# Patient Record
Sex: FEMALE | Race: WHITE | NOT HISPANIC OR LATINO | Employment: OTHER | URBAN - METROPOLITAN AREA
[De-identification: names, ages, dates, MRNs, and addresses within clinical notes are randomized per-mention and may not be internally consistent; named-entity substitution may affect disease eponyms.]

---

## 2017-07-12 ENCOUNTER — TELEPHONE (OUTPATIENT)
Dept: ORTHOPEDICS | Facility: CLINIC | Age: 67
End: 2017-07-12

## 2017-07-12 ENCOUNTER — OFFICE VISIT (OUTPATIENT)
Dept: ORTHOPEDICS | Facility: CLINIC | Age: 67
End: 2017-07-12
Payer: COMMERCIAL

## 2017-07-12 VITALS
HEART RATE: 93 BPM | BODY MASS INDEX: 28.34 KG/M2 | WEIGHT: 187 LBS | HEIGHT: 68 IN | DIASTOLIC BLOOD PRESSURE: 72 MMHG | SYSTOLIC BLOOD PRESSURE: 142 MMHG

## 2017-07-12 DIAGNOSIS — M17.12 PRIMARY OSTEOARTHRITIS OF LEFT KNEE: ICD-10-CM

## 2017-07-12 DIAGNOSIS — M25.562 LEFT KNEE PAIN: ICD-10-CM

## 2017-07-12 PROCEDURE — 99203 OFFICE O/P NEW LOW 30 MIN: CPT | Mod: ,,, | Performed by: ORTHOPAEDIC SURGERY

## 2017-07-12 PROCEDURE — 73562 X-RAY EXAM OF KNEE 3: CPT | Mod: LT,,, | Performed by: ORTHOPAEDIC SURGERY

## 2017-07-12 PROCEDURE — 1159F MED LIST DOCD IN RCRD: CPT | Mod: ,,, | Performed by: ORTHOPAEDIC SURGERY

## 2017-07-12 RX ORDER — AMLODIPINE BESYLATE 5 MG/1
1 TABLET ORAL DAILY
Refills: 1 | COMMUNITY
Start: 2017-06-05 | End: 2021-04-19 | Stop reason: DRUGHIGH

## 2017-07-12 RX ORDER — BUDESONIDE AND FORMOTEROL FUMARATE DIHYDRATE 160; 4.5 UG/1; UG/1
2 AEROSOL RESPIRATORY (INHALATION) DAILY
Refills: 5 | COMMUNITY
Start: 2017-06-05 | End: 2018-12-03

## 2017-07-12 RX ORDER — INSULIN ASPART 100 [IU]/ML
20 INJECTION, SUSPENSION SUBCUTANEOUS
Refills: 5 | Status: ON HOLD | COMMUNITY
Start: 2017-06-05 | End: 2021-05-11 | Stop reason: HOSPADM

## 2017-07-12 RX ORDER — PANCRELIPASE 60000; 12000; 38000 [USP'U]/1; [USP'U]/1; [USP'U]/1
2 CAPSULE, DELAYED RELEASE PELLETS ORAL 4 TIMES DAILY
Refills: 5 | COMMUNITY
Start: 2017-05-26

## 2017-07-17 DIAGNOSIS — M17.12 OSTEOARTHRITIS OF LEFT KNEE: Primary | ICD-10-CM

## 2017-08-04 ENCOUNTER — HISTORICAL (OUTPATIENT)
Dept: ADMINISTRATIVE | Facility: HOSPITAL | Age: 67
End: 2017-08-04

## 2017-08-04 ENCOUNTER — OFFICE VISIT (OUTPATIENT)
Dept: ORTHOPEDICS | Facility: CLINIC | Age: 67
End: 2017-08-04
Payer: MEDICARE

## 2017-08-04 VITALS
WEIGHT: 187 LBS | SYSTOLIC BLOOD PRESSURE: 150 MMHG | DIASTOLIC BLOOD PRESSURE: 99 MMHG | HEIGHT: 68 IN | BODY MASS INDEX: 28.34 KG/M2 | HEART RATE: 88 BPM

## 2017-08-04 DIAGNOSIS — M17.12 PRIMARY OSTEOARTHRITIS OF LEFT KNEE: Primary | ICD-10-CM

## 2017-08-04 LAB
ALBUMIN SERPL-MCNC: 4.3 G/DL (ref 3.1–4.7)
ALP SERPL-CCNC: 84 IU/L (ref 40–104)
ALT (SGPT): 19 IU/L (ref 3–33)
AST SERPL-CCNC: 18 IU/L (ref 10–40)
BACTERIA SPEC CULT: NORMAL
BASOPHILS NFR BLD: 0.1 K/UL (ref 0–0.2)
BASOPHILS NFR BLD: 0.8 %
BILIRUB SERPL-MCNC: 0.7 MG/DL (ref 0.3–1)
BUN SERPL-MCNC: 25 MG/DL (ref 8–20)
CALCIUM SERPL-MCNC: 9.3 MG/DL (ref 7.7–10.4)
CHLORIDE: 103 MMOL/L (ref 98–110)
CO2 SERPL-SCNC: 29.1 MMOL/L (ref 22.8–31.6)
CREATININE: 0.79 MG/DL (ref 0.6–1.4)
EOSINOPHIL NFR BLD: 0.3 K/UL (ref 0–0.7)
EOSINOPHIL NFR BLD: 3.2 %
ERYTHROCYTE [DISTWIDTH] IN BLOOD BY AUTOMATED COUNT: 12.1 % (ref 11.7–14.9)
GLUCOSE: 113 MG/DL (ref 70–99)
GRAN #: 5.1 K/UL (ref 1.4–6.5)
GRAN%: 64.2 %
HCT VFR BLD AUTO: 42.2 % (ref 36–48)
HGB BLD-MCNC: 14.3 G/DL (ref 12–15)
IMMATURE GRANS (ABS): 0.1 K/UL (ref 0–1)
IMMATURE GRANULOCYTES: 0.6 %
LYMPH #: 1.9 K/UL (ref 1.2–3.4)
LYMPH%: 23.6 %
MCH RBC QN AUTO: 31.9 PG (ref 25–35)
MCHC RBC AUTO-ENTMCNC: 33.9 G/DL (ref 31–36)
MCV RBC AUTO: 94.2 FL (ref 79–98)
MONO #: 0.6 K/UL (ref 0.1–0.6)
MONO%: 7.6 %
MRSA SCREEN BY PCR: NORMAL
NUCLEATED RBCS: 0 %
PLATELET # BLD AUTO: 196 K/UL (ref 140–440)
PMV BLD AUTO: 10.9 FL (ref 8.8–12.7)
POTASSIUM SERPL-SCNC: 4.8 MMOL/L (ref 3.5–5)
PROT SERPL-MCNC: 7.6 G/DL (ref 6–8.2)
RBC # BLD AUTO: 4.48 M/UL (ref 3.5–5.5)
RBC # BLD AUTO: NORMAL /HPF
SODIUM: 138 MMOL/L (ref 134–144)
SQUAMOUS EPITHELIAL, UA: NORMAL
WBC # BLD AUTO: 7.9 K/UL (ref 5–10)
WBC # BLD: NORMAL /HPF

## 2017-08-04 PROCEDURE — 99499 UNLISTED E&M SERVICE: CPT | Mod: ,,, | Performed by: ORTHOPAEDIC SURGERY

## 2017-08-04 RX ORDER — HYDROCODONE BITARTRATE AND ACETAMINOPHEN 7.5; 325 MG/1; MG/1
1 TABLET ORAL EVERY 12 HOURS PRN
Qty: 30 TABLET | Refills: 0 | Status: SHIPPED | OUTPATIENT
Start: 2017-08-04 | End: 2017-08-18 | Stop reason: DRUGHIGH

## 2017-08-04 NOTE — PROGRESS NOTES
Subjective:       Chief Complaint    Chief Complaint   Patient presents with    Left Knee - Pre-op Exam     DOS: will be on 08/10/2017 left total knee arthroplasty.  The purpose is to relieve the pain of the osteoarthritis left knee.       CURT Otero is a 66 y.o.  female who presents Preop for left total knee arthroplasty 08/10/2017.  Using Aleve or ibuprofen for pain relief. Works in an assisted living environment or apartment complex. We will give her Norco 7.5 one every 12 hours when necessary pain preoperatively. Been advised to completely stop taking NSAIDs. Point review of systems is negative. Previous pack a day smoker, quit in April of this past year. Occasionally gets shortness of breath. Does not have a productive cough. She also has frequent nocturia due to prolapsed bladder. This has been going on for years. She denies previous chronic infections.      Past History  Past Medical History:   Diagnosis Date    Arthritis     Asthma     Diabetes mellitus     Diabetes mellitus, type 2     Diverticulosis     HNP (herniated nucleus pulposus)     LUMBAR    Hypertension     Pancreatic insufficiency     s/p whipple, non cancer    Presence of dental bridge     UPPER     Past Surgical History:   Procedure Laterality Date    APPENDECTOMY      BACK SURGERY  1994    lower back    BACK SURGERY  2012    lower back    CHOLECYSTECTOMY      COLONOSCOPY  2009    normal, recheck 2019    TONSILLECTOMY      WHIPPLE PROCEDURE W/ LAPAROSCOPY  10/09     Social History     Social History    Marital status:      Spouse name: N/A    Number of children: N/A    Years of education: N/A     Occupational History    Not on file.     Social History Main Topics    Smoking status: Former Smoker     Packs/day: 0.00     Years: 30.00     Quit date: 04/2017    Smokeless tobacco: Never Used    Alcohol use No    Drug use: No    Sexual activity: Not on file     Other Topics Concern    Not on file      Social History Narrative    No narrative on file         Medications  Current Outpatient Prescriptions   Medication Sig    albuterol (PROVENTIL/VENTOLIN) 90 mcg/actuation inhaler Inhale 2 puffs into the lungs every 6 (six) hours as needed for Wheezing.    amlodipine (NORVASC) 5 MG tablet Take 1 tablet by mouth once daily.    CREON CpDR Take 2 capsules by mouth 4 (four) times daily.    NOVOLOG MIX 70-30 FLEXPEN 100 unit/mL (70-30) InPn pen Inject 12 Units into the skin 3 (three) times daily with meals.    SYMBICORT 160-4.5 mcg/actuation HFAA Inhale 2 puffs into the lungs once daily.    hydrocodone-acetaminophen 7.5-325mg (NORCO) 7.5-325 mg per tablet Take 1 tablet by mouth every 12 (twelve) hours as needed for Pain.     No current facility-administered medications for this visit.        Allergies  Review of patient's allergies indicates:   Allergen Reactions    Sulfa (sulfonamide antibiotics) Hives    Penicillin v      Childhood reaction, swelled         Review of Systems     Constitutional: Negative    HENT: Negative  Eyes: Negative  Respiratory: Negative  Cardiovascular: Negative  Musculoskeletal: HPI  Skin: Negative  Neurological: Negative  Hematological: Negative  Endocrine: Negative      Physical Exam    Vitals:    08/04/17 0922   BP: (!) 150/99   Pulse: 88     Physical Examination:Left knee-valgus alignment, with diffuse tenderness. Patellofemoral crepitance. Joint lines are tender. Range of motion is -°. Knee is boggy.     Skin- clear  General appearance -  well appearing, and in no distress  Mental status - awake  Neck - supple  Chest -  symmetric air entry  Heart - normal rate   Abdomen - soft      Assessment/Plan   Primary osteoarthritis of left knee  -     HME - OTHER    Other orders  -     hydrocodone-acetaminophen 7.5-325mg (NORCO) 7.5-325 mg per tablet; Take 1 tablet by mouth every 12 (twelve) hours as needed for Pain.  Dispense: 30 tablet; Refill: 0    TKA scheduled 08/10/2017. All  questions answered.        This note was dictated using voice recognition software and may contain grammatical errors.

## 2017-08-07 LAB
BACTERIA SPEC CULT: NORMAL
RBC # BLD AUTO: NORMAL /HPF
SQUAMOUS EPITHELIAL, UA: NORMAL
WBC # BLD: NORMAL /HPF

## 2017-08-10 LAB — GLUCOSE SERPL-MCNC: 202 MG/DL (ref 70–99)

## 2017-08-11 LAB
BUN SERPL-MCNC: 16 MG/DL (ref 8–20)
CALCIUM SERPL-MCNC: 8.1 MG/DL (ref 7.7–10.4)
CHLORIDE: 99 MMOL/L (ref 98–110)
CO2 SERPL-SCNC: 27.6 MMOL/L (ref 22.8–31.6)
CREATININE: 0.93 MG/DL (ref 0.6–1.4)
GLUCOSE: 256 MG/DL (ref 70–99)
HCT VFR BLD AUTO: 34.5 % (ref 36–48)
HGB BLD-MCNC: 11.4 G/DL (ref 12–15)
MCH RBC QN AUTO: 31.7 PG (ref 25–35)
MCHC RBC AUTO-ENTMCNC: 33 G/DL (ref 31–36)
MCV RBC AUTO: 95.8 FL (ref 79–98)
NUCLEATED RBCS: 0 %
PLATELET # BLD AUTO: 163 K/UL (ref 140–440)
POTASSIUM SERPL-SCNC: 4.3 MMOL/L (ref 3.5–5)
RBC # BLD AUTO: 3.6 M/UL (ref 3.5–5.5)
SODIUM: 134 MMOL/L (ref 134–144)
WBC # BLD AUTO: 9 K/UL (ref 5–10)

## 2017-08-13 LAB
HCT VFR BLD AUTO: 29.2 % (ref 36–48)
HGB BLD-MCNC: 9.6 G/DL (ref 12–15)
MCH RBC QN AUTO: 31.6 PG (ref 25–35)
MCHC RBC AUTO-ENTMCNC: 32.9 G/DL (ref 31–36)
MCV RBC AUTO: 96.1 FL (ref 79–98)
NUCLEATED RBCS: 0 %
PLATELET # BLD AUTO: 141 K/UL (ref 140–440)
RBC # BLD AUTO: 3.04 M/UL (ref 3.5–5.5)
WBC # BLD AUTO: 8.1 K/UL (ref 5–10)

## 2017-08-18 ENCOUNTER — OFFICE VISIT (OUTPATIENT)
Dept: ORTHOPEDICS | Facility: CLINIC | Age: 67
End: 2017-08-18
Payer: MEDICARE

## 2017-08-18 VITALS
DIASTOLIC BLOOD PRESSURE: 58 MMHG | WEIGHT: 187 LBS | BODY MASS INDEX: 28.34 KG/M2 | SYSTOLIC BLOOD PRESSURE: 136 MMHG | HEIGHT: 68 IN | HEART RATE: 95 BPM

## 2017-08-18 DIAGNOSIS — Z98.890 POST-OPERATIVE STATE: ICD-10-CM

## 2017-08-18 DIAGNOSIS — M17.12 PRIMARY OSTEOARTHRITIS OF LEFT KNEE: Primary | ICD-10-CM

## 2017-08-18 PROCEDURE — 99024 POSTOP FOLLOW-UP VISIT: CPT | Mod: ,,, | Performed by: ORTHOPAEDIC SURGERY

## 2017-08-18 RX ORDER — ENOXAPARIN SODIUM 100 MG/ML
30 INJECTION SUBCUTANEOUS DAILY
Refills: 0 | COMMUNITY
Start: 2017-08-10 | End: 2017-08-20

## 2017-08-18 RX ORDER — HYDROCODONE BITARTRATE AND ACETAMINOPHEN 10; 325 MG/1; MG/1
TABLET ORAL EVERY 4 HOURS PRN
COMMUNITY
End: 2017-09-12

## 2017-08-18 NOTE — PROGRESS NOTES
Subjective:       Chief Complaint    Chief Complaint   Patient presents with    Post-op Evaluation     8 days, left knee.  DOS: 8/10/17, Left TKA (Depuy Attune).  Patient states she has some swelling that started about 2-3 days ago.  Her pain is tolerable.  Home P.T. 3 times a week and using CPM machine daily.  She is using a walker to ambulate.         CURT Otero is a 66 y.o.  female who presents 8 days post total knee replacement, left knee. She hs done well. Treated with Sultana dressing.      Past History  Past Medical History:   Diagnosis Date    Arthritis     Asthma     Diabetes mellitus     Diabetes mellitus, type 2     Diverticulosis     HNP (herniated nucleus pulposus)     LUMBAR    Hypertension     Pancreatic insufficiency     s/p whipple, non cancer    Presence of dental bridge     UPPER     Past Surgical History:   Procedure Laterality Date    APPENDECTOMY      BACK SURGERY  1994    lower back    BACK SURGERY  2012    lower back    CHOLECYSTECTOMY      COLONOSCOPY  2009    normal, recheck 2019    TONSILLECTOMY      TOTAL KNEE ARTHROPLASTY Left 08/10/2017    WHIPPLE PROCEDURE W/ LAPAROSCOPY  10/09     Social History     Social History    Marital status:      Spouse name: N/A    Number of children: N/A    Years of education: N/A     Occupational History    Not on file.     Social History Main Topics    Smoking status: Former Smoker     Packs/day: 0.00     Years: 30.00     Quit date: 04/2017    Smokeless tobacco: Never Used    Alcohol use No    Drug use: No    Sexual activity: Not on file     Other Topics Concern    Not on file     Social History Narrative    No narrative on file         Medications  Current Outpatient Prescriptions   Medication Sig    albuterol (PROVENTIL/VENTOLIN) 90 mcg/actuation inhaler Inhale 2 puffs into the lungs every 6 (six) hours as needed for Wheezing.    amlodipine (NORVASC) 5 MG tablet Take 1 tablet by mouth once daily.    CREON  CpDR Take 2 capsules by mouth 4 (four) times daily.    enoxaparin (LOVENOX) 30 mg/0.3 mL Syrg Inject 30 mg into the skin once daily.    hydrocodone-acetaminophen 10-325mg (NORCO)  mg Tab Take by mouth every 4 (four) hours as needed for Pain.    NOVOLOG MIX 70-30 FLEXPEN 100 unit/mL (70-30) InPn pen Inject 12 Units into the skin 3 (three) times daily with meals.    SYMBICORT 160-4.5 mcg/actuation HFAA Inhale 2 puffs into the lungs once daily.     No current facility-administered medications for this visit.        Allergies  Review of patient's allergies indicates:   Allergen Reactions    Sulfa (sulfonamide antibiotics) Hives    Penicillin v      Childhood reaction, swelled         Review of Systems     Constitutional: Negative    HENT: Negative  Eyes: Negative  Respiratory: Negative  Cardiovascular: Negative  Musculoskeletal: HPI  Skin: Negative  Neurological: Negative  Hematological: Negative  Endocrine: Negative      Physical Exam    Vitals:    08/18/17 0950   BP: (!) 136/58   Pulse: 95     Physical Examination:Left knee demonstrates moderate swelling. Diffuse tenderness as expected. Incision looks good. Straight leg raise. Motions of -10-70°. Staples not quite ready for removal. But healing well.     Skin-  General appearance -  well appearing, and in no distress  Mental status - awake  Neck - supple  Chest -  symmetric air entry  Heart - normal rate   Abdomen - soft      Assessment/Plan   Primary osteoarthritis of left knee    Post-operative state      Will return Tuesday for staple removal. Encouraged to use ice.      This note was dictated using voice recognition software and may contain grammatical errors.

## 2017-08-22 ENCOUNTER — OFFICE VISIT (OUTPATIENT)
Dept: ORTHOPEDICS | Facility: CLINIC | Age: 67
End: 2017-08-22
Payer: MEDICARE

## 2017-08-22 VITALS
SYSTOLIC BLOOD PRESSURE: 142 MMHG | HEART RATE: 106 BPM | BODY MASS INDEX: 28.34 KG/M2 | DIASTOLIC BLOOD PRESSURE: 80 MMHG | WEIGHT: 187 LBS | HEIGHT: 68 IN

## 2017-08-22 DIAGNOSIS — M17.12 PRIMARY OSTEOARTHRITIS OF LEFT KNEE: Primary | ICD-10-CM

## 2017-08-22 DIAGNOSIS — Z98.890 POST-OPERATIVE STATE: ICD-10-CM

## 2017-08-22 DIAGNOSIS — Z96.652 S/P TOTAL KNEE ARTHROPLASTY, LEFT: ICD-10-CM

## 2017-08-22 PROCEDURE — 99024 POSTOP FOLLOW-UP VISIT: CPT | Mod: ,,, | Performed by: ORTHOPAEDIC SURGERY

## 2017-08-22 RX ORDER — HYDROCODONE BITARTRATE AND ACETAMINOPHEN 10; 325 MG/1; MG/1
1 TABLET ORAL EVERY 4 HOURS PRN
Qty: 60 TABLET | Refills: 0 | Status: SHIPPED | OUTPATIENT
Start: 2017-08-22 | End: 2019-05-09

## 2017-08-22 NOTE — PROGRESS NOTES
Subjective:       Chief Complaint    Chief Complaint   Patient presents with    Post-op Evaluation     11 days, left knee.  DOS: 8/10/17, Left TKA (Depuy Attune).  Still has some stiffness.  Patient is here to possibly have the remaining stitches removed.       CURT Otero is a 66 y.o.  female who presentsFollow-up on total knee arthroplasty for removal of remaining staples. Using CPM for at least 6 hours a day. Lives in [place].       Past History  Past Medical History:   Diagnosis Date    Arthritis     Asthma     Diabetes mellitus     Diabetes mellitus, type 2     Diverticulosis     HNP (herniated nucleus pulposus)     LUMBAR    Hypertension     Pancreatic insufficiency     s/p whipple, non cancer    Presence of dental bridge     UPPER     Past Surgical History:   Procedure Laterality Date    APPENDECTOMY      BACK SURGERY  1994    lower back    BACK SURGERY  2012    lower back    CHOLECYSTECTOMY      COLONOSCOPY  2009    normal, recheck 2019    TONSILLECTOMY      TOTAL KNEE ARTHROPLASTY Left 08/10/2017    WHIPPLE PROCEDURE W/ LAPAROSCOPY  10/09     Social History     Social History    Marital status:      Spouse name: N/A    Number of children: N/A    Years of education: N/A     Occupational History    Not on file.     Social History Main Topics    Smoking status: Former Smoker     Packs/day: 0.00     Years: 30.00     Quit date: 04/2017    Smokeless tobacco: Never Used    Alcohol use No    Drug use: No    Sexual activity: Not on file     Other Topics Concern    Not on file     Social History Narrative    No narrative on file         Medications  Current Outpatient Prescriptions   Medication Sig    albuterol (PROVENTIL/VENTOLIN) 90 mcg/actuation inhaler Inhale 2 puffs into the lungs every 6 (six) hours as needed for Wheezing.    amlodipine (NORVASC) 5 MG tablet Take 1 tablet by mouth once daily.    CREON CpDR Take 2 capsules by mouth 4 (four) times daily.     hydrocodone-acetaminophen 10-325mg (NORCO)  mg Tab Take by mouth every 4 (four) hours as needed for Pain.    NOVOLOG MIX 70-30 FLEXPEN 100 unit/mL (70-30) InPn pen Inject 12 Units into the skin 3 (three) times daily with meals.    SYMBICORT 160-4.5 mcg/actuation HFAA Inhale 2 puffs into the lungs once daily.    hydrocodone-acetaminophen 10-325mg (NORCO)  mg Tab Take 1 tablet by mouth every 4 (four) hours as needed for Pain.     No current facility-administered medications for this visit.        Allergies  Review of patient's allergies indicates:   Allergen Reactions    Sulfa (sulfonamide antibiotics) Hives    Penicillin v      Childhood reaction, swelled         Review of Systems     Constitutional: Negative    HENT: Negative  Eyes: Negative  Respiratory: Negative  Cardiovascular: Negative  Musculoskeletal: HPI  Skin: Negative  Neurological: Negative  Hematological: Negative  Endocrine: Negative      Physical Exam    Vitals:    08/22/17 0918   BP: (!) 142/80   Pulse: 106     Physical Examination:Moderate swelling of the left knee. -25° to 90°.. Remaining staples removed..     Skin-clear  General appearance -  well appearing, and in no distress  Mental status - awake  Neck - supple  Chest -  symmetric air entry  Heart - normal rate   Abdomen - soft      Assessment/Plan   Primary osteoarthritis of left knee  -     hydrocodone-acetaminophen 10-325mg (NORCO)  mg Tab; Take 1 tablet by mouth every 4 (four) hours as needed for Pain.  Dispense: 60 tablet; Refill: 0    Post-operative state  -     hydrocodone-acetaminophen 10-325mg (NORCO)  mg Tab; Take 1 tablet by mouth every 4 (four) hours as needed for Pain.  Dispense: 60 tablet; Refill: 0    S/P total knee arthroplasty, left  -     hydrocodone-acetaminophen 10-325mg (NORCO)  mg Tab; Take 1 tablet by mouth every 4 (four) hours as needed for Pain.  Dispense: 60 tablet; Refill: 0      Needs to work on quad control and full  extension.      This note was dictated using voice recognition software and may contain grammatical errors.

## 2017-09-12 ENCOUNTER — OFFICE VISIT (OUTPATIENT)
Dept: ORTHOPEDICS | Facility: CLINIC | Age: 67
End: 2017-09-12
Payer: MEDICARE

## 2017-09-12 VITALS
HEIGHT: 68 IN | DIASTOLIC BLOOD PRESSURE: 80 MMHG | BODY MASS INDEX: 28.34 KG/M2 | SYSTOLIC BLOOD PRESSURE: 138 MMHG | HEART RATE: 104 BPM | WEIGHT: 187 LBS

## 2017-09-12 DIAGNOSIS — Z96.652 S/P TOTAL KNEE ARTHROPLASTY, LEFT: Primary | ICD-10-CM

## 2017-09-12 DIAGNOSIS — M17.12 PRIMARY OSTEOARTHRITIS OF LEFT KNEE: ICD-10-CM

## 2017-09-12 PROCEDURE — 99024 POSTOP FOLLOW-UP VISIT: CPT | Mod: ,,, | Performed by: ORTHOPAEDIC SURGERY

## 2017-09-12 NOTE — PROGRESS NOTES
Subjective:       Chief Complaint    Chief Complaint   Patient presents with    Post-op Evaluation     4 weeks & 5 days, left knee.  DOS: 8/10/17, Left TKA (Depuy Attune).  Patient reports she still has pain at times but doing well.  Patient is using a walker.  Home P.T. will end tomorrow.        HPI  Maddie Otero is a 66 y.o.  female who presents 5 weeks post total knee arthroplasty on the left. Doing extremely well. Using 1 pain pole daily. Has good quad control      Past History  Past Medical History:   Diagnosis Date    Arthritis     Asthma     Diabetes mellitus     Diabetes mellitus, type 2     Diverticulosis     HNP (herniated nucleus pulposus)     LUMBAR    Hypertension     Pancreatic insufficiency     s/p whipple, non cancer    Presence of dental bridge     UPPER     Past Surgical History:   Procedure Laterality Date    APPENDECTOMY      BACK SURGERY  1994    lower back    BACK SURGERY  2012    lower back    CHOLECYSTECTOMY      COLONOSCOPY  2009    normal, recheck 2019    TONSILLECTOMY      TOTAL KNEE ARTHROPLASTY Left 08/10/2017    WHIPPLE PROCEDURE W/ LAPAROSCOPY  10/09     Social History     Social History    Marital status:      Spouse name: N/A    Number of children: N/A    Years of education: N/A     Occupational History    Not on file.     Social History Main Topics    Smoking status: Former Smoker     Packs/day: 0.00     Years: 30.00     Quit date: 04/2017    Smokeless tobacco: Never Used    Alcohol use No    Drug use: No    Sexual activity: Not on file     Other Topics Concern    Not on file     Social History Narrative    No narrative on file         Medications  Current Outpatient Prescriptions   Medication Sig    albuterol (PROVENTIL/VENTOLIN) 90 mcg/actuation inhaler Inhale 2 puffs into the lungs every 6 (six) hours as needed for Wheezing.    amlodipine (NORVASC) 5 MG tablet Take 1 tablet by mouth once daily.    CREON CpDR Take 2 capsules by mouth 4  (four) times daily.    hydrocodone-acetaminophen 10-325mg (NORCO)  mg Tab Take 1 tablet by mouth every 4 (four) hours as needed for Pain. (Patient taking differently: Take 1 tablet by mouth every 12 (twelve) hours as needed for Pain. )    NOVOLOG MIX 70-30 FLEXPEN 100 unit/mL (70-30) InPn pen Inject 12 Units into the skin 3 (three) times daily with meals.    SYMBICORT 160-4.5 mcg/actuation HFAA Inhale 2 puffs into the lungs once daily.     No current facility-administered medications for this visit.        Allergies  Review of patient's allergies indicates:   Allergen Reactions    Sulfa (sulfonamide antibiotics) Hives    Penicillin v      Childhood reaction, swelled         Review of Systems     Constitutional: Negative    HENT: Negative  Eyes: Negative  Respiratory: Negative  Cardiovascular: Negative  Musculoskeletal: HPI  Skin: Negative  Neurological: Negative  Hematological: Negative  Endocrine: Negative      Physical Exam    Vitals:    09/12/17 1501   BP: 138/80   Pulse: 104     Physical Examination:Age motion. Left knees of -8° to 110°. Good alignment. Mild swelling. No redness.     Skin-  General appearance -  well appearing, and in no distress  Mental status - awake  Neck - supple  Chest -  symmetric air entry  Heart - normal rate   Abdomen - soft      Assessment/Plan   S/P total knee arthroplasty, left    Primary osteoarthritis of left knee      Requesting outpatient physical therapy at [facility].    This note was dictated using voice recognition software and may contain grammatical errors.

## 2017-09-19 ENCOUNTER — TELEPHONE (OUTPATIENT)
Dept: ORTHOPEDICS | Facility: CLINIC | Age: 67
End: 2017-09-19

## 2017-09-19 DIAGNOSIS — M17.12 PRIMARY OSTEOARTHRITIS OF LEFT KNEE: ICD-10-CM

## 2017-09-19 DIAGNOSIS — Z96.652 S/P TKR (TOTAL KNEE REPLACEMENT), LEFT: Primary | ICD-10-CM

## 2017-09-19 NOTE — TELEPHONE ENCOUNTER
----- Message from Tamara Condon sent at 9/19/2017  8:56 AM CDT -----  Contact: PATIENT  NEEDS PHYSICAL THERAPY  ORDER FAXED TO [facility].    PATIENT Ph# [phone]

## 2017-10-03 ENCOUNTER — OFFICE VISIT (OUTPATIENT)
Dept: ORTHOPEDICS | Facility: CLINIC | Age: 67
End: 2017-10-03
Payer: MEDICARE

## 2017-10-03 VITALS
HEIGHT: 68 IN | WEIGHT: 187 LBS | HEART RATE: 103 BPM | DIASTOLIC BLOOD PRESSURE: 83 MMHG | BODY MASS INDEX: 28.34 KG/M2 | SYSTOLIC BLOOD PRESSURE: 157 MMHG

## 2017-10-03 DIAGNOSIS — M17.12 PRIMARY OSTEOARTHRITIS OF LEFT KNEE: ICD-10-CM

## 2017-10-03 DIAGNOSIS — Z98.890 POST-OPERATIVE STATE: Primary | ICD-10-CM

## 2017-10-03 DIAGNOSIS — Z96.652 S/P TOTAL KNEE ARTHROPLASTY, LEFT: ICD-10-CM

## 2017-10-03 PROCEDURE — 99024 POSTOP FOLLOW-UP VISIT: CPT | Mod: ,,, | Performed by: ORTHOPAEDIC SURGERY

## 2017-10-03 NOTE — PROGRESS NOTES
Subjective:       Chief Complaint    Chief Complaint   Patient presents with    Post-op Evaluation      7 weeks & 5 days, left knee.  DOS: 8/10/17, Left TKA (Depuy Attune). Her left knee is doing well.  She is attending physical therapy 2-3 days a week.       CURT Otero is a 66 y.o.  female who presents State weeks postop left total knee arthroplasty. Doing extremely well.      Past History  Past Medical History:   Diagnosis Date    Arthritis     Asthma     Diabetes mellitus     Diabetes mellitus, type 2     Diverticulosis     HNP (herniated nucleus pulposus)     LUMBAR    Hypertension     Pancreatic insufficiency     s/p whipple, non cancer    Presence of dental bridge     UPPER     Past Surgical History:   Procedure Laterality Date    APPENDECTOMY      BACK SURGERY  1994    lower back    BACK SURGERY  2012    lower back    CHOLECYSTECTOMY      COLONOSCOPY  2009    normal, recheck 2019    TONSILLECTOMY      TOTAL KNEE ARTHROPLASTY Left 08/10/2017    WHIPPLE PROCEDURE W/ LAPAROSCOPY  10/09     Social History     Social History    Marital status:      Spouse name: N/A    Number of children: N/A    Years of education: N/A     Occupational History    Not on file.     Social History Main Topics    Smoking status: Former Smoker     Packs/day: 0.00     Years: 30.00     Quit date: 04/2017    Smokeless tobacco: Never Used    Alcohol use No    Drug use: No    Sexual activity: Not on file     Other Topics Concern    Not on file     Social History Narrative    No narrative on file         Medications  Current Outpatient Prescriptions   Medication Sig    albuterol (PROVENTIL/VENTOLIN) 90 mcg/actuation inhaler Inhale 2 puffs into the lungs every 6 (six) hours as needed for Wheezing.    amlodipine (NORVASC) 5 MG tablet Take 1 tablet by mouth once daily.    CREON CpDR Take 2 capsules by mouth 4 (four) times daily.    hydrocodone-acetaminophen 10-325mg (NORCO)  mg Tab Take 1  tablet by mouth every 4 (four) hours as needed for Pain. (Patient taking differently: Take 1 tablet by mouth every 12 (twelve) hours as needed for Pain. )    NOVOLOG MIX 70-30 FLEXPEN 100 unit/mL (70-30) InPn pen Inject 12 Units into the skin 3 (three) times daily with meals.    SYMBICORT 160-4.5 mcg/actuation HFAA Inhale 2 puffs into the lungs once daily.     No current facility-administered medications for this visit.        Allergies  Review of patient's allergies indicates:   Allergen Reactions    Sulfa (sulfonamide antibiotics) Hives    Penicillin v      Childhood reaction, swelled         Review of Systems     Constitutional: Negative    HENT: Negative  Eyes: Negative  Respiratory: Negative  Cardiovascular: Negative  Musculoskeletal: HPI  Skin: Negative  Neurological: Negative  Hematological: Negative  Endocrine: Negative      Physical Exam    Vitals:    10/03/17 0853   BP: (!) 157/83   Pulse: 103     Physical Examination:Left knee range of motion -5° to 110°. Good alignment. Minimal tenderness. Mild swelling.     Skin-  General appearance -  well appearing, and in no distress  Mental status - awake  Neck - supple  Chest -  symmetric air entry  Heart - normal rate   Abdomen - soft      Assessment/Plan   Post-operative state    S/P total knee arthroplasty, left    Primary osteoarthritis of left knee      She's had 4 physical therapy visits. Continue with the physical therapy. She is very pleased with her progress and her therapist.      This note was dictated using voice recognition software and may contain grammatical errors.

## 2017-11-03 ENCOUNTER — OFFICE VISIT (OUTPATIENT)
Dept: ORTHOPEDICS | Facility: CLINIC | Age: 67
End: 2017-11-03
Payer: MEDICARE

## 2017-11-03 VITALS
HEART RATE: 104 BPM | BODY MASS INDEX: 28.34 KG/M2 | SYSTOLIC BLOOD PRESSURE: 132 MMHG | DIASTOLIC BLOOD PRESSURE: 78 MMHG | HEIGHT: 68 IN | WEIGHT: 187 LBS

## 2017-11-03 DIAGNOSIS — Z96.652 S/P TKR (TOTAL KNEE REPLACEMENT), LEFT: Primary | ICD-10-CM

## 2017-11-03 PROCEDURE — 99212 OFFICE O/P EST SF 10 MIN: CPT | Mod: ,,, | Performed by: ORTHOPAEDIC SURGERY

## 2017-11-03 NOTE — PROGRESS NOTES
Subjective:       Chief Complaint    Chief Complaint   Patient presents with    Post-op Evaluation     12 weeks & 1 day, left knee.  DOS: 8/10/17, Left TKA (Depuy Attune).  Patient reports a twinge a pain off and on but overall doing well.  Completed P.T.        HPI  Maddie Otero is a 66 y.o.  female who presents Follow-up on left total knee replacement. She doing very well and is ready to return to work 11/06/2017.      Past History  Past Medical History:   Diagnosis Date    Arthritis     Asthma     Diabetes mellitus     Diabetes mellitus, type 2     Diverticulosis     HNP (herniated nucleus pulposus)     LUMBAR    Hypertension     Pancreatic insufficiency     s/p whipple, non cancer    Presence of dental bridge     UPPER     Past Surgical History:   Procedure Laterality Date    APPENDECTOMY      BACK SURGERY  1994    lower back    BACK SURGERY  2012    lower back    CHOLECYSTECTOMY      COLONOSCOPY  2009    normal, recheck 2019    TONSILLECTOMY      TOTAL KNEE ARTHROPLASTY Left 08/10/2017    WHIPPLE PROCEDURE W/ LAPAROSCOPY  10/09     Social History     Social History    Marital status:      Spouse name: N/A    Number of children: N/A    Years of education: N/A     Occupational History    Not on file.     Social History Main Topics    Smoking status: Former Smoker     Packs/day: 0.00     Years: 30.00     Quit date: 04/2017    Smokeless tobacco: Never Used    Alcohol use No    Drug use: No    Sexual activity: Not on file     Other Topics Concern    Not on file     Social History Narrative    No narrative on file         Medications  Current Outpatient Prescriptions   Medication Sig    albuterol (PROVENTIL/VENTOLIN) 90 mcg/actuation inhaler Inhale 2 puffs into the lungs every 6 (six) hours as needed for Wheezing.    amlodipine (NORVASC) 5 MG tablet Take 1 tablet by mouth once daily.    CREON CpDR Take 2 capsules by mouth 4 (four) times daily.    hydrocodone-acetaminophen  10-325mg (NORCO)  mg Tab Take 1 tablet by mouth every 4 (four) hours as needed for Pain. (Patient taking differently: Take 1 tablet by mouth every 12 (twelve) hours as needed for Pain. )    NOVOLOG MIX 70-30 FLEXPEN 100 unit/mL (70-30) InPn pen Inject 12 Units into the skin 3 (three) times daily with meals.    SYMBICORT 160-4.5 mcg/actuation HFAA Inhale 2 puffs into the lungs once daily.     No current facility-administered medications for this visit.        Allergies  Review of patient's allergies indicates:   Allergen Reactions    Sulfa (sulfonamide antibiotics) Hives    Penicillin v      Childhood reaction, swelled         Review of Systems     Constitutional: Negative    HENT: Negative  Eyes: Negative  Respiratory: Negative  Cardiovascular: Negative  Musculoskeletal: HPI  Skin: Negative  Neurological: Negative  Hematological: Negative  Endocrine: Negative      Physical Exam    Vitals:    11/03/17 0810   BP: 132/78   Pulse: 104     Physical Examination:Left knee range of motion 0-120°. Swelling, nontender. Excellent alignment. Stable.     Skin-  General appearance -  well appearing, and in no distress  Mental status - awake  Neck - supple  Chest -  symmetric air entry  Heart - normal rate   Abdomen - soft      Assessment/Plan   S/P TKR (total knee replacement), left      Fit for duty with no restrictions on 6 2017.      This note was dictated using voice recognition software and may contain grammatical errors.

## 2018-12-03 ENCOUNTER — DOCUMENTATION ONLY (OUTPATIENT)
Dept: PULMONOLOGY | Facility: CLINIC | Age: 68
End: 2018-12-03

## 2018-12-03 ENCOUNTER — OFFICE VISIT (OUTPATIENT)
Dept: PULMONOLOGY | Facility: CLINIC | Age: 68
End: 2018-12-03
Payer: MEDICARE

## 2018-12-03 VITALS
SYSTOLIC BLOOD PRESSURE: 140 MMHG | HEART RATE: 100 BPM | HEIGHT: 68 IN | OXYGEN SATURATION: 95 % | WEIGHT: 201 LBS | BODY MASS INDEX: 30.46 KG/M2 | DIASTOLIC BLOOD PRESSURE: 85 MMHG

## 2018-12-03 DIAGNOSIS — Z87.891 PERSONAL HISTORY OF TOBACCO USE, PRESENTING HAZARDS TO HEALTH: ICD-10-CM

## 2018-12-03 DIAGNOSIS — J44.9 CHRONIC OBSTRUCTIVE PULMONARY DISEASE, UNSPECIFIED COPD TYPE: ICD-10-CM

## 2018-12-03 DIAGNOSIS — R06.00 DYSPNEA, UNSPECIFIED TYPE: ICD-10-CM

## 2018-12-03 DIAGNOSIS — R09.02 HYPOXEMIA: Primary | ICD-10-CM

## 2018-12-03 PROCEDURE — 3077F SYST BP >= 140 MM HG: CPT | Mod: ,,, | Performed by: INTERNAL MEDICINE

## 2018-12-03 PROCEDURE — 99205 OFFICE O/P NEW HI 60 MIN: CPT | Mod: 25,,, | Performed by: INTERNAL MEDICINE

## 2018-12-03 PROCEDURE — 3079F DIAST BP 80-89 MM HG: CPT | Mod: ,,, | Performed by: INTERNAL MEDICINE

## 2018-12-03 PROCEDURE — 1101F PT FALLS ASSESS-DOCD LE1/YR: CPT | Mod: ,,, | Performed by: INTERNAL MEDICINE

## 2018-12-03 RX ORDER — IPRATROPIUM BROMIDE AND ALBUTEROL SULFATE 2.5; .5 MG/3ML; MG/3ML
3 SOLUTION RESPIRATORY (INHALATION) EVERY 4 HOURS PRN
COMMUNITY
End: 2021-05-05 | Stop reason: CLARIF

## 2018-12-03 RX ORDER — CETIRIZINE HYDROCHLORIDE 10 MG/1
10 TABLET ORAL DAILY
COMMUNITY

## 2018-12-03 RX ORDER — IPRATROPIUM BROMIDE 0.5 MG/2.5ML
500 SOLUTION RESPIRATORY (INHALATION) 4 TIMES DAILY
COMMUNITY
End: 2018-12-03

## 2018-12-03 NOTE — PROGRESS NOTES
Roseanne Mcneil M.D., F.C.C.P.  Pulmonary & Critical Care Medicine    [address], [address]  [place] [address]  [phone]      PFT's / Spirometry Results    Patient Name: Maddie Otero  YOB: 1950    Date of Study: 12/3/18    Spirometry: Severe obstruction    Lung Volume: No restriction    Diffusion Capacity: Severe diffusion defect.    Response to Bronchodilators: No change with Ventolin.     Comments:         This note was electronically signed by Roseanne Mcneil MD

## 2018-12-03 NOTE — PROGRESS NOTES
Roseanne Mcneil M.D., F.C.C.P.  Pulmonary & Critical Care Medicine    [address], [address]  [place] [address]  [phone]    Patient: Maddie Otero         YOB: 1950    Diagnosis: Dyspnea      Date of Study: 12/3/18                            Room Air                      O2:  2 liters                                          SpO2    Heart Rate  SpO2  Heart Rate  Rest 95 106 98 110   1 min 93 112 97 112   2 min 90 117 92 117   3 min 87 116 91 119   4 min   91 121   5 min   91 122   6 min   91 122   Distance    755 feet           Recovery       1 min   96 115   2 min   96 112     Comments:       Impression:    Hypoxemic walk, needs to be on 2 liters of oxygen.       Roseanne Mcneil MD        This note was electronically signed by Roseanne Mcneil MD

## 2018-12-03 NOTE — PROGRESS NOTES
SUBJECTIVE:    Patient ID: Maddie Otero is a 68 y.o. female.    Chief Complaint: Shortness of Breath    HPI  The patient is a new patient who is here with increasing shortness of breath since early this year.  She was hospitalized in June at [place]. She left not feeling much better and is progressively more dyspneic as time goes on.  No peak flow.  She coughs and wheezes, her sputum is green. Last on Cipro a few months ago.    Past Medical History:   Diagnosis Date    Arthritis     Asthma     Diabetes mellitus     Diabetes mellitus, type 2     Diverticulosis     HNP (herniated nucleus pulposus)     LUMBAR    Hypertension     Pancreatic insufficiency     s/p whipple, non cancer    Presence of dental bridge     UPPER     Past Surgical History:   Procedure Laterality Date    APPENDECTOMY      BACK SURGERY  1994    lower back    BACK SURGERY  2012    lower back    CHOLECYSTECTOMY      LAMINECTOMY, SPINE, LUMBAR, WITH DISCECTOMY Left 12/3/2012    [facility] OR    TONSILLECTOMY      TOTAL KNEE ARTHROPLASTY Left 08/10/2017    WHIPPLE PROCEDURE W/ LAPAROSCOPY  10/09     Family History   Problem Relation Age of Onset    Diabetes Mother     Cancer Father     Hypertension Neg Hx     Hyperlipidemia Neg Hx         Social History:   Marital Status:   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  Tobacco History:  reports that she quit smoking about 20 months ago. Her smoking use included cigarettes. She has a 30.00 pack-year smoking history. she has never used smokeless tobacco.  Drug History:  reports that she does not use drugs.    Review of patient's allergies indicates:   Allergen Reactions    Sulfa (sulfonamide antibiotics) Hives    Penicillin v      Childhood reaction, swelled       Current Outpatient Medications   Medication Sig Dispense Refill    albuterol (PROVENTIL/VENTOLIN) 90 mcg/actuation  "inhaler Inhale 2 puffs into the lungs every 6 (six) hours as needed for Wheezing. 1 each 0    albuterol-ipratropium (DUO-NEB) 2.5 mg-0.5 mg/3 mL nebulizer solution Take 3 mLs by nebulization every 4 (four) hours as needed for Wheezing. Rescue      amlodipine (NORVASC) 5 MG tablet Take 1 tablet by mouth once daily.  1    cetirizine (ZYRTEC) 10 MG tablet Take 10 mg by mouth once daily.      CREON CpDR Take 2 capsules by mouth 4 (four) times daily.  5    NOVOLOG MIX 70-30 FLEXPEN 100 unit/mL (70-30) InPn pen Inject 12 Units into the skin 3 (three) times daily with meals.  5    ranitidine (ZANTAC) 150 MG tablet Take 150 mg by mouth 2 (two) times daily.      fluticasone-umeclidin-vilanter (TRELEGY ELLIPTA) 100-62.5-25 mcg DsDv Inhale 1 puff into the lungs once daily. 1 each 11    hydrocodone-acetaminophen 10-325mg (NORCO)  mg Tab Take 1 tablet by mouth every 4 (four) hours as needed for Pain. (Patient taking differently: Take 1 tablet by mouth every 12 (twelve) hours as needed for Pain. ) 60 tablet 0     No current facility-administered medications for this visit.        Alpha-1 Antitrypsin:  Last PFT:   Last CT:    Review of Systems  General: Feeling poorly.  Eyes: Vision is good with glasses.  ENT:  "Keeps sinuses" mucus is clear  Heart:: No chest pain or palpitations.  Lungs: Coughing and wheezing and coughing up green sputum especially at night.  Coughs her head off at night.  GI: Nauseated now, abdomen hurts.  : Nocturia 5-6, drinks a lot of water.  Musculoskeletal: Muscle cramps.  Skin: No lesions or rashes.  Neuro: No headaches or neuropathy.  Lymph: No edema or adenopathy.  Psych: Anxiety and depression  Endo: Gained 20-25 pounds in last year.    OBJECTIVE:      BP (!) 140/85 (BP Location: Right leg, Patient Position: Sitting)   Pulse 100   Ht 5' 8" (1.727 m)   Wt 91.2 kg (201 lb)   SpO2 95%   BMI 30.56 kg/m²     Physical Exam  GENERAL: Older patient in no distress.  HEENT: Pupils equal and " reactive. Extraocular movements intact. Nose intact.      Pharynx moist.  NECK: Supple.   HEART: Regular rate and rhythm. No murmur or gallop auscultated.  LUNGS: Diffuse expiratory wheezing with prolonged expiratory phase.  Sputum is tan.  ABDOMEN: Bowel sounds present. Non-tender, no masses palpated.  EXTREMITIES: Normal muscle tone and joint movement, no cyanosis or clubbing.   LYMPHATICS: No adenopathy palpated, no edema.  SKIN: Dry, intact, no lesions.   NEURO: Cranial nerves II-XII intact. Motor strength 5/5 bilaterally, upper and lower extremities.  PSYCH: Appropriate affect.    Taught to use MDI and ellipta devices correctly.    Assessment:       1. Hypoxemia    2. Chronic obstructive pulmonary disease, unspecified COPD type    3. Dyspnea, unspecified type    4. Personal history of tobacco use, presenting hazards to health          Plan:       Hypoxemia  -     OXYGEN FOR HOME USE    Chronic obstructive pulmonary disease, unspecified COPD type  -     Culture, Respiratory with Gram Stain    Dyspnea, unspecified type    Personal history of tobacco use, presenting hazards to health  -     CT Chest Lung Screening Low Dose    Other orders  -     fluticasone-umeclidin-vilanter (TRELEGY ELLIPTA) 100-62.5-25 mcg DsDv; Inhale 1 puff into the lungs once daily.  Dispense: 1 each; Refill: 11         Follow-up in about 2 months (around 2/3/2019).    Trelegy daily, rinse mouth after  Stop Symbicort  Continue Ventolin or nebulizer as needed, not more than 4-6 times a day  6 minute walk-- done shows patient desaturates while walking and needs 2 ltiers oxygen at all times.  Therefore, will order 2 liters pulsed with days and continuous with sleep.  Sputum culture  Screening CT

## 2018-12-06 ENCOUNTER — TELEPHONE (OUTPATIENT)
Dept: PULMONOLOGY | Facility: CLINIC | Age: 68
End: 2018-12-06

## 2018-12-06 NOTE — TELEPHONE ENCOUNTER
----- Message from Uli Manrique MA sent at 12/6/2018 10:23 AM CST -----  Spoke to her and told her I refaxed o2 orders to [facility] and if she doesn't hear from anyone by Tuesday to give me a call  ----- Message -----  From: Yesika Higgins  Sent: 12/5/2018   2:11 PM  To: Uli Manrique MA    Want to know what you found out about her oxygen. Please call

## 2018-12-07 LAB
BETA LACTAMASE: NORMAL

## 2018-12-10 ENCOUNTER — TELEPHONE (OUTPATIENT)
Dept: PULMONOLOGY | Facility: CLINIC | Age: 68
End: 2018-12-10

## 2018-12-10 DIAGNOSIS — J40 BRONCHITIS: Primary | ICD-10-CM

## 2018-12-10 RX ORDER — LEVOFLOXACIN 500 MG/1
500 TABLET, FILM COATED ORAL DAILY
Qty: 7 TABLET | Refills: 0 | Status: SHIPPED | OUTPATIENT
Start: 2018-12-10 | End: 2019-05-09

## 2018-12-10 NOTE — TELEPHONE ENCOUNTER
Sputum growing a heavy growth of beta lactamase positive [place].  Will call out a week of Levaquin.  No answer, left message.    Spoke with daughter.

## 2018-12-13 ENCOUNTER — TELEPHONE (OUTPATIENT)
Dept: PULMONOLOGY | Facility: CLINIC | Age: 68
End: 2018-12-13

## 2018-12-13 NOTE — TELEPHONE ENCOUNTER
CT shows basilar tree in bud and bronchial wall thickening.  She also has emphysema.   Will repeat film after Levaquin and see how she is feeling.  Will need to add emphysema to problem list next visit.  Spoke with the daughter who feels she is still coughing up a lot of mucus.  She will call back Monday if this continues.

## 2018-12-17 ENCOUNTER — TELEPHONE (OUTPATIENT)
Dept: PULMONOLOGY | Facility: CLINIC | Age: 68
End: 2018-12-17

## 2018-12-17 DIAGNOSIS — J40 BRONCHITIS DUE TO MORAXELLA CATARRHALIS: Primary | ICD-10-CM

## 2018-12-17 DIAGNOSIS — B96.89 BRONCHITIS DUE TO MORAXELLA CATARRHALIS: Primary | ICD-10-CM

## 2018-12-17 RX ORDER — LEVOFLOXACIN 500 MG/1
500 TABLET, FILM COATED ORAL DAILY
Qty: 7 TABLET | Refills: 0 | Status: SHIPPED | OUTPATIENT
Start: 2018-12-17 | End: 2019-08-22

## 2019-01-02 ENCOUNTER — TELEPHONE (OUTPATIENT)
Dept: PULMONOLOGY | Facility: CLINIC | Age: 69
End: 2019-01-02

## 2019-01-02 DIAGNOSIS — R05.9 COUGH: Primary | ICD-10-CM

## 2019-01-02 NOTE — TELEPHONE ENCOUNTER
"----- Message from Pablitobeck Morgan sent at 1/2/2019 11:42 AM CST -----  Contact: Self  This is DAYANNA    The patient called stating she is finished her 2nd round of antibiotics, she is feeling a little better, however she still is coughing up "stuff", and wanted to know if she needs to continue to be on the oxygen all the time.  She is requesting that you call her back at your earliest convenience.  Thanks in advance.    DAYANNA,   "

## 2019-01-02 NOTE — TELEPHONE ENCOUNTER
I spoke with her she needs a cbc with diff, cmp, IgG with subclasses, IgE, 3 first morning sputum cultures for afb and fungus.  I sent the order.

## 2019-01-03 LAB
ALBUMIN SERPL-MCNC: 3.6 G/DL (ref 3.1–4.7)
ALP SERPL-CCNC: 69 IU/L (ref 40–104)
ALT (SGPT): 16 IU/L (ref 3–33)
AST SERPL-CCNC: 24 IU/L (ref 10–40)
BASOPHILS NFR BLD: 0 K/UL (ref 0–0.2)
BASOPHILS NFR BLD: 0.6 %
BILIRUB SERPL-MCNC: 1.1 MG/DL (ref 0.3–1)
BUN SERPL-MCNC: 18 MG/DL (ref 8–20)
CALCIUM SERPL-MCNC: 9 MG/DL (ref 7.7–10.4)
CHLORIDE: 104 MMOL/L (ref 98–110)
CO2 SERPL-SCNC: 25.4 MMOL/L (ref 22.8–31.6)
CREATININE: 0.79 MG/DL (ref 0.6–1.4)
EOSINOPHIL NFR BLD: 0.2 K/UL (ref 0–0.7)
EOSINOPHIL NFR BLD: 3.2 %
ERYTHROCYTE [DISTWIDTH] IN BLOOD BY AUTOMATED COUNT: 13.2 % (ref 11.7–14.9)
GLUCOSE: 187 MG/DL (ref 70–99)
GRAN #: 4.6 K/UL (ref 1.4–6.5)
GRAN%: 64.6 %
HCT VFR BLD AUTO: 42.9 % (ref 36–48)
HGB BLD-MCNC: 13.9 G/DL (ref 12–15)
IMMATURE GRANS (ABS): 0 K/UL (ref 0–1)
IMMATURE GRANULOCYTES: 0.6 %
LYMPH #: 1.7 K/UL (ref 1.2–3.4)
LYMPH%: 23.5 %
MCH RBC QN AUTO: 30.6 PG (ref 25–35)
MCHC RBC AUTO-ENTMCNC: 32.4 G/DL (ref 31–36)
MCV RBC AUTO: 94.5 FL (ref 79–98)
MONO #: 0.5 K/UL (ref 0.1–0.6)
MONO%: 7.5 %
NUCLEATED RBCS: 0 %
PLATELET # BLD AUTO: 210 K/UL (ref 140–440)
PMV BLD AUTO: 11.6 FL (ref 8.8–12.7)
POTASSIUM SERPL-SCNC: 4.3 MMOL/L (ref 3.5–5)
PROT SERPL-MCNC: 7.4 G/DL (ref 6–8.2)
RBC # BLD AUTO: 4.54 M/UL (ref 3.5–5.5)
SODIUM: 137 MMOL/L (ref 134–144)
WBC # BLD AUTO: 7.1 K/UL (ref 5–10)

## 2019-01-06 LAB — IGE: 194 IU/ML (ref 0–100)

## 2019-01-08 LAB
BETA LACTAMASE: NORMAL

## 2019-01-10 ENCOUNTER — TELEPHONE (OUTPATIENT)
Dept: PULMONOLOGY | Facility: CLINIC | Age: 69
End: 2019-01-10

## 2019-01-10 NOTE — TELEPHONE ENCOUNTER
Patients sputum with sensitive pseudomonas aeruginosa and Haemophilus influenza.  CBC fine, IgG with no deficiency.  IgE elevated.  She needs an appointment next week to evaluate her.

## 2019-01-14 ENCOUNTER — OFFICE VISIT (OUTPATIENT)
Dept: PULMONOLOGY | Facility: CLINIC | Age: 69
End: 2019-01-14
Payer: MEDICARE

## 2019-01-14 VITALS
WEIGHT: 203 LBS | SYSTOLIC BLOOD PRESSURE: 120 MMHG | HEIGHT: 68 IN | HEART RATE: 103 BPM | BODY MASS INDEX: 30.77 KG/M2 | DIASTOLIC BLOOD PRESSURE: 90 MMHG | OXYGEN SATURATION: 97 %

## 2019-01-14 DIAGNOSIS — J44.9 CHRONIC OBSTRUCTIVE PULMONARY DISEASE, UNSPECIFIED COPD TYPE: Primary | ICD-10-CM

## 2019-01-14 DIAGNOSIS — J43.2 CENTRILOBULAR EMPHYSEMA: ICD-10-CM

## 2019-01-14 DIAGNOSIS — J44.89 CHRONIC OBSTRUCTIVE BRONCHITIS: ICD-10-CM

## 2019-01-14 PROCEDURE — 90662 FLU VACCINE - HIGH DOSE (65+) PRESERVATIVE FREE IM: ICD-10-PCS | Mod: ,,, | Performed by: INTERNAL MEDICINE

## 2019-01-14 PROCEDURE — 1101F PR PT FALLS ASSESS DOC 0-1 FALLS W/OUT INJ PAST YR: ICD-10-PCS | Mod: ,,, | Performed by: INTERNAL MEDICINE

## 2019-01-14 PROCEDURE — G0008 FLU VACCINE - HIGH DOSE (65+) PRESERVATIVE FREE IM: ICD-10-PCS | Mod: ,,, | Performed by: INTERNAL MEDICINE

## 2019-01-14 PROCEDURE — G0008 ADMIN INFLUENZA VIRUS VAC: HCPCS | Mod: ,,, | Performed by: INTERNAL MEDICINE

## 2019-01-14 PROCEDURE — 3080F PR MOST RECENT DIASTOLIC BLOOD PRESSURE >= 90 MM HG: ICD-10-PCS | Mod: ,,, | Performed by: INTERNAL MEDICINE

## 2019-01-14 PROCEDURE — 99214 PR OFFICE/OUTPT VISIT, EST, LEVL IV, 30-39 MIN: ICD-10-PCS | Mod: 25,,, | Performed by: INTERNAL MEDICINE

## 2019-01-14 PROCEDURE — 3074F PR MOST RECENT SYSTOLIC BLOOD PRESSURE < 130 MM HG: ICD-10-PCS | Mod: ,,, | Performed by: INTERNAL MEDICINE

## 2019-01-14 PROCEDURE — 1101F PT FALLS ASSESS-DOCD LE1/YR: CPT | Mod: ,,, | Performed by: INTERNAL MEDICINE

## 2019-01-14 PROCEDURE — 99214 OFFICE O/P EST MOD 30 MIN: CPT | Mod: 25,,, | Performed by: INTERNAL MEDICINE

## 2019-01-14 PROCEDURE — 3074F SYST BP LT 130 MM HG: CPT | Mod: ,,, | Performed by: INTERNAL MEDICINE

## 2019-01-14 PROCEDURE — 90662 IIV NO PRSV INCREASED AG IM: CPT | Mod: ,,, | Performed by: INTERNAL MEDICINE

## 2019-01-14 PROCEDURE — 3080F DIAST BP >= 90 MM HG: CPT | Mod: ,,, | Performed by: INTERNAL MEDICINE

## 2019-01-14 NOTE — PROGRESS NOTES
SUBJECTIVE:    Patient ID: Maddie Otero is a 68 y.o. female.    Chief Complaint: Results (per lp )    HPI The patient is here with her breathing doing better.  She is coughing up green about 6 times a day.  Small amounts, slimy.  She has had 2 rounds of Levaquin.  Her CT showed some tree in bud and emphysema.  She perceives she is breathing better with the Trelegy.    Past Medical History:   Diagnosis Date    Arthritis     Asthma     Diabetes mellitus     Diabetes mellitus, type 2     Diverticulosis     HNP (herniated nucleus pulposus)     LUMBAR    Hypertension     Pancreatic insufficiency     s/p whipple, non cancer    Presence of dental bridge     UPPER     Past Surgical History:   Procedure Laterality Date    APPENDECTOMY      BACK SURGERY  1994    lower back    BACK SURGERY  2012    lower back    CHOLECYSTECTOMY      LAMINECTOMY, SPINE, LUMBAR, WITH DISCECTOMY Left 12/3/2012    [facility] OR    TONSILLECTOMY      TOTAL KNEE ARTHROPLASTY Left 08/10/2017    WHIPPLE PROCEDURE W/ LAPAROSCOPY  10/09     Family History   Problem Relation Age of Onset    Diabetes Mother     Cancer Father     Hypertension Neg Hx     Hyperlipidemia Neg Hx         Social History:   Marital Status:   Occupation: retired from caring for mentally disabled people  Alcohol History:  reports that she does not drink alcohol.  Tobacco History:  reports that she quit smoking about 21 months ago. Her smoking use included cigarettes. She has a 30.00 pack-year smoking history. she has never used smokeless tobacco.  Drug History:  reports that she does not use drugs.    Review of patient's allergies indicates:   Allergen Reactions    Sulfa (sulfonamide antibiotics) Hives    Penicillin v      Childhood reaction, swelled       Current Outpatient Medications   Medication Sig Dispense Refill    albuterol (PROVENTIL/VENTOLIN) 90 mcg/actuation inhaler Inhale 2 puffs into the lungs every  "6 (six) hours as needed for Wheezing. 1 each 0    albuterol-ipratropium (DUO-NEB) 2.5 mg-0.5 mg/3 mL nebulizer solution Take 3 mLs by nebulization every 4 (four) hours as needed for Wheezing. Rescue      amlodipine (NORVASC) 5 MG tablet Take 1 tablet by mouth once daily.  1    cetirizine (ZYRTEC) 10 MG tablet Take 10 mg by mouth once daily.      CREON CpDR Take 2 capsules by mouth 4 (four) times daily.  5    fluticasone-umeclidin-vilanter (TRELEGY ELLIPTA) 100-62.5-25 mcg DsDv Inhale 1 puff into the lungs once daily. 1 each 11    hydrocodone-acetaminophen 10-325mg (NORCO)  mg Tab Take 1 tablet by mouth every 4 (four) hours as needed for Pain. (Patient taking differently: Take 1 tablet by mouth every 12 (twelve) hours as needed for Pain. ) 60 tablet 0    levoFLOXacin (LEVAQUIN) 500 MG tablet Take 1 tablet (500 mg total) by mouth once daily. 7 tablet 0    levoFLOXacin (LEVAQUIN) 500 MG tablet Take 1 tablet (500 mg total) by mouth once daily. 7 tablet 0    NOVOLOG MIX 70-30 FLEXPEN 100 unit/mL (70-30) InPn pen Inject 12 Units into the skin 3 (three) times daily with meals.  5    ranitidine (ZANTAC) 150 MG tablet Take 150 mg by mouth 2 (two) times daily.       No current facility-administered medications for this visit.        Alpha-1 Antitrypsin:  Last PFT:   Last CT:    Review of Systems  General: Feeling Well.  Eyes: Vision is good.  ENT:  No sinusitis or pharyngitis.   Heart:: No chest pain or palpitations.  Lungs: breathing is much better but she still has green mucus multiple times a day  GI: No Nausea, vomiting, constipation, diarrhea, or reflux.  : No dysuria, hesitancy, or nocturia.  Musculoskeletal: No joint pain or myalgias.  Skin: No lesions or rashes.  Neuro: No headaches or neuropathy.  Lymph: No edema or adenopathy.  Psych: No anxiety or depression.  Endo: No weight change.    OBJECTIVE:      BP (!) 120/90 (BP Location: Right arm, Patient Position: Sitting)   Pulse 103   Ht 5' 8" " (1.727 m)   Wt 92.1 kg (203 lb)   SpO2 97% Comment: 2.0 oxygen level  BMI 30.87 kg/m²     Physical Exam  GENERAL: Older patient in no distress.  HEENT: Pupils equal and reactive. Extraocular movements intact. Nose intact.      Pharynx moist.  NECK: Supple.   HEART: Regular rate and rhythm. No murmur or gallop auscultated.  LUNGS: Clear to auscultation and percussion. Lung excursion symmetrical. No change in fremitus. No adventitial noises.  ABDOMEN: Bowel sounds present. Non-tender, no masses palpated.  EXTREMITIES: Normal muscle tone and joint movement, no cyanosis or clubbing.   LYMPHATICS: No adenopathy palpated, no edema.  SKIN: Dry, intact, no lesions.   NEURO: Cranial nerves II-XII intact. Motor strength 5/5 bilaterally, upper and lower extremities.  PSYCH: Appropriate affect.    Assessment:       1. Chronic obstructive pulmonary disease, unspecified COPD type    2. Centrilobular emphysema    3. Chronic obstructive bronchitis          Plan:       Chronic obstructive pulmonary disease, unspecified COPD type  -     Influenza - High Dose (65+) (PF) (IM)    Centrilobular emphysema  -     CT Chest Without Contrast; Future    Chronic obstructive bronchitis  -     CT Chest Without Contrast; Future    CT chest in 6 months     Follow-up in about 4 months (around 5/14/2019).\  Use Trelegy daily  Cotninue oxygen  Prevnar next visit  Buy a pulse ox and keep sats > or equal to 90%

## 2019-01-14 NOTE — PATIENT INSTRUCTIONS
Chronic Lung Disease: Preventing Lung Infections  Chronic lung diseases include chronic obstructive pulmonary disease (COPD), which includes chronic bronchitis and emphysema. Other chronic lung diseases include pulmonary fibrosis, sarcoidosis, and other conditions. When you have chronic lung diseases, it's very important to protect yourself from respiratory infections, like colds, the flu, and lung infections. Infections may cause your lung condition to worsen. Although you can't completely avoid them, there are things you can do to lessen the chance of infections.    Take precautions  Taking the following precautions can help you avoid illness:  · Remember to keep your hands away from your nose and mouth. Germs on your hands get into your respiratory system this way.  · Wash your hands often. When you wash them:  ¨ Use soap and warm water.  ¨ Rub your hands together well for at least 20 seconds.  ¨ Make sure to rinse them well.  ¨ Dry your hands on clean towels or air-dry them.  · Use hand  containing alcohol, if you are unable to wash your hands. Use the  after touching doorknobs, handles, and supermarket carts, for example, since lots of people touch them. Then wash your hands as soon as you can.  · To help prevent the flu, get a flu vaccination every year. This may be given at your healthcare provider's office, a drugstore, or pharmacy, or at work. Get your flu shot as soon as the vaccines are available in your area. This is usually around September each year.  · To help prevent pneumococcal pneumonia, get pneumonia vaccinations. Talk with your healthcare provider about which pneumococcal vaccinations you need.  · Try to stay away from people with respiratory infections, such as colds or the flu. Stay away from crowded places, like shopping centers or movie theatres during cold and flu season.  · If you smoke, think about quitting. In addition to causing or worsening many lung conditions, the  lung damage from smoking increases your risk of infections. Stay away from others who smoke, too. This is also harmful and increases your chance of infections.  Date Last Reviewed: 4/14/2016  © 3167-9874 [employer]. [address] [place] [address], [place] [address]. All rights reserved. This information is not intended as a substitute for professional medical care. Always follow your healthcare professional's instructions.

## 2019-04-30 ENCOUNTER — TELEPHONE (OUTPATIENT)
Dept: PULMONOLOGY | Facility: CLINIC | Age: 69
End: 2019-04-30

## 2019-05-09 ENCOUNTER — OFFICE VISIT (OUTPATIENT)
Dept: PODIATRY | Facility: CLINIC | Age: 69
End: 2019-05-09
Payer: MEDICARE

## 2019-05-09 VITALS
HEIGHT: 68 IN | SYSTOLIC BLOOD PRESSURE: 138 MMHG | BODY MASS INDEX: 30.77 KG/M2 | DIASTOLIC BLOOD PRESSURE: 91 MMHG | HEART RATE: 88 BPM | WEIGHT: 203 LBS

## 2019-05-09 DIAGNOSIS — E11.9 DIABETES MELLITUS WITHOUT COMPLICATION: ICD-10-CM

## 2019-05-09 DIAGNOSIS — L84 CORN OR CALLUS: Primary | ICD-10-CM

## 2019-05-09 PROCEDURE — 1101F PT FALLS ASSESS-DOCD LE1/YR: CPT | Mod: ,,, | Performed by: PODIATRIST

## 2019-05-09 PROCEDURE — 3080F DIAST BP >= 90 MM HG: CPT | Mod: ,,, | Performed by: PODIATRIST

## 2019-05-09 PROCEDURE — 17110 DESTRUCTION B9 LES UP TO 14: CPT | Mod: ,,, | Performed by: PODIATRIST

## 2019-05-09 PROCEDURE — 3080F PR MOST RECENT DIASTOLIC BLOOD PRESSURE >= 90 MM HG: ICD-10-PCS | Mod: ,,, | Performed by: PODIATRIST

## 2019-05-09 PROCEDURE — 1101F PR PT FALLS ASSESS DOC 0-1 FALLS W/OUT INJ PAST YR: ICD-10-PCS | Mod: ,,, | Performed by: PODIATRIST

## 2019-05-09 PROCEDURE — 3075F SYST BP GE 130 - 139MM HG: CPT | Mod: ,,, | Performed by: PODIATRIST

## 2019-05-09 PROCEDURE — 17110 PR DESTRUCTION BENIGN LESIONS UP TO 14: ICD-10-PCS | Mod: ,,, | Performed by: PODIATRIST

## 2019-05-09 PROCEDURE — 99203 OFFICE O/P NEW LOW 30 MIN: CPT | Mod: 25,,, | Performed by: PODIATRIST

## 2019-05-09 PROCEDURE — 99070 PR SPECIAL SUPPLIES: ICD-10-PCS | Mod: ,,, | Performed by: PODIATRIST

## 2019-05-09 PROCEDURE — 99070 SPECIAL SUPPLIES PHYS/QHP: CPT | Mod: ,,, | Performed by: PODIATRIST

## 2019-05-09 PROCEDURE — 99203 PR OFFICE/OUTPT VISIT, NEW, LEVL III, 30-44 MIN: ICD-10-PCS | Mod: 25,,, | Performed by: PODIATRIST

## 2019-05-09 PROCEDURE — 3075F PR MOST RECENT SYSTOLIC BLOOD PRESS GE 130-139MM HG: ICD-10-PCS | Mod: ,,, | Performed by: PODIATRIST

## 2019-05-09 RX ORDER — LORATADINE 10 MG/1
TABLET ORAL
COMMUNITY
End: 2019-08-22

## 2019-05-09 RX ORDER — FLUTICASONE PROPIONATE 50 MCG
SPRAY, SUSPENSION (ML) NASAL
COMMUNITY
End: 2021-04-19

## 2019-05-09 RX ORDER — IPRATROPIUM BROMIDE AND ALBUTEROL SULFATE 2.5; .5 MG/3ML; MG/3ML
SOLUTION RESPIRATORY (INHALATION)
COMMUNITY
End: 2021-05-05 | Stop reason: CLARIF

## 2019-05-09 RX ORDER — BUDESONIDE AND FORMOTEROL FUMARATE DIHYDRATE 160; 4.5 UG/1; UG/1
AEROSOL RESPIRATORY (INHALATION)
COMMUNITY
End: 2019-05-09

## 2019-05-09 RX ORDER — ALBUTEROL SULFATE 90 UG/1
AEROSOL, METERED RESPIRATORY (INHALATION)
Refills: 4 | COMMUNITY
Start: 2019-04-19 | End: 2019-05-09 | Stop reason: SDUPTHER

## 2019-05-09 RX ORDER — OMEPRAZOLE 20 MG/1
CAPSULE, DELAYED RELEASE ORAL
COMMUNITY
End: 2019-05-09

## 2019-05-09 RX ORDER — CLINDAMYCIN HYDROCHLORIDE 300 MG/1
CAPSULE ORAL
COMMUNITY
End: 2019-05-09

## 2019-05-09 RX ORDER — CYCLOBENZAPRINE HCL 5 MG
TABLET ORAL
COMMUNITY
End: 2019-09-04

## 2019-05-09 RX ORDER — INSULIN ASPART 100 [IU]/ML
INJECTION, SUSPENSION SUBCUTANEOUS
COMMUNITY
End: 2019-08-22 | Stop reason: SDUPTHER

## 2019-05-09 RX ORDER — CALCIUM CITRATE/VITAMIN D3 200MG-6.25
TABLET ORAL
Refills: 4 | COMMUNITY
Start: 2019-04-19 | End: 2019-08-22

## 2019-05-09 RX ORDER — DEXAMETHASONE SODIUM PHOSPHATE 4 MG/ML
8 INJECTION, SOLUTION INTRA-ARTICULAR; INTRALESIONAL; INTRAMUSCULAR; INTRAVENOUS; SOFT TISSUE
COMMUNITY
End: 2019-08-22

## 2019-05-09 NOTE — PROGRESS NOTES
[address] [facility] [address]  [place] [address]  Phone: [phone]   [phone]    Patient's PCP:Primary Doctor No  Referring Provider: No ref. provider found    Subjective:      Chief Complaint:: Foot Pain (possible wart per Dr. Blunt / cracked skin)    CURT Otero is a 68 y.o. female who presents with a complaint of possible wart (per Dr. Blunt) or cracked skin lasting for a couple of years. Current symptoms include just like a cracked skin feeling, no pain. Treatment to date have included none. Patients rates pain 0/10 on pain scale.    Systemic Doctor: Dr. Blunt  Date Last Seen: 04/2019  Blood Sugar: 135   Hemoglobin A1c: 7.0s    Vitals:    05/09/19 0926   BP: (!) 138/91   Pulse: 88     Shoe Size: 7.5 / 8    Past Surgical History:   Procedure Laterality Date    APPENDECTOMY      BACK SURGERY  1994    lower back    BACK SURGERY  2012    lower back    CHOLECYSTECTOMY      LAMINECTOMY, SPINE, LUMBAR, WITH DISCECTOMY Left 12/3/2012    [facility] OR    TONSILLECTOMY      TOTAL KNEE ARTHROPLASTY Left 08/10/2017    WHIPPLE PROCEDURE W/ LAPAROSCOPY  10/09     Past Medical History:   Diagnosis Date    Arthritis     Asthma     Diabetes mellitus     Diabetes mellitus, type 2     Diverticulosis     HNP (herniated nucleus pulposus)     LUMBAR    Hypertension     Pancreatic insufficiency     s/p whipple, non cancer    Presence of dental bridge     UPPER     Family History   Problem Relation Age of Onset    Diabetes Mother     Cancer Father     Hypertension Neg Hx     Hyperlipidemia Neg Hx         Social History:   Marital Status:   Alcohol History:  reports that she does not drink alcohol.  Tobacco History:  reports that she quit smoking about 2 years ago. Her smoking use included cigarettes. She has a 30.00 pack-year smoking history. She has never used smokeless tobacco.  Drug History:  reports that she does not use drugs.    Review of  "patient's allergies indicates:   Allergen Reactions    Heparin Itching    Penicillins Anaphylaxis, Hives and Itching     Other reaction(s): Unknown  Childhood reaction, swelled      Sulfa (sulfonamide antibiotics) Hives    Doxycycline Nausea And Vomiting     Other reaction(s): Abdominal Pain    Clindamycin      "Felt like I was on fire down through throat and felt like I was having spasms in my throat"    Penicillin v      Childhood reaction, swelled       Current Outpatient Medications   Medication Sig Dispense Refill    albuterol (PROVENTIL/VENTOLIN) 90 mcg/actuation inhaler Inhale 2 puffs into the lungs every 6 (six) hours as needed for Wheezing. 1 each 0    amlodipine (NORVASC) 5 MG tablet Take 1 tablet by mouth once daily.  1    blood sugar diagnostic (TRUE METRIX GLUCOSE TEST STRIP MISC) True Metrix Glucose Test Strip   Take 1 strip 3 times a day by miscell. route as needed.      blood sugar diagnostic (TRUE METRIX GLUCOSE TEST STRIP MISC) True Metrix Glucose Test Strip      cetirizine (ZYRTEC) 10 MG tablet Take 10 mg by mouth once daily.      CREON CpDR Take 2 capsules by mouth 4 (four) times daily.  5    cyclobenzaprine (FLEXERIL) 5 MG tablet cyclobenzaprine 5 mg tablet      fluticasone-umeclidin-vilanter (TRELEGY ELLIPTA) 100-62.5-25 mcg DsDv Inhale 1 puff into the lungs once daily. 1 each 11    insulin aspart protamine-insulin aspart (NOVOLOG MIX 70-30FLEXPEN U-100) 100 unit/mL (70-30) InPn pen Novolog Mix 70-30 FlexPen U-100 Insulin 100 unit/mL subcutaneous pen      insulin NPH-insulin regular, 70/30, (NOVOLIN 70/30 U-100 INSULIN) 100 unit/mL (70-30) injection Novolin 70/30 U-100 Insulin 100 unit/mL subcutaneous suspension      NOVOLOG MIX 70-30 FLEXPEN 100 unit/mL (70-30) InPn pen Inject 12 Units into the skin 3 (three) times daily with meals.  5    ranitidine (ZANTAC) 150 MG tablet Take 150 mg by mouth 2 (two) times daily.      TRUE METRIX GLUCOSE TEST STRIP Strp   4    " albuterol-ipratropium (DUO-NEB) 2.5 mg-0.5 mg/3 mL nebulizer solution Take 3 mLs by nebulization every 4 (four) hours as needed for Wheezing. Rescue      albuterol-ipratropium (DUO-NEB) 2.5 mg-0.5 mg/3 mL nebulizer solution ipratropium-albuterol 0.5 mg-3 mg(2.5 mg base)/3 mL nebulization soln      dexamethasone (DECADRON) 4 mg/mL injection 8 mg.      fluticasone propionate (FLONASE) 50 mcg/actuation nasal spray fluticasone propionate 50 mcg/actuation nasal spray,suspension      levoFLOXacin (LEVAQUIN) 500 MG tablet Take 1 tablet (500 mg total) by mouth once daily. 7 tablet 0    loratadine (CLARITIN) 10 mg tablet loratadine 10 mg tablet   Take 1 tablet every day by oral route.       No current facility-administered medications for this visit.        Review of Systems      Objective:        Physical Exam:   Foot Exam    General  General Appearance: appears stated age and healthy   Orientation: alert and oriented to person, place, and time   Affect: appropriate   Gait: unimpaired       Right Foot/Ankle     Inspection and Palpation  Ecchymosis: none  Tenderness: none   Swelling: none   Arch: normal  Hammertoes: absent  Claw Toes: absent  Hallux valgus: no  Hallux limitus: no  Skin Exam: callus; (Subsecond metatarsal)    Neurovascular  Dorsalis pedis: 2+  Posterior tibial: 2+  Saphenous nerve sensation: normal  Tibial nerve sensation: normal  Superficial peroneal nerve sensation: normal  Deep peroneal nerve sensation: normal  Sural nerve sensation: normal      Left Foot/Ankle      Inspection and Palpation  Ecchymosis: none  Tenderness: none   Swelling: none   Arch: normal  Hammertoes: absent  Claw toes: absent  Hallux valgus: no  Hallux limitus: no  Skin Exam: skin intact;     Neurovascular  Dorsalis pedis: 2+  Posterior tibial: 2+  Saphenous nerve sensation: normal  Tibial nerve sensation: normal  Superficial peroneal nerve sensation: normal  Deep peroneal nerve sensation: normal  Sural nerve sensation:  normal          Physical Exam   Cardiovascular:   Pulses:       Dorsalis pedis pulses are 2+ on the right side, and 2+ on the left side.        Posterior tibial pulses are 2+ on the right side, and 2+ on the left side.   Feet:   Right Foot:   Skin Integrity: Positive for callus.       Imaging:            Assessment:       1. Corn or callus - Right Foot    2. Diabetes mellitus without complication      Plan:   Corn or callus - Right Foot    Diabetes mellitus without complication    Cantharone treatment of benign skin lesions - plantar second MPJ right foot. Informed consent was obtained, hyperkeratotic skin was debrided from each lesion utilizing a scapel, Cantharone acid was applied, and was covered with a Band-Aid. Written and verbal structures are given to the patient. Patient tolerated the procedure well.     Instructions After Cantharone Acid Treatment    1. Keep dry for 3 days  2. If a blister forms, pop it  3. After 3rd day, begin wart stick twice daily for two week  4. Follow-up appointment 2 weeks      Wart Treatment: Twice Daily    1. Bathe area at night  2. File with pumice stone or nail file  3. Apply wart stick to affected area  4. Cover with a bad aid    Follow up if symptoms worsen or fail to improve.    Procedures - None    Counseling:     I provided patient education verbally regarding:   Patient diagnosis, treatment options, as well as alternatives, risks, and benefits.     This note was created using Dragon voice recognition software that occasionally misinterpreted phrases or words.

## 2019-05-13 ENCOUNTER — OFFICE VISIT (OUTPATIENT)
Dept: PULMONOLOGY | Facility: CLINIC | Age: 69
End: 2019-05-13
Payer: MEDICARE

## 2019-05-13 VITALS
HEART RATE: 100 BPM | DIASTOLIC BLOOD PRESSURE: 80 MMHG | BODY MASS INDEX: 32.28 KG/M2 | HEIGHT: 68 IN | WEIGHT: 213 LBS | OXYGEN SATURATION: 97 % | SYSTOLIC BLOOD PRESSURE: 150 MMHG

## 2019-05-13 DIAGNOSIS — J44.9 CHRONIC OBSTRUCTIVE PULMONARY DISEASE, UNSPECIFIED COPD TYPE: Primary | ICD-10-CM

## 2019-05-13 PROCEDURE — 99214 OFFICE O/P EST MOD 30 MIN: CPT | Mod: ,,, | Performed by: INTERNAL MEDICINE

## 2019-05-13 PROCEDURE — 99214 PR OFFICE/OUTPT VISIT, EST, LEVL IV, 30-39 MIN: ICD-10-PCS | Mod: ,,, | Performed by: INTERNAL MEDICINE

## 2019-05-13 NOTE — PROGRESS NOTES
SUBJECTIVE:    Patient ID: Maddie Otero is a 68 y.o. female.    Chief Complaint: COPD    HPI   Patient here today feeling well. Her Ct scan showed resolution of tree and bud. She is using her Trelegy daily. She does not use her rescue inhaler regularly.  She has oxygen that she uses occasionally she checks her sats and she is above 90%.  She does occasionally sleep with it but not all the time.   Past Medical History:   Diagnosis Date    Arthritis     Asthma     Diabetes mellitus     Diabetes mellitus, type 2     Diverticulosis     HNP (herniated nucleus pulposus)     LUMBAR    Hypertension     Pancreatic insufficiency     s/p whipple, non cancer    Presence of dental bridge     UPPER     Past Surgical History:   Procedure Laterality Date    APPENDECTOMY      BACK SURGERY  1994    lower back    BACK SURGERY  2012    lower back    CHOLECYSTECTOMY      LAMINECTOMY, SPINE, LUMBAR, WITH DISCECTOMY Left 12/3/2012    Performed by Kwabena Bui Jr[facility]    TONSILLECTOMY      TOTAL KNEE ARTHROPLASTY Left 08/10/2017    WHIPPLE PROCEDURE W/ LAPAROSCOPY  10/09     Family History   Problem Relation Age of Onset    Diabetes Mother     Cancer Father     Hypertension Neg Hx     Hyperlipidemia Neg Hx         Social History:   Marital Status:   Occupation: retired from caring for mentally disabled people  Alcohol History:  reports that she does not drink alcohol.  Tobacco History:  reports that she quit smoking about 2 years ago. Her smoking use included cigarettes. She has a 30.00 pack-year smoking history. She has never used smokeless tobacco.  Drug History:  reports that she does not use drugs.    Review of patient's allergies indicates:   Allergen Reactions    Sulfa (sulfonamide antibiotics) Hives    Penicillin v      Childhood reaction, swelled       Current Outpatient Medications   Medication Sig Dispense Refill    albuterol (PROVENTIL/VENTOLIN) 90 mcg/actuation inhaler Inhale 2  puffs into the lungs every 6 (six) hours as needed for Wheezing. 1 each 0    amlodipine (NORVASC) 5 MG tablet Take 1 tablet by mouth once daily.  1    blood sugar diagnostic (TRUE METRIX GLUCOSE TEST STRIP MISC) True Metrix Glucose Test Strip   Take 1 strip 3 times a day by miscell. route as needed.      blood sugar diagnostic (TRUE METRIX GLUCOSE TEST STRIP MISC) True Metrix Glucose Test Strip      cetirizine (ZYRTEC) 10 MG tablet Take 10 mg by mouth once daily.      CREON CpDR Take 2 capsules by mouth 4 (four) times daily.  5    cyclobenzaprine (FLEXERIL) 5 MG tablet cyclobenzaprine 5 mg tablet      fluticasone propionate (FLONASE) 50 mcg/actuation nasal spray fluticasone propionate 50 mcg/actuation nasal spray,suspension      fluticasone-umeclidin-vilanter (TRELEGY ELLIPTA) 100-62.5-25 mcg DsDv Inhale 1 puff into the lungs once daily. 1 each 11    insulin aspart protamine-insulin aspart (NOVOLOG MIX 70-30FLEXPEN U-100) 100 unit/mL (70-30) InPn pen Novolog Mix 70-30 FlexPen U-100 Insulin 100 unit/mL subcutaneous pen      insulin NPH-insulin regular, 70/30, (NOVOLIN 70/30 U-100 INSULIN) 100 unit/mL (70-30) injection Novolin 70/30 U-100 Insulin 100 unit/mL subcutaneous suspension      ranitidine (ZANTAC) 150 MG tablet Take 150 mg by mouth 2 (two) times daily.      albuterol-ipratropium (DUO-NEB) 2.5 mg-0.5 mg/3 mL nebulizer solution Take 3 mLs by nebulization every 4 (four) hours as needed for Wheezing. Rescue      albuterol-ipratropium (DUO-NEB) 2.5 mg-0.5 mg/3 mL nebulizer solution ipratropium-albuterol 0.5 mg-3 mg(2.5 mg base)/3 mL nebulization soln      dexamethasone (DECADRON) 4 mg/mL injection 8 mg.      levoFLOXacin (LEVAQUIN) 500 MG tablet Take 1 tablet (500 mg total) by mouth once daily. 7 tablet 0    loratadine (CLARITIN) 10 mg tablet loratadine 10 mg tablet   Take 1 tablet every day by oral route.      NOVOLOG MIX 70-30 FLEXPEN 100 unit/mL (70-30) InPn pen Inject 12 Units into the skin 3  "(three) times daily with meals.  5    TRUE METRIX GLUCOSE TEST STRIP Strp   4     No current facility-administered medications for this visit.          Last PFT: 01/2019  Last CT:04/30/19    Review of Systems  General: Feeling Well.  Eyes: Vision is good.  ENT:  Gets sinus infections at least 3 times a year   Heart:: No chest pain or palpitations.  Lungs:breathing is better, occasionally brings up mucous.   GI: No Nausea, vomiting, constipation, diarrhea, or reflux.  : No dysuria, hesitancy, or nocturia.  Musculoskeletal: No joint pain or myalgias.  Skin: No lesions or rashes.  Neuro: No headaches or neuropathy.  Lymph: No edema or adenopathy.  Psych: No anxiety or depression.  Endo: weight gain     OBJECTIVE:      BP (!) 150/80 (BP Location: Left arm, Patient Position: Sitting, BP Method: Medium (Manual))   Pulse 100   Ht 5' 8" (1.727 m)   Wt 96.6 kg (213 lb)   SpO2 97%   BMI 32.39 kg/m²     Physical Exam  GENERAL: Older patient in no distress.  HEENT: Pupils equal and reactive. Extraocular movements intact. Nose intact.      Pharynx moist.  NECK: Supple.   HEART: Regular rate and rhythm. No murmur or gallop auscultated.  LUNGS: Clear to auscultation and percussion. Lung excursion symmetrical. No change in fremitus. No adventitial noises.  ABDOMEN: Bowel sounds present. Non-tender, no masses palpated.  EXTREMITIES: Normal muscle tone and joint movement, no cyanosis or clubbing.   LYMPHATICS: No adenopathy palpated, no edema.  SKIN: Dry, intact, no lesions.   NEURO: Cranial nerves II-XII intact. Motor strength 5/5 bilaterally, upper and lower extremities.  PSYCH: Appropriate affect.    Kerry Alvarez NP served in the capacity as a "scribe" for this patient encounter.  A "face to face" encounter occurred with Dr. Mcneil on this date  The treatment plan and medical decision making is outlined below:         Assessment:       1. Chronic obstructive pulmonary disease, unspecified COPD type          Plan:     "   Chronic obstructive pulmonary disease, unspecified COPD type  -     PULSE OXIMETRY OVERNIGHT; Future; Expected date: 05/13/2019         Follow up in about 6 months (around 11/13/2019).   Overnight pulse ox to see if should be sleeping on oxygen   Keep using your Trelegy    You should see an ENT regarding the frequent sinus infections

## 2019-05-13 NOTE — PATIENT INSTRUCTIONS
Treatment for COPD    Your healthcare provider will prescribe the best treatments for your COPD.  Treatment  Recommendations include the following:  · Medicines. Some medicines help relieve symptoms when you have them. Others are taken daily to control inflammation in the lungs. Always take your medicines as prescribed. Learn the names of your medicines, as well as how and when to use them.  · Oxygen therapy. Oxygen may be prescribed if tests show that your blood contains too little oxygen.  · Smoking. If you smoke, quit. Smoking is the main cause of COPD. Quitting will help you be able to better manage your COPD. Ask your healthcare provider about ways to help you quit smoking.  · Avoiding infections. Infections, like a cold or the flu, can cause your symptoms to worsen. Try to stay away from people who are sick. Wash your hands often. And, ask your healthcare provider about vaccines for the flu and pneumonia.  Coping with shortness of breath  Coping tips include the following:  · Exercise. Try to be as active as possible. This will improve energy levels and strengthen your muscles, so you can do more.  · Breathing techniques. Ask your healthcare provider or nurse to show you how to do pursed-lip breathing.  · Balance rest and activity. Each day, try to balance rest periods with activity. For example, you might start the day with getting dressed and eating breakfast, then relax and read the paper. After that, take a brief walk. And then sit with your feet up for a while.  · Pulmonary rehabilitation. Ask your provider, or call your local hospital to find out about pulmonary rehab programs. The programs help with managing your disease, breathing techniques, exercise, support and counseling.  · Healthy eating. Eating a healthy, balanced diet and making an effort to maintain your ideal weight are important to staying as healthy as possible. Make sure you have a lot of fruit and vegetables every day, as well as  balanced portions of whole grains, lean meats and fish, and low-fat dairy products.  Date Last Reviewed: 5/1/2016  © 2534-7635 [employer]. [address] [place] [address], [place] [address]. All rights reserved. This information is not intended as a substitute for professional medical care. Always follow your healthcare professional's instructions.    Overnight pulse ox to see if should be sleeping on oxygen   Keep using your Trelegy    You should see an ENT regarding the frequent sinus infections

## 2019-05-14 ENCOUNTER — DOCUMENTATION ONLY (OUTPATIENT)
Dept: PULMONOLOGY | Facility: CLINIC | Age: 69
End: 2019-05-14

## 2019-05-30 ENCOUNTER — TELEPHONE (OUTPATIENT)
Dept: PULMONOLOGY | Facility: CLINIC | Age: 69
End: 2019-05-30

## 2019-05-30 NOTE — TELEPHONE ENCOUNTER
----- Message from Marcella Lee MA sent at 5/29/2019 10:18 AM CDT -----  Procedure report virtuox 5/29/19

## 2019-05-30 NOTE — TELEPHONE ENCOUNTER
Overnight pulse ox showed sats less then 88% is 3.7 minutes, less then 89% for 25 minutes on room air.  Her lowest sat was 85%.  She should still be sleeping on her oxygen

## 2019-08-22 ENCOUNTER — TELEPHONE (OUTPATIENT)
Dept: PULMONOLOGY | Facility: CLINIC | Age: 69
End: 2019-08-22

## 2019-08-22 ENCOUNTER — OFFICE VISIT (OUTPATIENT)
Dept: SURGERY | Facility: CLINIC | Age: 69
End: 2019-08-22
Payer: MEDICARE

## 2019-08-22 VITALS
DIASTOLIC BLOOD PRESSURE: 89 MMHG | WEIGHT: 219 LBS | HEIGHT: 68 IN | BODY MASS INDEX: 33.19 KG/M2 | HEART RATE: 87 BPM | TEMPERATURE: 98 F | SYSTOLIC BLOOD PRESSURE: 145 MMHG

## 2019-08-22 DIAGNOSIS — K43.2 INCISIONAL HERNIA, WITHOUT OBSTRUCTION OR GANGRENE: Primary | ICD-10-CM

## 2019-08-22 PROCEDURE — 99203 OFFICE O/P NEW LOW 30 MIN: CPT | Mod: S$GLB,,, | Performed by: SURGERY

## 2019-08-22 PROCEDURE — 1101F PT FALLS ASSESS-DOCD LE1/YR: CPT | Mod: S$GLB,,, | Performed by: SURGERY

## 2019-08-22 PROCEDURE — 3079F PR MOST RECENT DIASTOLIC BLOOD PRESSURE 80-89 MM HG: ICD-10-PCS | Mod: S$GLB,,, | Performed by: SURGERY

## 2019-08-22 PROCEDURE — 99999 PR PBB SHADOW E&M-EST. PATIENT-LVL IV: CPT | Mod: PBBFAC,,, | Performed by: SURGERY

## 2019-08-22 PROCEDURE — 3077F SYST BP >= 140 MM HG: CPT | Mod: S$GLB,,, | Performed by: SURGERY

## 2019-08-22 PROCEDURE — 3079F DIAST BP 80-89 MM HG: CPT | Mod: S$GLB,,, | Performed by: SURGERY

## 2019-08-22 PROCEDURE — 99999 PR PBB SHADOW E&M-EST. PATIENT-LVL IV: ICD-10-PCS | Mod: PBBFAC,,, | Performed by: SURGERY

## 2019-08-22 PROCEDURE — 99203 PR OFFICE/OUTPT VISIT, NEW, LEVL III, 30-44 MIN: ICD-10-PCS | Mod: S$GLB,,, | Performed by: SURGERY

## 2019-08-22 PROCEDURE — 1101F PR PT FALLS ASSESS DOC 0-1 FALLS W/OUT INJ PAST YR: ICD-10-PCS | Mod: S$GLB,,, | Performed by: SURGERY

## 2019-08-22 PROCEDURE — 3077F PR MOST RECENT SYSTOLIC BLOOD PRESSURE >= 140 MM HG: ICD-10-PCS | Mod: S$GLB,,, | Performed by: SURGERY

## 2019-08-22 RX ORDER — LIDOCAINE HYDROCHLORIDE 10 MG/ML
1 INJECTION, SOLUTION EPIDURAL; INFILTRATION; INTRACAUDAL; PERINEURAL ONCE
Status: DISCONTINUED | OUTPATIENT
Start: 2019-08-22 | End: 2019-10-22 | Stop reason: CLARIF

## 2019-08-22 RX ORDER — SODIUM CHLORIDE 9 MG/ML
INJECTION, SOLUTION INTRAVENOUS CONTINUOUS
Status: CANCELLED | OUTPATIENT
Start: 2019-08-22

## 2019-08-22 NOTE — PROGRESS NOTES
"Subjective:       Patient ID: Maddie Otero is a 68 y.o. female.    Chief Complaint: Consult (Eval possible hernia )      HPI:  Patient is 68-year-old female referred to the office for evaluation of an incisional hernia. Patient had Whipple procedure for benign disease in 2009.  Over the past several months she has noticed bulging near the superior aspect of her incision. She states it is becoming more uncomfortable with lifting and Valsalva.  She has no obstructive symptoms.  She has no history of previous hernia repair.  She has past medical history of significant COPD.  She is on home oxygen occasionally at night with sleeping.  She is on Trelegy daily.  She reports no current shortness of breath or productive cough.      Past Medical History:   Diagnosis Date    Arthritis     Asthma     Diabetes mellitus, type 2     Diverticulosis     HNP (herniated nucleus pulposus)     LUMBAR    Hypertension     Pancreatic insufficiency     s/p whipple, non cancer    Presence of dental bridge     UPPER     Past Surgical History:   Procedure Laterality Date    APPENDECTOMY      BACK SURGERY  1994    lower back    BACK SURGERY  2012    lower back    CHOLECYSTECTOMY      LAMINECTOMY, SPINE, LUMBAR, WITH DISCECTOMY Left 12/3/2012    [facility]    TONSILLECTOMY      TOTAL KNEE ARTHROPLASTY Left 08/10/2017    WHIPPLE PROCEDURE W/ LAPAROSCOPY  10/09     Review of patient's allergies indicates:   Allergen Reactions    Heparin Itching    Penicillins Anaphylaxis, Hives and Itching     Other reaction(s): Unknown  Childhood reaction, swelled      Sulfa (sulfonamide antibiotics) Hives    Doxycycline Nausea And Vomiting     Other reaction(s): Abdominal Pain    Clindamycin      "Felt like I was on fire down through throat and felt like I was having spasms in my throat"    Penicillin v      Childhood reaction, swelled     Medication List with Changes/Refills   Current Medications    " ALBUTEROL (PROVENTIL/VENTOLIN) 90 MCG/ACTUATION INHALER    Inhale 2 puffs into the lungs every 6 (six) hours as needed for Wheezing.    ALBUTEROL-IPRATROPIUM (DUO-NEB) 2.5 MG-0.5 MG/3 ML NEBULIZER SOLUTION    Take 3 mLs by nebulization every 4 (four) hours as needed for Wheezing. Rescue    ALBUTEROL-IPRATROPIUM (DUO-NEB) 2.5 MG-0.5 MG/3 ML NEBULIZER SOLUTION    ipratropium-albuterol 0.5 mg-3 mg(2.5 mg base)/3 mL nebulization soln    AMLODIPINE (NORVASC) 5 MG TABLET    Take 1 tablet by mouth once daily.    BLOOD SUGAR DIAGNOSTIC (TRUE METRIX GLUCOSE TEST STRIP MISC)    True Metrix Glucose Test Strip   Take 1 strip 3 times a day by miscell. route as needed.    BLOOD SUGAR DIAGNOSTIC (TRUE METRIX GLUCOSE TEST STRIP MISC)    True Metrix Glucose Test Strip    CETIRIZINE (ZYRTEC) 10 MG TABLET    Take 10 mg by mouth once daily.    CREON CPDR    Take 2 capsules by mouth 4 (four) times daily.    CYCLOBENZAPRINE (FLEXERIL) 5 MG TABLET    cyclobenzaprine 5 mg tablet    FLUTICASONE PROPIONATE (FLONASE) 50 MCG/ACTUATION NASAL SPRAY    fluticasone propionate 50 mcg/actuation nasal spray,suspension    FLUTICASONE-UMECLIDIN-VILANTER (TRELEGY ELLIPTA) 100-62.5-25 MCG DSDV    Inhale 1 puff into the lungs once daily.    NOVOLOG MIX 70-30 FLEXPEN 100 UNIT/ML (70-30) INPN PEN    Inject 12 Units into the skin 3 (three) times daily with meals.    RANITIDINE (ZANTAC) 150 MG TABLET    Take 150 mg by mouth 2 (two) times daily.   Discontinued Medications    DEXAMETHASONE (DECADRON) 4 MG/ML INJECTION    8 mg.    INSULIN ASPART PROTAMINE-INSULIN ASPART (NOVOLOG MIX 70-30FLEXPEN U-100) 100 UNIT/ML (70-30) INPN PEN    Novolog Mix 70-30 FlexPen U-100 Insulin 100 unit/mL subcutaneous pen    INSULIN NPH-INSULIN REGULAR, 70/30, (NOVOLIN 70/30 U-100 INSULIN) 100 UNIT/ML (70-30) INJECTION    Novolin 70/30 U-100 Insulin 100 unit/mL subcutaneous suspension    LEVOFLOXACIN (LEVAQUIN) 500 MG TABLET    Take 1 tablet (500 mg total) by mouth once daily.     LORATADINE (CLARITIN) 10 MG TABLET    loratadine 10 mg tablet   Take 1 tablet every day by oral route.    TRUE METRIX GLUCOSE TEST STRIP STRP         Family History   Problem Relation Age of Onset    Diabetes Mother     Cancer Father     Hypertension Neg Hx     Hyperlipidemia Neg Hx      Social History     Socioeconomic History    Marital status:      Spouse name: Not on file    Number of children: Not on file    Years of education: Not on file    Highest education level: Not on file   Occupational History    Not on file   Social Needs    Financial resource strain: Not on file    Food insecurity:     Worry: Not on file     Inability: Not on file    Transportation needs:     Medical: Not on file     Non-medical: Not on file   Tobacco Use    Smoking status: Former Smoker     Packs/day: 1.00     Years: 30.00     Pack years: 30.00     Types: Cigarettes     Last attempt to quit: 2017     Years since quittin.3    Smokeless tobacco: Never Used   Substance and Sexual Activity    Alcohol use: No    Drug use: No    Sexual activity: Not on file   Lifestyle    Physical activity:     Days per week: Not on file     Minutes per session: Not on file    Stress: Not on file   Relationships    Social connections:     Talks on phone: Not on file     Gets together: Not on file     Attends Hinduism service: Not on file     Active member of club or organization: Not on file     Attends meetings of clubs or organizations: Not on file     Relationship status: Not on file   Other Topics Concern    Not on file   Social History Narrative    Not on file         Review of Systems   Constitutional: Negative for appetite change, chills, fever and unexpected weight change.   HENT: Negative for hearing loss, rhinorrhea, sore throat and voice change.    Eyes: Negative for photophobia and visual disturbance.   Respiratory: Negative for cough, choking and shortness of breath.    Cardiovascular: Negative for chest  pain, palpitations and leg swelling.   Gastrointestinal: Negative for abdominal pain, blood in stool, constipation, diarrhea, nausea and vomiting.   Endocrine: Negative for cold intolerance, heat intolerance, polydipsia and polyuria.   Musculoskeletal: Negative for arthralgias, back pain, joint swelling and neck stiffness.   Skin: Negative for color change, pallor and rash.   Neurological: Negative for dizziness, seizures, syncope and headaches.   Hematological: Negative for adenopathy. Does not bruise/bleed easily.   Psychiatric/Behavioral: Negative for agitation, behavioral problems and confusion.       Objective:      Physical Exam   Constitutional: She is oriented to person, place, and time. She appears well-developed and well-nourished.  Non-toxic appearance. No distress.   HENT:   Head: Normocephalic and atraumatic. Head is without abrasion and without laceration.   Right Ear: External ear normal.   Left Ear: External ear normal.   Nose: Nose normal.   Mouth/Throat: Oropharynx is clear and moist.   Eyes: Pupils are equal, round, and reactive to light. EOM are normal.   Neck: Trachea normal. Neck supple. No tracheal deviation and normal range of motion present.   Cardiovascular: Normal rate and regular rhythm.   Pulmonary/Chest: Effort normal. No accessory muscle usage. No tachypnea. No respiratory distress. She has wheezes.   Breathing comfortably.  Does have mild wheezing bilaterally   Abdominal: Soft. Normal appearance and bowel sounds are normal. She exhibits no distension and no mass. There is no tenderness. There is no rigidity, no rebound and no guarding. A hernia is present. Hernia confirmed positive in the ventral area.       Patient has a well-healed midline incision.  There is bulging at the superior aspect and I would do appreciate hernia defect with the bulging.  Size of the defect feels to be several cm in diameter.   Lymphadenopathy:        Right: No inguinal adenopathy present.        Left: No  inguinal adenopathy present.   Neurological: She is alert and oriented to person, place, and time. Coordination and gait normal.   Skin: Skin is warm and intact.   Psychiatric: She has a normal mood and affect. Her speech is normal and behavior is normal.       Assessment/Plan:   Maddie was seen today for consult.    Diagnoses and all orders for this visit:    Incisional hernia, without obstruction or gangrene  -     Vital signs; Standing  -     Insert peripheral IV; Standing  -     Diet NPO; Standing  -     Pulse Oximetry Q4H; Standing  -     Case Request Operating Room: REPAIR, HERNIA, INCISIONAL, LAPAROSCOPIC  -     Full code; Standing  -     Place in Outpatient; Standing  -     Place sequential compression device; Standing  -     Basic metabolic panel; Future  -     CBC auto differential; Future  -     EKG 12-lead; Future  -     X-Ray Chest 1 View; Future  -     Insert peripheral IV  -     Full code    Other orders  -     lidocaine (PF) 10 mg/ml (1%) injection 10 mg  -     0.9%  NaCl infusion  -     IP VTE LOW RISK PATIENT; Standing  -     vancomycin (VANCOCIN) 1,500 mg in dextrose 5 % 500 mL IVPB      Patient has moderate size incisional hernia. We will plan on repair tentatively set for September 16th.  Patient has history significant for COPD.  Would like to send her to back to her pulmonologist for pulmonary clearance and any further recommendations on perioperative pulmonary therapy.     Planned procedure:  Laparoscopic repair of incisional hernia    NPO past midnight    Alex cloth scrub per protocol    SCDs Bilateral Lower Extremities    I discussed the proposed procedures the the patient including risks, benefits, indications, alternatives and special concerns.  The patient appears to understand and agrees to go ahead with surgery.  I have made no promises, warranties or verbal agreements beyond what was discussed above.

## 2019-08-22 NOTE — TELEPHONE ENCOUNTER
Pt call [facility] wanting them to come  oxygen from her home she no longer needs it bc she has her own concentrator that she uses to sleep on.     Faxed order to [facility] to  oxygen equipt per verbal order by Kerry Alvarez NP.

## 2019-09-04 ENCOUNTER — TELEPHONE (OUTPATIENT)
Dept: SURGERY | Facility: CLINIC | Age: 69
End: 2019-09-04

## 2019-09-04 ENCOUNTER — LAB VISIT (OUTPATIENT)
Dept: LAB | Facility: HOSPITAL | Age: 69
End: 2019-09-04
Attending: INTERNAL MEDICINE
Payer: MEDICARE

## 2019-09-04 ENCOUNTER — TELEPHONE (OUTPATIENT)
Dept: PULMONOLOGY | Facility: CLINIC | Age: 69
End: 2019-09-04

## 2019-09-04 ENCOUNTER — OFFICE VISIT (OUTPATIENT)
Dept: PULMONOLOGY | Facility: CLINIC | Age: 69
End: 2019-09-04
Payer: MEDICARE

## 2019-09-04 ENCOUNTER — HOSPITAL ENCOUNTER (OUTPATIENT)
Dept: RADIOLOGY | Facility: HOSPITAL | Age: 69
Discharge: HOME | End: 2019-09-04
Attending: NURSE PRACTITIONER | Admitting: SURGERY
Payer: MEDICARE

## 2019-09-04 VITALS
HEART RATE: 85 BPM | WEIGHT: 210 LBS | DIASTOLIC BLOOD PRESSURE: 95 MMHG | OXYGEN SATURATION: 94 % | HEIGHT: 68 IN | BODY MASS INDEX: 31.83 KG/M2 | SYSTOLIC BLOOD PRESSURE: 145 MMHG

## 2019-09-04 DIAGNOSIS — J44.1 COPD WITH EXACERBATION: ICD-10-CM

## 2019-09-04 DIAGNOSIS — R05.9 COUGH: Primary | ICD-10-CM

## 2019-09-04 DIAGNOSIS — R05.9 COUGH: ICD-10-CM

## 2019-09-04 PROCEDURE — 87077 CULTURE AEROBIC IDENTIFY: CPT

## 2019-09-04 PROCEDURE — 87205 SMEAR GRAM STAIN: CPT

## 2019-09-04 PROCEDURE — 3077F SYST BP >= 140 MM HG: CPT | Mod: S$GLB,,, | Performed by: NURSE PRACTITIONER

## 2019-09-04 PROCEDURE — 71046 X-RAY EXAM CHEST 2 VIEWS: CPT | Mod: TC

## 2019-09-04 PROCEDURE — 3080F DIAST BP >= 90 MM HG: CPT | Mod: S$GLB,,, | Performed by: NURSE PRACTITIONER

## 2019-09-04 PROCEDURE — 87070 CULTURE OTHR SPECIMN AEROBIC: CPT

## 2019-09-04 PROCEDURE — 1101F PT FALLS ASSESS-DOCD LE1/YR: CPT | Mod: S$GLB,,, | Performed by: NURSE PRACTITIONER

## 2019-09-04 PROCEDURE — 3077F PR MOST RECENT SYSTOLIC BLOOD PRESSURE >= 140 MM HG: ICD-10-PCS | Mod: S$GLB,,, | Performed by: NURSE PRACTITIONER

## 2019-09-04 PROCEDURE — 3080F PR MOST RECENT DIASTOLIC BLOOD PRESSURE >= 90 MM HG: ICD-10-PCS | Mod: S$GLB,,, | Performed by: NURSE PRACTITIONER

## 2019-09-04 PROCEDURE — 99214 OFFICE O/P EST MOD 30 MIN: CPT | Mod: S$GLB,,, | Performed by: NURSE PRACTITIONER

## 2019-09-04 PROCEDURE — 1101F PR PT FALLS ASSESS DOC 0-1 FALLS W/OUT INJ PAST YR: ICD-10-PCS | Mod: S$GLB,,, | Performed by: NURSE PRACTITIONER

## 2019-09-04 PROCEDURE — 99214 PR OFFICE/OUTPT VISIT, EST, LEVL IV, 30-39 MIN: ICD-10-PCS | Mod: S$GLB,,, | Performed by: NURSE PRACTITIONER

## 2019-09-04 RX ORDER — LEVOFLOXACIN 500 MG/1
500 TABLET, FILM COATED ORAL DAILY
Qty: 7 TABLET | Refills: 0 | Status: ON HOLD | OUTPATIENT
Start: 2019-09-04 | End: 2019-10-22 | Stop reason: ALTCHOICE

## 2019-09-04 RX ORDER — HUMAN INSULIN 100 [USP'U]/ML
INJECTION, SUSPENSION SUBCUTANEOUS
Refills: 22 | COMMUNITY
Start: 2019-08-26 | End: 2021-04-19 | Stop reason: SDUPTHER

## 2019-09-04 RX ORDER — PREDNISONE 10 MG/1
TABLET ORAL
Qty: 20 TABLET | Refills: 0 | Status: SHIPPED | OUTPATIENT
Start: 2019-09-04 | End: 2021-04-19

## 2019-09-04 NOTE — PATIENT INSTRUCTIONS
COPD Flare    You have had a flare-up of your COPD.  COPD, or chronic obstructive pulmonary disease, is a common lung disease. It causes your airways to become irritated and narrower. This makes it harder for you to breathe. Emphysema and chronic bronchitis are both types of COPD. This is a chronic condition, which means you always have it. Sometimes it gets worse. When this happens, it is called a flare-up.  Symptoms of COPD  People with COPD may have symptoms most of the time. In a flare-up, your symptoms get worse. These symptoms may mean you are having a flare-up:  · Shortness of breath, shallow or rapid breathing, or wheezing that gets worse  · Lung infection  · Cough that gets worse  · More mucus, thicker mucus or mucus of a different color  · Tiredness, decreased energy, or trouble doing your usual activities  · Fever  · Chest tightness  · Your symptoms dont get better even when you use your usual medicines, inhalers, and nebulizer  · Trouble talking  · You feel confused  Causes of flare-ups  Unfortunately, a flare-up can happen even though you did everything right, and you followed your doctors instructions. Some causes of flare-ups are:  · Smoking or secondhand smoke  · Colds, the flu, or respiratory infections  · Air pollution  · Sudden change in the weather  · Dust, irritating chemicals, or strong fumes  · Not taking your medicines as prescribed  Home care  Here are some things you can do at home to treat a flare-up:  · Try not to panic. This makes it harder to breathe, and keeps you from doing the right things.  · Dont smoke or be around others who are smoking.  · Try to drink more fluids than usual during a flare-up, unless your doctor has told you not to because of heart and kidney problems. More fluids can help loosen the mucus.  · Use your inhalers and nebulizer, if you have one, as you have been told to.  · If you were given antibiotics, take them until they are used up or your doctor tells you  to stop. Its important to finish the antibiotics, even though you feel better. This will make sure the infection has cleared.  · If you were given prednisone or another steroid, finish it even if you feel better.  Preventing a flare-up  Even though flare-ups happen, the best way to treat one is to prevent it before it starts. Here are some pointers:  · Dont smoke or be around others who are smoking.  · Take your medicines as you have been told.  · Talk with your doctor about getting a flu shot every year. Also find out if you need a pneumonia shot.  · If there is a weather advisory warning to stay indoors, try to stay inside when possible.  · Try to eat healthy and get plenty of sleep.  · Try to avoid things that usually set you off, like dust, chemical fumes, hairsprays, or strong perfumes.  Follow-up care  Follow up with your healthcare provider, or as advised.  If a culture was done, you will be told if your treatment needs to be changed. You can call as directed for the results.  If X-rays were done, you will be notified of any new findings that may affect your care.  Call 911  Call 911 if any of these occur:  · You have trouble breathing  · You feel confused or its difficult to wake you up  · You faint or lose consciousness  · You have a rapid heart rate  · You have new pain in your chest, arm, shoulder, neck or upper back  When to seek medical advice  Call your healthcare provider right away if any of these occur:  · Wheezing or shortness of breath gets worse  · You need to use your inhalers more often than usual without relief  · Fever of 100.4°F (38ºC) or higher, or as directed by your healthcare provider  · Coughing up lots of dark-colored or bloody mucus (sputum)  · Chest pain with each breath  · You do not start to get better within 24 hours  · Swelling of your ankles gets worse  · Dizziness or weakness  Date Last Reviewed: 9/1/2016  © 7218-9349 [employer]. [address] [facility],  [place] [address]. All rights reserved. This information is not intended as a substitute for professional medical care. Always follow your healthcare professional's instructions.      Levaquin 500mg by mouth daily   Chest xray today   Prednisone short taper   Sputum culture   Wear your oxygen at night  Keep your sats 90% or above   Follow up next week with Dr. Mcneil   Follow up in about 1 week (around 9/11/2019).

## 2019-09-04 NOTE — TELEPHONE ENCOUNTER
Pt came into office requesting to cancel surgery that is scheduled for 9/16/19 due to pending pulmonary clearance from Dr. Mcneil. Pt will call office back once cleared to reschedule.

## 2019-09-04 NOTE — TELEPHONE ENCOUNTER
Chest xray The lungs are normally and symmetrically expanded, with no consolidation, pleural effusion or evidence of pulmonary edema.  Upper lobe lucencies reflecting emphysema are unchanged.  No confluent infiltrates or pneumothorax. There are no significant osseous abnormalities

## 2019-09-04 NOTE — PROGRESS NOTES
SUBJECTIVE:    Patient ID: Maddie Otero is a 68 y.o. female.    Chief Complaint: COPD (surgical clearance)    COPD        Patient here today to get surgical clearance for a laproscopic hernia repair.  Her PFT's in December showed an FEV1 of 0.8, patient states she was sick at that time. Today she is here sick again.  She did not see an ENT because she states she has had no issues since the first time she came here. She is using the Trelegy daily. She is coughing up green mucous, feeling tight and more short of breath.  She has multiple drug allergies.  Past Medical History:   Diagnosis Date    Arthritis     Asthma     Diabetes mellitus, type 2     Diverticulosis     HNP (herniated nucleus pulposus)     LUMBAR    Hypertension     Pancreatic insufficiency     s/p whipple, non cancer    Presence of dental bridge     UPPER     Past Surgical History:   Procedure Laterality Date    APPENDECTOMY      BACK SURGERY  1994    lower back    BACK SURGERY  2012    lower back    CHOLECYSTECTOMY      LAMINECTOMY, SPINE, LUMBAR, WITH DISCECTOMY Left 12/3/2012    Performed by Kwabena Bui Jr., MD at [facility] OR    TONSILLECTOMY      TOTAL KNEE ARTHROPLASTY Left 08/10/2017    WHIPPLE PROCEDURE W/ LAPAROSCOPY  10/09     Family History   Problem Relation Age of Onset    Diabetes Mother     Cancer Father     Hypertension Neg Hx     Hyperlipidemia Neg Hx         Social History:   Marital Status:   Occupation: retired from caring for mentally disabled people  Alcohol History:  reports that she does not drink alcohol.  Tobacco History:  reports that she quit smoking about 2 years ago. Her smoking use included cigarettes. She has a 30.00 pack-year smoking history. She has never used smokeless tobacco.  Drug History:  reports that she does not use drugs.    Review of patient's allergies indicates:   Allergen Reactions    Sulfa (sulfonamide antibiotics) Hives    Penicillin v      Childhood reaction, swelled        Current Outpatient Medications   Medication Sig Dispense Refill    albuterol (PROVENTIL/VENTOLIN) 90 mcg/actuation inhaler Inhale 2 puffs into the lungs every 6 (six) hours as needed for Wheezing. 1 each 0    amlodipine (NORVASC) 5 MG tablet Take 1 tablet by mouth once daily.  1    blood sugar diagnostic (TRUE METRIX GLUCOSE TEST STRIP MISC) True Metrix Glucose Test Strip   Take 1 strip 3 times a day by miscell. route as needed.      blood sugar diagnostic (TRUE METRIX GLUCOSE TEST STRIP MISC) True Metrix Glucose Test Strip      cetirizine (ZYRTEC) 10 MG tablet Take 10 mg by mouth once daily.      CREON CpDR Take 2 capsules by mouth 4 (four) times daily.  5    fluticasone-umeclidin-vilanter (TRELEGY ELLIPTA) 100-62.5-25 mcg DsDv Inhale 1 puff into the lungs once daily. 1 each 11    NOVOLIN 70/30 U-100 INSULIN 100 unit/mL (70-30) injection   22    NOVOLOG MIX 70-30 FLEXPEN 100 unit/mL (70-30) InPn pen Inject 12 Units into the skin 3 (three) times daily with meals.  5    ranitidine (ZANTAC) 150 MG tablet Take 150 mg by mouth 2 (two) times daily.      albuterol-ipratropium (DUO-NEB) 2.5 mg-0.5 mg/3 mL nebulizer solution Take 3 mLs by nebulization every 4 (four) hours as needed for Wheezing. Rescue      albuterol-ipratropium (DUO-NEB) 2.5 mg-0.5 mg/3 mL nebulizer solution ipratropium-albuterol 0.5 mg-3 mg(2.5 mg base)/3 mL nebulization soln      fluticasone propionate (FLONASE) 50 mcg/actuation nasal spray fluticasone propionate 50 mcg/actuation nasal spray,suspension      levoFLOXacin (LEVAQUIN) 500 MG tablet Take 1 tablet (500 mg total) by mouth once daily. 7 tablet 0    predniSONE (DELTASONE) 10 MG tablet Take 4 tabs x 2 days, then take 3 tabs x 2 days, then take 2 tabs x 2 days, then take 1 tab x 2 days. 20 tablet 0     Current Facility-Administered Medications   Medication Dose Route Frequency Provider Last Rate Last Dose    lidocaine (PF) 10 mg/ml (1%) injection 10 mg  1 mL Intradermal Once  "Pantera Almanza III, MD             Last PFT: 01/2019  Last CT:04/30/19    Review of Systems  General: not feeling well   Eyes: Vision is good.  ENT:  Ear feels full, unable to breathe through her nose   Heart:: No chest pain or palpitations.  Lungs:coughing up green mucous, increased dyspnea    GI: abdominal hernia that hurts  : No dysuria, hesitancy, or nocturia.  Musculoskeletal: No joint pain or myalgias.  Skin: No lesions or rashes.  Neuro: No headaches or neuropathy.  Lymph: no swelling  Psych: No anxiety or depression.  Endo: weight gain     OBJECTIVE:      BP (!) (P) 140/90   Pulse 85   Ht 5' 8" (1.727 m)   Wt 95.3 kg (210 lb)   SpO2 (!) 94%   BMI 31.93 kg/m²     Physical Exam  GENERAL: Older patient in no distress.  HEENT: Pupils equal and reactive. Extraocular movements intact. Nose intact.      Pharynx moist.  NECK: Supple.   HEART: Regular rate and rhythm. No murmur or gallop auscultated.  LUNGS: decreased throughout, harsh obstructed sounding cough   ABDOMEN: Bowel sounds present. Non-tender, no masses palpated.  EXTREMITIES: Normal muscle tone and joint movement, no cyanosis or clubbing.   LYMPHATICS: No adenopathy palpated, +1 pitting to legs  SKIN: Dry, intact, no lesions.   NEURO: Cranial nerves II-XII intact. Motor strength 5/5 bilaterally, upper and lower extremities.  PSYCH: Appropriate affect.           Assessment:       1. Cough    2. COPD with exacerbation          Plan:       Cough  -     X-Ray Chest PA And Lateral; Future  -     Culture, Respiratory with Gram Stain    COPD with exacerbation    Other orders  -     levoFLOXacin (LEVAQUIN) 500 MG tablet; Take 1 tablet (500 mg total) by mouth once daily.  Dispense: 7 tablet; Refill: 0  -     predniSONE (DELTASONE) 10 MG tablet; Take 4 tabs x 2 days, then take 3 tabs x 2 days, then take 2 tabs x 2 days, then take 1 tab x 2 days.  Dispense: 20 tablet; Refill: 0       Levaquin 500mg by mouth daily   Chest xray today   Prednisone short " taper   Sputum culture   Wear your oxygen at night  Keep your sats 90% or above   Follow up next week with Dr. Mcneil   Follow up in about 1 week (around 9/11/2019).

## 2019-09-07 LAB
BACTERIA SPEC AEROBE CULT: ABNORMAL
GRAM STN SPEC: ABNORMAL

## 2019-09-12 ENCOUNTER — OFFICE VISIT (OUTPATIENT)
Dept: PULMONOLOGY | Facility: CLINIC | Age: 69
End: 2019-09-12
Payer: MEDICARE

## 2019-09-12 VITALS
HEART RATE: 95 BPM | DIASTOLIC BLOOD PRESSURE: 70 MMHG | HEIGHT: 68 IN | OXYGEN SATURATION: 96 % | WEIGHT: 220 LBS | BODY MASS INDEX: 33.34 KG/M2 | SYSTOLIC BLOOD PRESSURE: 120 MMHG

## 2019-09-12 DIAGNOSIS — J44.9 CHRONIC OBSTRUCTIVE PULMONARY DISEASE, UNSPECIFIED COPD TYPE: Primary | ICD-10-CM

## 2019-09-12 DIAGNOSIS — G47.34 NOCTURNAL HYPOXEMIA: ICD-10-CM

## 2019-09-12 PROCEDURE — 1101F PT FALLS ASSESS-DOCD LE1/YR: CPT | Mod: S$GLB,,, | Performed by: INTERNAL MEDICINE

## 2019-09-12 PROCEDURE — 99214 PR OFFICE/OUTPT VISIT, EST, LEVL IV, 30-39 MIN: ICD-10-PCS | Mod: S$GLB,,, | Performed by: INTERNAL MEDICINE

## 2019-09-12 PROCEDURE — 3078F DIAST BP <80 MM HG: CPT | Mod: S$GLB,,, | Performed by: INTERNAL MEDICINE

## 2019-09-12 PROCEDURE — 3078F PR MOST RECENT DIASTOLIC BLOOD PRESSURE < 80 MM HG: ICD-10-PCS | Mod: S$GLB,,, | Performed by: INTERNAL MEDICINE

## 2019-09-12 PROCEDURE — 3074F PR MOST RECENT SYSTOLIC BLOOD PRESSURE < 130 MM HG: ICD-10-PCS | Mod: S$GLB,,, | Performed by: INTERNAL MEDICINE

## 2019-09-12 PROCEDURE — 1101F PR PT FALLS ASSESS DOC 0-1 FALLS W/OUT INJ PAST YR: ICD-10-PCS | Mod: S$GLB,,, | Performed by: INTERNAL MEDICINE

## 2019-09-12 PROCEDURE — 3074F SYST BP LT 130 MM HG: CPT | Mod: S$GLB,,, | Performed by: INTERNAL MEDICINE

## 2019-09-12 PROCEDURE — 99214 OFFICE O/P EST MOD 30 MIN: CPT | Mod: S$GLB,,, | Performed by: INTERNAL MEDICINE

## 2019-09-12 NOTE — PROGRESS NOTES
SUBJECTIVE:    Patient ID: Maddie Otero is a 68 y.o. female.    Chief Complaint: COPD (1 week follow up clerance for surgery hernia )    COPD      Patient here today feeling better.  She finished the antibiotic and steroids. Her sputum culture grew haemophilus and Moraxella.  She is not expectorating clear mucous occasionally, no fever. She is using Trelegy daily and sleeping on her oxygen. She needs clearance for her hernia surgery that was supposed to be last week but I pushed back secondary to COPD exacerbation and patients FEV1 is 0.8.   .  Past Medical History:   Diagnosis Date    Arthritis     Asthma     COPD (chronic obstructive pulmonary disease)     Diabetes mellitus, type 2     Diverticulosis     HNP (herniated nucleus pulposus)     LUMBAR    Hypertension     Lung disease     Pancreatic insufficiency     s/p whipple, non cancer    Presence of dental bridge     UPPER     Past Surgical History:   Procedure Laterality Date    APPENDECTOMY      BACK SURGERY  1994    lower back    BACK SURGERY  2012    lower back    CHOLECYSTECTOMY      LAMINECTOMY, SPINE, LUMBAR, WITH DISCECTOMY Left 12/3/2012    [facility]    TONSILLECTOMY      TOTAL KNEE ARTHROPLASTY Left 08/10/2017    WHIPPLE PROCEDURE W/ LAPAROSCOPY  10/09     Family History   Problem Relation Age of Onset    Diabetes Mother     Cancer Father         Social History:   Marital Status:   Occupation: retired from caring for mentally disabled people  Alcohol History:  reports that she does not drink alcohol.  Tobacco History:  reports that she quit smoking about 2 years ago. Her smoking use included cigarettes. She has a 30.00 pack-year smoking history. She has never used smokeless tobacco.  Drug History:  reports that she does not use drugs.    Review of patient's allergies indicates:   Allergen Reactions    Sulfa (sulfonamide antibiotics) Hives    Penicillin v      Childhood reaction,  swelled       Current Outpatient Medications   Medication Sig Dispense Refill    albuterol (PROVENTIL/VENTOLIN) 90 mcg/actuation inhaler Inhale 2 puffs into the lungs every 6 (six) hours as needed for Wheezing. 1 each 0    amlodipine (NORVASC) 5 MG tablet Take 1 tablet by mouth once daily.  1    blood sugar diagnostic (TRUE METRIX GLUCOSE TEST STRIP MISC) True Metrix Glucose Test Strip   Take 1 strip 3 times a day by miscell. route as needed.      blood sugar diagnostic (TRUE METRIX GLUCOSE TEST STRIP MISC) True Metrix Glucose Test Strip      cetirizine (ZYRTEC) 10 MG tablet Take 10 mg by mouth once daily.      CREON CpDR Take 2 capsules by mouth 4 (four) times daily.  5    fluticasone-umeclidin-vilanter (TRELEGY ELLIPTA) 100-62.5-25 mcg DsDv Inhale 1 puff into the lungs once daily. 1 each 11    NOVOLIN 70/30 U-100 INSULIN 100 unit/mL (70-30) injection   22    NOVOLOG MIX 70-30 FLEXPEN 100 unit/mL (70-30) InPn pen Inject 12 Units into the skin 3 (three) times daily with meals.  5    ranitidine (ZANTAC) 150 MG tablet Take 150 mg by mouth 2 (two) times daily.      albuterol-ipratropium (DUO-NEB) 2.5 mg-0.5 mg/3 mL nebulizer solution Take 3 mLs by nebulization every 4 (four) hours as needed for Wheezing. Rescue      albuterol-ipratropium (DUO-NEB) 2.5 mg-0.5 mg/3 mL nebulizer solution ipratropium-albuterol 0.5 mg-3 mg(2.5 mg base)/3 mL nebulization soln      fluticasone propionate (FLONASE) 50 mcg/actuation nasal spray fluticasone propionate 50 mcg/actuation nasal spray,suspension      levoFLOXacin (LEVAQUIN) 500 MG tablet Take 1 tablet (500 mg total) by mouth once daily. 7 tablet 0    predniSONE (DELTASONE) 10 MG tablet Take 4 tabs x 2 days, then take 3 tabs x 2 days, then take 2 tabs x 2 days, then take 1 tab x 2 days. 20 tablet 0     Current Facility-Administered Medications   Medication Dose Route Frequency Provider Last Rate Last Dose    lidocaine (PF) 10 mg/ml (1%) injection 10 mg  1 mL  "Intradermal Once Pantera Almanza III, MD             Last PFT: 01/2019    Last CT:04/30/19  IMPRESSION: Mild emphysematous changes with resolution of the scattered  bronchial wall thickening and tree-in-bud opacities    No acute pulmonary process    Review of Systems  General: not feeling well   Eyes: Vision is good.  ENT:  Ear feels full, unable to breathe through her nose   Heart:: No chest pain or palpitations.  Lungs:dyspnea is better, now with clear mucous     GI: abdominal hernia that hurts, GERD   : No dysuria, hesitancy, or nocturia.  Musculoskeletal: No joint pain or myalgias.  Skin: No lesions or rashes.  Neuro: No headaches or neuropathy.  Lymph: no swelling  Psych: No anxiety or depression.  Endo: weight gain     Kerry Alvarez NP served in the capacity as a "scribe" for this patient encounter.  A "face to face" encounter occurred with Dr. Mcneil on this date  The treatment plan and medical decision making is outlined below:         OBJECTIVE:      /70 (BP Location: Left arm, Patient Position: Sitting)   Pulse 95   Ht 5' 8" (1.727 m)   Wt 99.8 kg (220 lb)   SpO2 96%   BMI 33.45 kg/m²     Physical Exam  GENERAL: Older patient in no distress.  HEENT: Pupils equal and reactive. Extraocular movements intact. Nose intact.      Pharynx moist.  NECK: Supple.   HEART: Regular rate and rhythm. No murmur or gallop auscultated.  LUNGS: decreased throughout  ABDOMEN: Bowel sounds present. Non-tender, no masses palpated.  EXTREMITIES: Normal muscle tone and joint movement, no cyanosis or clubbing.   LYMPHATICS: No adenopathy palpated, trace edema   SKIN: Dry, intact, no lesions.   NEURO: Cranial nerves II-XII intact. Motor strength 5/5 bilaterally, upper and lower extremities.  PSYCH: Appropriate affect.    Kerry Alvarez NP served in the capacity as a "scribe" for this patient encounter.  A "face to face" encounter occurred with Dr. Mcneil on this date  The treatment plan and medical decision " making is outlined below:                Assessment:       1. Chronic obstructive pulmonary disease, unspecified COPD type    2. Nocturnal hypoxemia          Plan:       Chronic obstructive pulmonary disease, unspecified COPD type  -     Spirometry with/without bronchodilator; Future    Nocturnal hypoxemia      Continue Trelegy daily  Wear your oxygen at night   Needs clearance to Dr. Almanza for abdominal hernia repair, FEV1 in December 2018.     Follow up in about 6 months (around 3/12/2020).     Will clear for surgery In 1 week if FEV1 is better  If not better, will clear for 2 weeks with reservations  Need flu shot in October

## 2019-09-12 NOTE — PATIENT INSTRUCTIONS
Treatment for COPD    Your healthcare provider will prescribe the best treatments for your COPD.  Treatment  Recommendations include the following:  · Medicines. Some medicines help relieve symptoms when you have them. Others are taken daily to control inflammation in the lungs. Always take your medicines as prescribed. Learn the names of your medicines, as well as how and when to use them.  · Oxygen therapy. Oxygen may be prescribed if tests show that your blood contains too little oxygen.  · Smoking. If you smoke, quit. Smoking is the main cause of COPD. Quitting will help you be able to better manage your COPD. Ask your healthcare provider about ways to help you quit smoking.  · Avoiding infections. Infections, like a cold or the flu, can cause your symptoms to worsen. Try to stay away from people who are sick. Wash your hands often. And, ask your healthcare provider about vaccines for the flu and pneumonia.  Coping with shortness of breath  Coping tips include the following:  · Exercise. Try to be as active as possible. This will improve energy levels and strengthen your muscles, so you can do more.  · Breathing techniques. Ask your healthcare provider or nurse to show you how to do pursed-lip breathing.  · Balance rest and activity. Each day, try to balance rest periods with activity. For example, you might start the day with getting dressed and eating breakfast, then relax and read the paper. After that, take a brief walk. And then sit with your feet up for a while.  · Pulmonary rehabilitation. Ask your provider, or call your local hospital to find out about pulmonary rehab programs. The programs help with managing your disease, breathing techniques, exercise, support and counseling.  · Healthy eating. Eating a healthy, balanced diet and making an effort to maintain your ideal weight are important to staying as healthy as possible. Make sure you have a lot of fruit and vegetables every day, as well as  balanced portions of whole grains, lean meats and fish, and low-fat dairy products.  Date Last Reviewed: 5/1/2016  © 9878-7275 [employer]. [address] [place] [address], [place] [address]. All rights reserved. This information is not intended as a substitute for professional medical care. Always follow your healthcare professional's instructions.      Continue Trelegy daily  Wear your oxygen at night   Needs clearance to Dr. Almanza for abdominal hernia repair, FEV1 in December 2018.

## 2019-09-26 ENCOUNTER — HOSPITAL ENCOUNTER (OUTPATIENT)
Dept: PULMONOLOGY | Facility: HOSPITAL | Age: 69
Discharge: HOME | End: 2019-09-26
Attending: INTERNAL MEDICINE
Payer: MEDICARE

## 2019-09-26 DIAGNOSIS — J44.9 CHRONIC OBSTRUCTIVE PULMONARY DISEASE, UNSPECIFIED COPD TYPE: ICD-10-CM

## 2019-09-26 PROCEDURE — 94060 EVALUATION OF WHEEZING: CPT

## 2019-09-26 PROCEDURE — 94010 BREATHING CAPACITY TEST: CPT

## 2019-09-27 ENCOUNTER — TELEPHONE (OUTPATIENT)
Dept: PULMONOLOGY | Facility: CLINIC | Age: 69
End: 2019-09-27

## 2019-09-27 NOTE — TELEPHONE ENCOUNTER
Repeat Spirometry shows severe obstruction, but FEV1 is now 1.06 pre, it was 0.8 in December. Will clear for her abdominal surgery with recommendations.  I typed the letter to send to Dr. Gutierrez.

## 2019-09-27 NOTE — TELEPHONE ENCOUNTER
----- Message from Yesika Higgins sent at 9/26/2019  3:51 PM CDT -----  Dr Mcneil told her to call after she took the PFT. She did it this morning.

## 2019-09-30 ENCOUNTER — TELEPHONE (OUTPATIENT)
Dept: PULMONOLOGY | Facility: CLINIC | Age: 69
End: 2019-09-30

## 2019-09-30 NOTE — TELEPHONE ENCOUNTER
I called pt to let her know we sent her surg clearance and would like Kerry to call her back with the details of her PFT results.

## 2019-09-30 NOTE — TELEPHONE ENCOUNTER
----- Message from Jo Ann Espino sent at 9/30/2019  1:41 PM CDT -----  Contact: Maddie  Patient called last week inquiring about test results.  She has not gotten a return call.  Can you please call patient??      Thank You        Jo Ann

## 2019-10-02 ENCOUNTER — TELEPHONE (OUTPATIENT)
Dept: SURGERY | Facility: CLINIC | Age: 69
End: 2019-10-02

## 2019-10-02 NOTE — TELEPHONE ENCOUNTER
Pt called.. States  has cleared her for surgery.. She is ready to R/S... Apt sched to see  10/9/19.. We did not discuss a surgery date

## 2019-10-09 ENCOUNTER — OFFICE VISIT (OUTPATIENT)
Dept: SURGERY | Facility: CLINIC | Age: 69
End: 2019-10-09
Payer: MEDICARE

## 2019-10-09 ENCOUNTER — HOSPITAL ENCOUNTER (OUTPATIENT)
Dept: PREADMISSION TESTING | Facility: HOSPITAL | Age: 69
Discharge: HOME | End: 2019-10-09
Attending: SURGERY
Payer: MEDICARE

## 2019-10-09 VITALS
BODY MASS INDEX: 33.38 KG/M2 | HEIGHT: 68 IN | HEART RATE: 80 BPM | RESPIRATION RATE: 20 BRPM | OXYGEN SATURATION: 94 % | WEIGHT: 220.25 LBS | TEMPERATURE: 98 F

## 2019-10-09 VITALS
HEIGHT: 68 IN | HEART RATE: 89 BPM | TEMPERATURE: 98 F | BODY MASS INDEX: 33.34 KG/M2 | DIASTOLIC BLOOD PRESSURE: 93 MMHG | SYSTOLIC BLOOD PRESSURE: 146 MMHG | WEIGHT: 220 LBS

## 2019-10-09 DIAGNOSIS — K43.2 INCISIONAL HERNIA, WITHOUT OBSTRUCTION OR GANGRENE: Primary | ICD-10-CM

## 2019-10-09 DIAGNOSIS — K43.2 INCISIONAL HERNIA, WITHOUT OBSTRUCTION OR GANGRENE: ICD-10-CM

## 2019-10-09 PROCEDURE — 99213 OFFICE O/P EST LOW 20 MIN: CPT | Mod: S$GLB,,, | Performed by: SURGERY

## 2019-10-09 PROCEDURE — 3080F PR MOST RECENT DIASTOLIC BLOOD PRESSURE >= 90 MM HG: ICD-10-PCS | Mod: S$GLB,,, | Performed by: SURGERY

## 2019-10-09 PROCEDURE — 3077F PR MOST RECENT SYSTOLIC BLOOD PRESSURE >= 140 MM HG: ICD-10-PCS | Mod: S$GLB,,, | Performed by: SURGERY

## 2019-10-09 PROCEDURE — 3080F DIAST BP >= 90 MM HG: CPT | Mod: S$GLB,,, | Performed by: SURGERY

## 2019-10-09 PROCEDURE — 3077F SYST BP >= 140 MM HG: CPT | Mod: S$GLB,,, | Performed by: SURGERY

## 2019-10-09 PROCEDURE — 99213 PR OFFICE/OUTPT VISIT, EST, LEVL III, 20-29 MIN: ICD-10-PCS | Mod: S$GLB,,, | Performed by: SURGERY

## 2019-10-09 PROCEDURE — 93005 ELECTROCARDIOGRAM TRACING: CPT

## 2019-10-09 RX ORDER — SODIUM CHLORIDE 9 MG/ML
INJECTION, SOLUTION INTRAVENOUS CONTINUOUS
Status: CANCELLED | OUTPATIENT
Start: 2019-10-09

## 2019-10-09 RX ORDER — LIDOCAINE HYDROCHLORIDE 10 MG/ML
1 INJECTION, SOLUTION EPIDURAL; INFILTRATION; INTRACAUDAL; PERINEURAL ONCE
Status: DISCONTINUED | OUTPATIENT
Start: 2019-10-09 | End: 2019-10-22 | Stop reason: CLARIF

## 2019-10-10 NOTE — PROGRESS NOTES
Subjective:       Patient ID: Maddie Oetro is a 68 y.o. female.    Chief Complaint: Other (ReEval Incisional Hernia)      HPI:  Patient is 68-year-old female returns to the office to schedule incisional hernia repair.  She was originally referred to the office for evaluation of an incisional hernia 1 month ago. Patient had Whipple procedure for benign disease in 2009.  Over the past several months she has noticed bulging near the superior aspect of her incision. She states it is becoming more uncomfortable with lifting and Valsalva.  She has no obstructive symptoms.  She has no history of previous hernia repair.  She has past medical history of significant COPD.  She is on home oxygen occasionally at night with sleeping.  She is on Trelegy daily.  She reports no current shortness of breath or productive cough today.  She states she recently had a pulmonary infection and has completed Levaquin today. She was sent for pulmonary clearance.  She received pulmonary clearance but still considered high risk for perioperative pulmonary issues.      Past Medical History:   Diagnosis Date    Arthritis     Asthma     COPD (chronic obstructive pulmonary disease)     COPD (chronic obstructive pulmonary disease) 2019    Diabetes mellitus, type 2     Diverticulosis     History of left knee replacement 2017    DR CRENSHAW    HNP (herniated nucleus pulposus)     LUMBAR    Hypertension     Lung disease     Pancreatic insufficiency     s/p whipple, non cancer    Presence of dental bridge     UPPER     Past Surgical History:   Procedure Laterality Date    APPENDECTOMY      BACK SURGERY  1994    lower back    BACK SURGERY  2012    lower back    CHOLECYSTECTOMY      HERNIA REPAIR      pancrease  2009    stents in pancrease     TONSILLECTOMY      TOTAL KNEE ARTHROPLASTY Left 08/10/2017    WHIPPLE PROCEDURE W/ LAPAROSCOPY  10/09     Review of patient's allergies indicates:   Allergen Reactions    Heparin Itching      "Pt states she has no allergy      NOT AN ALLERGY     Penicillins Anaphylaxis, Hives and Itching     Other reaction(s): Unknown  Childhood reaction, swelled      Sulfa (sulfonamide antibiotics) Hives    Doxycycline Nausea And Vomiting     Other reaction(s): Abdominal Pain    Clindamycin      "Felt like I was on fire down through throat and felt like I was having spasms in my throat"    Penicillin v      Childhood reaction, swelled     Medication List with Changes/Refills   Current Medications    ALBUTEROL (PROVENTIL/VENTOLIN) 90 MCG/ACTUATION INHALER    Inhale 2 puffs into the lungs every 6 (six) hours as needed for Wheezing.    ALBUTEROL-IPRATROPIUM (DUO-NEB) 2.5 MG-0.5 MG/3 ML NEBULIZER SOLUTION    Take 3 mLs by nebulization every 4 (four) hours as needed for Wheezing. Rescue    ALBUTEROL-IPRATROPIUM (DUO-NEB) 2.5 MG-0.5 MG/3 ML NEBULIZER SOLUTION    ipratropium-albuterol 0.5 mg-3 mg(2.5 mg base)/3 mL nebulization soln    AMLODIPINE (NORVASC) 5 MG TABLET    Take 1 tablet by mouth once daily.    BLOOD SUGAR DIAGNOSTIC (TRUE METRIX GLUCOSE TEST STRIP MISC)    True Metrix Glucose Test Strip   Take 1 strip 3 times a day by miscell. route as needed.    BLOOD SUGAR DIAGNOSTIC (TRUE METRIX GLUCOSE TEST STRIP MISC)    True Metrix Glucose Test Strip    CETIRIZINE (ZYRTEC) 10 MG TABLET    Take 10 mg by mouth once daily.    CREON CPDR    Take 2 capsules by mouth 4 (four) times daily.    FLUTICASONE PROPIONATE (FLONASE) 50 MCG/ACTUATION NASAL SPRAY    fluticasone propionate 50 mcg/actuation nasal spray,suspension    FLUTICASONE-UMECLIDIN-VILANTER (TRELEGY ELLIPTA) 100-62.5-25 MCG DSDV    Inhale 1 puff into the lungs once daily.    LEVOFLOXACIN (LEVAQUIN) 500 MG TABLET    Take 1 tablet (500 mg total) by mouth once daily.    NOVOLIN 70/30 U-100 INSULIN 100 UNIT/ML (70-30) INJECTION        NOVOLOG MIX 70-30 FLEXPEN 100 UNIT/ML (70-30) INPN PEN    Inject 12 Units into the skin 3 (three) times daily with meals.    PREDNISONE " (DELTASONE) 10 MG TABLET    Take 4 tabs x 2 days, then take 3 tabs x 2 days, then take 2 tabs x 2 days, then take 1 tab x 2 days.    RANITIDINE (ZANTAC) 150 MG TABLET    Take 150 mg by mouth 2 (two) times daily.     Family History   Problem Relation Age of Onset    Diabetes Mother     Cancer Father      Social History     Socioeconomic History    Marital status:      Spouse name: Not on file    Number of children: Not on file    Years of education: Not on file    Highest education level: Not on file   Occupational History    Not on file   Social Needs    Financial resource strain: Not on file    Food insecurity:     Worry: Not on file     Inability: Not on file    Transportation needs:     Medical: Not on file     Non-medical: Not on file   Tobacco Use    Smoking status: Former Smoker     Packs/day: 1.00     Years: 30.00     Pack years: 30.00     Types: Cigarettes     Last attempt to quit: 2017     Years since quittin.5    Smokeless tobacco: Never Used   Substance and Sexual Activity    Alcohol use: No    Drug use: No    Sexual activity: Not on file   Lifestyle    Physical activity:     Days per week: Not on file     Minutes per session: Not on file    Stress: Not on file   Relationships    Social connections:     Talks on phone: Not on file     Gets together: Not on file     Attends Anabaptist service: Not on file     Active member of club or organization: Not on file     Attends meetings of clubs or organizations: Not on file     Relationship status: Not on file   Other Topics Concern    Not on file   Social History Narrative    Not on file         Review of Systems   Constitutional: Negative for appetite change, chills, fever and unexpected weight change.   HENT: Negative for hearing loss, rhinorrhea, sore throat and voice change.    Eyes: Negative for photophobia and visual disturbance.   Respiratory: Negative for cough, choking and shortness of breath.    Cardiovascular:  Negative for chest pain, palpitations and leg swelling.   Gastrointestinal: Negative for abdominal pain, blood in stool, constipation, diarrhea, nausea and vomiting.   Endocrine: Negative for cold intolerance, heat intolerance, polydipsia and polyuria.   Musculoskeletal: Negative for arthralgias, back pain, joint swelling and neck stiffness.   Skin: Negative for color change, pallor and rash.   Neurological: Negative for dizziness, seizures, syncope and headaches.   Hematological: Negative for adenopathy. Does not bruise/bleed easily.   Psychiatric/Behavioral: Negative for agitation, behavioral problems and confusion.       Objective:      Physical Exam   Constitutional: She is oriented to person, place, and time. She appears well-developed and well-nourished.  Non-toxic appearance. No distress.   HENT:   Head: Normocephalic and atraumatic. Head is without abrasion and without laceration.   Right Ear: External ear normal.   Left Ear: External ear normal.   Nose: Nose normal.   Mouth/Throat: Oropharynx is clear and moist.   Eyes: Pupils are equal, round, and reactive to light. EOM are normal.   Neck: Trachea normal. Neck supple. No tracheal deviation and normal range of motion present.   Cardiovascular: Normal rate and regular rhythm.   Pulmonary/Chest: Effort normal. No accessory muscle usage. No tachypnea. No respiratory distress. She has wheezes.   Breathing comfortably.  Does have mild wheezing bilaterally   Abdominal: Soft. Normal appearance and bowel sounds are normal. She exhibits no distension and no mass. There is no tenderness. There is no rigidity, no rebound and no guarding. A hernia is present. Hernia confirmed positive in the ventral area.       Patient has a well-healed midline incision.  There is bulging at the superior aspect and I would do appreciate hernia defect with the bulging.  Size of the defect feels to be several cm in diameter.   Lymphadenopathy: No inguinal adenopathy noted on the right or  left side.   Neurological: She is alert and oriented to person, place, and time. Coordination and gait normal.   Skin: Skin is warm and intact.   Psychiatric: She has a normal mood and affect. Her speech is normal and behavior is normal.       Assessment/Plan:   Incisional hernia, without obstruction or gangrene  -     Insert peripheral IV; Standing  -     Case Request Operating Room: REPAIR, HERNIA, INCISIONAL, LAPAROSCOPIC  -     Full code; Standing  -     Basic metabolic panel; Future; Expected date: 10/09/2019  -     CBC auto differential; Future; Expected date: 10/09/2019  -     EKG 12-lead; Future  -     X-Ray Chest 1 View; Future; Expected date: 10/09/2019    Other orders  -     lidocaine (PF) 10 mg/ml (1%) injection 10 mg     Patient will be scheduled for laparoscopic incisional hernia repair.  All risks and benefits of the surgery were discussed with the patient. She has been cleared with Pulmonary to proceed with surgery.      I discussed the proposed procedures the the patient including risks, benefits, indications, alternatives and special concerns.  The patient appears to understand and agrees to go ahead with surgery.  I have made no promises, warranties or verbal agreements beyond what was discussed above.

## 2019-10-10 NOTE — H&P (VIEW-ONLY)
Subjective:       Patient ID: Maddie Oteor is a 68 y.o. female.    Chief Complaint: Other (ReEval Incisional Hernia)      HPI:  Patient is 68-year-old female returns to the office to schedule incisional hernia repair.  She was originally referred to the office for evaluation of an incisional hernia 1 month ago. Patient had Whipple procedure for benign disease in 2009.  Over the past several months she has noticed bulging near the superior aspect of her incision. She states it is becoming more uncomfortable with lifting and Valsalva.  She has no obstructive symptoms.  She has no history of previous hernia repair.  She has past medical history of significant COPD.  She is on home oxygen occasionally at night with sleeping.  She is on Trelegy daily.  She reports no current shortness of breath or productive cough today.  She states she recently had a pulmonary infection and has completed Levaquin today. She was sent for pulmonary clearance.  She received pulmonary clearance but still considered high risk for perioperative pulmonary issues.      Past Medical History:   Diagnosis Date    Arthritis     Asthma     COPD (chronic obstructive pulmonary disease)     COPD (chronic obstructive pulmonary disease) 2019    Diabetes mellitus, type 2     Diverticulosis     History of left knee replacement 2017    DR CRENSHAW    HNP (herniated nucleus pulposus)     LUMBAR    Hypertension     Lung disease     Pancreatic insufficiency     s/p whipple, non cancer    Presence of dental bridge     UPPER     Past Surgical History:   Procedure Laterality Date    APPENDECTOMY      BACK SURGERY  1994    lower back    BACK SURGERY  2012    lower back    CHOLECYSTECTOMY      HERNIA REPAIR      pancrease  2009    stents in pancrease     TONSILLECTOMY      TOTAL KNEE ARTHROPLASTY Left 08/10/2017    WHIPPLE PROCEDURE W/ LAPAROSCOPY  10/09     Review of patient's allergies indicates:   Allergen Reactions    Heparin Itching      "Pt states she has no allergy      NOT AN ALLERGY     Penicillins Anaphylaxis, Hives and Itching     Other reaction(s): Unknown  Childhood reaction, swelled      Sulfa (sulfonamide antibiotics) Hives    Doxycycline Nausea And Vomiting     Other reaction(s): Abdominal Pain    Clindamycin      "Felt like I was on fire down through throat and felt like I was having spasms in my throat"    Penicillin v      Childhood reaction, swelled     Medication List with Changes/Refills   Current Medications    ALBUTEROL (PROVENTIL/VENTOLIN) 90 MCG/ACTUATION INHALER    Inhale 2 puffs into the lungs every 6 (six) hours as needed for Wheezing.    ALBUTEROL-IPRATROPIUM (DUO-NEB) 2.5 MG-0.5 MG/3 ML NEBULIZER SOLUTION    Take 3 mLs by nebulization every 4 (four) hours as needed for Wheezing. Rescue    ALBUTEROL-IPRATROPIUM (DUO-NEB) 2.5 MG-0.5 MG/3 ML NEBULIZER SOLUTION    ipratropium-albuterol 0.5 mg-3 mg(2.5 mg base)/3 mL nebulization soln    AMLODIPINE (NORVASC) 5 MG TABLET    Take 1 tablet by mouth once daily.    BLOOD SUGAR DIAGNOSTIC (TRUE METRIX GLUCOSE TEST STRIP MISC)    True Metrix Glucose Test Strip   Take 1 strip 3 times a day by miscell. route as needed.    BLOOD SUGAR DIAGNOSTIC (TRUE METRIX GLUCOSE TEST STRIP MISC)    True Metrix Glucose Test Strip    CETIRIZINE (ZYRTEC) 10 MG TABLET    Take 10 mg by mouth once daily.    CREON CPDR    Take 2 capsules by mouth 4 (four) times daily.    FLUTICASONE PROPIONATE (FLONASE) 50 MCG/ACTUATION NASAL SPRAY    fluticasone propionate 50 mcg/actuation nasal spray,suspension    FLUTICASONE-UMECLIDIN-VILANTER (TRELEGY ELLIPTA) 100-62.5-25 MCG DSDV    Inhale 1 puff into the lungs once daily.    LEVOFLOXACIN (LEVAQUIN) 500 MG TABLET    Take 1 tablet (500 mg total) by mouth once daily.    NOVOLIN 70/30 U-100 INSULIN 100 UNIT/ML (70-30) INJECTION        NOVOLOG MIX 70-30 FLEXPEN 100 UNIT/ML (70-30) INPN PEN    Inject 12 Units into the skin 3 (three) times daily with meals.    PREDNISONE " (DELTASONE) 10 MG TABLET    Take 4 tabs x 2 days, then take 3 tabs x 2 days, then take 2 tabs x 2 days, then take 1 tab x 2 days.    RANITIDINE (ZANTAC) 150 MG TABLET    Take 150 mg by mouth 2 (two) times daily.     Family History   Problem Relation Age of Onset    Diabetes Mother     Cancer Father      Social History     Socioeconomic History    Marital status:      Spouse name: Not on file    Number of children: Not on file    Years of education: Not on file    Highest education level: Not on file   Occupational History    Not on file   Social Needs    Financial resource strain: Not on file    Food insecurity:     Worry: Not on file     Inability: Not on file    Transportation needs:     Medical: Not on file     Non-medical: Not on file   Tobacco Use    Smoking status: Former Smoker     Packs/day: 1.00     Years: 30.00     Pack years: 30.00     Types: Cigarettes     Last attempt to quit: 2017     Years since quittin.5    Smokeless tobacco: Never Used   Substance and Sexual Activity    Alcohol use: No    Drug use: No    Sexual activity: Not on file   Lifestyle    Physical activity:     Days per week: Not on file     Minutes per session: Not on file    Stress: Not on file   Relationships    Social connections:     Talks on phone: Not on file     Gets together: Not on file     Attends Quaker service: Not on file     Active member of club or organization: Not on file     Attends meetings of clubs or organizations: Not on file     Relationship status: Not on file   Other Topics Concern    Not on file   Social History Narrative    Not on file         Review of Systems   Constitutional: Negative for appetite change, chills, fever and unexpected weight change.   HENT: Negative for hearing loss, rhinorrhea, sore throat and voice change.    Eyes: Negative for photophobia and visual disturbance.   Respiratory: Negative for cough, choking and shortness of breath.    Cardiovascular:  Negative for chest pain, palpitations and leg swelling.   Gastrointestinal: Negative for abdominal pain, blood in stool, constipation, diarrhea, nausea and vomiting.   Endocrine: Negative for cold intolerance, heat intolerance, polydipsia and polyuria.   Musculoskeletal: Negative for arthralgias, back pain, joint swelling and neck stiffness.   Skin: Negative for color change, pallor and rash.   Neurological: Negative for dizziness, seizures, syncope and headaches.   Hematological: Negative for adenopathy. Does not bruise/bleed easily.   Psychiatric/Behavioral: Negative for agitation, behavioral problems and confusion.       Objective:      Physical Exam   Constitutional: She is oriented to person, place, and time. She appears well-developed and well-nourished.  Non-toxic appearance. No distress.   HENT:   Head: Normocephalic and atraumatic. Head is without abrasion and without laceration.   Right Ear: External ear normal.   Left Ear: External ear normal.   Nose: Nose normal.   Mouth/Throat: Oropharynx is clear and moist.   Eyes: Pupils are equal, round, and reactive to light. EOM are normal.   Neck: Trachea normal. Neck supple. No tracheal deviation and normal range of motion present.   Cardiovascular: Normal rate and regular rhythm.   Pulmonary/Chest: Effort normal. No accessory muscle usage. No tachypnea. No respiratory distress. She has wheezes.   Breathing comfortably.  Does have mild wheezing bilaterally   Abdominal: Soft. Normal appearance and bowel sounds are normal. She exhibits no distension and no mass. There is no tenderness. There is no rigidity, no rebound and no guarding. A hernia is present. Hernia confirmed positive in the ventral area.       Patient has a well-healed midline incision.  There is bulging at the superior aspect and I would do appreciate hernia defect with the bulging.  Size of the defect feels to be several cm in diameter.   Lymphadenopathy: No inguinal adenopathy noted on the right or  left side.   Neurological: She is alert and oriented to person, place, and time. Coordination and gait normal.   Skin: Skin is warm and intact.   Psychiatric: She has a normal mood and affect. Her speech is normal and behavior is normal.       Assessment/Plan:   Incisional hernia, without obstruction or gangrene  -     Insert peripheral IV; Standing  -     Case Request Operating Room: REPAIR, HERNIA, INCISIONAL, LAPAROSCOPIC  -     Full code; Standing  -     Basic metabolic panel; Future; Expected date: 10/09/2019  -     CBC auto differential; Future; Expected date: 10/09/2019  -     EKG 12-lead; Future  -     X-Ray Chest 1 View; Future; Expected date: 10/09/2019    Other orders  -     lidocaine (PF) 10 mg/ml (1%) injection 10 mg     Patient will be scheduled for laparoscopic incisional hernia repair.  All risks and benefits of the surgery were discussed with the patient. She has been cleared with Pulmonary to proceed with surgery.      I discussed the proposed procedures the the patient including risks, benefits, indications, alternatives and special concerns.  The patient appears to understand and agrees to go ahead with surgery.  I have made no promises, warranties or verbal agreements beyond what was discussed above.

## 2019-10-22 ENCOUNTER — ANESTHESIA (OUTPATIENT)
Dept: SURGERY | Facility: HOSPITAL | Age: 69
End: 2019-10-22
Payer: MEDICARE

## 2019-10-22 ENCOUNTER — HOSPITAL ENCOUNTER (OUTPATIENT)
Facility: HOSPITAL | Age: 69
Discharge: HOME | End: 2019-10-23
Attending: SURGERY | Admitting: SURGERY
Payer: MEDICARE

## 2019-10-22 ENCOUNTER — ANESTHESIA EVENT (OUTPATIENT)
Dept: SURGERY | Facility: HOSPITAL | Age: 69
End: 2019-10-22
Payer: MEDICARE

## 2019-10-22 DIAGNOSIS — K43.2 INCISIONAL HERNIA, WITHOUT OBSTRUCTION OR GANGRENE: Primary | ICD-10-CM

## 2019-10-22 PROBLEM — R09.02 HYPOXIA: Status: ACTIVE | Noted: 2019-10-22

## 2019-10-22 LAB
GLUCOSE SERPL-MCNC: 161 MG/DL (ref 70–110)
GLUCOSE SERPL-MCNC: 250 MG/DL (ref 70–110)
GLUCOSE SERPL-MCNC: 271 MG/DL (ref 70–110)

## 2019-10-22 PROCEDURE — 71000016 HC POSTOP RECOV ADDL HR: Performed by: SURGERY

## 2019-10-22 PROCEDURE — 36000709 HC OR TIME LEV III EA ADD 15 MIN: Performed by: SURGERY

## 2019-10-22 PROCEDURE — 49654 PR LAP, INCISIONAL HERNIA REPAIR,REDUCIBLE: CPT | Mod: ,,, | Performed by: SURGERY

## 2019-10-22 PROCEDURE — 25000003 PHARM REV CODE 250: Performed by: ANESTHESIOLOGY

## 2019-10-22 PROCEDURE — 27000221 HC OXYGEN, UP TO 24 HOURS

## 2019-10-22 PROCEDURE — 27000654 HC CATH IV JELCO: Performed by: ANESTHESIOLOGY

## 2019-10-22 PROCEDURE — S0028 INJECTION, FAMOTIDINE, 20 MG: HCPCS | Performed by: NURSE ANESTHETIST, CERTIFIED REGISTERED

## 2019-10-22 PROCEDURE — 63600175 PHARM REV CODE 636 W HCPCS: Performed by: ANESTHESIOLOGY

## 2019-10-22 PROCEDURE — 71000015 HC POSTOP RECOV 1ST HR: Performed by: SURGERY

## 2019-10-22 PROCEDURE — 71000039 HC RECOVERY, EACH ADD'L HOUR: Performed by: SURGERY

## 2019-10-22 PROCEDURE — 27201423 OPTIME MED/SURG SUP & DEVICES STERILE SUPPLY: Performed by: SURGERY

## 2019-10-22 PROCEDURE — 63600175 PHARM REV CODE 636 W HCPCS: Performed by: SURGERY

## 2019-10-22 PROCEDURE — C1781 MESH (IMPLANTABLE): HCPCS | Performed by: SURGERY

## 2019-10-22 PROCEDURE — 49654 PR LAP, INCISIONAL HERNIA REPAIR,REDUCIBLE: ICD-10-PCS | Mod: ,,, | Performed by: SURGERY

## 2019-10-22 PROCEDURE — 96372 THER/PROPH/DIAG INJ SC/IM: CPT | Mod: 59

## 2019-10-22 PROCEDURE — 36000708 HC OR TIME LEV III 1ST 15 MIN: Performed by: SURGERY

## 2019-10-22 PROCEDURE — 27000673 HC TUBING BLOOD Y: Performed by: ANESTHESIOLOGY

## 2019-10-22 PROCEDURE — 63600175 PHARM REV CODE 636 W HCPCS: Performed by: NURSE ANESTHETIST, CERTIFIED REGISTERED

## 2019-10-22 PROCEDURE — 37000008 HC ANESTHESIA 1ST 15 MINUTES: Performed by: SURGERY

## 2019-10-22 PROCEDURE — 63600175 PHARM REV CODE 636 W HCPCS: Mod: GZ | Performed by: SURGERY

## 2019-10-22 PROCEDURE — 37000009 HC ANESTHESIA EA ADD 15 MINS: Performed by: SURGERY

## 2019-10-22 PROCEDURE — 71000033 HC RECOVERY, INTIAL HOUR: Performed by: SURGERY

## 2019-10-22 PROCEDURE — 27000675 HC TUBING MICRODRIP: Performed by: ANESTHESIOLOGY

## 2019-10-22 PROCEDURE — G0378 HOSPITAL OBSERVATION PER HR: HCPCS

## 2019-10-22 PROCEDURE — 27000651 HC AIRWAY WILLIAMS: Performed by: ANESTHESIOLOGY

## 2019-10-22 PROCEDURE — 25000003 PHARM REV CODE 250: Performed by: NURSE ANESTHETIST, CERTIFIED REGISTERED

## 2019-10-22 PROCEDURE — C9290 INJ, BUPIVACAINE LIPOSOME: HCPCS | Performed by: SURGERY

## 2019-10-22 PROCEDURE — 94761 N-INVAS EAR/PLS OXIMETRY MLT: CPT

## 2019-10-22 PROCEDURE — 25000003 PHARM REV CODE 250: Performed by: SURGERY

## 2019-10-22 DEVICE — IMPLANTABLE DEVICE: Type: IMPLANTABLE DEVICE | Site: ABDOMEN | Status: FUNCTIONAL

## 2019-10-22 RX ORDER — ACETAMINOPHEN 500 MG
1000 TABLET ORAL ONCE
Status: COMPLETED | OUTPATIENT
Start: 2019-10-22 | End: 2019-10-22

## 2019-10-22 RX ORDER — HYDROMORPHONE HYDROCHLORIDE 1 MG/ML
0.2 INJECTION, SOLUTION INTRAMUSCULAR; INTRAVENOUS; SUBCUTANEOUS
Status: DISCONTINUED | OUTPATIENT
Start: 2019-10-22 | End: 2019-10-22 | Stop reason: HOSPADM

## 2019-10-22 RX ORDER — HYDROCODONE BITARTRATE AND ACETAMINOPHEN 5; 325 MG/1; MG/1
1 TABLET ORAL EVERY 6 HOURS PRN
Qty: 30 TABLET | Refills: 0
Start: 2019-10-22 | End: 2021-04-19

## 2019-10-22 RX ORDER — LIDOCAINE HYDROCHLORIDE 20 MG/ML
INJECTION, SOLUTION EPIDURAL; INFILTRATION; INTRACAUDAL; PERINEURAL
Status: DISCONTINUED | OUTPATIENT
Start: 2019-10-22 | End: 2019-10-22

## 2019-10-22 RX ORDER — FAMOTIDINE 10 MG/ML
INJECTION INTRAVENOUS
Status: DISCONTINUED | OUTPATIENT
Start: 2019-10-22 | End: 2019-10-22

## 2019-10-22 RX ORDER — SUCCINYLCHOLINE CHLORIDE 20 MG/ML
INJECTION INTRAMUSCULAR; INTRAVENOUS
Status: DISCONTINUED | OUTPATIENT
Start: 2019-10-22 | End: 2019-10-22

## 2019-10-22 RX ORDER — CETIRIZINE HYDROCHLORIDE 10 MG/1
10 TABLET ORAL DAILY
Status: DISCONTINUED | OUTPATIENT
Start: 2019-10-23 | End: 2019-10-23 | Stop reason: HOSPADM

## 2019-10-22 RX ORDER — OXYCODONE HYDROCHLORIDE 5 MG/1
5 TABLET ORAL
Status: DISCONTINUED | OUTPATIENT
Start: 2019-10-22 | End: 2019-10-22 | Stop reason: HOSPADM

## 2019-10-22 RX ORDER — ALBUTEROL SULFATE 90 UG/1
1 AEROSOL, METERED RESPIRATORY (INHALATION) EVERY 4 HOURS PRN
Status: DISCONTINUED | OUTPATIENT
Start: 2019-10-22 | End: 2019-10-23 | Stop reason: HOSPADM

## 2019-10-22 RX ORDER — HYDROCODONE BITARTRATE AND ACETAMINOPHEN 10; 325 MG/1; MG/1
1 TABLET ORAL EVERY 4 HOURS PRN
Status: DISCONTINUED | OUTPATIENT
Start: 2019-10-22 | End: 2019-10-23 | Stop reason: HOSPADM

## 2019-10-22 RX ORDER — ACETAMINOPHEN 325 MG/1
650 TABLET ORAL EVERY 8 HOURS PRN
Status: DISCONTINUED | OUTPATIENT
Start: 2019-10-22 | End: 2019-10-23 | Stop reason: HOSPADM

## 2019-10-22 RX ORDER — DEXAMETHASONE SODIUM PHOSPHATE 4 MG/ML
INJECTION, SOLUTION INTRA-ARTICULAR; INTRALESIONAL; INTRAMUSCULAR; INTRAVENOUS; SOFT TISSUE
Status: DISCONTINUED | OUTPATIENT
Start: 2019-10-22 | End: 2019-10-22

## 2019-10-22 RX ORDER — SODIUM CHLORIDE 0.9 % (FLUSH) 0.9 %
10 SYRINGE (ML) INJECTION
Status: DISCONTINUED | OUTPATIENT
Start: 2019-10-22 | End: 2019-10-23 | Stop reason: HOSPADM

## 2019-10-22 RX ORDER — HYDROCODONE BITARTRATE AND ACETAMINOPHEN 5; 325 MG/1; MG/1
1 TABLET ORAL EVERY 4 HOURS PRN
Status: DISCONTINUED | OUTPATIENT
Start: 2019-10-22 | End: 2019-10-23 | Stop reason: HOSPADM

## 2019-10-22 RX ORDER — PROPOFOL 10 MG/ML
VIAL (ML) INTRAVENOUS
Status: DISCONTINUED | OUTPATIENT
Start: 2019-10-22 | End: 2019-10-22

## 2019-10-22 RX ORDER — SODIUM CHLORIDE 9 MG/ML
INJECTION, SOLUTION INTRAVENOUS CONTINUOUS
Status: DISCONTINUED | OUTPATIENT
Start: 2019-10-22 | End: 2019-10-22

## 2019-10-22 RX ORDER — BUPIVACAINE HYDROCHLORIDE AND EPINEPHRINE 5; 5 MG/ML; UG/ML
INJECTION, SOLUTION EPIDURAL; INTRACAUDAL; PERINEURAL
Status: DISCONTINUED | OUTPATIENT
Start: 2019-10-22 | End: 2019-10-22 | Stop reason: HOSPADM

## 2019-10-22 RX ORDER — RAMELTEON 8 MG/1
8 TABLET ORAL NIGHTLY PRN
Status: DISCONTINUED | OUTPATIENT
Start: 2019-10-22 | End: 2019-10-23 | Stop reason: HOSPADM

## 2019-10-22 RX ORDER — LIDOCAINE HYDROCHLORIDE 10 MG/ML
1 INJECTION, SOLUTION EPIDURAL; INFILTRATION; INTRACAUDAL; PERINEURAL ONCE
Status: DISCONTINUED | OUTPATIENT
Start: 2019-10-22 | End: 2019-10-23 | Stop reason: HOSPADM

## 2019-10-22 RX ORDER — GLYCOPYRROLATE 0.2 MG/ML
INJECTION INTRAMUSCULAR; INTRAVENOUS
Status: DISCONTINUED | OUTPATIENT
Start: 2019-10-22 | End: 2019-10-22

## 2019-10-22 RX ORDER — INSULIN ASPART 100 [IU]/ML
20 INJECTION, SUSPENSION SUBCUTANEOUS 3 TIMES DAILY
Status: DISCONTINUED | OUTPATIENT
Start: 2019-10-22 | End: 2019-10-23 | Stop reason: HOSPADM

## 2019-10-22 RX ORDER — SODIUM CHLORIDE 0.9 % (FLUSH) 0.9 %
10 SYRINGE (ML) INJECTION
Status: DISCONTINUED | OUTPATIENT
Start: 2019-10-22 | End: 2019-10-22 | Stop reason: HOSPADM

## 2019-10-22 RX ORDER — IBUPROFEN 200 MG
24 TABLET ORAL
Status: DISCONTINUED | OUTPATIENT
Start: 2019-10-22 | End: 2019-10-23 | Stop reason: HOSPADM

## 2019-10-22 RX ORDER — FENTANYL CITRATE 50 UG/ML
INJECTION, SOLUTION INTRAMUSCULAR; INTRAVENOUS
Status: DISCONTINUED | OUTPATIENT
Start: 2019-10-22 | End: 2019-10-22

## 2019-10-22 RX ORDER — ONDANSETRON HYDROCHLORIDE 2 MG/ML
INJECTION, SOLUTION INTRAMUSCULAR; INTRAVENOUS
Status: DISCONTINUED | OUTPATIENT
Start: 2019-10-22 | End: 2019-10-22

## 2019-10-22 RX ORDER — PHENYLEPHRINE HYDROCHLORIDE 10 MG/ML
INJECTION INTRAVENOUS
Status: DISCONTINUED | OUTPATIENT
Start: 2019-10-22 | End: 2019-10-22

## 2019-10-22 RX ORDER — GABAPENTIN 100 MG/1
200 CAPSULE ORAL ONCE
Status: COMPLETED | OUTPATIENT
Start: 2019-10-22 | End: 2019-10-22

## 2019-10-22 RX ORDER — GLUCAGON 1 MG
1 KIT INJECTION
Status: DISCONTINUED | OUTPATIENT
Start: 2019-10-22 | End: 2019-10-23 | Stop reason: HOSPADM

## 2019-10-22 RX ORDER — BUPIVACAINE HYDROCHLORIDE AND EPINEPHRINE 2.5; 5 MG/ML; UG/ML
INJECTION, SOLUTION EPIDURAL; INFILTRATION; INTRACAUDAL; PERINEURAL
Status: DISCONTINUED | OUTPATIENT
Start: 2019-10-22 | End: 2019-10-22 | Stop reason: HOSPADM

## 2019-10-22 RX ORDER — IPRATROPIUM BROMIDE AND ALBUTEROL SULFATE 2.5; .5 MG/3ML; MG/3ML
3 SOLUTION RESPIRATORY (INHALATION) EVERY 4 HOURS PRN
Status: DISCONTINUED | OUTPATIENT
Start: 2019-10-22 | End: 2019-10-23 | Stop reason: HOSPADM

## 2019-10-22 RX ORDER — ROCURONIUM BROMIDE 10 MG/ML
INJECTION, SOLUTION INTRAVENOUS
Status: DISCONTINUED | OUTPATIENT
Start: 2019-10-22 | End: 2019-10-22

## 2019-10-22 RX ORDER — ONDANSETRON 4 MG/1
8 TABLET, ORALLY DISINTEGRATING ORAL EVERY 8 HOURS PRN
Status: DISCONTINUED | OUTPATIENT
Start: 2019-10-22 | End: 2019-10-23 | Stop reason: HOSPADM

## 2019-10-22 RX ORDER — SODIUM CHLORIDE, SODIUM LACTATE, POTASSIUM CHLORIDE, CALCIUM CHLORIDE 600; 310; 30; 20 MG/100ML; MG/100ML; MG/100ML; MG/100ML
INJECTION, SOLUTION INTRAVENOUS CONTINUOUS PRN
Status: DISCONTINUED | OUTPATIENT
Start: 2019-10-22 | End: 2019-10-22

## 2019-10-22 RX ORDER — AMLODIPINE BESYLATE 5 MG/1
5 TABLET ORAL DAILY
Status: DISCONTINUED | OUTPATIENT
Start: 2019-10-23 | End: 2019-10-23 | Stop reason: HOSPADM

## 2019-10-22 RX ORDER — ONDANSETRON 2 MG/ML
4 INJECTION INTRAMUSCULAR; INTRAVENOUS DAILY PRN
Status: DISCONTINUED | OUTPATIENT
Start: 2019-10-22 | End: 2019-10-22 | Stop reason: HOSPADM

## 2019-10-22 RX ORDER — IBUPROFEN 200 MG
16 TABLET ORAL
Status: DISCONTINUED | OUTPATIENT
Start: 2019-10-22 | End: 2019-10-23 | Stop reason: HOSPADM

## 2019-10-22 RX ORDER — MEPERIDINE HYDROCHLORIDE 50 MG/ML
12.5 INJECTION INTRAMUSCULAR; INTRAVENOUS; SUBCUTANEOUS EVERY 10 MIN PRN
Status: DISCONTINUED | OUTPATIENT
Start: 2019-10-22 | End: 2019-10-22 | Stop reason: HOSPADM

## 2019-10-22 RX ORDER — INSULIN ASPART 100 [IU]/ML
0-5 INJECTION, SOLUTION INTRAVENOUS; SUBCUTANEOUS
Status: DISCONTINUED | OUTPATIENT
Start: 2019-10-22 | End: 2019-10-23 | Stop reason: HOSPADM

## 2019-10-22 RX ORDER — CELECOXIB 100 MG/1
200 CAPSULE ORAL ONCE
Status: COMPLETED | OUTPATIENT
Start: 2019-10-22 | End: 2019-10-22

## 2019-10-22 RX ADMIN — HYDROMORPHONE HYDROCHLORIDE 0.2 MG: 1 INJECTION, SOLUTION INTRAMUSCULAR; INTRAVENOUS; SUBCUTANEOUS at 02:10

## 2019-10-22 RX ADMIN — DEXAMETHASONE SODIUM PHOSPHATE 4 MG: 4 INJECTION, SOLUTION INTRAMUSCULAR; INTRAVENOUS at 09:10

## 2019-10-22 RX ADMIN — FAMOTIDINE 20 MG: 10 INJECTION, SOLUTION INTRAVENOUS at 09:10

## 2019-10-22 RX ADMIN — HYDROMORPHONE HYDROCHLORIDE 0.2 MG: 1 INJECTION, SOLUTION INTRAMUSCULAR; INTRAVENOUS; SUBCUTANEOUS at 01:10

## 2019-10-22 RX ADMIN — PROPOFOL 130 MG: 10 INJECTION, EMULSION INTRAVENOUS at 09:10

## 2019-10-22 RX ADMIN — VANCOMYCIN HYDROCHLORIDE 1500 MG: 1 INJECTION, POWDER, LYOPHILIZED, FOR SOLUTION INTRAVENOUS at 09:10

## 2019-10-22 RX ADMIN — FENTANYL CITRATE 50 MCG: 50 INJECTION, SOLUTION INTRAMUSCULAR; INTRAVENOUS at 10:10

## 2019-10-22 RX ADMIN — ONDANSETRON 4 MG: 2 INJECTION, SOLUTION INTRAMUSCULAR; INTRAVENOUS at 01:10

## 2019-10-22 RX ADMIN — SODIUM CHLORIDE, SODIUM LACTATE, POTASSIUM CHLORIDE, AND CALCIUM CHLORIDE: .6; .31; .03; .02 INJECTION, SOLUTION INTRAVENOUS at 10:10

## 2019-10-22 RX ADMIN — GLYCOPYRROLATE 0.2 MG: 0.2 INJECTION INTRAMUSCULAR; INTRAVENOUS at 10:10

## 2019-10-22 RX ADMIN — PHENYLEPHRINE HYDROCHLORIDE 150 MCG: 10 INJECTION INTRAVENOUS at 09:10

## 2019-10-22 RX ADMIN — LIDOCAINE HYDROCHLORIDE 100 MG: 20 INJECTION, SOLUTION EPIDURAL; INFILTRATION; INTRACAUDAL; PERINEURAL at 09:10

## 2019-10-22 RX ADMIN — HYDROCODONE BITARTRATE AND ACETAMINOPHEN 1 TABLET: 5; 325 TABLET ORAL at 06:10

## 2019-10-22 RX ADMIN — CELECOXIB 200 MG: 100 CAPSULE ORAL at 09:10

## 2019-10-22 RX ADMIN — SUCCINYLCHOLINE CHLORIDE 160 MG: 20 INJECTION, SOLUTION INTRAMUSCULAR; INTRAVENOUS at 09:10

## 2019-10-22 RX ADMIN — SODIUM CHLORIDE, SODIUM LACTATE, POTASSIUM CHLORIDE, AND CALCIUM CHLORIDE: .6; .31; .03; .02 INJECTION, SOLUTION INTRAVENOUS at 09:10

## 2019-10-22 RX ADMIN — HYDROCODONE BITARTRATE AND ACETAMINOPHEN 1 TABLET: 5; 325 TABLET ORAL at 09:10

## 2019-10-22 RX ADMIN — PHENYLEPHRINE HYDROCHLORIDE 150 MCG: 10 INJECTION INTRAVENOUS at 10:10

## 2019-10-22 RX ADMIN — SUGAMMADEX 200 MG: 100 INJECTION, SOLUTION INTRAVENOUS at 01:10

## 2019-10-22 RX ADMIN — GABAPENTIN 200 MG: 100 CAPSULE ORAL at 09:10

## 2019-10-22 RX ADMIN — ROCURONIUM BROMIDE 10 MG: 10 INJECTION, SOLUTION INTRAVENOUS at 11:10

## 2019-10-22 RX ADMIN — ROCURONIUM BROMIDE 10 MG: 10 INJECTION, SOLUTION INTRAVENOUS at 10:10

## 2019-10-22 RX ADMIN — FENTANYL CITRATE 150 MCG: 50 INJECTION, SOLUTION INTRAMUSCULAR; INTRAVENOUS at 09:10

## 2019-10-22 RX ADMIN — PANCRELIPASE 1 CAPSULE: 30000; 6000; 19000 CAPSULE, DELAYED RELEASE PELLETS ORAL at 09:10

## 2019-10-22 RX ADMIN — ACETAMINOPHEN 1000 MG: 500 TABLET, FILM COATED ORAL at 09:10

## 2019-10-22 RX ADMIN — INSULIN ASPART 20 UNITS: 100 INJECTION, SUSPENSION SUBCUTANEOUS at 09:10

## 2019-10-22 RX ADMIN — FENTANYL CITRATE 50 MCG: 50 INJECTION, SOLUTION INTRAMUSCULAR; INTRAVENOUS at 11:10

## 2019-10-22 RX ADMIN — SODIUM CHLORIDE, SODIUM LACTATE, POTASSIUM CHLORIDE, AND CALCIUM CHLORIDE: .6; .31; .03; .02 INJECTION, SOLUTION INTRAVENOUS at 12:10

## 2019-10-22 RX ADMIN — ROCURONIUM BROMIDE 30 MG: 10 INJECTION, SOLUTION INTRAVENOUS at 09:10

## 2019-10-22 RX ADMIN — ONDANSETRON 4 MG: 2 INJECTION, SOLUTION INTRAMUSCULAR; INTRAVENOUS at 09:10

## 2019-10-22 NOTE — ANESTHESIA PREPROCEDURE EVALUATION
10/22/2019  Maddie Otero is a 68 y.o., female.  Patient Active Problem List   Diagnosis    Pancreatic insufficiency    Hypertension    Diverticulosis    Abnormal mammogram    Back pain    Diabetes mellitus type II    Sciatica    Left lumbar radiculopathy    S/P lumbar discectomy, laminectomy 12/03/2012    Hyperlipidemia LDL goal < 100    Muscle cramps    Diabetes mellitus without complication    Primary osteoarthritis of left knee    Post-operative state    S/P TKR (total knee replacement), left    Corn or callus - Right Foot    COPD with exacerbation    COPD (chronic obstructive pulmonary disease)    Nocturnal hypoxemia    Incisional hernia, without obstruction or gangrene       Past Surgical History:   Procedure Laterality Date    APPENDECTOMY      BACK SURGERY  1994    lower back    BACK SURGERY  2012    lower back    CHOLECYSTECTOMY      HERNIA REPAIR      pancrease  2009    stents in pancrease     TONSILLECTOMY      TOTAL KNEE ARTHROPLASTY Left 08/10/2017    WHIPPLE PROCEDURE W/ LAPAROSCOPY  10/09        Tobacco Use:  The patient  reports that she quit smoking about 2 years ago. Her smoking use included cigarettes. She has a 30.00 pack-year smoking history. She has never used smokeless tobacco.     Results for orders placed or performed during the hospital encounter of 10/09/19   EKG 12-lead    Collection Time: 10/09/19  4:09 PM    Narrative    Test Reason : K43.2,    Vent. Rate : 073 BPM     Atrial Rate : 073 BPM     P-R Int : 142 ms          QRS Dur : 090 ms      QT Int : 412 ms       P-R-T Axes : 048 -13 063 degrees     QTc Int : 453 ms    Normal sinus rhythm  Normal ECG  When compared with ECG of 27-NOV-2012 08:53,  Questionable change in The axis  Confirmed by Mateo Umana MD ([id number]) on 10/11/2019 10:26:35 AM    Referred By: ASIA GUZMAN           Confirmed  By:Mateo Umana MD             Lab Results   Component Value Date    WBC 8.77 10/09/2019    HGB 13.9 10/09/2019    HCT 43.3 10/09/2019    MCV 96 10/09/2019     10/09/2019     BMP  Lab Results   Component Value Date     10/09/2019    K 4.4 10/09/2019     10/09/2019    CO2 29 10/09/2019    BUN 20 10/09/2019    CREATININE 0.9 10/09/2019    CALCIUM 8.6 (L) 10/09/2019    ANIONGAP 7 (L) 10/09/2019    ESTGFRAFRICA >60.0 10/09/2019    EGFRNONAA >60.0 10/09/2019           Pre-op Assessment    I have reviewed the Patient Summary Reports.    I have reviewed the Nursing Notes.   I have reviewed the Medications.     Review of Systems  Anesthesia Hx:  No problems with previous Anesthesia  Denies Family Hx of Anesthesia complications.   Denies Personal Hx of Anesthesia complications.   Social:  Non-Smoker    Hematology/Oncology:  Hematology Normal        EENT/Dental:EENT/Dental Normal   Cardiovascular:   Hypertension, well controlled    Pulmonary:  Pulmonary Normal  Asthma:  Chronic Obstructive Pulmonary Disease (COPD): (oxygen 2 l/min at night sats at low 90's as baseline)  is secondary to smoking.    Renal/:  Renal/ Normal     Hepatic/GI:  Hepatic/GI Normal    Musculoskeletal:  Musculoskeletal Normal  Musculoskeletal General/Symptoms: (generalized arthritis)    Neurological:  Neurology Normal  Neuromuscular Disease (no back problems since her last back surgery)   Endocrine:  Endocrine Normal  : runs as high as 200's.    Psych:  Psychiatric Normal           Physical Exam  General:  Well nourished    Airway/Jaw/Neck:  Airway Findings: Mouth Opening: Normal Tongue: Normal  General Airway Assessment: Adult  Mallampati: II  TM Distance: Normal, at least 6 cm  Jaw/Neck Findings:  Neck ROM: Normal ROM     Eyes/Ears/Nose:  Eyes/Ears/Nose Findings:    Dental:  Dental Findings: (upper partials at home) Upper partial dentures   Chest/Lungs:  Chest/Lungs Findings: Clear to auscultation, Normal Respiratory Rate      Heart/Vascular:  Heart Findings: Rate: Normal  Rhythm: Regular Rhythm  Sounds: Normal     Abdomen:  Abdomen Findings:     Musculoskeletal:  Musculoskeletal Findings:    Skin:  Skin Findings:     Mental Status:  Mental Status Findings:  Cooperative, Alert and Oriented         Anesthesia Plan  Type of Anesthesia, risks & benefits discussed:  Anesthesia Type:  general  Patient's Preference:   Intra-op Monitoring Plan: standard ASA monitors  Intra-op Monitoring Plan Comments:   Post Op Pain Control Plan: multimodal analgesia  Post Op Pain Control Plan Comments:   Induction:   IV  Beta Blocker:         Informed Consent: Patient understands risks and agrees with Anesthesia plan.  Questions answered. Anesthesia consent signed with patient.  ASA Score: 3     Day of Surgery Review of History & Physical:    H&P update referred to the surgeon.     Anesthesia Plan Notes: Multimodal with celebrex,tylenol,neurontin.  PONV prophylaxis with zofran 4mg and pepcid 20mg.

## 2019-10-22 NOTE — BRIEF OP NOTE
[facility]  Brief Operative Note    SUMMARY     Surgery Date: 10/22/2019     Surgeon(s) and Role:     * Pantera Almanza III, MD - Primary    Assisting Surgeon: None    Pre-op Diagnosis:  Incisional hernia, without obstruction or gangrene [K43.2]    Post-op Diagnosis:  Post-Op Diagnosis Codes:     * Incisional hernia, without obstruction or gangrene [K43.2]    Procedure(s) (LRB):  REPAIR, HERNIA, INCISIONAL, LAPAROSCOPIC (N/A) and extensive lysis of adhesions - small bowel and colon to the anterior abdominal wall.     Anesthesia: General    Description of Procedure:The patient was brought to the operating room and transferred to the operating room table in the supine position.  Patient was given general anesthesia and intubated.  Sharpe catheter and OGT tube were placed.  The abdomen was prepped and draped.  A small left upper quadrant incision was made.  Dissection was carried down through the skin and subcutaneous tissue.  The fascia was grasped and Veres needle was placed through the abdominal wall and used to insufflate the abdomen.  The 5mm visiport was used to gain entrance to the abdomen through the abdominal wall at this incision site.  Two 5mm ports were placed in the left lower quadrant. She had previous Whipple procedure and had extensive adhesions to the anterior abdominal wall. Small bowel and colon were densely adhered across nearly the entire abdominal wall. Adhesions were most dense directly underneath the incision. About 2 hr of time was taken to carefully dissect the intestine off the anterior abdominal wall.  This was done with scissors as well as ligature.  Once the adhesions were taken down, the entire anterior abdominal wall was exposed.  This exposed the incisional hernia located in the mid abdomen.  It measured 6 x 6 cm.  I could then also see that she had multiple smaller incisions inferiorly along the incision that had a Swiss cheese appearance.  Because of the smaller  hernias I chose a 22 cm x 15 cm hernia mesh to cover all the defects.  Another two 5mm ports were placed in the right abdomen.  The mesh was prepared for the repair.  [place]-Aubrey sutures were placed at the 12, 3, 6, and 9 o'clock positions on the mesh.  The mesh was placed into the abdomen.  The needle grasper was used to come through the abdominal wall to bring the gore-aubrey sutures through the abdominal wall and suture the mesh to the wall.  There was good overlap of the edge of the mesh to the hernia edges.  Absorbable tacs were used to better secure the mesh to the wall in between the sutures.  After this, the instruments and ports were removed.  The abdomen was deflated.  The skin was closed with Monocryl.  The patient was then extubated and brought to the recovery room in stable condition.             Description of the findings of the procedure:  6 cm x 6 cm incisional hernia as well as multiple smaller incisional hernias along her entire incision.    Estimated Blood Loss: 10 mL    Complications none     Instrument counts correct          Specimens:   Specimen (12h ago, onward)    None

## 2019-10-22 NOTE — OP NOTE
Surgery Date: 10/22/2019     Surgeon(s) and Role:     * Pantera Almanza III, MD - Primary    Assisting Surgeon: None    Pre-op Diagnosis:  Incisional hernia, without obstruction or gangrene [K43.2]    Post-op Diagnosis:  Post-Op Diagnosis Codes:     * Incisional hernia, without obstruction or gangrene [K43.2]    Procedure(s) (LRB):  REPAIR, HERNIA, INCISIONAL, LAPAROSCOPIC (N/A) and extensive lysis of adhesions - small bowel and colon to the anterior abdominal wall.     Anesthesia: General    Description of Procedure:The patient was brought to the operating room and transferred to the operating room table in the supine position.  Patient was given general anesthesia and intubated.  Sharpe catheter and OGT tube were placed.  The abdomen was prepped and draped.  A small left upper quadrant incision was made.  Dissection was carried down through the skin and subcutaneous tissue.  The fascia was grasped and Veres needle was placed through the abdominal wall and used to insufflate the abdomen.  The 5mm visiport was used to gain entrance to the abdomen through the abdominal wall at this incision site.  Two 5mm ports were placed in the left lower quadrant. She had previous Whipple procedure and had extensive adhesions to the anterior abdominal wall. Small bowel and colon were densely adhered across nearly the entire abdominal wall. Adhesions were most dense directly underneath the incision. About 2 hr of time was taken to carefully dissect the intestine off the anterior abdominal wall.  This was done with scissors as well as ligature.  Once the adhesions were taken down, the entire anterior abdominal wall was exposed.  This exposed the incisional hernia located in the mid abdomen.  It measured 6 x 6 cm.  I could then also see that she had multiple smaller incisions inferiorly along the incision that had a Swiss cheese appearance.  Because of the smaller hernias I chose a 22 cm x 15 cm hernia mesh to cover all the  defects.  Another two 5mm ports were placed in the right abdomen.  The mesh was prepared for the repair.  [place]-Aubrey sutures were placed at the 12, 3, 6, and 9 o'clock positions on the mesh.  The mesh was placed into the abdomen.  The needle grasper was used to come through the abdominal wall to bring the gore-aubrey sutures through the abdominal wall and suture the mesh to the wall.  There was good overlap of the edge of the mesh to the hernia edges.  Absorbable tacs were used to better secure the mesh to the wall in between the sutures.  After this, the instruments and ports were removed.  The abdomen was deflated.  The skin was closed with Monocryl.  The patient was then extubated and brought to the recovery room in stable condition.             Description of the findings of the procedure:  6 cm x 6 cm incisional hernia as well as multiple smaller incisional hernias along her entire incision.    Estimated Blood Loss: 10 mL    Complications none     Instrument counts correct          Specimens:   Specimen (12h ago, onward)    None

## 2019-10-22 NOTE — PLAN OF CARE
Pt sleepy, opens eyes to verbal stimuli, o2 sats 89% on room air, placed on o2 at 3l per n/c, o2 sats increased to 93-94%, Dr Almanza on unit and notified

## 2019-10-22 NOTE — TRANSFER OF CARE
"Anesthesia Transfer of Care Note    Patient: Maddie Otero    Procedure(s) Performed: Procedure(s) (LRB):  REPAIR, HERNIA, INCISIONAL, LAPAROSCOPIC (N/A)    Patient location: PACU    Anesthesia Type: general    Transport from OR: Transported from OR on room air with adequate spontaneous ventilation    Post pain: adequate analgesia    Post assessment: no apparent anesthetic complications    Post vital signs: stable    Level of consciousness: awake, alert and oriented    Nausea/Vomiting: no nausea/vomiting    Complications: none    Transfer of care protocol was followedComments: Patient extubated awake and following commands. To PACU. Spontaneously breathing. Vital signs stable. Report given to RN.       Last vitals:   Visit Vitals  BP (!) 162/92 (BP Location: Right arm, Patient Position: Lying)   Pulse 83   Temp 36.6 °C (97.9 °F) (Temporal)   Resp 16   Ht 5' 8" (1.727 m)   Wt 99.8 kg (220 lb)   SpO2 98%   Breastfeeding? No   BMI 33.45 kg/m²     "

## 2019-10-22 NOTE — DISCHARGE INSTRUCTIONS
Hernia (Adult)    A hernia can happen when there is a weakness or defect in the wall of the abdomen or groin. Intestines or nearby tissues may move from their usual location and push through the weakness in the wall. This can cause a hernia (bulge) you may see or feel.  Causes and Risk Factors   A hernia may be present at birth. Or it may be caused by the wear and tear of daily living. Certain factors can make a hernia more likely. These can include:  · Heavy lifting  · Straining, whether from lifting, movement, or constipation  · Chronic cough  · Injury to the abdominal wall  · Excess weight  · Pregnancy  · Prior surgery  · Older age  · Family history of hernia  Symptoms  Symptoms of a hernia may come on suddenly. Or they may appear slowly over time. Some common symptoms include:  · Bulge in the groin area, around the navel, or in the scrotum (the bulge may get bigger when you stand and go away when you lie down)  · Pain or pressure around the bulge  · Pain during activities such as lifting, coughing, or sneezing  · A feeling of weakness or pressure in the groin  · Pain or swelling in the scrotum  Types of hernias  There are different types of hernia. The type you have depends on its location:  · Inguinal: This type is in the groin or scrotum. It is more common in men.  · Femoral: This type is in the groin, upper thigh (where the leg bends), or labia. It is more common in women.  · Ventral: This type is in the abdominal wall.  · Umbilical: This type occurs around the navel (belly button).  · Incisional: This type occurs at the site of a previous surgery.  The condition of the hernia can help determine how urgently it needs to be treated.  · Reducible: It goes back in by itself, or it can be pushed back in.  · Irreducible: It cant be pushed back in.  · Incarcerated/Strangulated: The intestine is trapped (incarcerated). If this happens, you wont be able to push the bulge back in. If the incarcerated hernia isnt  treated, it may become strangulated. This means the area loses blood supply and the tissue may die. This requires emergency surgery! Treatment is needed right away!  In most cases, a hernia will not heal on its own. Surgery is usually needed to repair the defect in the abdominal wall or groin. Youll be told more about surgery, if needed.  If your symptoms are not severe, treatment may sometimes be delayed. In such cases, regular follow-up visits with the provider will be needed. Youll be asked to keep track of your symptoms and to watch for signs of more serious problems. You may also be given guidelines similar to the home care instructions below.  Home Care  To help keep a hernia from getting worse, you may be advised to:  · Avoid heavy lifting and straining as directed.  · Take steps to prevent constipation, such as eating more fiber and drinking more water. This may help reduce straining that can occur when having a bowel movement. Reducing straining may help keep your symptoms from getting worse.  · Maintain a healthy weight or lose excess weight. This can help reduce strain on abdominal muscles and tissues.  · Stop smoking. This can help prevent coughing that may also strain abdominal muscles and tissues.  Follow-up care  Follow up with your healthcare provider, or as directed. If imaging tests were done, they will be reviewed a doctor. You will be told the results and any new findings that may affect your care.  When to seek medical advice  Call your healthcare provider right away if any of these occur:  · Hernia hardens, swells, or grows larger  · Hernia can no longer be pushed back in  · Pain moves to the lower right abdomen (just below the waistline), or spreads to the back  Call 911  Call 911 right away if any of these occur:  · Nausea and vomiting  · Severe pain, redness, or tenderness in the area near the hernia  · Pain worsens quickly and doesnt get better  · Inability to have a bowel movement or  pass gas  · Fever of 100.4°F (38°C) or higher  · Trouble breathing  · Fainting  · Rapid heart rate  · Vomiting blood  · Large amounts of blood in stool  Date Last Reviewed: 6/9/2015  © 9972-6283 [employer]. [address] [place] [address], [place] [address]. All rights reserved. This information is not intended as a substitute for professional medical care. Always follow your healthcare professional's instructions.        Discharge Instructions: After Your Surgery  Youve just had surgery. During surgery, you were given medicine called anesthesia to keep you relaxed and free of pain. After surgery, you may have some pain or nausea. This is common. Here are some tips for feeling better and getting well after surgery.     Stay on schedule with your medicine.   Going home  Your healthcare provider will show you how to take care of yourself when you go home. He or she will also answer your questions. Have an adult family member or friend drive you home. For the first 24 hours after your surgery:  · Do not drive or use heavy equipment.  · Do not make important decisions or sign legal papers.  · Do not drink alcohol.  · Have someone stay with you, if needed. He or she can watch for problems and help keep you safe.  Be sure to go to all follow-up visits with your healthcare provider. And rest after your surgery for as long as your healthcare provider tells you to.  Coping with pain  If you have pain after surgery, pain medicine will help you feel better. Take it as told, before pain becomes severe. Also, ask your healthcare provider or pharmacist about other ways to control pain. This might be with heat, ice, or relaxation. And follow any other instructions your surgeon or nurse gives you.  Tips for taking pain medicine  To get the best relief possible, remember these points:  · Pain medicines can upset your stomach. Taking them with a little food may help.  · Most pain relievers taken by mouth need at least 20 to 30  minutes to start to work.  · Taking medicine on a schedule can help you remember to take it. Try to time your medicine so that you can take it before starting an activity. This might be before you get dressed, go for a walk, or sit down for dinner.  · Constipation is a common side effect of pain medicines. Call your healthcare provider before taking any medicines such as laxatives or stool softeners to help ease constipation. Also ask if you should skip any foods. Drinking lots of fluids and eating foods such as fruits and vegetables that are high in fiber can also help. Remember, do not take laxatives unless your surgeon has prescribed them.  · Drinking alcohol and taking pain medicine can cause dizziness and slow your breathing. It can even be deadly. Do not drink alcohol while taking pain medicine.  · Pain medicine can make you react more slowly to things. Do not drive or run machinery while taking pain medicine.  Your healthcare provider may tell you to take acetaminophen to help ease your pain. Ask him or her how much you are supposed to take each day. Acetaminophen or other pain relievers may interact with your prescription medicines or other over-the-counter (OTC) medicines. Some prescription medicines have acetaminophen and other ingredients. Using both prescription and OTC acetaminophen for pain can cause you to overdose. Read the labels on your OTC medicines with care. This will help you to clearly know the list of ingredients, how much to take, and any warnings. It may also help you not take too much acetaminophen. If you have questions or do not understand the information, ask your pharmacist or healthcare provider to explain it to you before you take the OTC medicine.  Managing nausea  Some people have an upset stomach after surgery. This is often because of anesthesia, pain, or pain medicine, or the stress of surgery. These tips will help you handle nausea and eat healthy foods as you get better. If  you were on a special food plan before surgery, ask your healthcare provider if you should follow it while you get better. These tips may help:  · Do not push yourself to eat. Your body will tell you when to eat and how much.  · Start off with clear liquids and soup. They are easier to digest.  · Next try semi-solid foods, such as mashed potatoes, applesauce, and gelatin, as you feel ready.  · Slowly move to solid foods. Dont eat fatty, rich, or spicy foods at first.  · Do not force yourself to have 3 large meals a day. Instead eat smaller amounts more often.  · Take pain medicines with a small amount of solid food, such as crackers or toast, to avoid nausea.     Call your surgeon if  · You still have pain an hour after taking medicine. The medicine may not be strong enough.  · You feel too sleepy, dizzy, or groggy. The medicine may be too strong.  · You have side effects like nausea, vomiting, or skin changes, such as rash, itching, or hives.       If you have obstructive sleep apnea  You were given anesthesia medicine during surgery to keep you comfortable and free of pain. After surgery, you may have more apnea spells because of this medicine and other medicines you were given. The spells may last longer than usual.   At home:  · Keep using the continuous positive airway pressure (CPAP) device when you sleep. Unless your healthcare provider tells you not to, use it when you sleep, day or night. CPAP is a common device used to treat obstructive sleep apnea.  · Talk with your provider before taking any pain medicine, muscle relaxants, or sedatives. Your provider will tell you about the possible dangers of taking these medicines.  Date Last Reviewed: 12/1/2016  © 0317-6242 [employer]. [address] [place] [address], [place] [address]. All rights reserved. This information is not intended as a substitute for professional medical care. Always follow your healthcare professional's instructions.

## 2019-10-22 NOTE — NURSING TRANSFER
Nursing Transfer Note      10/22/2019     Transfer To: 1224    Transfer via stretcher    Transfer with  to O2    Transported by [facility]    Medicines sent: no    Chart send with patient: Yes    Notified: daughter    Patient reassessed at: 10/22/2019 1635    Upon arrival to floor: patient oriented to room, call bell in reach and bed in lowest position

## 2019-10-22 NOTE — ANESTHESIA POSTPROCEDURE EVALUATION
Anesthesia Post Evaluation    Patient: Maddie Otero    Procedure(s) Performed: Procedure(s) (LRB):  REPAIR, HERNIA, INCISIONAL, LAPAROSCOPIC (N/A)    Final Anesthesia Type: general  Patient location during evaluation: PACU  Patient participation: Yes- Able to Participate  Level of consciousness: awake and alert  Post-procedure vital signs: reviewed and stable  Pain management: adequate  Airway patency: patent  PONV status at discharge: No PONV  Anesthetic complications: no      Cardiovascular status: blood pressure returned to baseline and stable  Respiratory status: unassisted and room air  Hydration status: euvolemic  Follow-up not needed.          Vitals Value Taken Time   /71 10/22/2019  2:15 PM   Temp 36.7 °C (98 °F) 10/22/2019  2:00 PM   Pulse 80 10/22/2019  2:26 PM   Resp 14 10/22/2019  2:26 PM   SpO2 95 % 10/22/2019  2:26 PM   Vitals shown include unvalidated device data.      No case tracking events are documented in the log.      Pain/Gemini Score: Pain Rating Prior to Med Admin: 6 (10/22/2019  2:08 PM)  Gemini Score: 9 (10/22/2019  2:00 PM)

## 2019-10-23 VITALS
RESPIRATION RATE: 16 BRPM | WEIGHT: 219.81 LBS | HEART RATE: 82 BPM | OXYGEN SATURATION: 93 % | BODY MASS INDEX: 33.31 KG/M2 | SYSTOLIC BLOOD PRESSURE: 122 MMHG | DIASTOLIC BLOOD PRESSURE: 86 MMHG | HEIGHT: 68 IN | TEMPERATURE: 98 F

## 2019-10-23 LAB
ESTIMATED AVG GLUCOSE: 180 MG/DL (ref 68–131)
GLUCOSE SERPL-MCNC: 237 MG/DL (ref 70–110)
HBA1C MFR BLD HPLC: 7.9 % (ref 4.5–6.2)

## 2019-10-23 PROCEDURE — 25000003 PHARM REV CODE 250: Performed by: SURGERY

## 2019-10-23 PROCEDURE — G0378 HOSPITAL OBSERVATION PER HR: HCPCS

## 2019-10-23 PROCEDURE — 36415 COLL VENOUS BLD VENIPUNCTURE: CPT

## 2019-10-23 PROCEDURE — 83036 HEMOGLOBIN GLYCOSYLATED A1C: CPT

## 2019-10-23 RX ADMIN — HYDROCODONE BITARTRATE AND ACETAMINOPHEN 1 TABLET: 10; 325 TABLET ORAL at 09:10

## 2019-10-23 RX ADMIN — CETIRIZINE HYDROCHLORIDE 10 MG: 10 TABLET, FILM COATED ORAL at 08:10

## 2019-10-23 RX ADMIN — PANCRELIPASE 1 CAPSULE: 30000; 6000; 19000 CAPSULE, DELAYED RELEASE PELLETS ORAL at 08:10

## 2019-10-23 RX ADMIN — HYDROCODONE BITARTRATE AND ACETAMINOPHEN 1 TABLET: 10; 325 TABLET ORAL at 04:10

## 2019-10-23 NOTE — CARE UPDATE
10/22/19 2124   Patient Assessment/Suction   Level of Consciousness (AVPU) alert   All Lung Fields Breath Sounds clear   PRE-TX-O2   O2 Device (Oxygen Therapy) nasal cannula   $ Is the patient on Low Flow Oxygen? Yes   Flow (L/min) 2   SpO2 96 %   Pulse Oximetry Type Continuous   $ Pulse Oximetry - Multiple Charge Pulse Oximetry - Multiple   Pulse 70   Resp 16

## 2019-11-07 ENCOUNTER — OFFICE VISIT (OUTPATIENT)
Dept: SURGERY | Facility: CLINIC | Age: 69
End: 2019-11-07
Payer: MEDICARE

## 2019-11-07 VITALS
DIASTOLIC BLOOD PRESSURE: 89 MMHG | HEART RATE: 96 BPM | HEIGHT: 68 IN | WEIGHT: 219 LBS | SYSTOLIC BLOOD PRESSURE: 150 MMHG | BODY MASS INDEX: 33.19 KG/M2 | TEMPERATURE: 98 F

## 2019-11-07 DIAGNOSIS — K43.2 INCISIONAL HERNIA, WITHOUT OBSTRUCTION OR GANGRENE: Primary | ICD-10-CM

## 2019-11-07 PROCEDURE — 99024 POSTOP FOLLOW-UP VISIT: CPT | Mod: S$GLB,,, | Performed by: SURGERY

## 2019-11-07 PROCEDURE — 99024 PR POST-OP FOLLOW-UP VISIT: ICD-10-PCS | Mod: S$GLB,,, | Performed by: SURGERY

## 2019-11-12 NOTE — PROGRESS NOTES
Subjective:       Patient ID: Maddie Otero is a 68 y.o. female.    Chief Complaint: Post-op Evaluation (FU DOS 10/22/19 L/S Incisional hernia repair and extensive lysis of adhesions - small bowel and colon to the anterior abdominal wall. )      HPI:  The patient is status post incisional hernia repair.  She has no new complaints today.  Pain is improving.  She has been afebrile.    Review of Systems    Objective:      Physical Exam   Constitutional: She is oriented to person, place, and time. She is cooperative. No distress.   Pulmonary/Chest: Effort normal. No respiratory distress.   Abdominal: Soft. She exhibits no distension. There is no tenderness. There is no rebound and no guarding. No hernia.   Neurological: She is alert and oriented to person, place, and time.   Skin:   Incisions are clean, dry and intact  There is no evidence of infection, hematoma or seroma        Assessment/Plan:   Incisional hernia, without obstruction or gangrene      S/p repair. Doing well  Follow up if symptoms worsen or fail to improve.

## 2019-12-23 DIAGNOSIS — J32.4 CHRONIC PANSINUSITIS: Primary | ICD-10-CM

## 2020-06-17 ENCOUNTER — OFFICE VISIT (OUTPATIENT)
Dept: PULMONOLOGY | Facility: CLINIC | Age: 70
End: 2020-06-17
Payer: MEDICARE

## 2020-06-17 VITALS
TEMPERATURE: 97 F | BODY MASS INDEX: 32.74 KG/M2 | HEART RATE: 93 BPM | DIASTOLIC BLOOD PRESSURE: 90 MMHG | WEIGHT: 216 LBS | OXYGEN SATURATION: 95 % | SYSTOLIC BLOOD PRESSURE: 145 MMHG | HEIGHT: 68 IN

## 2020-06-17 DIAGNOSIS — J44.9 CHRONIC OBSTRUCTIVE PULMONARY DISEASE, UNSPECIFIED COPD TYPE: Primary | ICD-10-CM

## 2020-06-17 DIAGNOSIS — G47.34 NOCTURNAL HYPOXEMIA: ICD-10-CM

## 2020-06-17 PROBLEM — J44.1 COPD WITH EXACERBATION: Status: RESOLVED | Noted: 2019-09-04 | Resolved: 2020-06-17

## 2020-06-17 PROCEDURE — 1159F PR MEDICATION LIST DOCUMENTED IN MEDICAL RECORD: ICD-10-PCS | Mod: S$GLB,,, | Performed by: INTERNAL MEDICINE

## 2020-06-17 PROCEDURE — 1125F AMNT PAIN NOTED PAIN PRSNT: CPT | Mod: S$GLB,,, | Performed by: INTERNAL MEDICINE

## 2020-06-17 PROCEDURE — 1101F PR PT FALLS ASSESS DOC 0-1 FALLS W/OUT INJ PAST YR: ICD-10-PCS | Mod: S$GLB,,, | Performed by: INTERNAL MEDICINE

## 2020-06-17 PROCEDURE — 99214 PR OFFICE/OUTPT VISIT, EST, LEVL IV, 30-39 MIN: ICD-10-PCS | Mod: S$GLB,,, | Performed by: INTERNAL MEDICINE

## 2020-06-17 PROCEDURE — 3077F SYST BP >= 140 MM HG: CPT | Mod: S$GLB,,, | Performed by: INTERNAL MEDICINE

## 2020-06-17 PROCEDURE — 1159F MED LIST DOCD IN RCRD: CPT | Mod: S$GLB,,, | Performed by: INTERNAL MEDICINE

## 2020-06-17 PROCEDURE — 3077F PR MOST RECENT SYSTOLIC BLOOD PRESSURE >= 140 MM HG: ICD-10-PCS | Mod: S$GLB,,, | Performed by: INTERNAL MEDICINE

## 2020-06-17 PROCEDURE — 1125F PR PAIN SEVERITY QUANTIFIED, PAIN PRESENT: ICD-10-PCS | Mod: S$GLB,,, | Performed by: INTERNAL MEDICINE

## 2020-06-17 PROCEDURE — 1101F PT FALLS ASSESS-DOCD LE1/YR: CPT | Mod: S$GLB,,, | Performed by: INTERNAL MEDICINE

## 2020-06-17 PROCEDURE — 99214 OFFICE O/P EST MOD 30 MIN: CPT | Mod: S$GLB,,, | Performed by: INTERNAL MEDICINE

## 2020-06-17 PROCEDURE — 3080F PR MOST RECENT DIASTOLIC BLOOD PRESSURE >= 90 MM HG: ICD-10-PCS | Mod: S$GLB,,, | Performed by: INTERNAL MEDICINE

## 2020-06-17 PROCEDURE — 3080F DIAST BP >= 90 MM HG: CPT | Mod: S$GLB,,, | Performed by: INTERNAL MEDICINE

## 2020-06-17 NOTE — PROGRESS NOTES
SUBJECTIVE:    Patient ID: Maddie Otero is a 69 y.o. female.    Chief Complaint: COPD    COPD    The patient is here after multiple months.  She survived her hernia repair.  She had a lot of adhesions.  She is using her Trelegy daily.  She is swimming weekly. She does ok if she goes slowly.  .  Past Medical History:   Diagnosis Date    Arthritis     Asthma     COPD (chronic obstructive pulmonary disease)     COPD (chronic obstructive pulmonary disease) 2019    Diabetes mellitus, type 2     Diverticulosis     History of left knee replacement 2017    DR CRENSHAW    HNP (herniated nucleus pulposus)     LUMBAR    Hypertension     Lung disease     Pancreatic insufficiency     s/p whipple, non cancer    Presence of dental bridge     UPPER     Past Surgical History:   Procedure Laterality Date    APPENDECTOMY      BACK SURGERY  1994    lower back    BACK SURGERY  2012    lower back    CHOLECYSTECTOMY      HERNIA REPAIR      LAPAROSCOPIC REPAIR OF INCISIONAL HERNIA N/A 10/22/2019    Procedure: REPAIR, HERNIA, INCISIONAL, LAPAROSCOPIC;  Surgeon: Pantera Almanza III, MD;  Location: [facility];  Service: General;  Laterality: N/A;    pancrease  2009    stents in pancrease     TONSILLECTOMY      TOTAL KNEE ARTHROPLASTY Left 08/10/2017    WHIPPLE PROCEDURE W/ LAPAROSCOPY  10/09     Family History   Problem Relation Age of Onset    Diabetes Mother     Cancer Father         Social History:   Marital Status:   Occupation: retired from caring for mentally disabled people  Alcohol History:  reports no history of alcohol use.  Tobacco History:  reports that she quit smoking about 3 years ago. Her smoking use included cigarettes. She has a 30.00 pack-year smoking history. She has never used smokeless tobacco.  Drug History:  reports no history of drug use.    Review of patient's allergies indicates:   Allergen Reactions    Sulfa (sulfonamide antibiotics) Hives    Penicillin v      Childhood  reaction, swelled       Current Outpatient Medications   Medication Sig Dispense Refill    albuterol (PROVENTIL/VENTOLIN) 90 mcg/actuation inhaler Inhale 2 puffs into the lungs every 6 (six) hours as needed for Wheezing. 1 each 0    albuterol-ipratropium (DUO-NEB) 2.5 mg-0.5 mg/3 mL nebulizer solution Take 3 mLs by nebulization every 4 (four) hours as needed for Wheezing. Rescue      albuterol-ipratropium (DUO-NEB) 2.5 mg-0.5 mg/3 mL nebulizer solution ipratropium-albuterol 0.5 mg-3 mg(2.5 mg base)/3 mL nebulization soln      amlodipine (NORVASC) 5 MG tablet Take 1 tablet by mouth once daily.  1    cetirizine (ZYRTEC) 10 MG tablet Take 10 mg by mouth once daily.      CREON CpDR Take 2 capsules by mouth 4 (four) times daily.  5    fluticasone-umeclidin-vilanter (TRELEGY ELLIPTA) 100-62.5-25 mcg DsDv Inhale 1 puff into the lungs once daily. 1 each 11    NOVOLIN 70/30 U-100 INSULIN 100 unit/mL (70-30) injection   22    NOVOLOG MIX 70-30 FLEXPEN 100 unit/mL (70-30) InPn pen Inject 20 Units into the skin 3 (three) times daily with meals.   5    blood sugar diagnostic (TRUE METRIX GLUCOSE TEST STRIP MISC) True Metrix Glucose Test Strip   Take 1 strip 3 times a day by miscell. route as needed.      blood sugar diagnostic (TRUE METRIX GLUCOSE TEST STRIP MISC) True Metrix Glucose Test Strip      fluticasone propionate (FLONASE) 50 mcg/actuation nasal spray fluticasone propionate 50 mcg/actuation nasal spray,suspension      HYDROcodone-acetaminophen (NORCO) 5-325 mg per tablet Take 1 tablet by mouth every 6 (six) hours as needed for Pain. (Patient not taking: Reported on 6/17/2020) 30 tablet 0    predniSONE (DELTASONE) 10 MG tablet Take 4 tabs x 2 days, then take 3 tabs x 2 days, then take 2 tabs x 2 days, then take 1 tab x 2 days. (Patient not taking: Reported on 6/17/2020) 20 tablet 0    ranitidine (ZANTAC) 150 MG tablet Take 150 mg by mouth 2 (two) times daily.       No current facility-administered  "medications for this visit.          Last PFT: 10/8/19  Spirometry shows severe obstruction, FEV1 1.06  Last CT:04/30/19  IMPRESSION: Mild emphysematous changes with resolution of the scattered  bronchial wall thickening and tree-in-bud opacities    No acute pulmonary process    Review of Systems  General: Feels good  Eyes: Vision needs improvement  ENT:  Still has allergies  Heart:: No chest pain or palpitations.  Lungs:coughs up clear mucus daily, has good and bad days  GI: GERD with red gravy  : No dysuria, hesitancy, or nocturia.  Musculoskeletal: pain in her knee and shoulder  Skin: No lesions or rashes.  Neuro: No headaches or neuropathy.  Lymph: no swelling  Psych: No anxiety or depression.  Endo: weight loss on purpose        OBJECTIVE:      BP (!) 145/90 (BP Location: Left arm, Patient Position: Sitting, BP Method: Medium (Manual))   Pulse 93   Temp 97.2 °F (36.2 °C)   Ht 5' 8" (1.727 m)   Wt 98 kg (216 lb)   LMP  (LMP Unknown)   SpO2 95%   BMI 32.84 kg/m²     Physical Exam  GENERAL: Older patient in no distress.  HEENT: Pupils equal and reactive. Extraocular movements intact. Nose intact.      Pharynx moist.  NECK: Supple.   HEART: Regular rate and rhythm. No murmur or gallop auscultated.  LUNGS: decreased throughout  ABDOMEN: Bowel sounds present. Non-tender, no masses palpated.  EXTREMITIES: Normal muscle tone and joint movement, no cyanosis or clubbing.   LYMPHATICS: No adenopathy palpated, trace edema   SKIN: Dry, intact, no lesions.   NEURO: Cranial nerves II-XII intact. Motor strength 5/5 bilaterally, upper and lower extremities.  PSYCH: Appropriate affect.      Assessment:       1. Chronic obstructive pulmonary disease, unspecified COPD type    2. Nocturnal hypoxemia          Plan:       Chronic obstructive pulmonary disease, unspecified COPD type    Nocturnal hypoxemia      Continue Trelegy daily  Wear your oxygen at night       Follow up in about 6 months (around 12/17/2020).         "

## 2020-11-03 ENCOUNTER — TELEPHONE (OUTPATIENT)
Dept: PULMONOLOGY | Facility: CLINIC | Age: 70
End: 2020-11-03

## 2020-11-03 DIAGNOSIS — J44.9 CHRONIC OBSTRUCTIVE PULMONARY DISEASE, UNSPECIFIED COPD TYPE: Primary | ICD-10-CM

## 2020-11-03 NOTE — TELEPHONE ENCOUNTER
----- Message from Jo Ann Espino sent at 11/3/2020  8:39 AM CST -----  Mrs. Otero needs a medical clearance faxed to [phone] for knee replacement surgery being done 11/20/20 in [place]. Dr. Carroll is the orthopedic surgeon. If you have any problems please call patient if she needs to come prior to having surgery.        Thank You      Jo Ann

## 2020-11-09 ENCOUNTER — TELEPHONE (OUTPATIENT)
Dept: PULMONOLOGY | Facility: CLINIC | Age: 70
End: 2020-11-09

## 2020-11-09 ENCOUNTER — HOSPITAL ENCOUNTER (OUTPATIENT)
Dept: PULMONOLOGY | Facility: HOSPITAL | Age: 70
Discharge: HOME | End: 2020-11-09
Attending: INTERNAL MEDICINE
Payer: MEDICARE

## 2020-11-09 ENCOUNTER — HOSPITAL ENCOUNTER (OUTPATIENT)
Dept: RADIOLOGY | Facility: HOSPITAL | Age: 70
Discharge: HOME | End: 2020-11-09
Attending: NURSE PRACTITIONER
Payer: MEDICARE

## 2020-11-09 DIAGNOSIS — J44.9 CHRONIC OBSTRUCTIVE PULMONARY DISEASE, UNSPECIFIED COPD TYPE: ICD-10-CM

## 2020-11-09 PROCEDURE — 94727 GAS DIL/WSHOT DETER LNG VOL: CPT

## 2020-11-09 PROCEDURE — 94060 EVALUATION OF WHEEZING: CPT

## 2020-11-09 PROCEDURE — 94729 DIFFUSING CAPACITY: CPT

## 2020-11-09 PROCEDURE — 94010 BREATHING CAPACITY TEST: CPT | Mod: XB

## 2020-11-09 PROCEDURE — 71046 X-RAY EXAM CHEST 2 VIEWS: CPT | Mod: TC

## 2020-11-09 NOTE — TELEPHONE ENCOUNTER
PFT shows severe obstruction with no response to bronchodilator, no restriction, severe diffusion defect.  FEV1 is 1.16

## 2020-11-17 ENCOUNTER — OFFICE VISIT (OUTPATIENT)
Dept: PULMONOLOGY | Facility: CLINIC | Age: 70
End: 2020-11-17
Payer: MEDICARE

## 2020-11-17 VITALS
HEART RATE: 89 BPM | TEMPERATURE: 97 F | WEIGHT: 216 LBS | DIASTOLIC BLOOD PRESSURE: 92 MMHG | OXYGEN SATURATION: 93 % | SYSTOLIC BLOOD PRESSURE: 132 MMHG | HEIGHT: 68 IN | BODY MASS INDEX: 32.74 KG/M2

## 2020-11-17 DIAGNOSIS — G47.34 NOCTURNAL HYPOXEMIA: ICD-10-CM

## 2020-11-17 DIAGNOSIS — R06.00 DYSPNEA, UNSPECIFIED TYPE: Primary | ICD-10-CM

## 2020-11-17 DIAGNOSIS — J44.9 CHRONIC OBSTRUCTIVE PULMONARY DISEASE, UNSPECIFIED COPD TYPE: ICD-10-CM

## 2020-11-17 DIAGNOSIS — J43.2 CENTRILOBULAR EMPHYSEMA: ICD-10-CM

## 2020-11-17 PROCEDURE — 3075F PR MOST RECENT SYSTOLIC BLOOD PRESS GE 130-139MM HG: ICD-10-PCS | Mod: S$GLB,,, | Performed by: INTERNAL MEDICINE

## 2020-11-17 PROCEDURE — 3080F PR MOST RECENT DIASTOLIC BLOOD PRESSURE >= 90 MM HG: ICD-10-PCS | Mod: S$GLB,,, | Performed by: INTERNAL MEDICINE

## 2020-11-17 PROCEDURE — 3080F DIAST BP >= 90 MM HG: CPT | Mod: S$GLB,,, | Performed by: INTERNAL MEDICINE

## 2020-11-17 PROCEDURE — 1125F AMNT PAIN NOTED PAIN PRSNT: CPT | Mod: S$GLB,,, | Performed by: INTERNAL MEDICINE

## 2020-11-17 PROCEDURE — 3075F SYST BP GE 130 - 139MM HG: CPT | Mod: S$GLB,,, | Performed by: INTERNAL MEDICINE

## 2020-11-17 PROCEDURE — 1125F PR PAIN SEVERITY QUANTIFIED, PAIN PRESENT: ICD-10-PCS | Mod: S$GLB,,, | Performed by: INTERNAL MEDICINE

## 2020-11-17 PROCEDURE — 99214 PR OFFICE/OUTPT VISIT, EST, LEVL IV, 30-39 MIN: ICD-10-PCS | Mod: S$GLB,,, | Performed by: INTERNAL MEDICINE

## 2020-11-17 PROCEDURE — 1159F PR MEDICATION LIST DOCUMENTED IN MEDICAL RECORD: ICD-10-PCS | Mod: S$GLB,,, | Performed by: INTERNAL MEDICINE

## 2020-11-17 PROCEDURE — 3008F PR BODY MASS INDEX (BMI) DOCUMENTED: ICD-10-PCS | Mod: S$GLB,,, | Performed by: INTERNAL MEDICINE

## 2020-11-17 PROCEDURE — 1159F MED LIST DOCD IN RCRD: CPT | Mod: S$GLB,,, | Performed by: INTERNAL MEDICINE

## 2020-11-17 PROCEDURE — 3008F BODY MASS INDEX DOCD: CPT | Mod: S$GLB,,, | Performed by: INTERNAL MEDICINE

## 2020-11-17 PROCEDURE — 99214 OFFICE O/P EST MOD 30 MIN: CPT | Mod: S$GLB,,, | Performed by: INTERNAL MEDICINE

## 2020-11-17 RX ORDER — OXYCODONE AND ACETAMINOPHEN 5; 325 MG/1; MG/1
TABLET ORAL
COMMUNITY
Start: 2020-10-19 | End: 2021-04-01

## 2020-11-17 RX ORDER — POTASSIUM CHLORIDE 20 MEQ/1
20 TABLET, EXTENDED RELEASE ORAL
Status: ON HOLD | COMMUNITY
End: 2021-05-11 | Stop reason: HOSPADM

## 2020-11-17 RX ORDER — A/SINGAPORE/GP1908/2015 IVR-180 (AN A/MICHIGAN/45/2015 (H1N1)PDM09-LIKE VIRUS, A/HONG KONG/4801/2014, NYMC X-263B (H3N2) (AN A/HONG KONG/4801/2014-LIKE VIRUS), AND B/BRISBANE/60/2008, WILD TYPE (A B/BRISBANE/60/2008-LIKE VIRUS) 15; 15; 15 UG/.5ML; UG/.5ML; UG/.5ML
INJECTION, SUSPENSION INTRAMUSCULAR
COMMUNITY
Start: 2020-10-01 | End: 2021-04-19

## 2020-11-17 RX ORDER — FAMOTIDINE 20 MG/1
20 TABLET, FILM COATED ORAL 2 TIMES DAILY
COMMUNITY

## 2020-11-17 NOTE — LETTER
Date: 11/17/2020        Patient: Maddie Otero  YOB: 1950      Dear Dr. Carroll,    Thank you very much for requesting my opinion on Maddie Otero.    Diagnosis: severe COPD and nocturnal hypoxemia    Comments and Recommendations: The patient has a FEV1 of 1.06 liters and a severe diffusion defect.  She should use her Trelegy that morning. She may need perioperative bronchodilators.  She sleeps on 2 liters of oxygen and likely with need perioperative oxygen supplementation.    Again, thank you for the consult. If I can be of further assistance, please don't hesitate to contact me.       Sincerely,       Roseanne Mcneil MD

## 2020-11-17 NOTE — PROGRESS NOTES
SUBJECTIVE:    Patient ID: Maddie Otero is a 69 y.o. female.    Chief Complaint: Follow-up    COPD    Follow-up    The patient is here for clearance for R knee arthroplasty.  She has severe COPD and a severe diffusion defect.  She is using Trelegy daily.    .  Past Medical History:   Diagnosis Date    Arthritis     COPD (chronic obstructive pulmonary disease)     COPD (chronic obstructive pulmonary disease) 2019    Diabetes mellitus, type 2     Diverticulosis     History of left knee replacement 2017    DR CRENSHAW    HNP (herniated nucleus pulposus)     LUMBAR    Hypertension     Lung disease     Pancreatic insufficiency     s/p whipple, non cancer    Presence of dental bridge     UPPER     Past Surgical History:   Procedure Laterality Date    APPENDECTOMY      BACK SURGERY  1994    lower back    BACK SURGERY  2012    lower back    CHOLECYSTECTOMY      HERNIA REPAIR      LAPAROSCOPIC REPAIR OF INCISIONAL HERNIA N/A 10/22/2019    Procedure: REPAIR, HERNIA, INCISIONAL, LAPAROSCOPIC;  Surgeon: Pantera Almanza III, MD;  Location: [facility];  Service: General;  Laterality: N/A;    pancrease  2009    stents in pancrease     TONSILLECTOMY      TOTAL KNEE ARTHROPLASTY Left 08/10/2017    WHIPPLE PROCEDURE W/ LAPAROSCOPY  10/09     Family History   Problem Relation Age of Onset    Diabetes Mother     Cancer Father         Social History:   Marital Status:   Occupation: retired from caring for mentally disabled people  Alcohol History:  reports no history of alcohol use.  Tobacco History:  reports that she quit smoking about 3 years ago. Her smoking use included cigarettes. She has a 30.00 pack-year smoking history. She has never used smokeless tobacco.  Drug History:  reports no history of drug use.    Review of patient's allergies indicates:   Allergen Reactions    Sulfa (sulfonamide antibiotics) Hives    Penicillin v      Childhood reaction, swelled       Current Outpatient Medications    Medication Sig Dispense Refill    albuterol (PROVENTIL/VENTOLIN) 90 mcg/actuation inhaler Inhale 2 puffs into the lungs every 6 (six) hours as needed for Wheezing. 1 each 0    amlodipine (NORVASC) 5 MG tablet Take 1 tablet by mouth once daily.  1    blood sugar diagnostic (TRUE METRIX GLUCOSE TEST STRIP MISC) True Metrix Glucose Test Strip   Take 1 strip 3 times a day by miscell. route as needed.      blood sugar diagnostic (TRUE METRIX GLUCOSE TEST STRIP MISC) True Metrix Glucose Test Strip      cetirizine (ZYRTEC) 10 MG tablet Take 10 mg by mouth once daily.      CREON CpDR Take 2 capsules by mouth 4 (four) times daily.  5    famotidine (PEPCID) 20 MG tablet famotidine 20 mg tablet      FLUAD QUAD 2020-21,65Y UP,,PF, 60 mcg (15 mcg x 4)/0.5 mL Syrg ADM 0.5ML IM UTD      fluticasone-umeclidin-vilanter (TRELEGY ELLIPTA) 100-62.5-25 mcg DsDv Inhale 1 puff into the lungs once daily. 1 each 11    NOVOLIN 70/30 U-100 INSULIN 100 unit/mL (70-30) injection   22    NOVOLOG MIX 70-30 FLEXPEN 100 unit/mL (70-30) InPn pen Inject 20 Units into the skin 3 (three) times daily with meals.   5    oxyCODONE-acetaminophen (PERCOCET) 5-325 mg per tablet       potassium chloride SA (K-DUR,KLOR-CON) 20 MEQ tablet potassium chloride ER 20 mEq tablet,extended release(part/cryst)      albuterol-ipratropium (DUO-NEB) 2.5 mg-0.5 mg/3 mL nebulizer solution Take 3 mLs by nebulization every 4 (four) hours as needed for Wheezing. Rescue      albuterol-ipratropium (DUO-NEB) 2.5 mg-0.5 mg/3 mL nebulizer solution ipratropium-albuterol 0.5 mg-3 mg(2.5 mg base)/3 mL nebulization soln      fluticasone propionate (FLONASE) 50 mcg/actuation nasal spray fluticasone propionate 50 mcg/actuation nasal spray,suspension      HYDROcodone-acetaminophen (NORCO) 5-325 mg per tablet Take 1 tablet by mouth every 6 (six) hours as needed for Pain. (Patient not taking: Reported on 6/17/2020) 30 tablet 0    predniSONE (DELTASONE) 10 MG tablet Take  "4 tabs x 2 days, then take 3 tabs x 2 days, then take 2 tabs x 2 days, then take 1 tab x 2 days. (Patient not taking: Reported on 6/17/2020) 20 tablet 0     No current facility-administered medications for this visit.          Last PFT: 10/8/19  Spirometry shows severe obstruction, FEV1 1.06  Last CT:04/30/19  IMPRESSION: Mild emphysematous changes with resolution of the scattered  bronchial wall thickening and tree-in-bud opacities    No acute pulmonary process    Review of Systems  General: Feels good  Eyes: Vision is good  ENT:  No rhinitis or sinusitis  Heart:: No chest pain or palpitations.  Lungs:coughs up clear mucus daily, still has dyspnea with exertion, sleeping with 2  GI: GERD sometimes  : No dysuria, hesitancy, or nocturia.  Musculoskeletal: pain in her knee and shoulder  Skin: No lesions or rashes.  Neuro: No headaches or neuropathy.  Lymph: no swelling  Psych: No anxiety or depression.  Endo: weight ok        OBJECTIVE:      BP (!) 132/92 (BP Location: Left arm, Patient Position: Sitting, BP Method: Large (Automatic))   Pulse 89   Temp 97.3 °F (36.3 °C)   Ht 5' 8" (1.727 m)   Wt 98 kg (216 lb)   LMP  (LMP Unknown)   SpO2 (!) 93%   BMI 32.84 kg/m²     Physical Exam  GENERAL: Older patient in no distress.  HEENT: Pupils equal and reactive. Extraocular movements intact. Nose intact.      Pharynx moist.  NECK: Supple.   HEART: Regular rate and rhythm. No murmur or gallop auscultated.  LUNGS: decreased throughout  ABDOMEN: Bowel sounds present. Non-tender, no masses palpated.  EXTREMITIES: Normal muscle tone and joint movement, no cyanosis or clubbing.   LYMPHATICS: No adenopathy palpated, trace edema   SKIN: Dry, intact, no lesions.   NEURO: Cranial nerves II-XII intact. Motor strength 5/5 bilaterally, upper and lower extremities.  PSYCH: Appropriate affect.      Assessment:       1. Dyspnea, unspecified type    2. Chronic obstructive pulmonary disease, unspecified COPD type    3. Centrilobular " emphysema    4. Nocturnal hypoxemia          Plan:       Dyspnea, unspecified type    Chronic obstructive pulmonary disease, unspecified COPD type    Centrilobular emphysema    Nocturnal hypoxemia      Continue Trelegy daily  Wear your oxygen at night   Clear for surgery, clearance to Dr. Carroll, [facility], fax to [phone]  Had her flu shot and another pneumonia      Follow up in about 3 months (around 2/17/2021).

## 2020-12-02 ENCOUNTER — TELEPHONE (OUTPATIENT)
Dept: PULMONOLOGY | Facility: CLINIC | Age: 70
End: 2020-12-02

## 2020-12-02 NOTE — TELEPHONE ENCOUNTER
The patient's chest x-ray shows bilateral infiltrates consistent with COVID pneumonia.  The patient states she had a negative COVID test as part of her preop workup.  The patient states she had diarrhea over the weekend and has been feeling better in the last few days.  However, her breathing is not doing well.  I explained we cannot clear her for surgery and she needs to be seen with her probable pneumonia.  She will come in tomorrow to see Alyse at 9:00 a.m..

## 2020-12-03 ENCOUNTER — TELEPHONE (OUTPATIENT)
Dept: PULMONOLOGY | Facility: CLINIC | Age: 70
End: 2020-12-03

## 2020-12-03 ENCOUNTER — OFFICE VISIT (OUTPATIENT)
Dept: PULMONOLOGY | Facility: CLINIC | Age: 70
End: 2020-12-03
Payer: MEDICARE

## 2020-12-03 ENCOUNTER — HOSPITAL ENCOUNTER (OUTPATIENT)
Dept: RADIOLOGY | Facility: HOSPITAL | Age: 70
Discharge: HOME | End: 2020-12-03
Attending: NURSE PRACTITIONER
Payer: MEDICARE

## 2020-12-03 VITALS
HEART RATE: 89 BPM | WEIGHT: 208 LBS | DIASTOLIC BLOOD PRESSURE: 85 MMHG | BODY MASS INDEX: 31.52 KG/M2 | OXYGEN SATURATION: 92 % | TEMPERATURE: 98 F | SYSTOLIC BLOOD PRESSURE: 140 MMHG | HEIGHT: 68 IN

## 2020-12-03 DIAGNOSIS — J18.9 PNEUMONIA DUE TO INFECTIOUS ORGANISM, UNSPECIFIED LATERALITY, UNSPECIFIED PART OF LUNG: Primary | ICD-10-CM

## 2020-12-03 DIAGNOSIS — J18.9 PNEUMONIA DUE TO INFECTIOUS ORGANISM, UNSPECIFIED LATERALITY, UNSPECIFIED PART OF LUNG: ICD-10-CM

## 2020-12-03 PROCEDURE — 99214 PR OFFICE/OUTPT VISIT, EST, LEVL IV, 30-39 MIN: ICD-10-PCS | Mod: S$GLB,,, | Performed by: NURSE PRACTITIONER

## 2020-12-03 PROCEDURE — 3008F PR BODY MASS INDEX (BMI) DOCUMENTED: ICD-10-PCS | Mod: S$GLB,,, | Performed by: NURSE PRACTITIONER

## 2020-12-03 PROCEDURE — 3008F BODY MASS INDEX DOCD: CPT | Mod: S$GLB,,, | Performed by: NURSE PRACTITIONER

## 2020-12-03 PROCEDURE — 3079F PR MOST RECENT DIASTOLIC BLOOD PRESSURE 80-89 MM HG: ICD-10-PCS | Mod: S$GLB,,, | Performed by: NURSE PRACTITIONER

## 2020-12-03 PROCEDURE — 1126F AMNT PAIN NOTED NONE PRSNT: CPT | Mod: S$GLB,,, | Performed by: NURSE PRACTITIONER

## 2020-12-03 PROCEDURE — 3079F DIAST BP 80-89 MM HG: CPT | Mod: S$GLB,,, | Performed by: NURSE PRACTITIONER

## 2020-12-03 PROCEDURE — 99214 OFFICE O/P EST MOD 30 MIN: CPT | Mod: S$GLB,,, | Performed by: NURSE PRACTITIONER

## 2020-12-03 PROCEDURE — 3077F PR MOST RECENT SYSTOLIC BLOOD PRESSURE >= 140 MM HG: ICD-10-PCS | Mod: S$GLB,,, | Performed by: NURSE PRACTITIONER

## 2020-12-03 PROCEDURE — 1126F PR PAIN SEVERITY QUANTIFIED, NO PAIN PRESENT: ICD-10-PCS | Mod: S$GLB,,, | Performed by: NURSE PRACTITIONER

## 2020-12-03 PROCEDURE — 1159F PR MEDICATION LIST DOCUMENTED IN MEDICAL RECORD: ICD-10-PCS | Mod: S$GLB,,, | Performed by: NURSE PRACTITIONER

## 2020-12-03 PROCEDURE — 1159F MED LIST DOCD IN RCRD: CPT | Mod: S$GLB,,, | Performed by: NURSE PRACTITIONER

## 2020-12-03 PROCEDURE — 71046 X-RAY EXAM CHEST 2 VIEWS: CPT | Mod: TC

## 2020-12-03 PROCEDURE — 3077F SYST BP >= 140 MM HG: CPT | Mod: S$GLB,,, | Performed by: NURSE PRACTITIONER

## 2020-12-03 RX ORDER — ALBUTEROL SULFATE 1.25 MG/3ML
1.25 SOLUTION RESPIRATORY (INHALATION) EVERY 6 HOURS PRN
Qty: 120 VIAL | Refills: 6 | Status: SHIPPED | OUTPATIENT
Start: 2020-12-03 | End: 2021-05-05 | Stop reason: CLARIF

## 2020-12-03 RX ORDER — LEVOFLOXACIN 500 MG/1
500 TABLET, FILM COATED ORAL DAILY
Qty: 7 TABLET | Refills: 0 | Status: SHIPPED | OUTPATIENT
Start: 2020-12-03 | End: 2021-04-19

## 2020-12-03 RX ORDER — NITROFURANTOIN 25; 75 MG/1; MG/1
100 CAPSULE ORAL
COMMUNITY
Start: 2020-11-30 | End: 2020-12-07

## 2020-12-03 NOTE — PATIENT INSTRUCTIONS
Continue Trelegy daily  Wear your oxygen at night   CBC,  CMP, Procalcitonin,  CRP,  Chest xray downstairs.

## 2020-12-03 NOTE — TELEPHONE ENCOUNTER
Chest xray with multifocal pneumonia.  Reviewed labs. Called the patient sending Levaquin for a week. She was instructed to eat before taking. If she gets worse she was instructed to go to the ER considering the severity of her COPD.  She did have a negative Covid test on 11/30 after she had a chest xray in [place] that showed pneumonia.

## 2020-12-03 NOTE — PROGRESS NOTES
SUBJECTIVE:    Patient ID: Maddie Otero is a 70 y.o. female.    Chief Complaint: Cough and Shortness of Breath    Patient here today feeling ill since Thanksgiving. She was supposed to have Knee surgery but not cleared because chest xray showed pneumonia on Monday.  She was Covid tested on Monday and was negative.  She has a productive cough with clear to green mucous all day. However she coughs all the time regardless. She has felt feverish in the evenings, had diarrhea over the weekend.  She is able to taste and smell, appetite is good, no body aches.  She is sleeping on her oxygen..     Past Medical History:   Diagnosis Date    Arthritis     COPD (chronic obstructive pulmonary disease)     COPD (chronic obstructive pulmonary disease) 2019    Diabetes mellitus, type 2     Diverticulosis     History of left knee replacement 2017    DR CRENSHAW    HNP (herniated nucleus pulposus)     LUMBAR    Hypertension     Lung disease     Pancreatic insufficiency     s/p whipple, non cancer    Presence of dental bridge     UPPER     Past Surgical History:   Procedure Laterality Date    APPENDECTOMY      BACK SURGERY  1994    lower back    BACK SURGERY  2012    lower back    CHOLECYSTECTOMY      HERNIA REPAIR      LAPAROSCOPIC REPAIR OF INCISIONAL HERNIA N/A 10/22/2019    Procedure: REPAIR, HERNIA, INCISIONAL, LAPAROSCOPIC;  Surgeon: Pantera Almanza III, MD;  Location: [facility];  Service: General;  Laterality: N/A;    pancrease  2009    stents in pancrease     TONSILLECTOMY      TOTAL KNEE ARTHROPLASTY Left 08/10/2017    WHIPPLE PROCEDURE W/ LAPAROSCOPY  10/09     Family History   Problem Relation Age of Onset    Diabetes Mother     Cancer Father         Social History:   Marital Status:   Occupation: retired from caring for mentally disabled people  Alcohol History:  reports no history of alcohol use.  Tobacco History:  reports that she quit smoking about 3 years ago. Her smoking use  included cigarettes. She has a 30.00 pack-year smoking history. She has never used smokeless tobacco.  Drug History:  reports no history of drug use.    Review of patient's allergies indicates:   Allergen Reactions    Sulfa (sulfonamide antibiotics) Hives    Penicillin v      Childhood reaction, swelled       Current Outpatient Medications   Medication Sig Dispense Refill    albuterol (PROVENTIL/VENTOLIN) 90 mcg/actuation inhaler Inhale 2 puffs into the lungs every 6 (six) hours as needed for Wheezing. 1 each 0    amlodipine (NORVASC) 5 MG tablet Take 1 tablet by mouth once daily.  1    blood sugar diagnostic (TRUE METRIX GLUCOSE TEST STRIP MISC) True Metrix Glucose Test Strip   Take 1 strip 3 times a day by miscell. route as needed.      blood sugar diagnostic (TRUE METRIX GLUCOSE TEST STRIP MISC) True Metrix Glucose Test Strip      cetirizine (ZYRTEC) 10 MG tablet Take 10 mg by mouth once daily.      CREON CpDR Take 2 capsules by mouth 4 (four) times daily.  5    famotidine (PEPCID) 20 MG tablet famotidine 20 mg tablet      FLUAD QUAD 2020-21,65Y UP,,PF, 60 mcg (15 mcg x 4)/0.5 mL Syrg ADM 0.5ML IM UTD      fluticasone propionate (FLONASE) 50 mcg/actuation nasal spray fluticasone propionate 50 mcg/actuation nasal spray,suspension      fluticasone-umeclidin-vilanter (TRELEGY ELLIPTA) 100-62.5-25 mcg DsDv Inhale 1 puff into the lungs once daily. 1 each 11    nitrofurantoin, macrocrystal-monohydrate, (MACROBID) 100 MG capsule Take 100 mg by mouth.      NOVOLIN 70/30 U-100 INSULIN 100 unit/mL (70-30) injection   22    NOVOLOG MIX 70-30 FLEXPEN 100 unit/mL (70-30) InPn pen Inject 20 Units into the skin 3 (three) times daily with meals.   5    oxyCODONE-acetaminophen (PERCOCET) 5-325 mg per tablet       potassium chloride SA (K-DUR,KLOR-CON) 20 MEQ tablet potassium chloride ER 20 mEq tablet,extended release(part/cryst)      albuterol (ACCUNEB) 1.25 mg/3 mL Nebu Take 3 mLs (1.25 mg total) by  "nebulization every 6 (six) hours as needed. Rescue 120 vial 6    albuterol-ipratropium (DUO-NEB) 2.5 mg-0.5 mg/3 mL nebulizer solution Take 3 mLs by nebulization every 4 (four) hours as needed for Wheezing. Rescue      albuterol-ipratropium (DUO-NEB) 2.5 mg-0.5 mg/3 mL nebulizer solution ipratropium-albuterol 0.5 mg-3 mg(2.5 mg base)/3 mL nebulization soln      HYDROcodone-acetaminophen (NORCO) 5-325 mg per tablet Take 1 tablet by mouth every 6 (six) hours as needed for Pain. (Patient not taking: Reported on 6/17/2020) 30 tablet 0    levoFLOXacin (LEVAQUIN) 500 MG tablet Take 1 tablet (500 mg total) by mouth once daily. 7 tablet 0    predniSONE (DELTASONE) 10 MG tablet Take 4 tabs x 2 days, then take 3 tabs x 2 days, then take 2 tabs x 2 days, then take 1 tab x 2 days. (Patient not taking: Reported on 6/17/2020) 20 tablet 0     No current facility-administered medications for this visit.                General: night sweats   Eyes: Vision is good  ENT:  No rhinitis or sinusitis  Heart:: No chest pain or palpitations.  Lungs:increased dyspnea over the last several days, productive cough with clear to green mucous    GI: diarrhea over the weekend, appetite good   : No dysuria, hesitancy, or nocturia.  Musculoskeletal: pain in her knee and shoulder  Skin: No lesions or rashes.  Neuro: No headaches or neuropathy.  Lymph: no swelling  Psych: No anxiety or depression.  Endo: weight stable         OBJECTIVE:      BP (!) 140/85 (BP Location: Left arm, Patient Position: Sitting, BP Method: Medium (Manual))   Pulse 89   Temp 97.9 °F (36.6 °C)   Ht 5' 8" (1.727 m)   Wt 94.3 kg (208 lb)   LMP  (LMP Unknown)   SpO2 (!) 92%   BMI 31.63 kg/m²     Physical Exam  GENERAL: Older patient in no distress.  HEENT: Pupils equal and reactive. Extraocular movements intact. Nose intact.      Pharynx moist.  NECK: Supple.   HEART: Regular rate and rhythm. No murmur or gallop auscultated.  LUNGS: decreased throughout but " clear  ABDOMEN: Bowel sounds present. Non-tender, no masses palpated.  EXTREMITIES: Normal muscle tone and joint movement, no cyanosis or clubbing.   LYMPHATICS: No adenopathy palpated, trace edema   SKIN: Dry, intact, no lesions.   NEURO: Cranial nerves II-XII intact. Motor strength 5/5 bilaterally, upper and lower extremities.  PSYCH: Appropriate affect.      Assessment:       1. Pneumonia due to infectious organism, unspecified laterality, unspecified part of lung          Plan:       Pneumonia due to infectious organism, unspecified laterality, unspecified part of lung  -     CBC Auto Differential; Future; Expected date: 12/03/2020  -     Comprehensive Metabolic Panel; Future; Expected date: 12/03/2020  -     X-Ray Chest PA And Lateral; Future  -     Procalcitonin; Future; Expected date: 12/03/2020  -     C-Reactive Protein; Future; Expected date: 12/03/2020    Other orders  -     albuterol (ACCUNEB) 1.25 mg/3 mL Nebu; Take 3 mLs (1.25 mg total) by nebulization every 6 (six) hours as needed. Rescue  Dispense: 120 vial; Refill: 6  -     levoFLOXacin (LEVAQUIN) 500 MG tablet; Take 1 tablet (500 mg total) by mouth once daily.  Dispense: 7 tablet; Refill: 0      Continue Trelegy daily  Wear your oxygen at night   CBC,  CMP, Procalcitonin,  CRP,  Chest xray downstairs.         No follow-ups on file.

## 2020-12-15 ENCOUNTER — HOSPITAL ENCOUNTER (OUTPATIENT)
Dept: RADIOLOGY | Facility: HOSPITAL | Age: 70
Discharge: HOME | End: 2020-12-15
Attending: INTERNAL MEDICINE
Payer: MEDICARE

## 2020-12-15 ENCOUNTER — TELEPHONE (OUTPATIENT)
Dept: PULMONOLOGY | Facility: CLINIC | Age: 70
End: 2020-12-15

## 2020-12-15 DIAGNOSIS — J18.9 UNRESOLVED PNEUMONIA: Primary | ICD-10-CM

## 2020-12-15 DIAGNOSIS — J18.9 UNRESOLVED PNEUMONIA: ICD-10-CM

## 2020-12-15 PROCEDURE — 71046 X-RAY EXAM CHEST 2 VIEWS: CPT | Mod: TC

## 2020-12-15 NOTE — TELEPHONE ENCOUNTER
Called pt and LVM stating her CXR was better but not completely clear and we want to repeat the CXR in 4 weeks to make sure its completely clear and for her to call me back to let me know how shes feeling.     @1305 pt called back and I told her all of the above and she said she is feeling better than she was but still not feels sob and not her normal. Wanted to know if she should take another round of antibiotics.

## 2020-12-15 NOTE — TELEPHONE ENCOUNTER
IMPRESSION:  Stable lateral opacity in the right midlung with improvement of the  interstitial opacities in the lung bases.     Continued follow-up in 4-6 weeks is recommended. X     Hyperexpanded lungs compatible with COPD    Will repeat chest xray in a 4 weeks

## 2021-01-07 ENCOUNTER — TELEPHONE (OUTPATIENT)
Dept: PULMONOLOGY | Facility: CLINIC | Age: 71
End: 2021-01-07

## 2021-01-07 DIAGNOSIS — J18.9 PNEUMONIA DUE TO INFECTIOUS ORGANISM, UNSPECIFIED LATERALITY, UNSPECIFIED PART OF LUNG: Primary | ICD-10-CM

## 2021-01-14 ENCOUNTER — TELEPHONE (OUTPATIENT)
Dept: PULMONOLOGY | Facility: CLINIC | Age: 71
End: 2021-01-14

## 2021-01-14 ENCOUNTER — OFFICE VISIT (OUTPATIENT)
Dept: PULMONOLOGY | Facility: CLINIC | Age: 71
End: 2021-01-14
Payer: MEDICARE

## 2021-01-14 ENCOUNTER — HOSPITAL ENCOUNTER (OUTPATIENT)
Dept: RADIOLOGY | Facility: HOSPITAL | Age: 71
Discharge: HOME | End: 2021-01-14
Attending: NURSE PRACTITIONER
Payer: MEDICARE

## 2021-01-14 VITALS
HEART RATE: 86 BPM | HEIGHT: 68 IN | OXYGEN SATURATION: 93 % | SYSTOLIC BLOOD PRESSURE: 142 MMHG | DIASTOLIC BLOOD PRESSURE: 70 MMHG | WEIGHT: 215 LBS | BODY MASS INDEX: 32.58 KG/M2

## 2021-01-14 DIAGNOSIS — J44.9 CHRONIC OBSTRUCTIVE PULMONARY DISEASE, UNSPECIFIED COPD TYPE: Primary | ICD-10-CM

## 2021-01-14 DIAGNOSIS — J18.9 PNEUMONIA DUE TO INFECTIOUS ORGANISM, UNSPECIFIED LATERALITY, UNSPECIFIED PART OF LUNG: ICD-10-CM

## 2021-01-14 DIAGNOSIS — G47.34 NOCTURNAL HYPOXEMIA: ICD-10-CM

## 2021-01-14 PROCEDURE — 99214 OFFICE O/P EST MOD 30 MIN: CPT | Mod: S$GLB,,, | Performed by: NURSE PRACTITIONER

## 2021-01-14 PROCEDURE — 3077F PR MOST RECENT SYSTOLIC BLOOD PRESSURE >= 140 MM HG: ICD-10-PCS | Mod: S$GLB,,, | Performed by: NURSE PRACTITIONER

## 2021-01-14 PROCEDURE — 3078F PR MOST RECENT DIASTOLIC BLOOD PRESSURE < 80 MM HG: ICD-10-PCS | Mod: S$GLB,,, | Performed by: NURSE PRACTITIONER

## 2021-01-14 PROCEDURE — 3008F PR BODY MASS INDEX (BMI) DOCUMENTED: ICD-10-PCS | Mod: S$GLB,,, | Performed by: NURSE PRACTITIONER

## 2021-01-14 PROCEDURE — 3078F DIAST BP <80 MM HG: CPT | Mod: S$GLB,,, | Performed by: NURSE PRACTITIONER

## 2021-01-14 PROCEDURE — 71046 X-RAY EXAM CHEST 2 VIEWS: CPT | Mod: TC

## 2021-01-14 PROCEDURE — 1159F MED LIST DOCD IN RCRD: CPT | Mod: S$GLB,,, | Performed by: NURSE PRACTITIONER

## 2021-01-14 PROCEDURE — 1159F PR MEDICATION LIST DOCUMENTED IN MEDICAL RECORD: ICD-10-PCS | Mod: S$GLB,,, | Performed by: NURSE PRACTITIONER

## 2021-01-14 PROCEDURE — 3008F BODY MASS INDEX DOCD: CPT | Mod: S$GLB,,, | Performed by: NURSE PRACTITIONER

## 2021-01-14 PROCEDURE — 3077F SYST BP >= 140 MM HG: CPT | Mod: S$GLB,,, | Performed by: NURSE PRACTITIONER

## 2021-01-14 PROCEDURE — 99214 PR OFFICE/OUTPT VISIT, EST, LEVL IV, 30-39 MIN: ICD-10-PCS | Mod: S$GLB,,, | Performed by: NURSE PRACTITIONER

## 2021-01-14 RX ORDER — DILTIAZEM HYDROCHLORIDE 30 MG/1
30 TABLET, FILM COATED ORAL 3 TIMES DAILY
COMMUNITY
End: 2021-04-19

## 2021-01-14 RX ORDER — UMECLIDINIUM BROMIDE AND VILANTEROL TRIFENATATE 62.5; 25 UG/1; UG/1
1 POWDER RESPIRATORY (INHALATION) DAILY
Qty: 1 EACH | Refills: 5 | Status: SHIPPED | OUTPATIENT
Start: 2021-01-14 | End: 2021-02-08

## 2021-01-14 RX ORDER — NYSTATIN 100000 [USP'U]/ML
5 SUSPENSION ORAL 4 TIMES DAILY
COMMUNITY
End: 2021-04-19

## 2021-01-14 RX ORDER — PANTOPRAZOLE SODIUM 40 MG/1
40 TABLET, DELAYED RELEASE ORAL DAILY
Qty: 30 TABLET | Refills: 6 | Status: SHIPPED | OUTPATIENT
Start: 2021-01-14 | End: 2021-04-19

## 2021-01-22 ENCOUNTER — PATIENT MESSAGE (OUTPATIENT)
Dept: ADMINISTRATIVE | Facility: OTHER | Age: 71
End: 2021-01-22

## 2021-01-27 ENCOUNTER — TELEPHONE (OUTPATIENT)
Dept: PULMONOLOGY | Facility: CLINIC | Age: 71
End: 2021-01-27

## 2021-01-27 DIAGNOSIS — R05.9 COUGH: Primary | ICD-10-CM

## 2021-02-08 RX ORDER — TIOTROPIUM BROMIDE AND OLODATEROL 3.124; 2.736 UG/1; UG/1
2 SPRAY, METERED RESPIRATORY (INHALATION) DAILY
Qty: 1 G | Refills: 5 | Status: SHIPPED | OUTPATIENT
Start: 2021-02-08 | End: 2021-05-05 | Stop reason: CLARIF

## 2021-02-08 RX ORDER — TIOTROPIUM BROMIDE AND OLODATEROL 3.124; 2.736 UG/1; UG/1
2 SPRAY, METERED RESPIRATORY (INHALATION) DAILY
Qty: 12 G | Refills: 5 | Status: SHIPPED | OUTPATIENT
Start: 2021-02-08 | End: 2021-05-05 | Stop reason: CLARIF

## 2021-02-09 ENCOUNTER — IMMUNIZATION (OUTPATIENT)
Dept: PRIMARY CARE CLINIC | Facility: CLINIC | Age: 71
End: 2021-02-09
Payer: MEDICARE

## 2021-02-09 DIAGNOSIS — Z23 NEED FOR VACCINATION: Primary | ICD-10-CM

## 2021-02-09 PROCEDURE — 0001A COVID-19, MRNA, LNP-S, PF, 30 MCG/0.3 ML DOSE VACCINE: ICD-10-PCS | Mod: S$GLB,,, | Performed by: FAMILY MEDICINE

## 2021-02-09 PROCEDURE — 91300 COVID-19, MRNA, LNP-S, PF, 30 MCG/0.3 ML DOSE VACCINE: CPT | Mod: S$GLB,,, | Performed by: FAMILY MEDICINE

## 2021-02-09 PROCEDURE — 91300 COVID-19, MRNA, LNP-S, PF, 30 MCG/0.3 ML DOSE VACCINE: ICD-10-PCS | Mod: S$GLB,,, | Performed by: FAMILY MEDICINE

## 2021-02-09 PROCEDURE — 0001A COVID-19, MRNA, LNP-S, PF, 30 MCG/0.3 ML DOSE VACCINE: CPT | Mod: S$GLB,,, | Performed by: FAMILY MEDICINE

## 2021-02-11 ENCOUNTER — LAB VISIT (OUTPATIENT)
Dept: LAB | Facility: HOSPITAL | Age: 71
End: 2021-02-11
Attending: NURSE PRACTITIONER
Payer: MEDICARE

## 2021-02-11 DIAGNOSIS — R05.9 COUGH: ICD-10-CM

## 2021-02-11 PROCEDURE — 87070 CULTURE OTHR SPECIMN AEROBIC: CPT

## 2021-02-11 PROCEDURE — 87205 SMEAR GRAM STAIN: CPT

## 2021-02-13 LAB
BACTERIA SPEC AEROBE CULT: NORMAL
GRAM STN SPEC: NORMAL

## 2021-03-03 ENCOUNTER — IMMUNIZATION (OUTPATIENT)
Dept: PRIMARY CARE CLINIC | Facility: CLINIC | Age: 71
End: 2021-03-03
Payer: MEDICARE

## 2021-03-03 DIAGNOSIS — Z23 NEED FOR VACCINATION: Primary | ICD-10-CM

## 2021-03-03 PROCEDURE — 0002A COVID-19, MRNA, LNP-S, PF, 30 MCG/0.3 ML DOSE VACCINE: CPT | Mod: CV19,S$GLB,, | Performed by: FAMILY MEDICINE

## 2021-03-03 PROCEDURE — 91300 COVID-19, MRNA, LNP-S, PF, 30 MCG/0.3 ML DOSE VACCINE: ICD-10-PCS | Mod: S$GLB,,, | Performed by: FAMILY MEDICINE

## 2021-03-03 PROCEDURE — 0002A COVID-19, MRNA, LNP-S, PF, 30 MCG/0.3 ML DOSE VACCINE: ICD-10-PCS | Mod: CV19,S$GLB,, | Performed by: FAMILY MEDICINE

## 2021-03-03 PROCEDURE — 91300 COVID-19, MRNA, LNP-S, PF, 30 MCG/0.3 ML DOSE VACCINE: CPT | Mod: S$GLB,,, | Performed by: FAMILY MEDICINE

## 2021-03-08 ENCOUNTER — TELEPHONE (OUTPATIENT)
Dept: PULMONOLOGY | Facility: CLINIC | Age: 71
End: 2021-03-08

## 2021-03-08 DIAGNOSIS — J44.9 CHRONIC OBSTRUCTIVE PULMONARY DISEASE, UNSPECIFIED COPD TYPE: Primary | ICD-10-CM

## 2021-03-11 ENCOUNTER — HOSPITAL ENCOUNTER (OUTPATIENT)
Dept: RADIOLOGY | Facility: HOSPITAL | Age: 71
Discharge: HOME | End: 2021-03-11
Attending: NURSE PRACTITIONER
Payer: MEDICARE

## 2021-03-11 DIAGNOSIS — J44.9 CHRONIC OBSTRUCTIVE PULMONARY DISEASE, UNSPECIFIED COPD TYPE: ICD-10-CM

## 2021-03-11 PROCEDURE — 71046 X-RAY EXAM CHEST 2 VIEWS: CPT | Mod: TC

## 2021-03-15 ENCOUNTER — TELEPHONE (OUTPATIENT)
Dept: PULMONOLOGY | Facility: CLINIC | Age: 71
End: 2021-03-15

## 2021-03-16 ENCOUNTER — OFFICE VISIT (OUTPATIENT)
Dept: PULMONOLOGY | Facility: CLINIC | Age: 71
End: 2021-03-16
Payer: MEDICARE

## 2021-03-16 VITALS
DIASTOLIC BLOOD PRESSURE: 85 MMHG | SYSTOLIC BLOOD PRESSURE: 160 MMHG | BODY MASS INDEX: 32.74 KG/M2 | WEIGHT: 216 LBS | HEIGHT: 68 IN | TEMPERATURE: 98 F | OXYGEN SATURATION: 93 % | HEART RATE: 84 BPM

## 2021-03-16 DIAGNOSIS — J44.9 CHRONIC OBSTRUCTIVE PULMONARY DISEASE, UNSPECIFIED COPD TYPE: Primary | ICD-10-CM

## 2021-03-16 DIAGNOSIS — G47.34 NOCTURNAL HYPOXEMIA: ICD-10-CM

## 2021-03-16 PROCEDURE — 99214 PR OFFICE/OUTPT VISIT, EST, LEVL IV, 30-39 MIN: ICD-10-PCS | Mod: S$GLB,,, | Performed by: NURSE PRACTITIONER

## 2021-03-16 PROCEDURE — 3079F DIAST BP 80-89 MM HG: CPT | Mod: S$GLB,,, | Performed by: NURSE PRACTITIONER

## 2021-03-16 PROCEDURE — 3008F BODY MASS INDEX DOCD: CPT | Mod: S$GLB,,, | Performed by: NURSE PRACTITIONER

## 2021-03-16 PROCEDURE — 3079F PR MOST RECENT DIASTOLIC BLOOD PRESSURE 80-89 MM HG: ICD-10-PCS | Mod: S$GLB,,, | Performed by: NURSE PRACTITIONER

## 2021-03-16 PROCEDURE — 3008F PR BODY MASS INDEX (BMI) DOCUMENTED: ICD-10-PCS | Mod: S$GLB,,, | Performed by: NURSE PRACTITIONER

## 2021-03-16 PROCEDURE — 1125F AMNT PAIN NOTED PAIN PRSNT: CPT | Mod: S$GLB,,, | Performed by: NURSE PRACTITIONER

## 2021-03-16 PROCEDURE — 1159F MED LIST DOCD IN RCRD: CPT | Mod: S$GLB,,, | Performed by: NURSE PRACTITIONER

## 2021-03-16 PROCEDURE — 1159F PR MEDICATION LIST DOCUMENTED IN MEDICAL RECORD: ICD-10-PCS | Mod: S$GLB,,, | Performed by: NURSE PRACTITIONER

## 2021-03-16 PROCEDURE — 3077F SYST BP >= 140 MM HG: CPT | Mod: S$GLB,,, | Performed by: NURSE PRACTITIONER

## 2021-03-16 PROCEDURE — 1125F PR PAIN SEVERITY QUANTIFIED, PAIN PRESENT: ICD-10-PCS | Mod: S$GLB,,, | Performed by: NURSE PRACTITIONER

## 2021-03-16 PROCEDURE — 3077F PR MOST RECENT SYSTOLIC BLOOD PRESSURE >= 140 MM HG: ICD-10-PCS | Mod: S$GLB,,, | Performed by: NURSE PRACTITIONER

## 2021-03-16 PROCEDURE — 99214 OFFICE O/P EST MOD 30 MIN: CPT | Mod: S$GLB,,, | Performed by: NURSE PRACTITIONER

## 2021-03-16 RX ORDER — TEMAZEPAM 15 MG/1
CAPSULE ORAL
COMMUNITY
Start: 2021-02-15 | End: 2021-04-19

## 2021-04-01 ENCOUNTER — OFFICE VISIT (OUTPATIENT)
Dept: ORTHOPEDICS | Facility: CLINIC | Age: 71
End: 2021-04-01
Payer: MEDICARE

## 2021-04-01 VITALS
DIASTOLIC BLOOD PRESSURE: 96 MMHG | HEART RATE: 77 BPM | BODY MASS INDEX: 32.74 KG/M2 | WEIGHT: 216 LBS | SYSTOLIC BLOOD PRESSURE: 140 MMHG | HEIGHT: 68 IN

## 2021-04-01 DIAGNOSIS — M17.11 PRIMARY OSTEOARTHRITIS OF RIGHT KNEE: Primary | ICD-10-CM

## 2021-04-01 DIAGNOSIS — Z03.818 ENCOUNTER FOR OBSERVATION FOR SUSPECTED EXPOSURE TO OTHER BIOLOGICAL AGENTS RULED OUT: ICD-10-CM

## 2021-04-01 DIAGNOSIS — Z01.818 PRE-OPERATIVE EXAM: ICD-10-CM

## 2021-04-01 PROCEDURE — 1125F AMNT PAIN NOTED PAIN PRSNT: CPT | Mod: S$GLB,,, | Performed by: ORTHOPAEDIC SURGERY

## 2021-04-01 PROCEDURE — 1101F PT FALLS ASSESS-DOCD LE1/YR: CPT | Mod: S$GLB,,, | Performed by: ORTHOPAEDIC SURGERY

## 2021-04-01 PROCEDURE — 1125F PR PAIN SEVERITY QUANTIFIED, PAIN PRESENT: ICD-10-PCS | Mod: S$GLB,,, | Performed by: ORTHOPAEDIC SURGERY

## 2021-04-01 PROCEDURE — 3080F PR MOST RECENT DIASTOLIC BLOOD PRESSURE >= 90 MM HG: ICD-10-PCS | Mod: S$GLB,,, | Performed by: ORTHOPAEDIC SURGERY

## 2021-04-01 PROCEDURE — 3008F PR BODY MASS INDEX (BMI) DOCUMENTED: ICD-10-PCS | Mod: S$GLB,,, | Performed by: ORTHOPAEDIC SURGERY

## 2021-04-01 PROCEDURE — 1101F PR PT FALLS ASSESS DOC 0-1 FALLS W/OUT INJ PAST YR: ICD-10-PCS | Mod: S$GLB,,, | Performed by: ORTHOPAEDIC SURGERY

## 2021-04-01 PROCEDURE — 3008F BODY MASS INDEX DOCD: CPT | Mod: S$GLB,,, | Performed by: ORTHOPAEDIC SURGERY

## 2021-04-01 PROCEDURE — 3080F DIAST BP >= 90 MM HG: CPT | Mod: S$GLB,,, | Performed by: ORTHOPAEDIC SURGERY

## 2021-04-01 PROCEDURE — 99213 PR OFFICE/OUTPT VISIT, EST, LEVL III, 20-29 MIN: ICD-10-PCS | Mod: S$GLB,,, | Performed by: ORTHOPAEDIC SURGERY

## 2021-04-01 PROCEDURE — 1159F PR MEDICATION LIST DOCUMENTED IN MEDICAL RECORD: ICD-10-PCS | Mod: S$GLB,,, | Performed by: ORTHOPAEDIC SURGERY

## 2021-04-01 PROCEDURE — 3288F FALL RISK ASSESSMENT DOCD: CPT | Mod: S$GLB,,, | Performed by: ORTHOPAEDIC SURGERY

## 2021-04-01 PROCEDURE — 99213 OFFICE O/P EST LOW 20 MIN: CPT | Mod: S$GLB,,, | Performed by: ORTHOPAEDIC SURGERY

## 2021-04-01 PROCEDURE — 1159F MED LIST DOCD IN RCRD: CPT | Mod: S$GLB,,, | Performed by: ORTHOPAEDIC SURGERY

## 2021-04-01 PROCEDURE — 3077F PR MOST RECENT SYSTOLIC BLOOD PRESSURE >= 140 MM HG: ICD-10-PCS | Mod: S$GLB,,, | Performed by: ORTHOPAEDIC SURGERY

## 2021-04-01 PROCEDURE — 3288F PR FALLS RISK ASSESSMENT DOCUMENTED: ICD-10-PCS | Mod: S$GLB,,, | Performed by: ORTHOPAEDIC SURGERY

## 2021-04-01 PROCEDURE — 3077F SYST BP >= 140 MM HG: CPT | Mod: S$GLB,,, | Performed by: ORTHOPAEDIC SURGERY

## 2021-04-01 RX ORDER — MIRTAZAPINE 15 MG/1
TABLET, FILM COATED ORAL
COMMUNITY
Start: 2021-03-18 | End: 2021-04-19

## 2021-04-01 RX ORDER — CALCIUM CITRATE/VITAMIN D3 200MG-6.25
TABLET ORAL
COMMUNITY
Start: 2021-03-21 | End: 2021-05-05 | Stop reason: CLARIF

## 2021-04-01 RX ORDER — OXYCODONE AND ACETAMINOPHEN 7.5; 325 MG/1; MG/1
TABLET ORAL
Status: ON HOLD | COMMUNITY
Start: 2021-03-18 | End: 2021-05-03 | Stop reason: HOSPADM

## 2021-04-06 ENCOUNTER — PATIENT MESSAGE (OUTPATIENT)
Dept: ORTHOPEDICS | Facility: CLINIC | Age: 71
End: 2021-04-06

## 2021-04-19 ENCOUNTER — HOSPITAL ENCOUNTER (OUTPATIENT)
Dept: PREADMISSION TESTING | Facility: HOSPITAL | Age: 71
Discharge: HOME | End: 2021-04-19
Attending: ORTHOPAEDIC SURGERY
Payer: MEDICARE

## 2021-04-19 ENCOUNTER — HOSPITAL ENCOUNTER (OUTPATIENT)
Dept: RADIOLOGY | Facility: HOSPITAL | Age: 71
Discharge: HOME | End: 2021-04-19
Attending: ORTHOPAEDIC SURGERY
Payer: MEDICARE

## 2021-04-19 VITALS — BODY MASS INDEX: 32.06 KG/M2 | HEIGHT: 68 IN | WEIGHT: 211.56 LBS

## 2021-04-19 DIAGNOSIS — M17.11 PRIMARY OSTEOARTHRITIS OF RIGHT KNEE: ICD-10-CM

## 2021-04-19 DIAGNOSIS — Z01.818 PRE-OPERATIVE EXAM: ICD-10-CM

## 2021-04-19 DIAGNOSIS — Z01.818 PREOP TESTING: Primary | ICD-10-CM

## 2021-04-19 LAB
ABO + RH BLD: NORMAL
ALBUMIN SERPL BCP-MCNC: 3.7 G/DL (ref 3.5–5.2)
ALP SERPL-CCNC: 91 U/L (ref 55–135)
ALT SERPL W/O P-5'-P-CCNC: 16 U/L (ref 10–44)
ANION GAP SERPL CALC-SCNC: 6 MMOL/L (ref 8–16)
AST SERPL-CCNC: 25 U/L (ref 10–40)
BASOPHILS # BLD AUTO: 0.06 K/UL (ref 0–0.2)
BASOPHILS NFR BLD: 0.6 % (ref 0–1.9)
BILIRUB SERPL-MCNC: 0.6 MG/DL (ref 0.1–1)
BLD GP AB SCN CELLS X3 SERPL QL: NORMAL
BUN SERPL-MCNC: 15 MG/DL (ref 8–23)
CALCIUM SERPL-MCNC: 9.3 MG/DL (ref 8.7–10.5)
CHLORIDE SERPL-SCNC: 104 MMOL/L (ref 95–110)
CO2 SERPL-SCNC: 29 MMOL/L (ref 23–29)
CREAT SERPL-MCNC: 1 MG/DL (ref 0.5–1.4)
DIFFERENTIAL METHOD: ABNORMAL
EOSINOPHIL # BLD AUTO: 0.3 K/UL (ref 0–0.5)
EOSINOPHIL NFR BLD: 3.6 % (ref 0–8)
ERYTHROCYTE [DISTWIDTH] IN BLOOD BY AUTOMATED COUNT: 13.2 % (ref 11.5–14.5)
EST. GFR  (AFRICAN AMERICAN): >60 ML/MIN/1.73 M^2
EST. GFR  (NON AFRICAN AMERICAN): 56 ML/MIN/1.73 M^2
GLUCOSE SERPL-MCNC: 155 MG/DL (ref 70–110)
HCT VFR BLD AUTO: 41.1 % (ref 37–48.5)
HGB BLD-MCNC: 13 G/DL (ref 12–16)
IMM GRANULOCYTES # BLD AUTO: 0.03 K/UL (ref 0–0.04)
IMM GRANULOCYTES NFR BLD AUTO: 0.3 % (ref 0–0.5)
LYMPHOCYTES # BLD AUTO: 2 K/UL (ref 1–4.8)
LYMPHOCYTES NFR BLD: 21 % (ref 18–48)
MCH RBC QN AUTO: 29.6 PG (ref 27–31)
MCHC RBC AUTO-ENTMCNC: 31.6 G/DL (ref 32–36)
MCV RBC AUTO: 94 FL (ref 82–98)
MONOCYTES # BLD AUTO: 0.7 K/UL (ref 0.3–1)
MONOCYTES NFR BLD: 7.8 % (ref 4–15)
NEUTROPHILS # BLD AUTO: 6.2 K/UL (ref 1.8–7.7)
NEUTROPHILS NFR BLD: 66.7 % (ref 38–73)
NRBC BLD-RTO: 0 /100 WBC
PLATELET # BLD AUTO: 246 K/UL (ref 150–450)
PMV BLD AUTO: 10.8 FL (ref 9.2–12.9)
POTASSIUM SERPL-SCNC: 4.1 MMOL/L (ref 3.5–5.1)
PROT SERPL-MCNC: 7.5 G/DL (ref 6–8.4)
RBC # BLD AUTO: 4.39 M/UL (ref 4–5.4)
SODIUM SERPL-SCNC: 139 MMOL/L (ref 136–145)
WBC # BLD AUTO: 9.32 K/UL (ref 3.9–12.7)

## 2021-04-19 PROCEDURE — 85025 COMPLETE CBC W/AUTO DIFF WBC: CPT | Performed by: ORTHOPAEDIC SURGERY

## 2021-04-19 PROCEDURE — 80053 COMPREHEN METABOLIC PANEL: CPT | Performed by: ORTHOPAEDIC SURGERY

## 2021-04-19 PROCEDURE — 86900 BLOOD TYPING SEROLOGIC ABO: CPT | Performed by: ORTHOPAEDIC SURGERY

## 2021-04-19 PROCEDURE — 36415 COLL VENOUS BLD VENIPUNCTURE: CPT | Performed by: ORTHOPAEDIC SURGERY

## 2021-04-19 PROCEDURE — 87081 CULTURE SCREEN ONLY: CPT | Performed by: ORTHOPAEDIC SURGERY

## 2021-04-19 PROCEDURE — 99900103 DSU ONLY-NO CHARGE-INITIAL HR (STAT)

## 2021-04-19 PROCEDURE — 99900104 DSU ONLY-NO CHARGE-EA ADD'L HR (STAT)

## 2021-04-19 RX ORDER — AMLODIPINE BESYLATE 10 MG/1
10 TABLET ORAL DAILY
Status: ON HOLD | COMMUNITY
End: 2021-05-11 | Stop reason: HOSPADM

## 2021-04-19 RX ORDER — FUROSEMIDE 40 MG/1
40 TABLET ORAL
COMMUNITY

## 2021-04-21 LAB — MRSA SPEC QL CULT: NORMAL

## 2021-04-27 DIAGNOSIS — Z96.651 STATUS POST TOTAL KNEE REPLACEMENT, RIGHT: Primary | ICD-10-CM

## 2021-04-30 ENCOUNTER — LAB VISIT (OUTPATIENT)
Dept: PRIMARY CARE CLINIC | Facility: CLINIC | Age: 71
End: 2021-04-30
Payer: MEDICARE

## 2021-04-30 ENCOUNTER — ANESTHESIA EVENT (OUTPATIENT)
Dept: SURGERY | Facility: HOSPITAL | Age: 71
DRG: 981 | End: 2021-04-30
Payer: MEDICARE

## 2021-04-30 DIAGNOSIS — Z03.818 ENCOUNTER FOR OBSERVATION FOR SUSPECTED EXPOSURE TO OTHER BIOLOGICAL AGENTS RULED OUT: ICD-10-CM

## 2021-04-30 PROCEDURE — U0003 INFECTIOUS AGENT DETECTION BY NUCLEIC ACID (DNA OR RNA); SEVERE ACUTE RESPIRATORY SYNDROME CORONAVIRUS 2 (SARS-COV-2) (CORONAVIRUS DISEASE [COVID-19]), AMPLIFIED PROBE TECHNIQUE, MAKING USE OF HIGH THROUGHPUT TECHNOLOGIES AS DESCRIBED BY CMS-2020-01-R: HCPCS | Performed by: ORTHOPAEDIC SURGERY

## 2021-04-30 PROCEDURE — U0005 INFEC AGEN DETEC AMPLI PROBE: HCPCS | Performed by: ORTHOPAEDIC SURGERY

## 2021-05-01 LAB — SARS-COV-2 RNA RESP QL NAA+PROBE: NOT DETECTED

## 2021-05-03 ENCOUNTER — ANESTHESIA (OUTPATIENT)
Dept: SURGERY | Facility: HOSPITAL | Age: 71
DRG: 981 | End: 2021-05-03
Payer: MEDICARE

## 2021-05-03 PROBLEM — Z01.818 PREOP TESTING: Status: ACTIVE | Noted: 2021-05-03

## 2021-05-03 PROBLEM — Z01.818 PREOP TESTING: Status: RESOLVED | Noted: 2021-05-03 | Resolved: 2021-05-03

## 2021-05-03 PROCEDURE — 64447 NJX AA&/STRD FEMORAL NRV IMG: CPT | Performed by: ANESTHESIOLOGY

## 2021-05-03 PROCEDURE — 76942 ECHO GUIDE FOR BIOPSY: CPT | Mod: 26,,, | Performed by: ANESTHESIOLOGY

## 2021-05-03 PROCEDURE — 25000003 PHARM REV CODE 250: Performed by: NURSE ANESTHETIST, CERTIFIED REGISTERED

## 2021-05-03 PROCEDURE — 63600175 PHARM REV CODE 636 W HCPCS

## 2021-05-03 PROCEDURE — 63600175 PHARM REV CODE 636 W HCPCS: Performed by: NURSE ANESTHETIST, CERTIFIED REGISTERED

## 2021-05-03 PROCEDURE — 76942 PR U/S GUIDANCE FOR NEEDLE GUIDANCE: ICD-10-PCS | Mod: 26,,, | Performed by: ANESTHESIOLOGY

## 2021-05-03 PROCEDURE — 27200688 HC TRAY, SPINAL-HYPER/ ISOBARIC: Performed by: ANESTHESIOLOGY

## 2021-05-03 PROCEDURE — D9220A PRA ANESTHESIA: Mod: CRNA,,, | Performed by: NURSE ANESTHETIST, CERTIFIED REGISTERED

## 2021-05-03 PROCEDURE — 27200750 HC INSULATED NEEDLE/ STIMUPLEX: Performed by: ANESTHESIOLOGY

## 2021-05-03 PROCEDURE — D9220A PRA ANESTHESIA: ICD-10-PCS | Mod: CRNA,,, | Performed by: NURSE ANESTHETIST, CERTIFIED REGISTERED

## 2021-05-03 PROCEDURE — S0020 INJECTION, BUPIVICAINE HYDRO: HCPCS | Performed by: ANESTHESIOLOGY

## 2021-05-03 PROCEDURE — 63600175 PHARM REV CODE 636 W HCPCS: Performed by: ANESTHESIOLOGY

## 2021-05-03 PROCEDURE — C9290 INJ, BUPIVACAINE LIPOSOME: HCPCS | Performed by: ANESTHESIOLOGY

## 2021-05-03 PROCEDURE — D9220A PRA ANESTHESIA: ICD-10-PCS | Mod: ANES,,, | Performed by: ANESTHESIOLOGY

## 2021-05-03 PROCEDURE — 64447 PR NERVE BLOCK INJ, ANES/STEROID, FEMORAL, INCL IMAG GUIDANCE: ICD-10-PCS | Mod: 59,RT,, | Performed by: ANESTHESIOLOGY

## 2021-05-03 PROCEDURE — D9220A PRA ANESTHESIA: Mod: ANES,,, | Performed by: ANESTHESIOLOGY

## 2021-05-03 PROCEDURE — 25000003 PHARM REV CODE 250: Performed by: ANESTHESIOLOGY

## 2021-05-03 PROCEDURE — 76942 ECHO GUIDE FOR BIOPSY: CPT | Performed by: ANESTHESIOLOGY

## 2021-05-03 PROCEDURE — 64447 NJX AA&/STRD FEMORAL NRV IMG: CPT | Mod: 59,RT,, | Performed by: ANESTHESIOLOGY

## 2021-05-03 RX ORDER — PROPOFOL 10 MG/ML
VIAL (ML) INTRAVENOUS CONTINUOUS PRN
Status: DISCONTINUED | OUTPATIENT
Start: 2021-05-03 | End: 2021-05-03

## 2021-05-03 RX ORDER — FENTANYL CITRATE 50 UG/ML
INJECTION, SOLUTION INTRAMUSCULAR; INTRAVENOUS
Status: DISCONTINUED | OUTPATIENT
Start: 2021-05-03 | End: 2021-05-03

## 2021-05-03 RX ORDER — PHENYLEPHRINE HYDROCHLORIDE 10 MG/ML
INJECTION INTRAVENOUS
Status: DISCONTINUED | OUTPATIENT
Start: 2021-05-03 | End: 2021-05-03

## 2021-05-03 RX ORDER — BUPIVACAINE HYDROCHLORIDE 5 MG/ML
INJECTION, SOLUTION EPIDURAL; INTRACAUDAL
Status: DISCONTINUED | OUTPATIENT
Start: 2021-05-03 | End: 2021-05-03

## 2021-05-03 RX ORDER — ONDANSETRON 2 MG/ML
INJECTION INTRAMUSCULAR; INTRAVENOUS
Status: DISCONTINUED | OUTPATIENT
Start: 2021-05-03 | End: 2021-05-03

## 2021-05-03 RX ORDER — ACETAMINOPHEN 10 MG/ML
INJECTION, SOLUTION INTRAVENOUS
Status: DISCONTINUED | OUTPATIENT
Start: 2021-05-03 | End: 2021-05-03

## 2021-05-03 RX ORDER — TRANEXAMIC ACID 100 MG/ML
INJECTION, SOLUTION INTRAVENOUS
Status: DISCONTINUED | OUTPATIENT
Start: 2021-05-03 | End: 2021-05-03

## 2021-05-03 RX ORDER — BUPIVACAINE HYDROCHLORIDE 7.5 MG/ML
INJECTION, SOLUTION EPIDURAL; RETROBULBAR
Status: DISCONTINUED | OUTPATIENT
Start: 2021-05-03 | End: 2021-05-03

## 2021-05-03 RX ORDER — MIDAZOLAM HYDROCHLORIDE 1 MG/ML
INJECTION, SOLUTION INTRAMUSCULAR; INTRAVENOUS
Status: DISCONTINUED | OUTPATIENT
Start: 2021-05-03 | End: 2021-05-03

## 2021-05-03 RX ORDER — DEXAMETHASONE SODIUM PHOSPHATE 4 MG/ML
INJECTION, SOLUTION INTRA-ARTICULAR; INTRALESIONAL; INTRAMUSCULAR; INTRAVENOUS; SOFT TISSUE
Status: DISCONTINUED | OUTPATIENT
Start: 2021-05-03 | End: 2021-05-03

## 2021-05-03 RX ORDER — LIDOCAINE HYDROCHLORIDE 20 MG/ML
INJECTION INTRAVENOUS
Status: DISCONTINUED | OUTPATIENT
Start: 2021-05-03 | End: 2021-05-03

## 2021-05-03 RX ADMIN — DEXAMETHASONE SODIUM PHOSPHATE 4 MG: 4 INJECTION, SOLUTION INTRA-ARTICULAR; INTRALESIONAL; INTRAMUSCULAR; INTRAVENOUS; SOFT TISSUE at 10:05

## 2021-05-03 RX ADMIN — ACETAMINOPHEN 1000 MG: 10 INJECTION, SOLUTION INTRAVENOUS at 10:05

## 2021-05-03 RX ADMIN — SODIUM CHLORIDE, SODIUM GLUCONATE, SODIUM ACETATE, POTASSIUM CHLORIDE, MAGNESIUM CHLORIDE, SODIUM PHOSPHATE, DIBASIC, AND POTASSIUM PHOSPHATE: .53; .5; .37; .037; .03; .012; .00082 INJECTION, SOLUTION INTRAVENOUS at 10:05

## 2021-05-03 RX ADMIN — FENTANYL CITRATE 50 MCG: 50 INJECTION, SOLUTION INTRAMUSCULAR; INTRAVENOUS at 09:05

## 2021-05-03 RX ADMIN — FENTANYL CITRATE 50 MCG: 50 INJECTION, SOLUTION INTRAMUSCULAR; INTRAVENOUS at 08:05

## 2021-05-03 RX ADMIN — GLYCOPYRROLATE 0.2 MG: 0.2 INJECTION, SOLUTION INTRAMUSCULAR; INTRAVITREAL at 09:05

## 2021-05-03 RX ADMIN — PROPOFOL 50 MCG/KG/MIN: 10 INJECTION, EMULSION INTRAVENOUS at 10:05

## 2021-05-03 RX ADMIN — BUPIVACAINE HYDROCHLORIDE 2 ML: 7.5 INJECTION, SOLUTION EPIDURAL; RETROBULBAR at 10:05

## 2021-05-03 RX ADMIN — BUPIVACAINE HYDROCHLORIDE 10 ML: 5 INJECTION, SOLUTION EPIDURAL; INTRACAUDAL; PERINEURAL at 08:05

## 2021-05-03 RX ADMIN — BUPIVACAINE 20 ML: 13.3 INJECTION, SUSPENSION, LIPOSOMAL INFILTRATION at 08:05

## 2021-05-03 RX ADMIN — LIDOCAINE HYDROCHLORIDE 50 MG: 20 INJECTION, SOLUTION INTRAVENOUS at 10:05

## 2021-05-03 RX ADMIN — ONDANSETRON 4 MG: 2 INJECTION, SOLUTION INTRAMUSCULAR; INTRAVENOUS at 10:05

## 2021-05-03 RX ADMIN — TRANEXAMIC ACID 1000 MG: 100 INJECTION, SOLUTION INTRAVENOUS at 10:05

## 2021-05-03 RX ADMIN — MIDAZOLAM 2 MG: 1 INJECTION INTRAMUSCULAR; INTRAVENOUS at 08:05

## 2021-05-03 RX ADMIN — PHENYLEPHRINE HYDROCHLORIDE 100 MCG: 10 INJECTION INTRAVENOUS at 10:05

## 2021-05-05 ENCOUNTER — TELEPHONE (OUTPATIENT)
Dept: ORTHOPEDICS | Facility: CLINIC | Age: 71
End: 2021-05-05

## 2021-05-05 ENCOUNTER — HOSPITAL ENCOUNTER (INPATIENT)
Facility: HOSPITAL | Age: 71
LOS: 8 days | Discharge: NURSING, REHAB OR LONG-TERM CARE | DRG: 981 | End: 2021-05-13
Attending: EMERGENCY MEDICINE | Admitting: HOSPITALIST
Payer: MEDICARE

## 2021-05-05 DIAGNOSIS — I49.9 ARRHYTHMIA: ICD-10-CM

## 2021-05-05 DIAGNOSIS — R06.02 SHORTNESS OF BREATH: ICD-10-CM

## 2021-05-05 DIAGNOSIS — R11.10 VOMITING: ICD-10-CM

## 2021-05-05 DIAGNOSIS — J18.9 HCAP (HEALTHCARE-ASSOCIATED PNEUMONIA): ICD-10-CM

## 2021-05-05 DIAGNOSIS — I48.0 PAF (PAROXYSMAL ATRIAL FIBRILLATION): ICD-10-CM

## 2021-05-05 DIAGNOSIS — R00.0 TACHYCARDIA: ICD-10-CM

## 2021-05-05 DIAGNOSIS — R06.02 SOB (SHORTNESS OF BREATH): ICD-10-CM

## 2021-05-05 DIAGNOSIS — Z96.652 S/P TKR (TOTAL KNEE REPLACEMENT), LEFT: ICD-10-CM

## 2021-05-05 DIAGNOSIS — R07.9 CHEST PAIN: ICD-10-CM

## 2021-05-05 DIAGNOSIS — J18.9 PNEUMONIA: ICD-10-CM

## 2021-05-05 DIAGNOSIS — J96.21 ACUTE ON CHRONIC RESPIRATORY FAILURE WITH HYPOXIA: Primary | ICD-10-CM

## 2021-05-05 PROBLEM — R74.8 ELEVATED LIVER ENZYMES: Status: ACTIVE | Noted: 2021-05-05

## 2021-05-05 PROBLEM — N17.9 AKI (ACUTE KIDNEY INJURY): Status: ACTIVE | Noted: 2021-05-05

## 2021-05-05 LAB
ALBUMIN SERPL BCP-MCNC: 2.9 G/DL (ref 3.5–5.2)
ALP SERPL-CCNC: 98 U/L (ref 55–135)
ALT SERPL W/O P-5'-P-CCNC: 174 U/L (ref 10–44)
ANION GAP SERPL CALC-SCNC: 14 MMOL/L (ref 8–16)
AST SERPL-CCNC: 251 U/L (ref 10–40)
BASOPHILS NFR BLD: 0 % (ref 0–1.9)
BILIRUB SERPL-MCNC: 0.8 MG/DL (ref 0.1–1)
BILIRUB UR QL STRIP: ABNORMAL
BUN SERPL-MCNC: 37 MG/DL (ref 8–23)
CALCIUM SERPL-MCNC: 8.8 MG/DL (ref 8.7–10.5)
CHLORIDE SERPL-SCNC: 95 MMOL/L (ref 95–110)
CLARITY UR: CLEAR
CO2 SERPL-SCNC: 25 MMOL/L (ref 23–29)
COLOR UR: YELLOW
CREAT SERPL-MCNC: 2 MG/DL (ref 0.5–1.4)
DIFFERENTIAL METHOD: ABNORMAL
EOSINOPHIL NFR BLD: 0 % (ref 0–8)
ERYTHROCYTE [DISTWIDTH] IN BLOOD BY AUTOMATED COUNT: 13.6 % (ref 11.5–14.5)
EST. GFR  (AFRICAN AMERICAN): 28 ML/MIN/1.73 M^2
EST. GFR  (NON AFRICAN AMERICAN): 24 ML/MIN/1.73 M^2
GLUCOSE SERPL-MCNC: 451 MG/DL (ref 70–110)
GLUCOSE SERPL-MCNC: 469 MG/DL (ref 70–110)
GLUCOSE UR QL STRIP: ABNORMAL
HCT VFR BLD AUTO: 34.2 % (ref 37–48.5)
HGB BLD-MCNC: 10.7 G/DL (ref 12–16)
HGB UR QL STRIP: NEGATIVE
IMM GRANULOCYTES # BLD AUTO: ABNORMAL K/UL
IMM GRANULOCYTES NFR BLD AUTO: ABNORMAL %
KETONES UR QL STRIP: ABNORMAL
LACTATE SERPL-SCNC: 2.1 MMOL/L (ref 0.5–2.2)
LACTATE SERPL-SCNC: 2.3 MMOL/L (ref 0.5–2.2)
LACTATE SERPL-SCNC: 2.6 MMOL/L (ref 0.5–2.2)
LEUKOCYTE ESTERASE UR QL STRIP: NEGATIVE
LIPASE SERPL-CCNC: 4 U/L (ref 4–60)
LIPASE SERPL-CCNC: 4 U/L (ref 4–60)
LYMPHOCYTES NFR BLD: 12 % (ref 18–48)
MCH RBC QN AUTO: 29.7 PG (ref 27–31)
MCHC RBC AUTO-ENTMCNC: 31.3 G/DL (ref 32–36)
MCV RBC AUTO: 95 FL (ref 82–98)
MONOCYTES NFR BLD: 5 % (ref 4–15)
NEUTROPHILS NFR BLD: 75 % (ref 38–73)
NEUTS BAND NFR BLD MANUAL: 8 %
NITRITE UR QL STRIP: NEGATIVE
NRBC BLD-RTO: 0 /100 WBC
PH UR STRIP: 6 [PH] (ref 5–8)
PLATELET # BLD AUTO: 172 K/UL (ref 150–450)
PMV BLD AUTO: 11.6 FL (ref 9.2–12.9)
POCT GLUCOSE: 386 MG/DL (ref 70–110)
POCT GLUCOSE: 437 MG/DL (ref 70–110)
POCT GLUCOSE: 459 MG/DL (ref 70–110)
POTASSIUM SERPL-SCNC: 5.1 MMOL/L (ref 3.5–5.1)
PROCALCITONIN SERPL IA-MCNC: 1.62 NG/ML
PROT SERPL-MCNC: 6.3 G/DL (ref 6–8.4)
PROT UR QL STRIP: NEGATIVE
RBC # BLD AUTO: 3.6 M/UL (ref 4–5.4)
SARS-COV-2 RDRP RESP QL NAA+PROBE: NEGATIVE
SODIUM SERPL-SCNC: 134 MMOL/L (ref 136–145)
SP GR UR STRIP: >=1.03 (ref 1–1.03)
URN SPEC COLLECT METH UR: ABNORMAL
UROBILINOGEN UR STRIP-ACNC: NEGATIVE EU/DL
VANCOMYCIN SERPL-MCNC: 4 UG/ML
WBC # BLD AUTO: 14.52 K/UL (ref 3.9–12.7)

## 2021-05-05 PROCEDURE — 25000003 PHARM REV CODE 250: Performed by: PHYSICIAN ASSISTANT

## 2021-05-05 PROCEDURE — 82947 ASSAY GLUCOSE BLOOD QUANT: CPT | Performed by: HOSPITALIST

## 2021-05-05 PROCEDURE — 83690 ASSAY OF LIPASE: CPT | Performed by: PHYSICIAN ASSISTANT

## 2021-05-05 PROCEDURE — 83690 ASSAY OF LIPASE: CPT | Mod: 91 | Performed by: EMERGENCY MEDICINE

## 2021-05-05 PROCEDURE — 63600175 PHARM REV CODE 636 W HCPCS: Performed by: PHYSICIAN ASSISTANT

## 2021-05-05 PROCEDURE — 94761 N-INVAS EAR/PLS OXIMETRY MLT: CPT

## 2021-05-05 PROCEDURE — 87040 BLOOD CULTURE FOR BACTERIA: CPT | Performed by: EMERGENCY MEDICINE

## 2021-05-05 PROCEDURE — 25000242 PHARM REV CODE 250 ALT 637 W/ HCPCS: Performed by: PHYSICIAN ASSISTANT

## 2021-05-05 PROCEDURE — 27000221 HC OXYGEN, UP TO 24 HOURS

## 2021-05-05 PROCEDURE — 12000002 HC ACUTE/MED SURGE SEMI-PRIVATE ROOM

## 2021-05-05 PROCEDURE — 94640 AIRWAY INHALATION TREATMENT: CPT

## 2021-05-05 PROCEDURE — S0073 INJECTION, AZTREONAM, 500 MG: HCPCS | Performed by: PHYSICIAN ASSISTANT

## 2021-05-05 PROCEDURE — 83605 ASSAY OF LACTIC ACID: CPT | Mod: 91 | Performed by: NURSE PRACTITIONER

## 2021-05-05 PROCEDURE — U0002 COVID-19 LAB TEST NON-CDC: HCPCS | Performed by: PHYSICIAN ASSISTANT

## 2021-05-05 PROCEDURE — 85027 COMPLETE CBC AUTOMATED: CPT | Performed by: PHYSICIAN ASSISTANT

## 2021-05-05 PROCEDURE — 81003 URINALYSIS AUTO W/O SCOPE: CPT | Performed by: PHYSICIAN ASSISTANT

## 2021-05-05 PROCEDURE — 63600175 PHARM REV CODE 636 W HCPCS: Performed by: NURSE PRACTITIONER

## 2021-05-05 PROCEDURE — 85007 BL SMEAR W/DIFF WBC COUNT: CPT | Performed by: PHYSICIAN ASSISTANT

## 2021-05-05 PROCEDURE — 96374 THER/PROPH/DIAG INJ IV PUSH: CPT

## 2021-05-05 PROCEDURE — 83605 ASSAY OF LACTIC ACID: CPT | Performed by: EMERGENCY MEDICINE

## 2021-05-05 PROCEDURE — 36415 COLL VENOUS BLD VENIPUNCTURE: CPT | Performed by: PHYSICIAN ASSISTANT

## 2021-05-05 PROCEDURE — 99285 EMERGENCY DEPT VISIT HI MDM: CPT | Mod: 25

## 2021-05-05 PROCEDURE — 25000242 PHARM REV CODE 250 ALT 637 W/ HCPCS: Performed by: NURSE PRACTITIONER

## 2021-05-05 PROCEDURE — 36415 COLL VENOUS BLD VENIPUNCTURE: CPT | Performed by: HOSPITALIST

## 2021-05-05 PROCEDURE — 84145 PROCALCITONIN (PCT): CPT | Performed by: HOSPITALIST

## 2021-05-05 PROCEDURE — 25000003 PHARM REV CODE 250: Performed by: NURSE PRACTITIONER

## 2021-05-05 PROCEDURE — 80202 ASSAY OF VANCOMYCIN: CPT | Performed by: HOSPITALIST

## 2021-05-05 PROCEDURE — 36415 COLL VENOUS BLD VENIPUNCTURE: CPT | Performed by: NURSE PRACTITIONER

## 2021-05-05 PROCEDURE — 96361 HYDRATE IV INFUSION ADD-ON: CPT

## 2021-05-05 PROCEDURE — 80053 COMPREHEN METABOLIC PANEL: CPT | Performed by: PHYSICIAN ASSISTANT

## 2021-05-05 RX ORDER — GLUCAGON 1 MG
1 KIT INJECTION
Status: DISCONTINUED | OUTPATIENT
Start: 2021-05-05 | End: 2021-05-13 | Stop reason: HOSPADM

## 2021-05-05 RX ORDER — INSULIN ASPART 100 [IU]/ML
0-5 INJECTION, SOLUTION INTRAVENOUS; SUBCUTANEOUS
Status: DISCONTINUED | OUTPATIENT
Start: 2021-05-05 | End: 2021-05-05

## 2021-05-05 RX ORDER — SODIUM CHLORIDE 0.9 % (FLUSH) 0.9 %
10 SYRINGE (ML) INJECTION
Status: DISCONTINUED | OUTPATIENT
Start: 2021-05-05 | End: 2021-05-13 | Stop reason: HOSPADM

## 2021-05-05 RX ORDER — ONDANSETRON 2 MG/ML
8 INJECTION INTRAMUSCULAR; INTRAVENOUS
Status: COMPLETED | OUTPATIENT
Start: 2021-05-05 | End: 2021-05-05

## 2021-05-05 RX ORDER — SODIUM,POTASSIUM PHOSPHATES 280-250MG
2 POWDER IN PACKET (EA) ORAL
Status: DISCONTINUED | OUTPATIENT
Start: 2021-05-05 | End: 2021-05-13 | Stop reason: HOSPADM

## 2021-05-05 RX ORDER — IBUPROFEN 200 MG
24 TABLET ORAL
Status: DISCONTINUED | OUTPATIENT
Start: 2021-05-05 | End: 2021-05-13 | Stop reason: HOSPADM

## 2021-05-05 RX ORDER — ACETAMINOPHEN 500 MG
1000 TABLET ORAL
Status: COMPLETED | OUTPATIENT
Start: 2021-05-05 | End: 2021-05-05

## 2021-05-05 RX ORDER — ALBUTEROL SULFATE 2.5 MG/.5ML
2.5 SOLUTION RESPIRATORY (INHALATION)
Status: DISPENSED | OUTPATIENT
Start: 2021-05-05 | End: 2021-05-05

## 2021-05-05 RX ORDER — OXYCODONE HYDROCHLORIDE 5 MG/1
5 TABLET ORAL EVERY 4 HOURS PRN
Status: DISCONTINUED | OUTPATIENT
Start: 2021-05-05 | End: 2021-05-13 | Stop reason: HOSPADM

## 2021-05-05 RX ORDER — INSULIN ASPART 100 [IU]/ML
8 INJECTION, SOLUTION INTRAVENOUS; SUBCUTANEOUS ONCE
Status: COMPLETED | OUTPATIENT
Start: 2021-05-05 | End: 2021-05-05

## 2021-05-05 RX ORDER — SODIUM CHLORIDE 9 MG/ML
INJECTION, SOLUTION INTRAVENOUS CONTINUOUS
Status: DISCONTINUED | OUTPATIENT
Start: 2021-05-05 | End: 2021-05-08

## 2021-05-05 RX ORDER — ONDANSETRON 2 MG/ML
4 INJECTION INTRAMUSCULAR; INTRAVENOUS EVERY 6 HOURS PRN
Status: DISCONTINUED | OUTPATIENT
Start: 2021-05-05 | End: 2021-05-13 | Stop reason: HOSPADM

## 2021-05-05 RX ORDER — LANOLIN ALCOHOL/MO/W.PET/CERES
800 CREAM (GRAM) TOPICAL
Status: DISCONTINUED | OUTPATIENT
Start: 2021-05-05 | End: 2021-05-13 | Stop reason: HOSPADM

## 2021-05-05 RX ORDER — IBUPROFEN 200 MG
16 TABLET ORAL
Status: DISCONTINUED | OUTPATIENT
Start: 2021-05-05 | End: 2021-05-13 | Stop reason: HOSPADM

## 2021-05-05 RX ORDER — MORPHINE SULFATE 2 MG/ML
2 INJECTION, SOLUTION INTRAMUSCULAR; INTRAVENOUS EVERY 4 HOURS PRN
Status: DISCONTINUED | OUTPATIENT
Start: 2021-05-05 | End: 2021-05-11

## 2021-05-05 RX ORDER — INSULIN ASPART 100 [IU]/ML
1-10 INJECTION, SOLUTION INTRAVENOUS; SUBCUTANEOUS
Status: DISCONTINUED | OUTPATIENT
Start: 2021-05-05 | End: 2021-05-13 | Stop reason: HOSPADM

## 2021-05-05 RX ORDER — IPRATROPIUM BROMIDE AND ALBUTEROL SULFATE 2.5; .5 MG/3ML; MG/3ML
3 SOLUTION RESPIRATORY (INHALATION)
Status: DISCONTINUED | OUTPATIENT
Start: 2021-05-05 | End: 2021-05-13 | Stop reason: HOSPADM

## 2021-05-05 RX ORDER — AMOXICILLIN 250 MG
1 CAPSULE ORAL 2 TIMES DAILY
Status: DISCONTINUED | OUTPATIENT
Start: 2021-05-05 | End: 2021-05-13 | Stop reason: HOSPADM

## 2021-05-05 RX ORDER — TIOTROPIUM BROMIDE AND OLODATEROL 3.124; 2.736 UG/1; UG/1
1 SPRAY, METERED RESPIRATORY (INHALATION) 2 TIMES DAILY
COMMUNITY
End: 2024-01-05

## 2021-05-05 RX ADMIN — INSULIN ASPART 8 UNITS: 100 INJECTION, SOLUTION INTRAVENOUS; SUBCUTANEOUS at 10:05

## 2021-05-05 RX ADMIN — TIOTROPIUM BROMIDE AND OLODATEROL 1 PUFF: 3.124; 2.736 SPRAY, METERED RESPIRATORY (INHALATION) at 08:05

## 2021-05-05 RX ADMIN — INSULIN ASPART 5 UNITS: 100 INJECTION, SOLUTION INTRAVENOUS; SUBCUTANEOUS at 08:05

## 2021-05-05 RX ADMIN — ALBUTEROL SULFATE 2.5 MG: 2.5 SOLUTION RESPIRATORY (INHALATION) at 01:05

## 2021-05-05 RX ADMIN — ONDANSETRON 8 MG: 2 INJECTION INTRAMUSCULAR; INTRAVENOUS at 01:05

## 2021-05-05 RX ADMIN — AZTREONAM 2000 MG: 2 INJECTION, POWDER, LYOPHILIZED, FOR SOLUTION INTRAMUSCULAR; INTRAVENOUS at 04:05

## 2021-05-05 RX ADMIN — DOCUSATE SODIUM 50 MG AND SENNOSIDES 8.6 MG 1 TABLET: 8.6; 5 TABLET, FILM COATED ORAL at 08:05

## 2021-05-05 RX ADMIN — PANCRELIPASE 1 CAPSULE: 24000; 76000; 120000 CAPSULE, DELAYED RELEASE PELLETS ORAL at 08:05

## 2021-05-05 RX ADMIN — APIXABAN 5 MG: 2.5 TABLET, FILM COATED ORAL at 08:05

## 2021-05-05 RX ADMIN — ACETAMINOPHEN 1000 MG: 500 TABLET ORAL at 01:05

## 2021-05-05 RX ADMIN — OXYCODONE HYDROCHLORIDE 5 MG: 5 TABLET ORAL at 08:05

## 2021-05-05 RX ADMIN — IPRATROPIUM BROMIDE AND ALBUTEROL SULFATE 3 ML: .5; 3 SOLUTION RESPIRATORY (INHALATION) at 08:05

## 2021-05-05 RX ADMIN — SODIUM CHLORIDE: 0.9 INJECTION, SOLUTION INTRAVENOUS at 06:05

## 2021-05-05 RX ADMIN — SODIUM CHLORIDE 1000 ML: 0.9 INJECTION, SOLUTION INTRAVENOUS at 03:05

## 2021-05-06 LAB
ALBUMIN SERPL BCP-MCNC: 2.6 G/DL (ref 3.5–5.2)
ALLENS TEST: ABNORMAL
ALP SERPL-CCNC: 89 U/L (ref 55–135)
ALT SERPL W/O P-5'-P-CCNC: 194 U/L (ref 10–44)
ANION GAP SERPL CALC-SCNC: 7 MMOL/L (ref 8–16)
ANISOCYTOSIS BLD QL SMEAR: SLIGHT
AST SERPL-CCNC: 130 U/L (ref 10–40)
BASOPHILS NFR BLD: 0 % (ref 0–1.9)
BILIRUB SERPL-MCNC: 0.5 MG/DL (ref 0.1–1)
BUN SERPL-MCNC: 41 MG/DL (ref 8–23)
CALCIUM SERPL-MCNC: 8.3 MG/DL (ref 8.7–10.5)
CHLORIDE SERPL-SCNC: 100 MMOL/L (ref 95–110)
CO2 SERPL-SCNC: 26 MMOL/L (ref 23–29)
CREAT SERPL-MCNC: 1.6 MG/DL (ref 0.5–1.4)
DELSYS: ABNORMAL
DIFFERENTIAL METHOD: ABNORMAL
EOSINOPHIL NFR BLD: 0 % (ref 0–8)
ERYTHROCYTE [DISTWIDTH] IN BLOOD BY AUTOMATED COUNT: 13.3 % (ref 11.5–14.5)
EST. GFR  (AFRICAN AMERICAN): 37 ML/MIN/1.73 M^2
EST. GFR  (NON AFRICAN AMERICAN): 32 ML/MIN/1.73 M^2
FLOW: 4
GLUCOSE SERPL-MCNC: 296 MG/DL (ref 70–110)
HCO3 UR-SCNC: 28.6 MMOL/L (ref 24–28)
HCT VFR BLD AUTO: 30.5 % (ref 37–48.5)
HGB BLD-MCNC: 9.8 G/DL (ref 12–16)
HYPOCHROMIA BLD QL SMEAR: ABNORMAL
IMM GRANULOCYTES # BLD AUTO: ABNORMAL K/UL (ref 0–0.04)
IMM GRANULOCYTES NFR BLD AUTO: ABNORMAL % (ref 0–0.5)
LACTATE SERPL-SCNC: 1.7 MMOL/L (ref 0.5–2.2)
LYMPHOCYTES NFR BLD: 12 % (ref 18–48)
MAGNESIUM SERPL-MCNC: 2.5 MG/DL (ref 1.6–2.6)
MCH RBC QN AUTO: 30.1 PG (ref 27–31)
MCHC RBC AUTO-ENTMCNC: 32.1 G/DL (ref 32–36)
MCV RBC AUTO: 94 FL (ref 82–98)
MODE: ABNORMAL
MONOCYTES NFR BLD: 3 % (ref 4–15)
NEUTROPHILS NFR BLD: 75 % (ref 38–73)
NEUTS BAND NFR BLD MANUAL: 10 %
NRBC BLD-RTO: 0 /100 WBC
PCO2 BLDA: 44.9 MMHG (ref 35–45)
PH SMN: 7.41 [PH] (ref 7.35–7.45)
PLATELET # BLD AUTO: 163 K/UL (ref 150–450)
PLATELET BLD QL SMEAR: ABNORMAL
PMV BLD AUTO: 11.4 FL (ref 9.2–12.9)
PO2 BLDA: 45 MMHG (ref 80–100)
POC BE: 4 MMOL/L
POC SATURATED O2: 81 % (ref 95–100)
POC TCO2: 30 MMOL/L (ref 23–27)
POCT GLUCOSE: 219 MG/DL (ref 70–110)
POCT GLUCOSE: 279 MG/DL (ref 70–110)
POCT GLUCOSE: 300 MG/DL (ref 70–110)
POTASSIUM SERPL-SCNC: 4.3 MMOL/L (ref 3.5–5.1)
PROT SERPL-MCNC: 5.9 G/DL (ref 6–8.4)
RBC # BLD AUTO: 3.26 M/UL (ref 4–5.4)
SAMPLE: ABNORMAL
SITE: ABNORMAL
SODIUM SERPL-SCNC: 133 MMOL/L (ref 136–145)
WBC # BLD AUTO: 13.3 K/UL (ref 3.9–12.7)

## 2021-05-06 PROCEDURE — 94761 N-INVAS EAR/PLS OXIMETRY MLT: CPT

## 2021-05-06 PROCEDURE — 99900035 HC TECH TIME PER 15 MIN (STAT)

## 2021-05-06 PROCEDURE — 25000003 PHARM REV CODE 250: Performed by: NURSE PRACTITIONER

## 2021-05-06 PROCEDURE — C9113 INJ PANTOPRAZOLE SODIUM, VIA: HCPCS | Performed by: NURSE PRACTITIONER

## 2021-05-06 PROCEDURE — 12000002 HC ACUTE/MED SURGE SEMI-PRIVATE ROOM

## 2021-05-06 PROCEDURE — 25000242 PHARM REV CODE 250 ALT 637 W/ HCPCS: Performed by: NURSE PRACTITIONER

## 2021-05-06 PROCEDURE — 85027 COMPLETE CBC AUTOMATED: CPT | Performed by: NURSE PRACTITIONER

## 2021-05-06 PROCEDURE — 97116 GAIT TRAINING THERAPY: CPT

## 2021-05-06 PROCEDURE — S0073 INJECTION, AZTREONAM, 500 MG: HCPCS | Performed by: NURSE PRACTITIONER

## 2021-05-06 PROCEDURE — 36415 COLL VENOUS BLD VENIPUNCTURE: CPT | Performed by: NURSE PRACTITIONER

## 2021-05-06 PROCEDURE — 83605 ASSAY OF LACTIC ACID: CPT | Performed by: NURSE PRACTITIONER

## 2021-05-06 PROCEDURE — 63600175 PHARM REV CODE 636 W HCPCS: Performed by: NURSE PRACTITIONER

## 2021-05-06 PROCEDURE — 36600 WITHDRAWAL OF ARTERIAL BLOOD: CPT

## 2021-05-06 PROCEDURE — 83735 ASSAY OF MAGNESIUM: CPT | Performed by: NURSE PRACTITIONER

## 2021-05-06 PROCEDURE — 82803 BLOOD GASES ANY COMBINATION: CPT

## 2021-05-06 PROCEDURE — 85007 BL SMEAR W/DIFF WBC COUNT: CPT | Performed by: NURSE PRACTITIONER

## 2021-05-06 PROCEDURE — 94640 AIRWAY INHALATION TREATMENT: CPT

## 2021-05-06 PROCEDURE — 80053 COMPREHEN METABOLIC PANEL: CPT | Performed by: NURSE PRACTITIONER

## 2021-05-06 PROCEDURE — 97162 PT EVAL MOD COMPLEX 30 MIN: CPT

## 2021-05-06 PROCEDURE — 63600175 PHARM REV CODE 636 W HCPCS: Performed by: HOSPITALIST

## 2021-05-06 PROCEDURE — 92610 EVALUATE SWALLOWING FUNCTION: CPT

## 2021-05-06 PROCEDURE — 25000003 PHARM REV CODE 250: Performed by: HOSPITALIST

## 2021-05-06 PROCEDURE — 27000221 HC OXYGEN, UP TO 24 HOURS

## 2021-05-06 PROCEDURE — 94799 UNLISTED PULMONARY SVC/PX: CPT

## 2021-05-06 RX ORDER — ACETAMINOPHEN 325 MG/1
650 TABLET ORAL EVERY 6 HOURS PRN
Status: DISCONTINUED | OUTPATIENT
Start: 2021-05-07 | End: 2021-05-13 | Stop reason: HOSPADM

## 2021-05-06 RX ORDER — INSULIN ASPART 100 [IU]/ML
20 INJECTION, SUSPENSION SUBCUTANEOUS
Status: DISCONTINUED | OUTPATIENT
Start: 2021-05-06 | End: 2021-05-13 | Stop reason: HOSPADM

## 2021-05-06 RX ORDER — AMLODIPINE BESYLATE 5 MG/1
10 TABLET ORAL DAILY
Status: DISCONTINUED | OUTPATIENT
Start: 2021-05-06 | End: 2021-05-11

## 2021-05-06 RX ORDER — PANTOPRAZOLE SODIUM 40 MG/10ML
40 INJECTION, POWDER, LYOPHILIZED, FOR SOLUTION INTRAVENOUS 2 TIMES DAILY
Status: DISCONTINUED | OUTPATIENT
Start: 2021-05-06 | End: 2021-05-13 | Stop reason: HOSPADM

## 2021-05-06 RX ADMIN — INSULIN ASPART 6 UNITS: 100 INJECTION, SOLUTION INTRAVENOUS; SUBCUTANEOUS at 06:05

## 2021-05-06 RX ADMIN — DOCUSATE SODIUM 50 MG AND SENNOSIDES 8.6 MG 1 TABLET: 8.6; 5 TABLET, FILM COATED ORAL at 09:05

## 2021-05-06 RX ADMIN — INSULIN ASPART 6 UNITS: 100 INJECTION, SOLUTION INTRAVENOUS; SUBCUTANEOUS at 12:05

## 2021-05-06 RX ADMIN — APIXABAN 5 MG: 2.5 TABLET, FILM COATED ORAL at 09:05

## 2021-05-06 RX ADMIN — AMLODIPINE BESYLATE 10 MG: 5 TABLET ORAL at 12:05

## 2021-05-06 RX ADMIN — SODIUM CHLORIDE: 0.9 INJECTION, SOLUTION INTRAVENOUS at 06:05

## 2021-05-06 RX ADMIN — PANCRELIPASE 1 CAPSULE: 24000; 76000; 120000 CAPSULE, DELAYED RELEASE PELLETS ORAL at 12:05

## 2021-05-06 RX ADMIN — SODIUM CHLORIDE: 0.9 INJECTION, SOLUTION INTRAVENOUS at 07:05

## 2021-05-06 RX ADMIN — IPRATROPIUM BROMIDE AND ALBUTEROL SULFATE 3 ML: .5; 3 SOLUTION RESPIRATORY (INHALATION) at 11:05

## 2021-05-06 RX ADMIN — IPRATROPIUM BROMIDE AND ALBUTEROL SULFATE 3 ML: .5; 3 SOLUTION RESPIRATORY (INHALATION) at 07:05

## 2021-05-06 RX ADMIN — AZTREONAM 1000 MG: 1 INJECTION, POWDER, LYOPHILIZED, FOR SOLUTION INTRAMUSCULAR; INTRAVENOUS at 09:05

## 2021-05-06 RX ADMIN — IPRATROPIUM BROMIDE AND ALBUTEROL SULFATE 3 ML: .5; 3 SOLUTION RESPIRATORY (INHALATION) at 03:05

## 2021-05-06 RX ADMIN — AZTREONAM 1000 MG: 1 INJECTION, POWDER, LYOPHILIZED, FOR SOLUTION INTRAMUSCULAR; INTRAVENOUS at 01:05

## 2021-05-06 RX ADMIN — PANCRELIPASE 1 CAPSULE: 24000; 76000; 120000 CAPSULE, DELAYED RELEASE PELLETS ORAL at 09:05

## 2021-05-06 RX ADMIN — OXYCODONE HYDROCHLORIDE 5 MG: 5 TABLET ORAL at 12:05

## 2021-05-06 RX ADMIN — OXYCODONE HYDROCHLORIDE 5 MG: 5 TABLET ORAL at 07:05

## 2021-05-06 RX ADMIN — AZTREONAM 1000 MG: 1 INJECTION, POWDER, LYOPHILIZED, FOR SOLUTION INTRAMUSCULAR; INTRAVENOUS at 05:05

## 2021-05-06 RX ADMIN — APIXABAN 5 MG: 2.5 TABLET, FILM COATED ORAL at 10:05

## 2021-05-06 RX ADMIN — DOCUSATE SODIUM 50 MG AND SENNOSIDES 8.6 MG 1 TABLET: 8.6; 5 TABLET, FILM COATED ORAL at 10:05

## 2021-05-06 RX ADMIN — INSULIN ASPART 20 UNITS: 100 INJECTION, SUSPENSION SUBCUTANEOUS at 01:05

## 2021-05-06 RX ADMIN — OXYCODONE HYDROCHLORIDE 5 MG: 5 TABLET ORAL at 06:05

## 2021-05-06 RX ADMIN — PANTOPRAZOLE SODIUM 40 MG: 40 INJECTION, POWDER, FOR SOLUTION INTRAVENOUS at 10:05

## 2021-05-06 RX ADMIN — PANTOPRAZOLE SODIUM 40 MG: 40 INJECTION, POWDER, FOR SOLUTION INTRAVENOUS at 12:05

## 2021-05-06 RX ADMIN — ACETAMINOPHEN 650 MG: 325 TABLET ORAL at 11:05

## 2021-05-06 RX ADMIN — PANCRELIPASE 1 CAPSULE: 24000; 76000; 120000 CAPSULE, DELAYED RELEASE PELLETS ORAL at 10:05

## 2021-05-06 RX ADMIN — INSULIN ASPART 4 UNITS: 100 INJECTION, SOLUTION INTRAVENOUS; SUBCUTANEOUS at 05:05

## 2021-05-06 RX ADMIN — PANCRELIPASE 1 CAPSULE: 24000; 76000; 120000 CAPSULE, DELAYED RELEASE PELLETS ORAL at 05:05

## 2021-05-06 RX ADMIN — IPRATROPIUM BROMIDE AND ALBUTEROL SULFATE 3 ML: .5; 3 SOLUTION RESPIRATORY (INHALATION) at 06:05

## 2021-05-06 RX ADMIN — INSULIN ASPART 20 UNITS: 100 INJECTION, SUSPENSION SUBCUTANEOUS at 05:05

## 2021-05-06 RX ADMIN — VANCOMYCIN HYDROCHLORIDE 1500 MG: 1.5 INJECTION, POWDER, LYOPHILIZED, FOR SOLUTION INTRAVENOUS at 02:05

## 2021-05-07 ENCOUNTER — OUTSIDE PLACE OF SERVICE (OUTPATIENT)
Dept: PULMONOLOGY | Facility: CLINIC | Age: 71
End: 2021-05-07
Payer: MEDICARE

## 2021-05-07 DIAGNOSIS — J18.9 PNEUMONIA: ICD-10-CM

## 2021-05-07 PROBLEM — J43.8 OTHER EMPHYSEMA: Status: ACTIVE | Noted: 2019-09-12

## 2021-05-07 PROBLEM — J96.21 ACUTE ON CHRONIC RESPIRATORY FAILURE WITH HYPOXIA: Status: ACTIVE | Noted: 2021-05-07

## 2021-05-07 LAB
ALBUMIN SERPL BCP-MCNC: 2.3 G/DL (ref 3.5–5.2)
ALP SERPL-CCNC: 89 U/L (ref 55–135)
ALT SERPL W/O P-5'-P-CCNC: 270 U/L (ref 10–44)
ANION GAP SERPL CALC-SCNC: 7 MMOL/L (ref 8–16)
AST SERPL-CCNC: 173 U/L (ref 10–40)
BASOPHILS # BLD AUTO: 0.08 K/UL (ref 0–0.2)
BASOPHILS NFR BLD: 0.6 % (ref 0–1.9)
BILIRUB SERPL-MCNC: 0.7 MG/DL (ref 0.1–1)
BUN SERPL-MCNC: 24 MG/DL (ref 8–23)
CALCIUM SERPL-MCNC: 8 MG/DL (ref 8.7–10.5)
CHLORIDE SERPL-SCNC: 104 MMOL/L (ref 95–110)
CO2 SERPL-SCNC: 27 MMOL/L (ref 23–29)
CREAT SERPL-MCNC: 0.9 MG/DL (ref 0.5–1.4)
DIFFERENTIAL METHOD: ABNORMAL
EOSINOPHIL # BLD AUTO: 0.1 K/UL (ref 0–0.5)
EOSINOPHIL NFR BLD: 0.6 % (ref 0–8)
ERYTHROCYTE [DISTWIDTH] IN BLOOD BY AUTOMATED COUNT: 13.5 % (ref 11.5–14.5)
EST. GFR  (AFRICAN AMERICAN): >60 ML/MIN/1.73 M^2
EST. GFR  (NON AFRICAN AMERICAN): >60 ML/MIN/1.73 M^2
GLUCOSE SERPL-MCNC: 147 MG/DL (ref 70–110)
HCT VFR BLD AUTO: 28.9 % (ref 37–48.5)
HGB BLD-MCNC: 9 G/DL (ref 12–16)
IMM GRANULOCYTES # BLD AUTO: 0.22 K/UL (ref 0–0.04)
IMM GRANULOCYTES NFR BLD AUTO: 1.7 % (ref 0–0.5)
LYMPHOCYTES # BLD AUTO: 1.3 K/UL (ref 1–4.8)
LYMPHOCYTES NFR BLD: 10 % (ref 18–48)
MAGNESIUM SERPL-MCNC: 2.2 MG/DL (ref 1.6–2.6)
MCH RBC QN AUTO: 29.3 PG (ref 27–31)
MCHC RBC AUTO-ENTMCNC: 31.1 G/DL (ref 32–36)
MCV RBC AUTO: 94 FL (ref 82–98)
MONOCYTES # BLD AUTO: 0.8 K/UL (ref 0.3–1)
MONOCYTES NFR BLD: 6.4 % (ref 4–15)
NEUTROPHILS # BLD AUTO: 10.4 K/UL (ref 1.8–7.7)
NEUTROPHILS NFR BLD: 80.7 % (ref 38–73)
NRBC BLD-RTO: 0 /100 WBC
PLATELET # BLD AUTO: 159 K/UL (ref 150–450)
PMV BLD AUTO: 11.1 FL (ref 9.2–12.9)
POCT GLUCOSE: 117 MG/DL (ref 70–110)
POCT GLUCOSE: 122 MG/DL (ref 70–110)
POCT GLUCOSE: 142 MG/DL (ref 70–110)
POCT GLUCOSE: 236 MG/DL (ref 70–110)
POTASSIUM SERPL-SCNC: 3.9 MMOL/L (ref 3.5–5.1)
PROT SERPL-MCNC: 5.5 G/DL (ref 6–8.4)
RBC # BLD AUTO: 3.07 M/UL (ref 4–5.4)
SODIUM SERPL-SCNC: 138 MMOL/L (ref 136–145)
VANCOMYCIN SERPL-MCNC: 13.2 UG/ML
VANCOMYCIN SERPL-MCNC: 9.5 UG/ML
WBC # BLD AUTO: 12.94 K/UL (ref 3.9–12.7)

## 2021-05-07 PROCEDURE — 36415 COLL VENOUS BLD VENIPUNCTURE: CPT | Performed by: HOSPITALIST

## 2021-05-07 PROCEDURE — 94640 AIRWAY INHALATION TREATMENT: CPT

## 2021-05-07 PROCEDURE — 97116 GAIT TRAINING THERAPY: CPT | Mod: CQ

## 2021-05-07 PROCEDURE — 99223 1ST HOSP IP/OBS HIGH 75: CPT | Mod: S$GLB,,, | Performed by: INTERNAL MEDICINE

## 2021-05-07 PROCEDURE — 63600175 PHARM REV CODE 636 W HCPCS: Performed by: NURSE PRACTITIONER

## 2021-05-07 PROCEDURE — 12000002 HC ACUTE/MED SURGE SEMI-PRIVATE ROOM

## 2021-05-07 PROCEDURE — 80074 ACUTE HEPATITIS PANEL: CPT | Performed by: INTERNAL MEDICINE

## 2021-05-07 PROCEDURE — 25000242 PHARM REV CODE 250 ALT 637 W/ HCPCS: Performed by: HOSPITALIST

## 2021-05-07 PROCEDURE — S0073 INJECTION, AZTREONAM, 500 MG: HCPCS | Performed by: NURSE PRACTITIONER

## 2021-05-07 PROCEDURE — 80053 COMPREHEN METABOLIC PANEL: CPT | Performed by: NURSE PRACTITIONER

## 2021-05-07 PROCEDURE — 25000242 PHARM REV CODE 250 ALT 637 W/ HCPCS: Performed by: NURSE PRACTITIONER

## 2021-05-07 PROCEDURE — 85025 COMPLETE CBC W/AUTO DIFF WBC: CPT | Performed by: NURSE PRACTITIONER

## 2021-05-07 PROCEDURE — 99900035 HC TECH TIME PER 15 MIN (STAT)

## 2021-05-07 PROCEDURE — 97165 OT EVAL LOW COMPLEX 30 MIN: CPT

## 2021-05-07 PROCEDURE — C9113 INJ PANTOPRAZOLE SODIUM, VIA: HCPCS | Performed by: NURSE PRACTITIONER

## 2021-05-07 PROCEDURE — 87205 SMEAR GRAM STAIN: CPT | Performed by: NURSE PRACTITIONER

## 2021-05-07 PROCEDURE — 80202 ASSAY OF VANCOMYCIN: CPT | Mod: 91 | Performed by: INTERNAL MEDICINE

## 2021-05-07 PROCEDURE — 92526 ORAL FUNCTION THERAPY: CPT

## 2021-05-07 PROCEDURE — 63600175 PHARM REV CODE 636 W HCPCS: Performed by: HOSPITALIST

## 2021-05-07 PROCEDURE — 94799 UNLISTED PULMONARY SVC/PX: CPT

## 2021-05-07 PROCEDURE — 99223 PR INITIAL HOSPITAL CARE,LEVL III: ICD-10-PCS | Mod: S$GLB,,, | Performed by: INTERNAL MEDICINE

## 2021-05-07 PROCEDURE — 25000003 PHARM REV CODE 250: Performed by: HOSPITALIST

## 2021-05-07 PROCEDURE — 87070 CULTURE OTHR SPECIMN AEROBIC: CPT | Performed by: NURSE PRACTITIONER

## 2021-05-07 PROCEDURE — 94761 N-INVAS EAR/PLS OXIMETRY MLT: CPT

## 2021-05-07 PROCEDURE — 27000221 HC OXYGEN, UP TO 24 HOURS

## 2021-05-07 PROCEDURE — 83735 ASSAY OF MAGNESIUM: CPT | Performed by: NURSE PRACTITIONER

## 2021-05-07 PROCEDURE — 97530 THERAPEUTIC ACTIVITIES: CPT | Mod: CQ

## 2021-05-07 PROCEDURE — 36415 COLL VENOUS BLD VENIPUNCTURE: CPT | Performed by: INTERNAL MEDICINE

## 2021-05-07 PROCEDURE — 80202 ASSAY OF VANCOMYCIN: CPT | Performed by: HOSPITALIST

## 2021-05-07 PROCEDURE — 25000003 PHARM REV CODE 250: Performed by: NURSE PRACTITIONER

## 2021-05-07 PROCEDURE — 36415 COLL VENOUS BLD VENIPUNCTURE: CPT | Performed by: NURSE PRACTITIONER

## 2021-05-07 RX ORDER — VANCOMYCIN HCL IN 5 % DEXTROSE 1G/250ML
1000 PLASTIC BAG, INJECTION (ML) INTRAVENOUS
Status: COMPLETED | OUTPATIENT
Start: 2021-05-08 | End: 2021-05-09

## 2021-05-07 RX ADMIN — AZTREONAM 1000 MG: 1 INJECTION, POWDER, LYOPHILIZED, FOR SOLUTION INTRAMUSCULAR; INTRAVENOUS at 06:05

## 2021-05-07 RX ADMIN — IPRATROPIUM BROMIDE AND ALBUTEROL SULFATE 3 ML: .5; 3 SOLUTION RESPIRATORY (INHALATION) at 07:05

## 2021-05-07 RX ADMIN — OXYCODONE HYDROCHLORIDE 5 MG: 5 TABLET ORAL at 02:05

## 2021-05-07 RX ADMIN — TIOTROPIUM BROMIDE AND OLODATEROL 1 PUFF: 3.124; 2.736 SPRAY, METERED RESPIRATORY (INHALATION) at 08:05

## 2021-05-07 RX ADMIN — IPRATROPIUM BROMIDE AND ALBUTEROL SULFATE 3 ML: .5; 3 SOLUTION RESPIRATORY (INHALATION) at 03:05

## 2021-05-07 RX ADMIN — PANCRELIPASE 1 CAPSULE: 24000; 76000; 120000 CAPSULE, DELAYED RELEASE PELLETS ORAL at 09:05

## 2021-05-07 RX ADMIN — OXYCODONE HYDROCHLORIDE 5 MG: 5 TABLET ORAL at 09:05

## 2021-05-07 RX ADMIN — PANCRELIPASE 1 CAPSULE: 24000; 76000; 120000 CAPSULE, DELAYED RELEASE PELLETS ORAL at 12:05

## 2021-05-07 RX ADMIN — OXYCODONE HYDROCHLORIDE 5 MG: 5 TABLET ORAL at 04:05

## 2021-05-07 RX ADMIN — DOCUSATE SODIUM 50 MG AND SENNOSIDES 8.6 MG 1 TABLET: 8.6; 5 TABLET, FILM COATED ORAL at 09:05

## 2021-05-07 RX ADMIN — APIXABAN 5 MG: 2.5 TABLET, FILM COATED ORAL at 09:05

## 2021-05-07 RX ADMIN — AMLODIPINE BESYLATE 10 MG: 5 TABLET ORAL at 09:05

## 2021-05-07 RX ADMIN — PANTOPRAZOLE SODIUM 40 MG: 40 INJECTION, POWDER, FOR SOLUTION INTRAVENOUS at 09:05

## 2021-05-07 RX ADMIN — PANCRELIPASE 1 CAPSULE: 24000; 76000; 120000 CAPSULE, DELAYED RELEASE PELLETS ORAL at 06:05

## 2021-05-07 RX ADMIN — VANCOMYCIN HYDROCHLORIDE 1500 MG: 1.5 INJECTION, POWDER, LYOPHILIZED, FOR SOLUTION INTRAVENOUS at 04:05

## 2021-05-07 RX ADMIN — INSULIN ASPART 20 UNITS: 100 INJECTION, SUSPENSION SUBCUTANEOUS at 12:05

## 2021-05-07 RX ADMIN — AZTREONAM 1000 MG: 1 INJECTION, POWDER, LYOPHILIZED, FOR SOLUTION INTRAMUSCULAR; INTRAVENOUS at 09:05

## 2021-05-07 RX ADMIN — AZTREONAM 1000 MG: 1 INJECTION, POWDER, LYOPHILIZED, FOR SOLUTION INTRAMUSCULAR; INTRAVENOUS at 12:05

## 2021-05-07 RX ADMIN — IPRATROPIUM BROMIDE AND ALBUTEROL SULFATE 3 ML: .5; 3 SOLUTION RESPIRATORY (INHALATION) at 11:05

## 2021-05-07 RX ADMIN — INSULIN ASPART 20 UNITS: 100 INJECTION, SUSPENSION SUBCUTANEOUS at 09:05

## 2021-05-07 RX ADMIN — TIOTROPIUM BROMIDE AND OLODATEROL 1 PUFF: 3.124; 2.736 SPRAY, METERED RESPIRATORY (INHALATION) at 12:05

## 2021-05-08 PROBLEM — T79.1XXA FAT EMBOLISM: Status: ACTIVE | Noted: 2021-05-08

## 2021-05-08 LAB
ALBUMIN SERPL BCP-MCNC: 2.3 G/DL (ref 3.5–5.2)
ALLENS TEST: ABNORMAL
ALP SERPL-CCNC: 94 U/L (ref 55–135)
ALT SERPL W/O P-5'-P-CCNC: 284 U/L (ref 10–44)
ANION GAP SERPL CALC-SCNC: 10 MMOL/L (ref 8–16)
AORTIC ROOT ANNULUS: 3.06 CM
AORTIC VALVE CUSP SEPERATION: 2.23 CM
AST SERPL-CCNC: 111 U/L (ref 10–40)
AV INDEX (PROSTH): 1.19
AV MEAN GRADIENT: 6 MMHG
AV PEAK GRADIENT: 13 MMHG
AV VALVE AREA: 4.85 CM2
AV VELOCITY RATIO: 0.8
BASOPHILS # BLD AUTO: 0.02 K/UL (ref 0–0.2)
BASOPHILS NFR BLD: 0.2 % (ref 0–1.9)
BILIRUB SERPL-MCNC: 0.8 MG/DL (ref 0.1–1)
BNP SERPL-MCNC: 283 PG/ML (ref 0–99)
BSA FOR ECHO PROCEDURE: 2.14 M2
BUN SERPL-MCNC: 14 MG/DL (ref 8–23)
CALCIUM SERPL-MCNC: 8.2 MG/DL (ref 8.7–10.5)
CHLORIDE SERPL-SCNC: 101 MMOL/L (ref 95–110)
CO2 SERPL-SCNC: 26 MMOL/L (ref 23–29)
CREAT SERPL-MCNC: 0.8 MG/DL (ref 0.5–1.4)
CV ECHO LV RWT: 0.36 CM
DELSYS: ABNORMAL
DIFFERENTIAL METHOD: ABNORMAL
DOP CALC AO PEAK VEL: 1.78 M/S
DOP CALC AO VTI: 21.88 CM
DOP CALC LVOT AREA: 4.1 CM2
DOP CALC LVOT DIAMETER: 2.28 CM
DOP CALC LVOT PEAK VEL: 1.43 M/S
DOP CALC LVOT STROKE VOLUME: 106.02 CM3
DOP CALCLVOT PEAK VEL VTI: 25.98 CM
E WAVE DECELERATION TIME: 214.34 MSEC
E/A RATIO: 0.71
E/E' RATIO: 11.08 M/S
ECHO LV POSTERIOR WALL: 0.82 CM (ref 0.6–1.1)
EJECTION FRACTION: 75 %
EOSINOPHIL # BLD AUTO: 0.1 K/UL (ref 0–0.5)
EOSINOPHIL NFR BLD: 0.6 % (ref 0–8)
ERYTHROCYTE [DISTWIDTH] IN BLOOD BY AUTOMATED COUNT: 13.8 % (ref 11.5–14.5)
EST. GFR  (AFRICAN AMERICAN): >60 ML/MIN/1.73 M^2
EST. GFR  (NON AFRICAN AMERICAN): >60 ML/MIN/1.73 M^2
FRACTIONAL SHORTENING: 29 % (ref 28–44)
GLUCOSE SERPL-MCNC: 176 MG/DL (ref 70–110)
HCO3 UR-SCNC: 29 MMOL/L (ref 24–28)
HCT VFR BLD AUTO: 34.5 % (ref 37–48.5)
HGB BLD-MCNC: 10.6 G/DL (ref 12–16)
IMM GRANULOCYTES # BLD AUTO: 0.39 K/UL (ref 0–0.04)
IMM GRANULOCYTES NFR BLD AUTO: 3.2 % (ref 0–0.5)
INTERVENTRICULAR SEPTUM: 0.83 CM (ref 0.6–1.1)
IVRT: 117.98 MSEC
LA MAJOR: 5.35 CM
LA MINOR: 4.68 CM
LA WIDTH: 3.79 CM
LEFT ATRIUM SIZE: 2.95 CM
LEFT ATRIUM VOLUME INDEX MOD: 26.6 ML/M2
LEFT ATRIUM VOLUME INDEX: 22.7 ML/M2
LEFT ATRIUM VOLUME MOD: 55.52 CM3
LEFT ATRIUM VOLUME: 47.45 CM3
LEFT INTERNAL DIMENSION IN SYSTOLE: 3.23 CM (ref 2.1–4)
LEFT VENTRICLE DIASTOLIC VOLUME INDEX: 44.85 ML/M2
LEFT VENTRICLE DIASTOLIC VOLUME: 93.73 ML
LEFT VENTRICLE MASS INDEX: 57 G/M2
LEFT VENTRICLE SYSTOLIC VOLUME INDEX: 20.1 ML/M2
LEFT VENTRICLE SYSTOLIC VOLUME: 41.96 ML
LEFT VENTRICULAR INTERNAL DIMENSION IN DIASTOLE: 4.53 CM (ref 3.5–6)
LEFT VENTRICULAR MASS: 119.63 G
LV LATERAL E/E' RATIO: 10.29 M/S
LV SEPTAL E/E' RATIO: 12 M/S
LYMPHOCYTES # BLD AUTO: 1 K/UL (ref 1–4.8)
LYMPHOCYTES NFR BLD: 8.3 % (ref 18–48)
MAGNESIUM SERPL-MCNC: 2.2 MG/DL (ref 1.6–2.6)
MCH RBC QN AUTO: 29.6 PG (ref 27–31)
MCHC RBC AUTO-ENTMCNC: 30.7 G/DL (ref 32–36)
MCV RBC AUTO: 96 FL (ref 82–98)
MONOCYTES # BLD AUTO: 0.9 K/UL (ref 0.3–1)
MONOCYTES NFR BLD: 7.1 % (ref 4–15)
MV A" WAVE DURATION": 7.99 MSEC
MV MEAN GRADIENT: 1 MMHG
MV PEAK A VEL: 1.01 M/S
MV PEAK E VEL: 0.72 M/S
MV PEAK GRADIENT: 5 MMHG
MV STENOSIS PRESSURE HALF TIME: 62.16 MS
MV VALVE AREA P 1/2 METHOD: 3.54 CM2
NEUTROPHILS # BLD AUTO: 9.7 K/UL (ref 1.8–7.7)
NEUTROPHILS NFR BLD: 80.6 % (ref 38–73)
NRBC BLD-RTO: 0 /100 WBC
PCO2 BLDA: 47 MMHG (ref 35–45)
PH SMN: 7.4 [PH] (ref 7.35–7.45)
PISA TR MAX VEL: 2.89 M/S
PLATELET # BLD AUTO: 141 K/UL (ref 150–450)
PMV BLD AUTO: 11.3 FL (ref 9.2–12.9)
PO2 BLDA: 65 MMHG (ref 80–100)
POC BE: 4 MMOL/L
POC SATURATED O2: 92 % (ref 95–100)
POC TCO2: 30 MMOL/L (ref 23–27)
POCT GLUCOSE: 110 MG/DL (ref 70–110)
POCT GLUCOSE: 162 MG/DL (ref 70–110)
POCT GLUCOSE: 171 MG/DL (ref 70–110)
POCT GLUCOSE: 180 MG/DL (ref 70–110)
POTASSIUM SERPL-SCNC: 3.9 MMOL/L (ref 3.5–5.1)
PROCALCITONIN SERPL IA-MCNC: 0.32 NG/ML
PROT SERPL-MCNC: 6.2 G/DL (ref 6–8.4)
PV PEAK VELOCITY: 1.16 CM/S
RA MAJOR: 4.69 CM
RA PRESSURE: 3 MMHG
RA WIDTH: 3.54 CM
RBC # BLD AUTO: 3.58 M/UL (ref 4–5.4)
RIGHT VENTRICULAR END-DIASTOLIC DIMENSION: 3.82 CM
RV TISSUE DOPPLER FREE WALL SYSTOLIC VELOCITY 1 (APICAL 4 CHAMBER VIEW): 21.92 CM/S
SAMPLE: ABNORMAL
SITE: ABNORMAL
SODIUM SERPL-SCNC: 137 MMOL/L (ref 136–145)
TDI LATERAL: 0.07 M/S
TDI SEPTAL: 0.06 M/S
TDI: 0.07 M/S
TR MAX PG: 33 MMHG
TV REST PULMONARY ARTERY PRESSURE: 36 MMHG
WBC # BLD AUTO: 12.05 K/UL (ref 3.9–12.7)

## 2021-05-08 PROCEDURE — 94640 AIRWAY INHALATION TREATMENT: CPT

## 2021-05-08 PROCEDURE — 25000242 PHARM REV CODE 250 ALT 637 W/ HCPCS: Performed by: HOSPITALIST

## 2021-05-08 PROCEDURE — 27000190 HC CPAP FULL FACE MASK W/VALVE

## 2021-05-08 PROCEDURE — C9113 INJ PANTOPRAZOLE SODIUM, VIA: HCPCS | Performed by: NURSE PRACTITIONER

## 2021-05-08 PROCEDURE — A4216 STERILE WATER/SALINE, 10 ML: HCPCS | Performed by: INTERNAL MEDICINE

## 2021-05-08 PROCEDURE — 99900035 HC TECH TIME PER 15 MIN (STAT)

## 2021-05-08 PROCEDURE — 25000003 PHARM REV CODE 250: Performed by: NURSE PRACTITIONER

## 2021-05-08 PROCEDURE — 94799 UNLISTED PULMONARY SVC/PX: CPT

## 2021-05-08 PROCEDURE — 25000003 PHARM REV CODE 250: Performed by: INTERNAL MEDICINE

## 2021-05-08 PROCEDURE — 63600175 PHARM REV CODE 636 W HCPCS: Performed by: INTERNAL MEDICINE

## 2021-05-08 PROCEDURE — 36415 COLL VENOUS BLD VENIPUNCTURE: CPT | Performed by: INTERNAL MEDICINE

## 2021-05-08 PROCEDURE — 63600175 PHARM REV CODE 636 W HCPCS: Performed by: NURSE PRACTITIONER

## 2021-05-08 PROCEDURE — 84145 PROCALCITONIN (PCT): CPT | Performed by: INTERNAL MEDICINE

## 2021-05-08 PROCEDURE — 99233 SBSQ HOSP IP/OBS HIGH 50: CPT | Mod: ,,, | Performed by: INTERNAL MEDICINE

## 2021-05-08 PROCEDURE — 83735 ASSAY OF MAGNESIUM: CPT | Performed by: NURSE PRACTITIONER

## 2021-05-08 PROCEDURE — 80053 COMPREHEN METABOLIC PANEL: CPT | Performed by: NURSE PRACTITIONER

## 2021-05-08 PROCEDURE — 85025 COMPLETE CBC W/AUTO DIFF WBC: CPT | Performed by: NURSE PRACTITIONER

## 2021-05-08 PROCEDURE — 27000221 HC OXYGEN, UP TO 24 HOURS

## 2021-05-08 PROCEDURE — 82803 BLOOD GASES ANY COMBINATION: CPT

## 2021-05-08 PROCEDURE — 11000001 HC ACUTE MED/SURG PRIVATE ROOM

## 2021-05-08 PROCEDURE — 83880 ASSAY OF NATRIURETIC PEPTIDE: CPT | Performed by: INTERNAL MEDICINE

## 2021-05-08 PROCEDURE — 25000242 PHARM REV CODE 250 ALT 637 W/ HCPCS: Performed by: NURSE PRACTITIONER

## 2021-05-08 PROCEDURE — 36600 WITHDRAWAL OF ARTERIAL BLOOD: CPT

## 2021-05-08 PROCEDURE — C1751 CATH, INF, PER/CENT/MIDLINE: HCPCS

## 2021-05-08 PROCEDURE — 99233 PR SUBSEQUENT HOSPITAL CARE,LEVL III: ICD-10-PCS | Mod: ,,, | Performed by: INTERNAL MEDICINE

## 2021-05-08 PROCEDURE — 94660 CPAP INITIATION&MGMT: CPT

## 2021-05-08 PROCEDURE — 36573 INSJ PICC RS&I 5 YR+: CPT

## 2021-05-08 PROCEDURE — 36569 INSJ PICC 5 YR+ W/O IMAGING: CPT

## 2021-05-08 PROCEDURE — 97116 GAIT TRAINING THERAPY: CPT

## 2021-05-08 PROCEDURE — S0073 INJECTION, AZTREONAM, 500 MG: HCPCS | Performed by: NURSE PRACTITIONER

## 2021-05-08 PROCEDURE — 97530 THERAPEUTIC ACTIVITIES: CPT

## 2021-05-08 RX ORDER — SODIUM CHLORIDE 0.9 % (FLUSH) 0.9 %
10 SYRINGE (ML) INJECTION
Status: DISCONTINUED | OUTPATIENT
Start: 2021-05-08 | End: 2021-05-13 | Stop reason: HOSPADM

## 2021-05-08 RX ORDER — TALC
6 POWDER (GRAM) TOPICAL NIGHTLY PRN
Status: DISCONTINUED | OUTPATIENT
Start: 2021-05-08 | End: 2021-05-13 | Stop reason: HOSPADM

## 2021-05-08 RX ORDER — FUROSEMIDE 10 MG/ML
20 INJECTION INTRAMUSCULAR; INTRAVENOUS 2 TIMES DAILY
Status: DISCONTINUED | OUTPATIENT
Start: 2021-05-08 | End: 2021-05-08

## 2021-05-08 RX ORDER — FUROSEMIDE 10 MG/ML
20 INJECTION INTRAMUSCULAR; INTRAVENOUS ONCE
Status: COMPLETED | OUTPATIENT
Start: 2021-05-08 | End: 2021-05-08

## 2021-05-08 RX ORDER — FUROSEMIDE 10 MG/ML
20 INJECTION INTRAMUSCULAR; INTRAVENOUS DAILY
Status: DISCONTINUED | OUTPATIENT
Start: 2021-05-09 | End: 2021-05-08

## 2021-05-08 RX ORDER — MUPIROCIN 20 MG/G
OINTMENT TOPICAL 2 TIMES DAILY
Status: DISCONTINUED | OUTPATIENT
Start: 2021-05-08 | End: 2021-05-10

## 2021-05-08 RX ORDER — SODIUM CHLORIDE 0.9 % (FLUSH) 0.9 %
10 SYRINGE (ML) INJECTION EVERY 6 HOURS
Status: DISCONTINUED | OUTPATIENT
Start: 2021-05-08 | End: 2021-05-13 | Stop reason: HOSPADM

## 2021-05-08 RX ADMIN — AMLODIPINE BESYLATE 10 MG: 5 TABLET ORAL at 10:05

## 2021-05-08 RX ADMIN — OXYCODONE HYDROCHLORIDE 5 MG: 5 TABLET ORAL at 10:05

## 2021-05-08 RX ADMIN — VANCOMYCIN HYDROCHLORIDE 1000 MG: 1 INJECTION, POWDER, LYOPHILIZED, FOR SOLUTION INTRAVENOUS at 12:05

## 2021-05-08 RX ADMIN — IPRATROPIUM BROMIDE AND ALBUTEROL SULFATE 3 ML: .5; 3 SOLUTION RESPIRATORY (INHALATION) at 03:05

## 2021-05-08 RX ADMIN — AZTREONAM 1000 MG: 1 INJECTION, POWDER, LYOPHILIZED, FOR SOLUTION INTRAMUSCULAR; INTRAVENOUS at 02:05

## 2021-05-08 RX ADMIN — APIXABAN 5 MG: 2.5 TABLET, FILM COATED ORAL at 10:05

## 2021-05-08 RX ADMIN — AZTREONAM 1000 MG: 1 INJECTION, POWDER, LYOPHILIZED, FOR SOLUTION INTRAMUSCULAR; INTRAVENOUS at 10:05

## 2021-05-08 RX ADMIN — PANCRELIPASE 1 CAPSULE: 24000; 76000; 120000 CAPSULE, DELAYED RELEASE PELLETS ORAL at 12:05

## 2021-05-08 RX ADMIN — PANTOPRAZOLE SODIUM 40 MG: 40 INJECTION, POWDER, FOR SOLUTION INTRAVENOUS at 10:05

## 2021-05-08 RX ADMIN — IPRATROPIUM BROMIDE AND ALBUTEROL SULFATE 3 ML: .5; 3 SOLUTION RESPIRATORY (INHALATION) at 08:05

## 2021-05-08 RX ADMIN — ONDANSETRON 4 MG: 2 INJECTION INTRAMUSCULAR; INTRAVENOUS at 06:05

## 2021-05-08 RX ADMIN — INSULIN ASPART 2 UNITS: 100 INJECTION, SOLUTION INTRAVENOUS; SUBCUTANEOUS at 05:05

## 2021-05-08 RX ADMIN — PANCRELIPASE 1 CAPSULE: 24000; 76000; 120000 CAPSULE, DELAYED RELEASE PELLETS ORAL at 09:05

## 2021-05-08 RX ADMIN — OXYCODONE HYDROCHLORIDE 5 MG: 5 TABLET ORAL at 12:05

## 2021-05-08 RX ADMIN — OXYCODONE HYDROCHLORIDE 5 MG: 5 TABLET ORAL at 11:05

## 2021-05-08 RX ADMIN — DOCUSATE SODIUM 50 MG AND SENNOSIDES 8.6 MG 1 TABLET: 8.6; 5 TABLET, FILM COATED ORAL at 10:05

## 2021-05-08 RX ADMIN — DOCUSATE SODIUM 50 MG AND SENNOSIDES 8.6 MG 1 TABLET: 8.6; 5 TABLET, FILM COATED ORAL at 09:05

## 2021-05-08 RX ADMIN — PANCRELIPASE 1 CAPSULE: 24000; 76000; 120000 CAPSULE, DELAYED RELEASE PELLETS ORAL at 04:05

## 2021-05-08 RX ADMIN — TIOTROPIUM BROMIDE AND OLODATEROL 1 PUFF: 3.124; 2.736 SPRAY, METERED RESPIRATORY (INHALATION) at 08:05

## 2021-05-08 RX ADMIN — INSULIN ASPART 20 UNITS: 100 INJECTION, SUSPENSION SUBCUTANEOUS at 10:05

## 2021-05-08 RX ADMIN — INSULIN ASPART 2 UNITS: 100 INJECTION, SOLUTION INTRAVENOUS; SUBCUTANEOUS at 12:05

## 2021-05-08 RX ADMIN — FUROSEMIDE 20 MG: 10 INJECTION, SOLUTION INTRAMUSCULAR; INTRAVENOUS at 07:05

## 2021-05-08 RX ADMIN — Medication 10 ML: at 05:05

## 2021-05-08 RX ADMIN — PANCRELIPASE 1 CAPSULE: 24000; 76000; 120000 CAPSULE, DELAYED RELEASE PELLETS ORAL at 10:05

## 2021-05-08 RX ADMIN — AZTREONAM 1000 MG: 1 INJECTION, POWDER, LYOPHILIZED, FOR SOLUTION INTRAMUSCULAR; INTRAVENOUS at 04:05

## 2021-05-08 RX ADMIN — TIOTROPIUM BROMIDE AND OLODATEROL 1 PUFF: 3.124; 2.736 SPRAY, METERED RESPIRATORY (INHALATION) at 07:05

## 2021-05-08 RX ADMIN — PANTOPRAZOLE SODIUM 40 MG: 40 INJECTION, POWDER, FOR SOLUTION INTRAVENOUS at 09:05

## 2021-05-08 RX ADMIN — APIXABAN 5 MG: 2.5 TABLET, FILM COATED ORAL at 09:05

## 2021-05-08 RX ADMIN — OXYCODONE HYDROCHLORIDE 5 MG: 5 TABLET ORAL at 04:05

## 2021-05-08 RX ADMIN — MELATONIN TAB 3 MG 6 MG: 3 TAB at 11:05

## 2021-05-08 RX ADMIN — Medication 10 ML: at 11:05

## 2021-05-08 RX ADMIN — IPRATROPIUM BROMIDE AND ALBUTEROL SULFATE 3 ML: .5; 3 SOLUTION RESPIRATORY (INHALATION) at 12:05

## 2021-05-08 RX ADMIN — SODIUM CHLORIDE: 0.9 INJECTION, SOLUTION INTRAVENOUS at 05:05

## 2021-05-08 RX ADMIN — MUPIROCIN: 20 OINTMENT TOPICAL at 09:05

## 2021-05-08 RX ADMIN — IPRATROPIUM BROMIDE AND ALBUTEROL SULFATE 3 ML: .5; 3 SOLUTION RESPIRATORY (INHALATION) at 07:05

## 2021-05-09 LAB
ALBUMIN SERPL BCP-MCNC: 2.1 G/DL (ref 3.5–5.2)
ALP SERPL-CCNC: 98 U/L (ref 55–135)
ALT SERPL W/O P-5'-P-CCNC: 182 U/L (ref 10–44)
ANION GAP SERPL CALC-SCNC: 8 MMOL/L (ref 8–16)
AST SERPL-CCNC: 48 U/L (ref 10–40)
BASOPHILS # BLD AUTO: 0.05 K/UL (ref 0–0.2)
BASOPHILS NFR BLD: 0.4 % (ref 0–1.9)
BILIRUB SERPL-MCNC: 0.8 MG/DL (ref 0.1–1)
BUN SERPL-MCNC: 12 MG/DL (ref 8–23)
CALCIUM SERPL-MCNC: 8.1 MG/DL (ref 8.7–10.5)
CHLORIDE SERPL-SCNC: 98 MMOL/L (ref 95–110)
CO2 SERPL-SCNC: 29 MMOL/L (ref 23–29)
CREAT SERPL-MCNC: 0.8 MG/DL (ref 0.5–1.4)
DIFFERENTIAL METHOD: ABNORMAL
EOSINOPHIL # BLD AUTO: 0.2 K/UL (ref 0–0.5)
EOSINOPHIL NFR BLD: 1.5 % (ref 0–8)
ERYTHROCYTE [DISTWIDTH] IN BLOOD BY AUTOMATED COUNT: 13.7 % (ref 11.5–14.5)
EST. GFR  (AFRICAN AMERICAN): >60 ML/MIN/1.73 M^2
EST. GFR  (NON AFRICAN AMERICAN): >60 ML/MIN/1.73 M^2
GLUCOSE SERPL-MCNC: 168 MG/DL (ref 70–110)
HCT VFR BLD AUTO: 29.6 % (ref 37–48.5)
HGB BLD-MCNC: 9.3 G/DL (ref 12–16)
IMM GRANULOCYTES # BLD AUTO: 0.46 K/UL (ref 0–0.04)
IMM GRANULOCYTES NFR BLD AUTO: 3.8 % (ref 0–0.5)
LYMPHOCYTES # BLD AUTO: 1.2 K/UL (ref 1–4.8)
LYMPHOCYTES NFR BLD: 10 % (ref 18–48)
MAGNESIUM SERPL-MCNC: 2 MG/DL (ref 1.6–2.6)
MCH RBC QN AUTO: 29.6 PG (ref 27–31)
MCHC RBC AUTO-ENTMCNC: 31.4 G/DL (ref 32–36)
MCV RBC AUTO: 94 FL (ref 82–98)
MONOCYTES # BLD AUTO: 1.1 K/UL (ref 0.3–1)
MONOCYTES NFR BLD: 8.6 % (ref 4–15)
NEUTROPHILS # BLD AUTO: 9.2 K/UL (ref 1.8–7.7)
NEUTROPHILS NFR BLD: 75.7 % (ref 38–73)
NRBC BLD-RTO: 0 /100 WBC
PLATELET # BLD AUTO: 193 K/UL (ref 150–450)
PLATELET BLD QL SMEAR: ABNORMAL
PMV BLD AUTO: 11.3 FL (ref 9.2–12.9)
POCT GLUCOSE: 167 MG/DL (ref 70–110)
POCT GLUCOSE: 181 MG/DL (ref 70–110)
POCT GLUCOSE: 227 MG/DL (ref 70–110)
POCT GLUCOSE: 246 MG/DL (ref 70–110)
POTASSIUM SERPL-SCNC: 3.6 MMOL/L (ref 3.5–5.1)
PROT SERPL-MCNC: 5.7 G/DL (ref 6–8.4)
RBC # BLD AUTO: 3.14 M/UL (ref 4–5.4)
SODIUM SERPL-SCNC: 135 MMOL/L (ref 136–145)
WBC # BLD AUTO: 12.14 K/UL (ref 3.9–12.7)

## 2021-05-09 PROCEDURE — 25000242 PHARM REV CODE 250 ALT 637 W/ HCPCS: Performed by: NURSE PRACTITIONER

## 2021-05-09 PROCEDURE — 83735 ASSAY OF MAGNESIUM: CPT | Performed by: NURSE PRACTITIONER

## 2021-05-09 PROCEDURE — 99900035 HC TECH TIME PER 15 MIN (STAT)

## 2021-05-09 PROCEDURE — 97116 GAIT TRAINING THERAPY: CPT | Mod: CQ

## 2021-05-09 PROCEDURE — 99232 PR SUBSEQUENT HOSPITAL CARE,LEVL II: ICD-10-PCS | Mod: ,,, | Performed by: INTERNAL MEDICINE

## 2021-05-09 PROCEDURE — S0073 INJECTION, AZTREONAM, 500 MG: HCPCS | Performed by: NURSE PRACTITIONER

## 2021-05-09 PROCEDURE — 25000003 PHARM REV CODE 250: Performed by: INTERNAL MEDICINE

## 2021-05-09 PROCEDURE — 94799 UNLISTED PULMONARY SVC/PX: CPT

## 2021-05-09 PROCEDURE — 80053 COMPREHEN METABOLIC PANEL: CPT | Performed by: NURSE PRACTITIONER

## 2021-05-09 PROCEDURE — S0073 INJECTION, AZTREONAM, 500 MG: HCPCS | Performed by: INTERNAL MEDICINE

## 2021-05-09 PROCEDURE — 27000221 HC OXYGEN, UP TO 24 HOURS

## 2021-05-09 PROCEDURE — 25000242 PHARM REV CODE 250 ALT 637 W/ HCPCS: Performed by: HOSPITALIST

## 2021-05-09 PROCEDURE — 25000003 PHARM REV CODE 250: Performed by: NURSE PRACTITIONER

## 2021-05-09 PROCEDURE — 94640 AIRWAY INHALATION TREATMENT: CPT

## 2021-05-09 PROCEDURE — 63600175 PHARM REV CODE 636 W HCPCS: Performed by: NURSE PRACTITIONER

## 2021-05-09 PROCEDURE — 97530 THERAPEUTIC ACTIVITIES: CPT | Mod: CQ

## 2021-05-09 PROCEDURE — A4216 STERILE WATER/SALINE, 10 ML: HCPCS | Performed by: INTERNAL MEDICINE

## 2021-05-09 PROCEDURE — 11000001 HC ACUTE MED/SURG PRIVATE ROOM

## 2021-05-09 PROCEDURE — 63600175 PHARM REV CODE 636 W HCPCS: Performed by: INTERNAL MEDICINE

## 2021-05-09 PROCEDURE — 94761 N-INVAS EAR/PLS OXIMETRY MLT: CPT

## 2021-05-09 PROCEDURE — C9113 INJ PANTOPRAZOLE SODIUM, VIA: HCPCS | Performed by: NURSE PRACTITIONER

## 2021-05-09 PROCEDURE — 85025 COMPLETE CBC W/AUTO DIFF WBC: CPT | Performed by: NURSE PRACTITIONER

## 2021-05-09 PROCEDURE — 99232 SBSQ HOSP IP/OBS MODERATE 35: CPT | Mod: ,,, | Performed by: INTERNAL MEDICINE

## 2021-05-09 RX ORDER — POLYETHYLENE GLYCOL 3350 17 G/17G
17 POWDER, FOR SOLUTION ORAL DAILY
Status: DISCONTINUED | OUTPATIENT
Start: 2021-05-09 | End: 2021-05-13 | Stop reason: HOSPADM

## 2021-05-09 RX ADMIN — INSULIN ASPART 1 UNITS: 100 INJECTION, SOLUTION INTRAVENOUS; SUBCUTANEOUS at 08:05

## 2021-05-09 RX ADMIN — IPRATROPIUM BROMIDE AND ALBUTEROL SULFATE 3 ML: .5; 3 SOLUTION RESPIRATORY (INHALATION) at 03:05

## 2021-05-09 RX ADMIN — PANTOPRAZOLE SODIUM 40 MG: 40 INJECTION, POWDER, FOR SOLUTION INTRAVENOUS at 09:05

## 2021-05-09 RX ADMIN — POLYETHYLENE GLYCOL 3350 17 G: 17 POWDER, FOR SOLUTION ORAL at 01:05

## 2021-05-09 RX ADMIN — APIXABAN 5 MG: 2.5 TABLET, FILM COATED ORAL at 08:05

## 2021-05-09 RX ADMIN — VANCOMYCIN HYDROCHLORIDE 1000 MG: 1 INJECTION, POWDER, LYOPHILIZED, FOR SOLUTION INTRAVENOUS at 01:05

## 2021-05-09 RX ADMIN — PANCRELIPASE 1 CAPSULE: 24000; 76000; 120000 CAPSULE, DELAYED RELEASE PELLETS ORAL at 09:05

## 2021-05-09 RX ADMIN — Medication 10 ML: at 05:05

## 2021-05-09 RX ADMIN — INSULIN ASPART 20 UNITS: 100 INJECTION, SUSPENSION SUBCUTANEOUS at 05:05

## 2021-05-09 RX ADMIN — AZTREONAM 1000 MG: 1 INJECTION, POWDER, LYOPHILIZED, FOR SOLUTION INTRAMUSCULAR; INTRAVENOUS at 09:05

## 2021-05-09 RX ADMIN — DOCUSATE SODIUM 50 MG AND SENNOSIDES 8.6 MG 1 TABLET: 8.6; 5 TABLET, FILM COATED ORAL at 08:05

## 2021-05-09 RX ADMIN — AMLODIPINE BESYLATE 10 MG: 5 TABLET ORAL at 09:05

## 2021-05-09 RX ADMIN — MELATONIN TAB 3 MG 6 MG: 3 TAB at 09:05

## 2021-05-09 RX ADMIN — APIXABAN 5 MG: 2.5 TABLET, FILM COATED ORAL at 09:05

## 2021-05-09 RX ADMIN — INSULIN ASPART 4 UNITS: 100 INJECTION, SOLUTION INTRAVENOUS; SUBCUTANEOUS at 11:05

## 2021-05-09 RX ADMIN — PANTOPRAZOLE SODIUM 40 MG: 40 INJECTION, POWDER, FOR SOLUTION INTRAVENOUS at 08:05

## 2021-05-09 RX ADMIN — PANCRELIPASE 1 CAPSULE: 24000; 76000; 120000 CAPSULE, DELAYED RELEASE PELLETS ORAL at 01:05

## 2021-05-09 RX ADMIN — PANCRELIPASE 1 CAPSULE: 24000; 76000; 120000 CAPSULE, DELAYED RELEASE PELLETS ORAL at 05:05

## 2021-05-09 RX ADMIN — TIOTROPIUM BROMIDE AND OLODATEROL 1 PUFF: 3.124; 2.736 SPRAY, METERED RESPIRATORY (INHALATION) at 07:05

## 2021-05-09 RX ADMIN — IPRATROPIUM BROMIDE AND ALBUTEROL SULFATE 3 ML: .5; 3 SOLUTION RESPIRATORY (INHALATION) at 06:05

## 2021-05-09 RX ADMIN — IPRATROPIUM BROMIDE AND ALBUTEROL SULFATE 3 ML: .5; 3 SOLUTION RESPIRATORY (INHALATION) at 07:05

## 2021-05-09 RX ADMIN — OXYCODONE HYDROCHLORIDE 5 MG: 5 TABLET ORAL at 01:05

## 2021-05-09 RX ADMIN — OXYCODONE HYDROCHLORIDE 5 MG: 5 TABLET ORAL at 09:05

## 2021-05-09 RX ADMIN — OXYCODONE HYDROCHLORIDE 5 MG: 5 TABLET ORAL at 05:05

## 2021-05-09 RX ADMIN — IPRATROPIUM BROMIDE AND ALBUTEROL SULFATE 3 ML: .5; 3 SOLUTION RESPIRATORY (INHALATION) at 11:05

## 2021-05-09 RX ADMIN — AZTREONAM 1000 MG: 1 INJECTION, POWDER, LYOPHILIZED, FOR SOLUTION INTRAMUSCULAR; INTRAVENOUS at 02:05

## 2021-05-09 RX ADMIN — DOCUSATE SODIUM 50 MG AND SENNOSIDES 8.6 MG 1 TABLET: 8.6; 5 TABLET, FILM COATED ORAL at 09:05

## 2021-05-09 RX ADMIN — AZTREONAM 1000 MG: 1 INJECTION, POWDER, LYOPHILIZED, FOR SOLUTION INTRAMUSCULAR; INTRAVENOUS at 05:05

## 2021-05-09 RX ADMIN — INSULIN ASPART 2 UNITS: 100 INJECTION, SOLUTION INTRAVENOUS; SUBCUTANEOUS at 05:05

## 2021-05-09 RX ADMIN — MUPIROCIN: 20 OINTMENT TOPICAL at 08:05

## 2021-05-09 RX ADMIN — PANCRELIPASE 1 CAPSULE: 24000; 76000; 120000 CAPSULE, DELAYED RELEASE PELLETS ORAL at 08:05

## 2021-05-09 RX ADMIN — MUPIROCIN: 20 OINTMENT TOPICAL at 09:05

## 2021-05-09 RX ADMIN — INSULIN ASPART 4 UNITS: 100 INJECTION, SOLUTION INTRAVENOUS; SUBCUTANEOUS at 05:05

## 2021-05-09 RX ADMIN — Medication 10 ML: at 11:05

## 2021-05-10 LAB
ALBUMIN SERPL BCP-MCNC: 2 G/DL (ref 3.5–5.2)
ALP SERPL-CCNC: 75 U/L (ref 55–135)
ALT SERPL W/O P-5'-P-CCNC: 120 U/L (ref 10–44)
ANION GAP SERPL CALC-SCNC: 11 MMOL/L (ref 8–16)
AST SERPL-CCNC: 26 U/L (ref 10–40)
BACTERIA SPEC AEROBE CULT: NORMAL
BASOPHILS # BLD AUTO: 0.05 K/UL (ref 0–0.2)
BASOPHILS NFR BLD: 0.4 % (ref 0–1.9)
BILIRUB SERPL-MCNC: 0.8 MG/DL (ref 0.1–1)
BUN SERPL-MCNC: 11 MG/DL (ref 8–23)
CALCIUM SERPL-MCNC: 8.1 MG/DL (ref 8.7–10.5)
CHLORIDE SERPL-SCNC: 96 MMOL/L (ref 95–110)
CO2 SERPL-SCNC: 29 MMOL/L (ref 23–29)
CREAT SERPL-MCNC: 0.8 MG/DL (ref 0.5–1.4)
DIFFERENTIAL METHOD: ABNORMAL
EOSINOPHIL # BLD AUTO: 0.3 K/UL (ref 0–0.5)
EOSINOPHIL NFR BLD: 2.3 % (ref 0–8)
ERYTHROCYTE [DISTWIDTH] IN BLOOD BY AUTOMATED COUNT: 13.6 % (ref 11.5–14.5)
EST. GFR  (AFRICAN AMERICAN): >60 ML/MIN/1.73 M^2
EST. GFR  (NON AFRICAN AMERICAN): >60 ML/MIN/1.73 M^2
GLUCOSE SERPL-MCNC: 154 MG/DL (ref 70–110)
GRAM STN SPEC: NORMAL
HAV IGM SERPL QL IA: NEGATIVE
HBV CORE IGM SERPL QL IA: NEGATIVE
HBV SURFACE AG SERPL QL IA: NEGATIVE
HCT VFR BLD AUTO: 28.3 % (ref 37–48.5)
HCV AB SERPL QL IA: NEGATIVE
HGB BLD-MCNC: 8.9 G/DL (ref 12–16)
IMM GRANULOCYTES # BLD AUTO: 0.48 K/UL (ref 0–0.04)
IMM GRANULOCYTES NFR BLD AUTO: 3.8 % (ref 0–0.5)
LYMPHOCYTES # BLD AUTO: 1.1 K/UL (ref 1–4.8)
LYMPHOCYTES NFR BLD: 8.6 % (ref 18–48)
MAGNESIUM SERPL-MCNC: 2 MG/DL (ref 1.6–2.6)
MCH RBC QN AUTO: 29.5 PG (ref 27–31)
MCHC RBC AUTO-ENTMCNC: 31.4 G/DL (ref 32–36)
MCV RBC AUTO: 94 FL (ref 82–98)
MONOCYTES # BLD AUTO: 1.2 K/UL (ref 0.3–1)
MONOCYTES NFR BLD: 9.4 % (ref 4–15)
NEUTROPHILS # BLD AUTO: 9.6 K/UL (ref 1.8–7.7)
NEUTROPHILS NFR BLD: 75.5 % (ref 38–73)
NRBC BLD-RTO: 0 /100 WBC
PLATELET # BLD AUTO: 210 K/UL (ref 150–450)
PMV BLD AUTO: 11.1 FL (ref 9.2–12.9)
POCT GLUCOSE: 157 MG/DL (ref 70–110)
POCT GLUCOSE: 178 MG/DL (ref 70–110)
POCT GLUCOSE: 182 MG/DL (ref 70–110)
POTASSIUM SERPL-SCNC: 3.7 MMOL/L (ref 3.5–5.1)
PROT SERPL-MCNC: 5.6 G/DL (ref 6–8.4)
RBC # BLD AUTO: 3.02 M/UL (ref 4–5.4)
SODIUM SERPL-SCNC: 136 MMOL/L (ref 136–145)
WBC # BLD AUTO: 12.72 K/UL (ref 3.9–12.7)

## 2021-05-10 PROCEDURE — 80053 COMPREHEN METABOLIC PANEL: CPT | Performed by: NURSE PRACTITIONER

## 2021-05-10 PROCEDURE — 11000001 HC ACUTE MED/SURG PRIVATE ROOM

## 2021-05-10 PROCEDURE — 27000221 HC OXYGEN, UP TO 24 HOURS

## 2021-05-10 PROCEDURE — 25000003 PHARM REV CODE 250: Performed by: NURSE PRACTITIONER

## 2021-05-10 PROCEDURE — 63600175 PHARM REV CODE 636 W HCPCS: Performed by: INTERNAL MEDICINE

## 2021-05-10 PROCEDURE — 25000003 PHARM REV CODE 250: Performed by: FAMILY MEDICINE

## 2021-05-10 PROCEDURE — 25000242 PHARM REV CODE 250 ALT 637 W/ HCPCS: Performed by: NURSE PRACTITIONER

## 2021-05-10 PROCEDURE — 25000003 PHARM REV CODE 250: Performed by: INTERNAL MEDICINE

## 2021-05-10 PROCEDURE — 94799 UNLISTED PULMONARY SVC/PX: CPT

## 2021-05-10 PROCEDURE — 94640 AIRWAY INHALATION TREATMENT: CPT

## 2021-05-10 PROCEDURE — 99233 PR SUBSEQUENT HOSPITAL CARE,LEVL III: ICD-10-PCS | Mod: S$GLB,,, | Performed by: INTERNAL MEDICINE

## 2021-05-10 PROCEDURE — 97110 THERAPEUTIC EXERCISES: CPT

## 2021-05-10 PROCEDURE — C9113 INJ PANTOPRAZOLE SODIUM, VIA: HCPCS | Performed by: NURSE PRACTITIONER

## 2021-05-10 PROCEDURE — 63600175 PHARM REV CODE 636 W HCPCS: Performed by: NURSE PRACTITIONER

## 2021-05-10 PROCEDURE — 30200315 PPD INTRADERMAL TEST REV CODE 302: Performed by: FAMILY MEDICINE

## 2021-05-10 PROCEDURE — 25000242 PHARM REV CODE 250 ALT 637 W/ HCPCS: Performed by: HOSPITALIST

## 2021-05-10 PROCEDURE — 36415 COLL VENOUS BLD VENIPUNCTURE: CPT | Performed by: NURSE PRACTITIONER

## 2021-05-10 PROCEDURE — A4216 STERILE WATER/SALINE, 10 ML: HCPCS | Performed by: INTERNAL MEDICINE

## 2021-05-10 PROCEDURE — 83735 ASSAY OF MAGNESIUM: CPT | Performed by: NURSE PRACTITIONER

## 2021-05-10 PROCEDURE — 94761 N-INVAS EAR/PLS OXIMETRY MLT: CPT

## 2021-05-10 PROCEDURE — 85025 COMPLETE CBC W/AUTO DIFF WBC: CPT | Performed by: NURSE PRACTITIONER

## 2021-05-10 PROCEDURE — 99233 SBSQ HOSP IP/OBS HIGH 50: CPT | Mod: S$GLB,,, | Performed by: INTERNAL MEDICINE

## 2021-05-10 PROCEDURE — 86580 TB INTRADERMAL TEST: CPT | Performed by: FAMILY MEDICINE

## 2021-05-10 PROCEDURE — 97116 GAIT TRAINING THERAPY: CPT

## 2021-05-10 RX ORDER — OXYCODONE HYDROCHLORIDE 10 MG/1
10 TABLET ORAL EVERY 4 HOURS PRN
Status: DISCONTINUED | OUTPATIENT
Start: 2021-05-10 | End: 2021-05-13 | Stop reason: HOSPADM

## 2021-05-10 RX ORDER — FUROSEMIDE 10 MG/ML
40 INJECTION INTRAMUSCULAR; INTRAVENOUS ONCE
Status: COMPLETED | OUTPATIENT
Start: 2021-05-10 | End: 2021-05-10

## 2021-05-10 RX ORDER — LACTULOSE 10 G/15ML
30 SOLUTION ORAL 3 TIMES DAILY
Status: DISCONTINUED | OUTPATIENT
Start: 2021-05-10 | End: 2021-05-13 | Stop reason: HOSPADM

## 2021-05-10 RX ADMIN — PANCRELIPASE 1 CAPSULE: 24000; 76000; 120000 CAPSULE, DELAYED RELEASE PELLETS ORAL at 12:05

## 2021-05-10 RX ADMIN — DOCUSATE SODIUM 50 MG AND SENNOSIDES 8.6 MG 1 TABLET: 8.6; 5 TABLET, FILM COATED ORAL at 09:05

## 2021-05-10 RX ADMIN — INSULIN ASPART 2 UNITS: 100 INJECTION, SOLUTION INTRAVENOUS; SUBCUTANEOUS at 04:05

## 2021-05-10 RX ADMIN — IPRATROPIUM BROMIDE AND ALBUTEROL SULFATE 3 ML: .5; 3 SOLUTION RESPIRATORY (INHALATION) at 11:05

## 2021-05-10 RX ADMIN — TUBERCULIN PURIFIED PROTEIN DERIVATIVE 5 UNITS: 5 INJECTION, SOLUTION INTRADERMAL at 12:05

## 2021-05-10 RX ADMIN — PANTOPRAZOLE SODIUM 40 MG: 40 INJECTION, POWDER, FOR SOLUTION INTRAVENOUS at 08:05

## 2021-05-10 RX ADMIN — OXYCODONE HYDROCHLORIDE 10 MG: 10 TABLET ORAL at 09:05

## 2021-05-10 RX ADMIN — PANCRELIPASE 1 CAPSULE: 24000; 76000; 120000 CAPSULE, DELAYED RELEASE PELLETS ORAL at 04:05

## 2021-05-10 RX ADMIN — OXYCODONE HYDROCHLORIDE 5 MG: 5 TABLET ORAL at 08:05

## 2021-05-10 RX ADMIN — INSULIN ASPART 1 UNITS: 100 INJECTION, SOLUTION INTRAVENOUS; SUBCUTANEOUS at 10:05

## 2021-05-10 RX ADMIN — MUPIROCIN: 20 OINTMENT TOPICAL at 08:05

## 2021-05-10 RX ADMIN — POLYETHYLENE GLYCOL 3350 17 G: 17 POWDER, FOR SOLUTION ORAL at 12:05

## 2021-05-10 RX ADMIN — IPRATROPIUM BROMIDE AND ALBUTEROL SULFATE 3 ML: .5; 3 SOLUTION RESPIRATORY (INHALATION) at 07:05

## 2021-05-10 RX ADMIN — IPRATROPIUM BROMIDE AND ALBUTEROL SULFATE 3 ML: .5; 3 SOLUTION RESPIRATORY (INHALATION) at 03:05

## 2021-05-10 RX ADMIN — PANTOPRAZOLE SODIUM 40 MG: 40 INJECTION, POWDER, FOR SOLUTION INTRAVENOUS at 09:05

## 2021-05-10 RX ADMIN — OXYCODONE HYDROCHLORIDE 5 MG: 5 TABLET ORAL at 06:05

## 2021-05-10 RX ADMIN — INSULIN ASPART 20 UNITS: 100 INJECTION, SUSPENSION SUBCUTANEOUS at 12:05

## 2021-05-10 RX ADMIN — PANCRELIPASE 1 CAPSULE: 24000; 76000; 120000 CAPSULE, DELAYED RELEASE PELLETS ORAL at 08:05

## 2021-05-10 RX ADMIN — Medication 10 ML: at 05:05

## 2021-05-10 RX ADMIN — Medication 10 ML: at 12:05

## 2021-05-10 RX ADMIN — INSULIN ASPART 20 UNITS: 100 INJECTION, SUSPENSION SUBCUTANEOUS at 08:05

## 2021-05-10 RX ADMIN — FUROSEMIDE 40 MG: 10 INJECTION, SOLUTION INTRAMUSCULAR; INTRAVENOUS at 12:05

## 2021-05-10 RX ADMIN — INSULIN ASPART 2 UNITS: 100 INJECTION, SOLUTION INTRAVENOUS; SUBCUTANEOUS at 12:05

## 2021-05-10 RX ADMIN — OXYCODONE HYDROCHLORIDE 5 MG: 5 TABLET ORAL at 12:05

## 2021-05-10 RX ADMIN — DOCUSATE SODIUM 50 MG AND SENNOSIDES 8.6 MG 1 TABLET: 8.6; 5 TABLET, FILM COATED ORAL at 08:05

## 2021-05-10 RX ADMIN — PANCRELIPASE 1 CAPSULE: 24000; 76000; 120000 CAPSULE, DELAYED RELEASE PELLETS ORAL at 09:05

## 2021-05-10 RX ADMIN — INSULIN ASPART 20 UNITS: 100 INJECTION, SUSPENSION SUBCUTANEOUS at 04:05

## 2021-05-10 RX ADMIN — LACTULOSE 30 G: 20 SOLUTION ORAL at 09:05

## 2021-05-10 RX ADMIN — AMLODIPINE BESYLATE 10 MG: 5 TABLET ORAL at 08:05

## 2021-05-10 RX ADMIN — APIXABAN 5 MG: 2.5 TABLET, FILM COATED ORAL at 08:05

## 2021-05-10 RX ADMIN — Medication 10 ML: at 06:05

## 2021-05-10 RX ADMIN — APIXABAN 5 MG: 2.5 TABLET, FILM COATED ORAL at 09:05

## 2021-05-10 RX ADMIN — TIOTROPIUM BROMIDE AND OLODATEROL 1 PUFF: 3.124; 2.736 SPRAY, METERED RESPIRATORY (INHALATION) at 07:05

## 2021-05-10 RX ADMIN — OXYCODONE HYDROCHLORIDE 5 MG: 5 TABLET ORAL at 03:05

## 2021-05-11 PROBLEM — I48.91 ATRIAL FIBRILLATION WITH RVR: Status: ACTIVE | Noted: 2021-05-11

## 2021-05-11 PROBLEM — I48.0 PAF (PAROXYSMAL ATRIAL FIBRILLATION): Status: ACTIVE | Noted: 2021-05-11

## 2021-05-11 LAB
BACTERIA BLD CULT: NORMAL
BACTERIA BLD CULT: NORMAL
POCT GLUCOSE: 105 MG/DL (ref 70–110)
POCT GLUCOSE: 254 MG/DL (ref 70–110)
POCT GLUCOSE: 255 MG/DL (ref 70–110)

## 2021-05-11 PROCEDURE — 25000003 PHARM REV CODE 250: Performed by: INTERNAL MEDICINE

## 2021-05-11 PROCEDURE — 99233 SBSQ HOSP IP/OBS HIGH 50: CPT | Mod: S$GLB,,, | Performed by: INTERNAL MEDICINE

## 2021-05-11 PROCEDURE — 93010 ELECTROCARDIOGRAM REPORT: CPT | Mod: ,,, | Performed by: INTERNAL MEDICINE

## 2021-05-11 PROCEDURE — 25000003 PHARM REV CODE 250: Performed by: FAMILY MEDICINE

## 2021-05-11 PROCEDURE — 93005 ELECTROCARDIOGRAM TRACING: CPT

## 2021-05-11 PROCEDURE — 11000001 HC ACUTE MED/SURG PRIVATE ROOM

## 2021-05-11 PROCEDURE — 25000242 PHARM REV CODE 250 ALT 637 W/ HCPCS: Performed by: NURSE PRACTITIONER

## 2021-05-11 PROCEDURE — 94640 AIRWAY INHALATION TREATMENT: CPT

## 2021-05-11 PROCEDURE — 25000242 PHARM REV CODE 250 ALT 637 W/ HCPCS: Performed by: HOSPITALIST

## 2021-05-11 PROCEDURE — 97116 GAIT TRAINING THERAPY: CPT | Mod: CQ

## 2021-05-11 PROCEDURE — C9113 INJ PANTOPRAZOLE SODIUM, VIA: HCPCS | Performed by: NURSE PRACTITIONER

## 2021-05-11 PROCEDURE — 25000003 PHARM REV CODE 250: Performed by: NURSE PRACTITIONER

## 2021-05-11 PROCEDURE — 94799 UNLISTED PULMONARY SVC/PX: CPT

## 2021-05-11 PROCEDURE — 63600175 PHARM REV CODE 636 W HCPCS: Performed by: NURSE PRACTITIONER

## 2021-05-11 PROCEDURE — 94761 N-INVAS EAR/PLS OXIMETRY MLT: CPT

## 2021-05-11 PROCEDURE — 25500020 PHARM REV CODE 255

## 2021-05-11 PROCEDURE — 93010 EKG 12-LEAD: ICD-10-PCS | Mod: ,,, | Performed by: INTERNAL MEDICINE

## 2021-05-11 PROCEDURE — 27000221 HC OXYGEN, UP TO 24 HOURS

## 2021-05-11 PROCEDURE — 99233 PR SUBSEQUENT HOSPITAL CARE,LEVL III: ICD-10-PCS | Mod: S$GLB,,, | Performed by: INTERNAL MEDICINE

## 2021-05-11 PROCEDURE — 94660 CPAP INITIATION&MGMT: CPT

## 2021-05-11 PROCEDURE — 97530 THERAPEUTIC ACTIVITIES: CPT | Mod: CQ

## 2021-05-11 PROCEDURE — 99900035 HC TECH TIME PER 15 MIN (STAT)

## 2021-05-11 PROCEDURE — A4216 STERILE WATER/SALINE, 10 ML: HCPCS | Performed by: INTERNAL MEDICINE

## 2021-05-11 RX ORDER — METOPROLOL TARTRATE 50 MG/1
50 TABLET ORAL 2 TIMES DAILY
Status: DISCONTINUED | OUTPATIENT
Start: 2021-05-11 | End: 2021-05-13 | Stop reason: HOSPADM

## 2021-05-11 RX ORDER — INSULIN ASPART 100 [IU]/ML
20 INJECTION, SUSPENSION SUBCUTANEOUS
Refills: 0
Start: 2021-05-11 | End: 2022-05-11

## 2021-05-11 RX ORDER — POLYETHYLENE GLYCOL 3350 17 G/17G
17 POWDER, FOR SOLUTION ORAL DAILY
Refills: 0
Start: 2021-05-12

## 2021-05-11 RX ORDER — METOPROLOL TARTRATE 50 MG/1
50 TABLET ORAL 2 TIMES DAILY
Start: 2021-05-11 | End: 2022-05-11

## 2021-05-11 RX ORDER — OXYCODONE HYDROCHLORIDE 5 MG/1
TABLET ORAL
Qty: 10 TABLET | Refills: 0 | Status: SHIPPED | OUTPATIENT
Start: 2021-05-11

## 2021-05-11 RX ORDER — DILTIAZEM HYDROCHLORIDE 5 MG/ML
10 INJECTION INTRAVENOUS ONCE
Status: COMPLETED | OUTPATIENT
Start: 2021-05-11 | End: 2021-05-11

## 2021-05-11 RX ORDER — AMOXICILLIN 250 MG
1 CAPSULE ORAL 2 TIMES DAILY
Start: 2021-05-11

## 2021-05-11 RX ORDER — DILTIAZEM HCL 1 MG/ML
0-15 INJECTION, SOLUTION INTRAVENOUS CONTINUOUS
Status: DISCONTINUED | OUTPATIENT
Start: 2021-05-11 | End: 2021-05-11

## 2021-05-11 RX ORDER — METOPROLOL TARTRATE 50 MG/1
50 TABLET ORAL 2 TIMES DAILY
Status: DISCONTINUED | OUTPATIENT
Start: 2021-05-11 | End: 2021-05-11

## 2021-05-11 RX ADMIN — DOCUSATE SODIUM 50 MG AND SENNOSIDES 8.6 MG 1 TABLET: 8.6; 5 TABLET, FILM COATED ORAL at 08:05

## 2021-05-11 RX ADMIN — TIOTROPIUM BROMIDE AND OLODATEROL 1 PUFF: 3.124; 2.736 SPRAY, METERED RESPIRATORY (INHALATION) at 08:05

## 2021-05-11 RX ADMIN — AMLODIPINE BESYLATE 10 MG: 5 TABLET ORAL at 08:05

## 2021-05-11 RX ADMIN — IPRATROPIUM BROMIDE AND ALBUTEROL SULFATE 3 ML: .5; 3 SOLUTION RESPIRATORY (INHALATION) at 04:05

## 2021-05-11 RX ADMIN — Medication 10 ML: at 12:05

## 2021-05-11 RX ADMIN — LACTULOSE 30 G: 20 SOLUTION ORAL at 08:05

## 2021-05-11 RX ADMIN — PANCRELIPASE 1 CAPSULE: 24000; 76000; 120000 CAPSULE, DELAYED RELEASE PELLETS ORAL at 08:05

## 2021-05-11 RX ADMIN — METOPROLOL TARTRATE 50 MG: 50 TABLET, FILM COATED ORAL at 08:05

## 2021-05-11 RX ADMIN — PANTOPRAZOLE SODIUM 40 MG: 40 INJECTION, POWDER, FOR SOLUTION INTRAVENOUS at 08:05

## 2021-05-11 RX ADMIN — IPRATROPIUM BROMIDE AND ALBUTEROL SULFATE 3 ML: .5; 3 SOLUTION RESPIRATORY (INHALATION) at 12:05

## 2021-05-11 RX ADMIN — Medication 10 ML: at 05:05

## 2021-05-11 RX ADMIN — Medication 10 ML: at 10:05

## 2021-05-11 RX ADMIN — OXYCODONE HYDROCHLORIDE 10 MG: 10 TABLET ORAL at 10:05

## 2021-05-11 RX ADMIN — INSULIN ASPART 20 UNITS: 100 INJECTION, SUSPENSION SUBCUTANEOUS at 11:05

## 2021-05-11 RX ADMIN — INSULIN ASPART 20 UNITS: 100 INJECTION, SUSPENSION SUBCUTANEOUS at 04:05

## 2021-05-11 RX ADMIN — DILTIAZEM HYDROCHLORIDE 10 MG: 5 INJECTION INTRAVENOUS at 03:05

## 2021-05-11 RX ADMIN — APIXABAN 5 MG: 2.5 TABLET, FILM COATED ORAL at 08:05

## 2021-05-11 RX ADMIN — POLYETHYLENE GLYCOL 3350 17 G: 17 POWDER, FOR SOLUTION ORAL at 08:05

## 2021-05-11 RX ADMIN — INSULIN ASPART 20 UNITS: 100 INJECTION, SUSPENSION SUBCUTANEOUS at 08:05

## 2021-05-11 RX ADMIN — OXYCODONE HYDROCHLORIDE 10 MG: 10 TABLET ORAL at 08:05

## 2021-05-11 RX ADMIN — OXYCODONE HYDROCHLORIDE 10 MG: 10 TABLET ORAL at 04:05

## 2021-05-11 RX ADMIN — INSULIN ASPART 4 UNITS: 100 INJECTION, SOLUTION INTRAVENOUS; SUBCUTANEOUS at 08:05

## 2021-05-11 RX ADMIN — IPRATROPIUM BROMIDE AND ALBUTEROL SULFATE 3 ML: .5; 3 SOLUTION RESPIRATORY (INHALATION) at 07:05

## 2021-05-11 RX ADMIN — IPRATROPIUM BROMIDE AND ALBUTEROL SULFATE 3 ML: .5; 3 SOLUTION RESPIRATORY (INHALATION) at 08:05

## 2021-05-11 RX ADMIN — INSULIN ASPART 6 UNITS: 100 INJECTION, SOLUTION INTRAVENOUS; SUBCUTANEOUS at 05:05

## 2021-05-11 RX ADMIN — IOHEXOL 75 ML: 350 INJECTION, SOLUTION INTRAVENOUS at 03:05

## 2021-05-11 RX ADMIN — MELATONIN TAB 3 MG 6 MG: 3 TAB at 08:05

## 2021-05-11 RX ADMIN — PANCRELIPASE 1 CAPSULE: 24000; 76000; 120000 CAPSULE, DELAYED RELEASE PELLETS ORAL at 04:05

## 2021-05-12 ENCOUNTER — OUTSIDE PLACE OF SERVICE (OUTPATIENT)
Dept: PULMONOLOGY | Facility: CLINIC | Age: 71
End: 2021-05-12
Payer: MEDICARE

## 2021-05-12 DIAGNOSIS — J96.21 ACUTE ON CHRONIC RESPIRATORY FAILURE WITH HYPOXIA: ICD-10-CM

## 2021-05-12 LAB
ANION GAP SERPL CALC-SCNC: 8 MMOL/L (ref 8–16)
BUN SERPL-MCNC: 17 MG/DL (ref 8–23)
CALCIUM SERPL-MCNC: 8.4 MG/DL (ref 8.7–10.5)
CHLORIDE SERPL-SCNC: 94 MMOL/L (ref 95–110)
CO2 SERPL-SCNC: 34 MMOL/L (ref 23–29)
CREAT SERPL-MCNC: 0.9 MG/DL (ref 0.5–1.4)
EST. GFR  (AFRICAN AMERICAN): >60 ML/MIN/1.73 M^2
EST. GFR  (NON AFRICAN AMERICAN): >60 ML/MIN/1.73 M^2
GLUCOSE SERPL-MCNC: 158 MG/DL (ref 70–110)
POCT GLUCOSE: 151 MG/DL (ref 70–110)
POCT GLUCOSE: 161 MG/DL (ref 70–110)
POCT GLUCOSE: 79 MG/DL (ref 70–110)
POCT GLUCOSE: 84 MG/DL (ref 70–110)
POCT GLUCOSE: 92 MG/DL (ref 70–110)
POTASSIUM SERPL-SCNC: 3.7 MMOL/L (ref 3.5–5.1)
SARS-COV-2 RDRP RESP QL NAA+PROBE: NEGATIVE
SODIUM SERPL-SCNC: 136 MMOL/L (ref 136–145)

## 2021-05-12 PROCEDURE — 94640 AIRWAY INHALATION TREATMENT: CPT

## 2021-05-12 PROCEDURE — 27000646 HC AEROBIKA DEVICE

## 2021-05-12 PROCEDURE — 94761 N-INVAS EAR/PLS OXIMETRY MLT: CPT

## 2021-05-12 PROCEDURE — 94799 UNLISTED PULMONARY SVC/PX: CPT

## 2021-05-12 PROCEDURE — 25000003 PHARM REV CODE 250: Performed by: FAMILY MEDICINE

## 2021-05-12 PROCEDURE — 25000003 PHARM REV CODE 250: Performed by: NURSE PRACTITIONER

## 2021-05-12 PROCEDURE — 99233 PR SUBSEQUENT HOSPITAL CARE,LEVL III: ICD-10-PCS | Mod: S$GLB,,, | Performed by: INTERNAL MEDICINE

## 2021-05-12 PROCEDURE — 11000001 HC ACUTE MED/SURG PRIVATE ROOM

## 2021-05-12 PROCEDURE — 25000242 PHARM REV CODE 250 ALT 637 W/ HCPCS: Performed by: NURSE PRACTITIONER

## 2021-05-12 PROCEDURE — 99233 SBSQ HOSP IP/OBS HIGH 50: CPT | Mod: S$GLB,,, | Performed by: INTERNAL MEDICINE

## 2021-05-12 PROCEDURE — 80048 BASIC METABOLIC PNL TOTAL CA: CPT | Performed by: FAMILY MEDICINE

## 2021-05-12 PROCEDURE — 99900035 HC TECH TIME PER 15 MIN (STAT)

## 2021-05-12 PROCEDURE — 94664 DEMO&/EVAL PT USE INHALER: CPT

## 2021-05-12 PROCEDURE — 97535 SELF CARE MNGMENT TRAINING: CPT

## 2021-05-12 PROCEDURE — 97116 GAIT TRAINING THERAPY: CPT | Mod: CQ

## 2021-05-12 PROCEDURE — 25000003 PHARM REV CODE 250: Performed by: INTERNAL MEDICINE

## 2021-05-12 PROCEDURE — 27000221 HC OXYGEN, UP TO 24 HOURS

## 2021-05-12 PROCEDURE — 36415 COLL VENOUS BLD VENIPUNCTURE: CPT | Performed by: FAMILY MEDICINE

## 2021-05-12 PROCEDURE — C9113 INJ PANTOPRAZOLE SODIUM, VIA: HCPCS | Performed by: NURSE PRACTITIONER

## 2021-05-12 PROCEDURE — U0002 COVID-19 LAB TEST NON-CDC: HCPCS | Performed by: INTERNAL MEDICINE

## 2021-05-12 PROCEDURE — 63600175 PHARM REV CODE 636 W HCPCS: Performed by: NURSE PRACTITIONER

## 2021-05-12 PROCEDURE — 97110 THERAPEUTIC EXERCISES: CPT | Mod: CQ

## 2021-05-12 PROCEDURE — A4216 STERILE WATER/SALINE, 10 ML: HCPCS | Performed by: INTERNAL MEDICINE

## 2021-05-12 PROCEDURE — 25000242 PHARM REV CODE 250 ALT 637 W/ HCPCS: Performed by: HOSPITALIST

## 2021-05-12 RX ADMIN — ONDANSETRON 4 MG: 2 INJECTION INTRAMUSCULAR; INTRAVENOUS at 09:05

## 2021-05-12 RX ADMIN — TIOTROPIUM BROMIDE AND OLODATEROL 1 PUFF: 3.124; 2.736 SPRAY, METERED RESPIRATORY (INHALATION) at 07:05

## 2021-05-12 RX ADMIN — IPRATROPIUM BROMIDE AND ALBUTEROL SULFATE 3 ML: .5; 3 SOLUTION RESPIRATORY (INHALATION) at 04:05

## 2021-05-12 RX ADMIN — OXYCODONE HYDROCHLORIDE 10 MG: 10 TABLET ORAL at 06:05

## 2021-05-12 RX ADMIN — IPRATROPIUM BROMIDE AND ALBUTEROL SULFATE 3 ML: .5; 3 SOLUTION RESPIRATORY (INHALATION) at 07:05

## 2021-05-12 RX ADMIN — METOPROLOL TARTRATE 50 MG: 50 TABLET, FILM COATED ORAL at 08:05

## 2021-05-12 RX ADMIN — POLYETHYLENE GLYCOL 3350 17 G: 17 POWDER, FOR SOLUTION ORAL at 08:05

## 2021-05-12 RX ADMIN — INSULIN ASPART 20 UNITS: 100 INJECTION, SUSPENSION SUBCUTANEOUS at 08:05

## 2021-05-12 RX ADMIN — LACTULOSE 30 G: 20 SOLUTION ORAL at 04:05

## 2021-05-12 RX ADMIN — APIXABAN 5 MG: 2.5 TABLET, FILM COATED ORAL at 08:05

## 2021-05-12 RX ADMIN — Medication 10 ML: at 12:05

## 2021-05-12 RX ADMIN — INSULIN ASPART 20 UNITS: 100 INJECTION, SUSPENSION SUBCUTANEOUS at 12:05

## 2021-05-12 RX ADMIN — PANTOPRAZOLE SODIUM 40 MG: 40 INJECTION, POWDER, FOR SOLUTION INTRAVENOUS at 08:05

## 2021-05-12 RX ADMIN — DOCUSATE SODIUM 50 MG AND SENNOSIDES 8.6 MG 1 TABLET: 8.6; 5 TABLET, FILM COATED ORAL at 08:05

## 2021-05-12 RX ADMIN — PANCRELIPASE 1 CAPSULE: 24000; 76000; 120000 CAPSULE, DELAYED RELEASE PELLETS ORAL at 01:05

## 2021-05-12 RX ADMIN — IPRATROPIUM BROMIDE AND ALBUTEROL SULFATE 3 ML: .5; 3 SOLUTION RESPIRATORY (INHALATION) at 12:05

## 2021-05-12 RX ADMIN — Medication 10 ML: at 05:05

## 2021-05-12 RX ADMIN — PANCRELIPASE 1 CAPSULE: 24000; 76000; 120000 CAPSULE, DELAYED RELEASE PELLETS ORAL at 08:05

## 2021-05-12 RX ADMIN — LACTULOSE 30 G: 20 SOLUTION ORAL at 08:05

## 2021-05-12 RX ADMIN — OXYCODONE HYDROCHLORIDE 10 MG: 10 TABLET ORAL at 09:05

## 2021-05-12 RX ADMIN — OXYCODONE HYDROCHLORIDE 10 MG: 10 TABLET ORAL at 04:05

## 2021-05-12 RX ADMIN — PANCRELIPASE 1 CAPSULE: 24000; 76000; 120000 CAPSULE, DELAYED RELEASE PELLETS ORAL at 06:05

## 2021-05-12 RX ADMIN — Medication 10 ML: at 11:05

## 2021-05-12 RX ADMIN — OXYCODONE HYDROCHLORIDE 10 MG: 10 TABLET ORAL at 01:05

## 2021-05-12 RX ADMIN — Medication 10 ML: at 06:05

## 2021-05-13 VITALS
DIASTOLIC BLOOD PRESSURE: 60 MMHG | WEIGHT: 233.94 LBS | HEART RATE: 75 BPM | SYSTOLIC BLOOD PRESSURE: 121 MMHG | HEIGHT: 68 IN | BODY MASS INDEX: 35.45 KG/M2 | OXYGEN SATURATION: 99 % | TEMPERATURE: 97 F | RESPIRATION RATE: 12 BRPM

## 2021-05-13 LAB
ALBUMIN SERPL BCP-MCNC: 2.2 G/DL (ref 3.5–5.2)
ALP SERPL-CCNC: 64 U/L (ref 55–135)
ALT SERPL W/O P-5'-P-CCNC: 42 U/L (ref 10–44)
ANION GAP SERPL CALC-SCNC: 11 MMOL/L (ref 8–16)
AST SERPL-CCNC: 18 U/L (ref 10–40)
BASOPHILS # BLD AUTO: 0.05 K/UL (ref 0–0.2)
BASOPHILS NFR BLD: 0.4 % (ref 0–1.9)
BILIRUB SERPL-MCNC: 0.8 MG/DL (ref 0.1–1)
BUN SERPL-MCNC: 15 MG/DL (ref 8–23)
CALCIUM SERPL-MCNC: 8.4 MG/DL (ref 8.7–10.5)
CHLORIDE SERPL-SCNC: 95 MMOL/L (ref 95–110)
CO2 SERPL-SCNC: 32 MMOL/L (ref 23–29)
CREAT SERPL-MCNC: 0.9 MG/DL (ref 0.5–1.4)
DIFFERENTIAL METHOD: ABNORMAL
EOSINOPHIL # BLD AUTO: 0.3 K/UL (ref 0–0.5)
EOSINOPHIL NFR BLD: 2.2 % (ref 0–8)
ERYTHROCYTE [DISTWIDTH] IN BLOOD BY AUTOMATED COUNT: 13.9 % (ref 11.5–14.5)
EST. GFR  (AFRICAN AMERICAN): >60 ML/MIN/1.73 M^2
EST. GFR  (NON AFRICAN AMERICAN): >60 ML/MIN/1.73 M^2
GLUCOSE SERPL-MCNC: 146 MG/DL (ref 70–110)
HCT VFR BLD AUTO: 25.5 % (ref 37–48.5)
HGB BLD-MCNC: 8 G/DL (ref 12–16)
IMM GRANULOCYTES # BLD AUTO: 0.48 K/UL (ref 0–0.04)
IMM GRANULOCYTES NFR BLD AUTO: 3.9 % (ref 0–0.5)
LYMPHOCYTES # BLD AUTO: 1.5 K/UL (ref 1–4.8)
LYMPHOCYTES NFR BLD: 11.9 % (ref 18–48)
MAGNESIUM SERPL-MCNC: 2.3 MG/DL (ref 1.6–2.6)
MCH RBC QN AUTO: 29.6 PG (ref 27–31)
MCHC RBC AUTO-ENTMCNC: 31.4 G/DL (ref 32–36)
MCV RBC AUTO: 94 FL (ref 82–98)
MONOCYTES # BLD AUTO: 0.9 K/UL (ref 0.3–1)
MONOCYTES NFR BLD: 6.9 % (ref 4–15)
NEUTROPHILS # BLD AUTO: 9.3 K/UL (ref 1.8–7.7)
NEUTROPHILS NFR BLD: 74.7 % (ref 38–73)
NRBC BLD-RTO: 0 /100 WBC
PLATELET # BLD AUTO: 320 K/UL (ref 150–450)
PLATELET BLD QL SMEAR: ABNORMAL
PMV BLD AUTO: 10.2 FL (ref 9.2–12.9)
POCT GLUCOSE: 127 MG/DL (ref 70–110)
POTASSIUM SERPL-SCNC: 3.4 MMOL/L (ref 3.5–5.1)
PROT SERPL-MCNC: 6 G/DL (ref 6–8.4)
RBC # BLD AUTO: 2.7 M/UL (ref 4–5.4)
SODIUM SERPL-SCNC: 138 MMOL/L (ref 136–145)
TB INDURATION 48 - 72 HR READ: 0 MM
WBC # BLD AUTO: 12.42 K/UL (ref 3.9–12.7)

## 2021-05-13 PROCEDURE — 25000242 PHARM REV CODE 250 ALT 637 W/ HCPCS: Performed by: HOSPITALIST

## 2021-05-13 PROCEDURE — 63600175 PHARM REV CODE 636 W HCPCS: Performed by: NURSE PRACTITIONER

## 2021-05-13 PROCEDURE — 94640 AIRWAY INHALATION TREATMENT: CPT

## 2021-05-13 PROCEDURE — 25000003 PHARM REV CODE 250: Performed by: NURSE PRACTITIONER

## 2021-05-13 PROCEDURE — 27000221 HC OXYGEN, UP TO 24 HOURS

## 2021-05-13 PROCEDURE — 97116 GAIT TRAINING THERAPY: CPT | Mod: CQ

## 2021-05-13 PROCEDURE — A4216 STERILE WATER/SALINE, 10 ML: HCPCS | Performed by: INTERNAL MEDICINE

## 2021-05-13 PROCEDURE — 85025 COMPLETE CBC W/AUTO DIFF WBC: CPT | Performed by: NURSE PRACTITIONER

## 2021-05-13 PROCEDURE — C9113 INJ PANTOPRAZOLE SODIUM, VIA: HCPCS | Performed by: NURSE PRACTITIONER

## 2021-05-13 PROCEDURE — 25000242 PHARM REV CODE 250 ALT 637 W/ HCPCS: Performed by: NURSE PRACTITIONER

## 2021-05-13 PROCEDURE — 93010 ELECTROCARDIOGRAM REPORT: CPT | Mod: ,,, | Performed by: INTERNAL MEDICINE

## 2021-05-13 PROCEDURE — 94799 UNLISTED PULMONARY SVC/PX: CPT

## 2021-05-13 PROCEDURE — 80053 COMPREHEN METABOLIC PANEL: CPT | Performed by: NURSE PRACTITIONER

## 2021-05-13 PROCEDURE — 83735 ASSAY OF MAGNESIUM: CPT | Performed by: NURSE PRACTITIONER

## 2021-05-13 PROCEDURE — 93005 ELECTROCARDIOGRAM TRACING: CPT

## 2021-05-13 PROCEDURE — 27100171 HC OXYGEN HIGH FLOW UP TO 24 HOURS

## 2021-05-13 PROCEDURE — 99900035 HC TECH TIME PER 15 MIN (STAT)

## 2021-05-13 PROCEDURE — 36415 COLL VENOUS BLD VENIPUNCTURE: CPT | Performed by: NURSE PRACTITIONER

## 2021-05-13 PROCEDURE — 93010 EKG 12-LEAD: ICD-10-PCS | Mod: ,,, | Performed by: INTERNAL MEDICINE

## 2021-05-13 PROCEDURE — 25000003 PHARM REV CODE 250: Performed by: INTERNAL MEDICINE

## 2021-05-13 PROCEDURE — 94761 N-INVAS EAR/PLS OXIMETRY MLT: CPT

## 2021-05-13 PROCEDURE — 97110 THERAPEUTIC EXERCISES: CPT | Mod: CQ

## 2021-05-13 PROCEDURE — 25000003 PHARM REV CODE 250: Performed by: FAMILY MEDICINE

## 2021-05-13 PROCEDURE — 94664 DEMO&/EVAL PT USE INHALER: CPT

## 2021-05-13 RX ORDER — SODIUM CHLORIDE 0.9 % (FLUSH) 0.9 %
10 SYRINGE (ML) INJECTION
Status: CANCELLED | OUTPATIENT
Start: 2021-05-13

## 2021-05-13 RX ORDER — FERROUS SULFATE 324(65)MG
324 TABLET, DELAYED RELEASE (ENTERIC COATED) ORAL DAILY
Qty: 30 TABLET | Refills: 0 | Status: SHIPPED | OUTPATIENT
Start: 2021-05-13

## 2021-05-13 RX ORDER — POTASSIUM CHLORIDE 20 MEQ/1
40 TABLET, EXTENDED RELEASE ORAL ONCE
Status: DISCONTINUED | OUTPATIENT
Start: 2021-05-13 | End: 2021-05-13 | Stop reason: HOSPADM

## 2021-05-13 RX ADMIN — Medication 10 ML: at 05:05

## 2021-05-13 RX ADMIN — POTASSIUM BICARBONATE 35 MEQ: 391 TABLET, EFFERVESCENT ORAL at 04:05

## 2021-05-13 RX ADMIN — IPRATROPIUM BROMIDE AND ALBUTEROL SULFATE 3 ML: .5; 3 SOLUTION RESPIRATORY (INHALATION) at 11:05

## 2021-05-13 RX ADMIN — OXYCODONE HYDROCHLORIDE 10 MG: 10 TABLET ORAL at 05:05

## 2021-05-13 RX ADMIN — IPRATROPIUM BROMIDE AND ALBUTEROL SULFATE 3 ML: .5; 3 SOLUTION RESPIRATORY (INHALATION) at 03:05

## 2021-05-13 RX ADMIN — TIOTROPIUM BROMIDE AND OLODATEROL 1 PUFF: 3.124; 2.736 SPRAY, METERED RESPIRATORY (INHALATION) at 07:05

## 2021-05-13 RX ADMIN — OXYCODONE HYDROCHLORIDE 10 MG: 10 TABLET ORAL at 01:05

## 2021-05-13 RX ADMIN — POTASSIUM BICARBONATE 35 MEQ: 391 TABLET, EFFERVESCENT ORAL at 02:05

## 2021-05-13 RX ADMIN — PANTOPRAZOLE SODIUM 40 MG: 40 INJECTION, POWDER, FOR SOLUTION INTRAVENOUS at 08:05

## 2021-05-13 RX ADMIN — APIXABAN 5 MG: 2.5 TABLET, FILM COATED ORAL at 08:05

## 2021-05-13 RX ADMIN — IRON SUCROSE 200 MG: 20 INJECTION, SOLUTION INTRAVENOUS at 10:05

## 2021-05-13 RX ADMIN — INSULIN ASPART 20 UNITS: 100 INJECTION, SUSPENSION SUBCUTANEOUS at 08:05

## 2021-05-13 RX ADMIN — PANCRELIPASE 1 CAPSULE: 24000; 76000; 120000 CAPSULE, DELAYED RELEASE PELLETS ORAL at 08:05

## 2021-05-13 RX ADMIN — Medication 10 ML: at 12:05

## 2021-05-13 RX ADMIN — IPRATROPIUM BROMIDE AND ALBUTEROL SULFATE 3 ML: .5; 3 SOLUTION RESPIRATORY (INHALATION) at 07:05

## 2021-05-13 RX ADMIN — METOPROLOL TARTRATE 50 MG: 50 TABLET, FILM COATED ORAL at 08:05

## 2021-05-14 LAB — POCT GLUCOSE: 107 MG/DL (ref 70–110)

## 2021-05-17 ENCOUNTER — OFFICE VISIT (OUTPATIENT)
Dept: ORTHOPEDICS | Facility: CLINIC | Age: 71
End: 2021-05-17
Payer: MEDICARE

## 2021-05-17 VITALS
HEIGHT: 68 IN | WEIGHT: 233 LBS | BODY MASS INDEX: 35.31 KG/M2 | HEART RATE: 89 BPM | DIASTOLIC BLOOD PRESSURE: 82 MMHG | SYSTOLIC BLOOD PRESSURE: 126 MMHG

## 2021-05-17 DIAGNOSIS — Z96.651 STATUS POST TOTAL KNEE REPLACEMENT, RIGHT: Primary | ICD-10-CM

## 2021-05-17 PROCEDURE — 3288F FALL RISK ASSESSMENT DOCD: CPT | Mod: S$GLB,,, | Performed by: PHYSICIAN ASSISTANT

## 2021-05-17 PROCEDURE — 3288F PR FALLS RISK ASSESSMENT DOCUMENTED: ICD-10-PCS | Mod: S$GLB,,, | Performed by: PHYSICIAN ASSISTANT

## 2021-05-17 PROCEDURE — 1125F PR PAIN SEVERITY QUANTIFIED, PAIN PRESENT: ICD-10-PCS | Mod: S$GLB,,, | Performed by: PHYSICIAN ASSISTANT

## 2021-05-17 PROCEDURE — 3008F BODY MASS INDEX DOCD: CPT | Mod: S$GLB,,, | Performed by: PHYSICIAN ASSISTANT

## 2021-05-17 PROCEDURE — 3008F PR BODY MASS INDEX (BMI) DOCUMENTED: ICD-10-PCS | Mod: S$GLB,,, | Performed by: PHYSICIAN ASSISTANT

## 2021-05-17 PROCEDURE — 1125F AMNT PAIN NOTED PAIN PRSNT: CPT | Mod: S$GLB,,, | Performed by: PHYSICIAN ASSISTANT

## 2021-05-17 PROCEDURE — 1101F PR PT FALLS ASSESS DOC 0-1 FALLS W/OUT INJ PAST YR: ICD-10-PCS | Mod: S$GLB,,, | Performed by: PHYSICIAN ASSISTANT

## 2021-05-17 PROCEDURE — 99024 PR POST-OP FOLLOW-UP VISIT: ICD-10-PCS | Mod: S$GLB,,, | Performed by: PHYSICIAN ASSISTANT

## 2021-05-17 PROCEDURE — 1101F PT FALLS ASSESS-DOCD LE1/YR: CPT | Mod: S$GLB,,, | Performed by: PHYSICIAN ASSISTANT

## 2021-05-17 PROCEDURE — 99024 POSTOP FOLLOW-UP VISIT: CPT | Mod: S$GLB,,, | Performed by: PHYSICIAN ASSISTANT

## 2021-05-17 RX ORDER — DILTIAZEM HYDROCHLORIDE 120 MG/1
CAPSULE, COATED, EXTENDED RELEASE ORAL
COMMUNITY
Start: 2021-04-10

## 2021-05-17 RX ORDER — OXYCODONE AND ACETAMINOPHEN 10; 325 MG/1; MG/1
TABLET ORAL
COMMUNITY
Start: 2021-05-04 | End: 2021-06-15

## 2021-05-17 RX ORDER — METOPROLOL SUCCINATE 100 MG/1
TABLET, EXTENDED RELEASE ORAL
COMMUNITY
Start: 2021-04-10

## 2021-06-02 ENCOUNTER — OFFICE VISIT (OUTPATIENT)
Dept: PULMONOLOGY | Facility: CLINIC | Age: 71
End: 2021-06-02
Payer: MEDICARE

## 2021-06-02 VITALS
OXYGEN SATURATION: 96 % | DIASTOLIC BLOOD PRESSURE: 80 MMHG | SYSTOLIC BLOOD PRESSURE: 140 MMHG | BODY MASS INDEX: 31.07 KG/M2 | HEIGHT: 68 IN | WEIGHT: 205 LBS | HEART RATE: 83 BPM

## 2021-06-02 DIAGNOSIS — R09.02 HYPOXEMIA: ICD-10-CM

## 2021-06-02 DIAGNOSIS — J44.9 CHRONIC OBSTRUCTIVE PULMONARY DISEASE, UNSPECIFIED COPD TYPE: Primary | ICD-10-CM

## 2021-06-02 PROCEDURE — 1159F MED LIST DOCD IN RCRD: CPT | Mod: S$GLB,,, | Performed by: NURSE PRACTITIONER

## 2021-06-02 PROCEDURE — 1159F PR MEDICATION LIST DOCUMENTED IN MEDICAL RECORD: ICD-10-PCS | Mod: S$GLB,,, | Performed by: NURSE PRACTITIONER

## 2021-06-02 PROCEDURE — 99214 PR OFFICE/OUTPT VISIT, EST, LEVL IV, 30-39 MIN: ICD-10-PCS | Mod: S$GLB,,, | Performed by: NURSE PRACTITIONER

## 2021-06-02 PROCEDURE — 1126F PR PAIN SEVERITY QUANTIFIED, NO PAIN PRESENT: ICD-10-PCS | Mod: S$GLB,,, | Performed by: NURSE PRACTITIONER

## 2021-06-02 PROCEDURE — 99214 OFFICE O/P EST MOD 30 MIN: CPT | Mod: S$GLB,,, | Performed by: NURSE PRACTITIONER

## 2021-06-02 PROCEDURE — 1111F DSCHRG MED/CURRENT MED MERGE: CPT | Mod: S$GLB,,, | Performed by: NURSE PRACTITIONER

## 2021-06-02 PROCEDURE — 1111F PR DISCHARGE MEDS RECONCILED W/ CURRENT OUTPATIENT MED LIST: ICD-10-PCS | Mod: S$GLB,,, | Performed by: NURSE PRACTITIONER

## 2021-06-02 PROCEDURE — 1126F AMNT PAIN NOTED NONE PRSNT: CPT | Mod: S$GLB,,, | Performed by: NURSE PRACTITIONER

## 2021-06-02 RX ORDER — TIOTROPIUM BROMIDE AND OLODATEROL 3.124; 2.736 UG/1; UG/1
2 SPRAY, METERED RESPIRATORY (INHALATION) DAILY
Qty: 12 G | Refills: 5 | Status: SHIPPED | OUTPATIENT
Start: 2021-06-02 | End: 2023-03-07 | Stop reason: SDUPTHER

## 2021-06-15 ENCOUNTER — TELEPHONE (OUTPATIENT)
Dept: ORTHOPEDICS | Facility: CLINIC | Age: 71
End: 2021-06-15

## 2021-06-15 ENCOUNTER — OFFICE VISIT (OUTPATIENT)
Dept: ORTHOPEDICS | Facility: CLINIC | Age: 71
End: 2021-06-15
Payer: MEDICARE

## 2021-06-15 VITALS — WEIGHT: 205 LBS | HEIGHT: 68 IN | BODY MASS INDEX: 31.07 KG/M2

## 2021-06-15 DIAGNOSIS — Z96.651 STATUS POST TOTAL KNEE REPLACEMENT, RIGHT: Primary | ICD-10-CM

## 2021-06-15 PROCEDURE — 3288F PR FALLS RISK ASSESSMENT DOCUMENTED: ICD-10-PCS | Mod: S$GLB,,, | Performed by: ORTHOPAEDIC SURGERY

## 2021-06-15 PROCEDURE — 1101F PR PT FALLS ASSESS DOC 0-1 FALLS W/OUT INJ PAST YR: ICD-10-PCS | Mod: S$GLB,,, | Performed by: ORTHOPAEDIC SURGERY

## 2021-06-15 PROCEDURE — 3288F FALL RISK ASSESSMENT DOCD: CPT | Mod: S$GLB,,, | Performed by: ORTHOPAEDIC SURGERY

## 2021-06-15 PROCEDURE — 1125F AMNT PAIN NOTED PAIN PRSNT: CPT | Mod: S$GLB,,, | Performed by: ORTHOPAEDIC SURGERY

## 2021-06-15 PROCEDURE — 99024 PR POST-OP FOLLOW-UP VISIT: ICD-10-PCS | Mod: S$GLB,,, | Performed by: ORTHOPAEDIC SURGERY

## 2021-06-15 PROCEDURE — 99024 POSTOP FOLLOW-UP VISIT: CPT | Mod: S$GLB,,, | Performed by: ORTHOPAEDIC SURGERY

## 2021-06-15 PROCEDURE — 1125F PR PAIN SEVERITY QUANTIFIED, PAIN PRESENT: ICD-10-PCS | Mod: S$GLB,,, | Performed by: ORTHOPAEDIC SURGERY

## 2021-06-15 PROCEDURE — 3008F PR BODY MASS INDEX (BMI) DOCUMENTED: ICD-10-PCS | Mod: S$GLB,,, | Performed by: ORTHOPAEDIC SURGERY

## 2021-06-15 PROCEDURE — 1101F PT FALLS ASSESS-DOCD LE1/YR: CPT | Mod: S$GLB,,, | Performed by: ORTHOPAEDIC SURGERY

## 2021-06-15 PROCEDURE — 3008F BODY MASS INDEX DOCD: CPT | Mod: S$GLB,,, | Performed by: ORTHOPAEDIC SURGERY

## 2021-06-15 RX ORDER — OXYCODONE AND ACETAMINOPHEN 10; 325 MG/1; MG/1
1 TABLET ORAL EVERY 4 HOURS PRN
COMMUNITY
Start: 2021-06-14

## 2021-06-25 ENCOUNTER — TELEPHONE (OUTPATIENT)
Dept: ORTHOPEDICS | Facility: CLINIC | Age: 71
End: 2021-06-25

## 2021-07-08 ENCOUNTER — OFFICE VISIT (OUTPATIENT)
Dept: ORTHOPEDICS | Facility: CLINIC | Age: 71
End: 2021-07-08
Payer: MEDICARE

## 2021-07-08 VITALS — WEIGHT: 205 LBS | HEIGHT: 68 IN | BODY MASS INDEX: 31.07 KG/M2

## 2021-07-08 DIAGNOSIS — Z96.651 STATUS POST TOTAL KNEE REPLACEMENT, RIGHT: Primary | ICD-10-CM

## 2021-07-08 PROCEDURE — 1101F PT FALLS ASSESS-DOCD LE1/YR: CPT | Mod: S$GLB,,, | Performed by: ORTHOPAEDIC SURGERY

## 2021-07-08 PROCEDURE — 99024 POSTOP FOLLOW-UP VISIT: CPT | Mod: S$GLB,,, | Performed by: ORTHOPAEDIC SURGERY

## 2021-07-08 PROCEDURE — 1125F PR PAIN SEVERITY QUANTIFIED, PAIN PRESENT: ICD-10-PCS | Mod: S$GLB,,, | Performed by: ORTHOPAEDIC SURGERY

## 2021-07-08 PROCEDURE — 1125F AMNT PAIN NOTED PAIN PRSNT: CPT | Mod: S$GLB,,, | Performed by: ORTHOPAEDIC SURGERY

## 2021-07-08 PROCEDURE — 3008F BODY MASS INDEX DOCD: CPT | Mod: S$GLB,,, | Performed by: ORTHOPAEDIC SURGERY

## 2021-07-08 PROCEDURE — 99024 PR POST-OP FOLLOW-UP VISIT: ICD-10-PCS | Mod: S$GLB,,, | Performed by: ORTHOPAEDIC SURGERY

## 2021-07-08 PROCEDURE — 1101F PR PT FALLS ASSESS DOC 0-1 FALLS W/OUT INJ PAST YR: ICD-10-PCS | Mod: S$GLB,,, | Performed by: ORTHOPAEDIC SURGERY

## 2021-07-08 PROCEDURE — 3008F PR BODY MASS INDEX (BMI) DOCUMENTED: ICD-10-PCS | Mod: S$GLB,,, | Performed by: ORTHOPAEDIC SURGERY

## 2021-07-08 PROCEDURE — 3288F PR FALLS RISK ASSESSMENT DOCUMENTED: ICD-10-PCS | Mod: S$GLB,,, | Performed by: ORTHOPAEDIC SURGERY

## 2021-07-08 PROCEDURE — 3288F FALL RISK ASSESSMENT DOCD: CPT | Mod: S$GLB,,, | Performed by: ORTHOPAEDIC SURGERY

## 2021-07-08 RX ORDER — AMLODIPINE BESYLATE 5 MG/1
TABLET ORAL
COMMUNITY
Start: 2021-06-22

## 2021-09-15 ENCOUNTER — HOSPITAL ENCOUNTER (OUTPATIENT)
Dept: RADIOLOGY | Facility: HOSPITAL | Age: 71
Discharge: HOME | End: 2021-09-15
Attending: NURSE PRACTITIONER
Payer: MEDICARE

## 2021-09-15 ENCOUNTER — TELEPHONE (OUTPATIENT)
Dept: PULMONOLOGY | Facility: CLINIC | Age: 71
End: 2021-09-15

## 2021-09-15 DIAGNOSIS — J44.9 CHRONIC OBSTRUCTIVE PULMONARY DISEASE, UNSPECIFIED COPD TYPE: Primary | ICD-10-CM

## 2021-09-15 DIAGNOSIS — J96.21 ACUTE ON CHRONIC RESPIRATORY FAILURE WITH HYPOXIA: ICD-10-CM

## 2021-09-15 DIAGNOSIS — J44.9 CHRONIC OBSTRUCTIVE PULMONARY DISEASE, UNSPECIFIED COPD TYPE: ICD-10-CM

## 2021-09-15 PROCEDURE — 71046 X-RAY EXAM CHEST 2 VIEWS: CPT | Mod: TC

## 2021-10-08 ENCOUNTER — OFFICE VISIT (OUTPATIENT)
Dept: PULMONOLOGY | Facility: CLINIC | Age: 71
End: 2021-10-08
Payer: MEDICARE

## 2021-10-08 VITALS
WEIGHT: 213 LBS | HEART RATE: 65 BPM | OXYGEN SATURATION: 95 % | HEIGHT: 68 IN | BODY MASS INDEX: 32.28 KG/M2 | DIASTOLIC BLOOD PRESSURE: 74 MMHG | SYSTOLIC BLOOD PRESSURE: 152 MMHG

## 2021-10-08 DIAGNOSIS — J44.9 CHRONIC OBSTRUCTIVE PULMONARY DISEASE, UNSPECIFIED COPD TYPE: Primary | ICD-10-CM

## 2021-10-08 PROCEDURE — 99213 PR OFFICE/OUTPT VISIT, EST, LEVL III, 20-29 MIN: ICD-10-PCS | Mod: S$GLB,,, | Performed by: NURSE PRACTITIONER

## 2021-10-08 PROCEDURE — 1159F PR MEDICATION LIST DOCUMENTED IN MEDICAL RECORD: ICD-10-PCS | Mod: S$GLB,,, | Performed by: NURSE PRACTITIONER

## 2021-10-08 PROCEDURE — 1159F MED LIST DOCD IN RCRD: CPT | Mod: S$GLB,,, | Performed by: NURSE PRACTITIONER

## 2021-10-08 PROCEDURE — 3052F PR MOST RECENT HEMOGLOBIN A1C LEVEL 8.0 - < 9.0%: ICD-10-PCS | Mod: S$GLB,,, | Performed by: NURSE PRACTITIONER

## 2021-10-08 PROCEDURE — 1126F AMNT PAIN NOTED NONE PRSNT: CPT | Mod: S$GLB,,, | Performed by: NURSE PRACTITIONER

## 2021-10-08 PROCEDURE — 3077F PR MOST RECENT SYSTOLIC BLOOD PRESSURE >= 140 MM HG: ICD-10-PCS | Mod: S$GLB,,, | Performed by: NURSE PRACTITIONER

## 2021-10-08 PROCEDURE — 3078F PR MOST RECENT DIASTOLIC BLOOD PRESSURE < 80 MM HG: ICD-10-PCS | Mod: S$GLB,,, | Performed by: NURSE PRACTITIONER

## 2021-10-08 PROCEDURE — 3008F BODY MASS INDEX DOCD: CPT | Mod: S$GLB,,, | Performed by: NURSE PRACTITIONER

## 2021-10-08 PROCEDURE — 1126F PR PAIN SEVERITY QUANTIFIED, NO PAIN PRESENT: ICD-10-PCS | Mod: S$GLB,,, | Performed by: NURSE PRACTITIONER

## 2021-10-08 PROCEDURE — 3077F SYST BP >= 140 MM HG: CPT | Mod: S$GLB,,, | Performed by: NURSE PRACTITIONER

## 2021-10-08 PROCEDURE — 3008F PR BODY MASS INDEX (BMI) DOCUMENTED: ICD-10-PCS | Mod: S$GLB,,, | Performed by: NURSE PRACTITIONER

## 2021-10-08 PROCEDURE — 99213 OFFICE O/P EST LOW 20 MIN: CPT | Mod: S$GLB,,, | Performed by: NURSE PRACTITIONER

## 2021-10-08 PROCEDURE — 3078F DIAST BP <80 MM HG: CPT | Mod: S$GLB,,, | Performed by: NURSE PRACTITIONER

## 2021-10-08 PROCEDURE — 3052F HG A1C>EQUAL 8.0%<EQUAL 9.0%: CPT | Mod: S$GLB,,, | Performed by: NURSE PRACTITIONER

## 2021-10-08 RX ORDER — FLECAINIDE ACETATE 100 MG/1
1 TABLET ORAL 2 TIMES DAILY
COMMUNITY
Start: 2021-08-09 | End: 2022-08-25

## 2021-10-08 RX ORDER — PREDNISONE 10 MG/1
TABLET ORAL
Qty: 20 TABLET | Refills: 0 | Status: SHIPPED | OUTPATIENT
Start: 2021-10-08 | End: 2022-04-14

## 2021-10-08 RX ORDER — IPRATROPIUM BROMIDE AND ALBUTEROL SULFATE 2.5; .5 MG/3ML; MG/3ML
SOLUTION RESPIRATORY (INHALATION)
COMMUNITY
Start: 2021-09-13

## 2022-04-13 NOTE — PROGRESS NOTES
Subjective:       Chief Complaint    Chief Complaint   Patient presents with    Knee Pain     Pain started approx. on 6/2017 with no known trauma.  Dr. Blunt gave her a cortisone injection which helped only for 1 day.  Previous xrays perform at [place] but were not brought with her.   Patient reports pain and burning under the patella.  Also has some swelling.       CURT Otero is a 66 y.o.  female who presents Progressive discomfort in her left knee. She's had trouble with it for over a year, but it out, worse in the last 6 weeks. 15 pound weight gain since April. She stopped smoking at that time. Pain level during the day as 5/10. At night the pain level goes to 8/10. She has used tramadol. 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      Past History  Past Medical History:   Diagnosis Date    Arthritis     Asthma     Diabetes mellitus     Diabetes mellitus, type 2     Diverticulosis     HNP (herniated nucleus pulposus)     LUMBAR    Hypertension     Pancreatic insufficiency     s/p whipple, non cancer    Presence of dental bridge     UPPER     Past Surgical History:   Procedure Laterality Date    APPENDECTOMY      BACK SURGERY  1994    lower back    BACK SURGERY  2012    lower back    CHOLECYSTECTOMY      COLONOSCOPY  2009    normal, recheck 2019    TONSILLECTOMY      WHIPPLE PROCEDURE W/ LAPAROSCOPY  10/09     Social History     Social History    Marital status:      Spouse name: N/A    Number of children: N/A    Years of education: N/A     Occupational History    Not on file.     Social History Main Topics    Smoking status: Former Smoker     Packs/day: 0.00     Years: 30.00     Quit date: 04/2017    Smokeless tobacco: Never Used    Alcohol use No    Drug use: No    Sexual activity: Not on file     Other Topics Concern    Not on file     Social History Narrative    No narrative on file         Medications  Current Outpatient Prescriptions    Medication Sig    albuterol (PROVENTIL/VENTOLIN) 90 mcg/actuation inhaler Inhale 2 puffs into the lungs every 6 (six) hours as needed for Wheezing.    amlodipine (NORVASC) 5 MG tablet Take 1 tablet by mouth once daily.    CREON CpDR Take 2 capsules by mouth 4 (four) times daily.    NOVOLOG MIX 70-30 FLEXPEN 100 unit/mL (70-30) InPn pen Inject 12 Units into the skin 3 (three) times daily with meals.    SYMBICORT 160-4.5 mcg/actuation HFAA Inhale 2 puffs into the lungs once daily.     No current facility-administered medications for this visit.        Allergies  Review of patient's allergies indicates:   Allergen Reactions    Sulfa (sulfonamide antibiotics) Hives    Penicillin v      Childhood reaction, swelled         Review of Systems     Constitutional: Negative    HENT: Negative  Eyes: Negative  Respiratory: Negative  Cardiovascular: Negative  Musculoskeletal: HPI  Skin: Negative  Neurological: Negative  Hematological: Negative  Endocrine: Negative      Physical Exam    Vitals:    07/12/17 0843   BP: (!) 142/72   Pulse: 93     Physical Examination:Left knee--10° to 118°. Diffuse tenderness. Valgus alignment. Patellofemoral crepitance. Boggy knee.     Skin-no rash.   General appearance -  well appearing, and in no distress  Mental status - awake  Neck - supple  Chest -  symmetric air entry  Heart - normal rate   Abdomen - soft      Assessment/Plan   Left knee pain  -     X-Ray Knee AP LAT with Starke Left    Primary osteoarthritis of left knee    The procedure of total knee arthroplasty Discussed for the left knee. She would like pain relief. She's not, going to get relief with conservative treatment. It has failed already. She will think about it and get back with us.     X-rays of the left knee show a valgus knee with advanced degenerative changes a loss of the lateral cartilage space, as well as some in the medial. She also has moderate loss of cartilage space at the patellofemoral joint. The right  knee is in varus with bone to bone contact in the medial compartment degenerative changes laterally and at the patellofemoral joint.    This note was dictated using voice recognition software and may contain grammatical errors.   Yes

## 2022-04-14 ENCOUNTER — OFFICE VISIT (OUTPATIENT)
Dept: PULMONOLOGY | Facility: CLINIC | Age: 72
End: 2022-04-14
Payer: MEDICARE

## 2022-04-14 VITALS
HEART RATE: 60 BPM | OXYGEN SATURATION: 84 % | DIASTOLIC BLOOD PRESSURE: 80 MMHG | HEIGHT: 68 IN | BODY MASS INDEX: 33.04 KG/M2 | WEIGHT: 218 LBS | SYSTOLIC BLOOD PRESSURE: 120 MMHG

## 2022-04-14 DIAGNOSIS — G47.34 NOCTURNAL HYPOXEMIA: ICD-10-CM

## 2022-04-14 DIAGNOSIS — R09.02 HYPOXEMIA: ICD-10-CM

## 2022-04-14 DIAGNOSIS — J44.9 CHRONIC OBSTRUCTIVE PULMONARY DISEASE, UNSPECIFIED COPD TYPE: Primary | ICD-10-CM

## 2022-04-14 PROCEDURE — 4010F PR ACE/ARB THEARPY RXD/TAKEN: ICD-10-PCS | Mod: S$GLB,,, | Performed by: NURSE PRACTITIONER

## 2022-04-14 PROCEDURE — 1159F PR MEDICATION LIST DOCUMENTED IN MEDICAL RECORD: ICD-10-PCS | Mod: S$GLB,,, | Performed by: NURSE PRACTITIONER

## 2022-04-14 PROCEDURE — 3074F PR MOST RECENT SYSTOLIC BLOOD PRESSURE < 130 MM HG: ICD-10-PCS | Mod: S$GLB,,, | Performed by: NURSE PRACTITIONER

## 2022-04-14 PROCEDURE — 3008F PR BODY MASS INDEX (BMI) DOCUMENTED: ICD-10-PCS | Mod: S$GLB,,, | Performed by: NURSE PRACTITIONER

## 2022-04-14 PROCEDURE — 1159F MED LIST DOCD IN RCRD: CPT | Mod: S$GLB,,, | Performed by: NURSE PRACTITIONER

## 2022-04-14 PROCEDURE — 3079F PR MOST RECENT DIASTOLIC BLOOD PRESSURE 80-89 MM HG: ICD-10-PCS | Mod: S$GLB,,, | Performed by: NURSE PRACTITIONER

## 2022-04-14 PROCEDURE — 1126F AMNT PAIN NOTED NONE PRSNT: CPT | Mod: S$GLB,,, | Performed by: NURSE PRACTITIONER

## 2022-04-14 PROCEDURE — 1126F PR PAIN SEVERITY QUANTIFIED, NO PAIN PRESENT: ICD-10-PCS | Mod: S$GLB,,, | Performed by: NURSE PRACTITIONER

## 2022-04-14 PROCEDURE — 3079F DIAST BP 80-89 MM HG: CPT | Mod: S$GLB,,, | Performed by: NURSE PRACTITIONER

## 2022-04-14 PROCEDURE — 99214 PR OFFICE/OUTPT VISIT, EST, LEVL IV, 30-39 MIN: ICD-10-PCS | Mod: S$GLB,,, | Performed by: NURSE PRACTITIONER

## 2022-04-14 PROCEDURE — 3074F SYST BP LT 130 MM HG: CPT | Mod: S$GLB,,, | Performed by: NURSE PRACTITIONER

## 2022-04-14 PROCEDURE — 3008F BODY MASS INDEX DOCD: CPT | Mod: S$GLB,,, | Performed by: NURSE PRACTITIONER

## 2022-04-14 PROCEDURE — 4010F ACE/ARB THERAPY RXD/TAKEN: CPT | Mod: S$GLB,,, | Performed by: NURSE PRACTITIONER

## 2022-04-14 PROCEDURE — 99214 OFFICE O/P EST MOD 30 MIN: CPT | Mod: S$GLB,,, | Performed by: NURSE PRACTITIONER

## 2022-04-14 RX ORDER — CALCIUM CITRATE/VITAMIN D3 200MG-6.25
TABLET ORAL
COMMUNITY
Start: 2022-02-17

## 2022-04-14 RX ORDER — LISINOPRIL 5 MG/1
TABLET ORAL
COMMUNITY
Start: 2022-01-18

## 2022-04-14 RX ORDER — MONTELUKAST SODIUM 10 MG/1
TABLET ORAL
COMMUNITY
Start: 2022-02-06

## 2022-04-14 RX ORDER — POTASSIUM CHLORIDE 20 MEQ/1
TABLET, EXTENDED RELEASE ORAL
COMMUNITY
Start: 2022-02-14

## 2022-04-14 NOTE — PATIENT INSTRUCTIONS
Use the Stiolto 2 puffs daily  Use your rescue inhaler or the nebulizer as needed every 4-6 hours for     Keep  Sleeping on your oxygen   6 minute walk to see how much oxygen needs to be on   Needs portable oxygen wants a portable concentrator     Follow up in about 6 months (around 10/14/2022).

## 2022-04-14 NOTE — PROGRESS NOTES
SUBJECTIVE:    Patient ID: Maddie Otero is a 71 y.o. female.    Chief Complaint: COPD and Shortness of Breath    Patient here today feeling well. Her sats were 84% when she walked in office on room air. The patient does sleep on oxygen. She is using her Stiotlo daily. She has not had a pneumonia since switching off inhaled steroid.      Past Medical History:   Diagnosis Date    A-fib     Anticoagulant long-term use     Arthritis     COPD (chronic obstructive pulmonary disease)     COPD (chronic obstructive pulmonary disease) 2019    Diabetes mellitus, type 2     Diverticulosis     History of left knee replacement 2017    DR CRENSHAW    HNP (herniated nucleus pulposus)     LUMBAR    Hypertension     Lung disease     Pancreatic insufficiency     s/p whipple, non cancer    Presence of dental bridge     UPPER    Wears glasses      Past Surgical History:   Procedure Laterality Date    APPENDECTOMY      BACK SURGERY  1994    lower back    BACK SURGERY  2012    lower back    CHOLECYSTECTOMY      HERNIA REPAIR      JOINT REPLACEMENT Left     KNEE    KNEE ARTHROPLASTY Right 5/3/2021    Procedure: ARTHROPLASTY, KNEE;  Surgeon: Manuel Willingham MD;  Location: [facility];  Service: Orthopedics;  Laterality: Right;  guzman    LAPAROSCOPIC REPAIR OF INCISIONAL HERNIA N/A 10/22/2019    Procedure: REPAIR, HERNIA, INCISIONAL, LAPAROSCOPIC;  Surgeon: Pantera Almanza III, MD;  Location: [facility] OR;  Service: General;  Laterality: N/A;    pancrease  2009    stents in pancrease     TONSILLECTOMY      TOTAL KNEE ARTHROPLASTY Left 08/10/2017    WHIPPLE PROCEDURE W/ LAPAROSCOPY  10/09     Family History   Problem Relation Age of Onset    Diabetes Mother     Cancer Father         Social History:   Marital Status:   Occupation: retired from caring for mentally disabled people  Alcohol History:  reports no history of alcohol use.  Tobacco History:  reports that she quit smoking about 5 years ago. Her smoking  use included cigarettes. She has a 30.00 pack-year smoking history. She has never used smokeless tobacco.  Drug History:  reports no history of drug use.    Review of patient's allergies indicates:   Allergen Reactions    Sulfa (sulfonamide antibiotics) Hives    Penicillin v      Childhood reaction, swelled       Current Outpatient Medications   Medication Sig Dispense Refill    albuterol (PROVENTIL/VENTOLIN) 90 mcg/actuation inhaler Inhale 2 puffs into the lungs every 6 (six) hours as needed for Wheezing. 1 each 0    albuterol-ipratropium (DUO-NEB) 2.5 mg-0.5 mg/3 mL nebulizer solution       amLODIPine (NORVASC) 5 MG tablet       apixaban (ELIQUIS) 5 mg Tab Take 5 mg by mouth 2 (two) times daily.      cetirizine (ZYRTEC) 10 MG tablet Take 10 mg by mouth once daily.      CREON CpDR Take 2 capsules by mouth 4 (four) times daily.  5    diltiaZEM (CARDIZEM CD) 120 MG Cp24       famotidine (PEPCID) 20 MG tablet Take 20 mg by mouth 2 (two) times daily.       ferrous sulfate 324 mg (65 mg iron) TbEC Take 1 tablet (324 mg total) by mouth once daily. 30 tablet 0    flecainide (TAMBOCOR) 100 MG Tab Take 1 tablet by mouth 2 (two) times daily.      furosemide (LASIX) 40 MG tablet Take 40 mg by mouth as needed (edema).       insulin aspart protamine-insulin aspart (NOVOLOG MIX 70-30FLEXPEN U-100) 100 unit/mL (70-30) InPn pen Inject 20 Units into the skin 3 (three) times daily before meals.  0    lisinopriL (PRINIVIL,ZESTRIL) 5 MG tablet       metoprolol succinate (TOPROL-XL) 100 MG 24 hr tablet       metoprolol tartrate (LOPRESSOR) 50 MG tablet Take 1 tablet (50 mg total) by mouth 2 (two) times daily.      montelukast (SINGULAIR) 10 mg tablet       oxyCODONE (ROXICODONE) 5 MG immediate release tablet 1-2 tab for pain q4-6 hours 10 tablet 0    oxyCODONE-acetaminophen (PERCOCET) 7.5-325 mg per tablet       potassium chloride SA (K-DUR,KLOR-CON) 20 MEQ tablet       senna-docusate 8.6-50 mg (PERICOLACE) 8.6-50  "mg per tablet Take 1 tablet by mouth 2 (two) times daily.      sodium chloride (OCEAN NASAL NASL) 1 spray by Nasal route daily as needed (rhinitis).       tiotropium-olodateroL (STIOLTO RESPIMAT) 2.5-2.5 mcg/actuation Mist Inhale 1 puff into the lungs 2 (two) times a day. Controller      tiotropium-olodateroL (STIOLTO RESPIMAT) 2.5-2.5 mcg/actuation Mist Inhale 2 puffs into the lungs once daily. Controller 12 g 5    TRUE METRIX GLUCOSE TEST STRIP Strp       polyethylene glycol (GLYCOLAX) 17 gram PwPk Take 17 g by mouth once daily. (Patient not taking: Reported on 10/8/2021)  0     No current facility-administered medications for this visit.       Chest xray 09/15/2021  IMPRESSION: Pulmonary emphysema, with no evidence of acute cardiopulmonary disease      General: feeling well   Eyes: Vision is good  ENT:  No rhinitis or sinusitis  Heart:: No chest pain or palpitations.  Lungs:breathing is stable   GI: no more diarrhea    : No dysuria, hesitancy, or nocturia.  Musculoskeletal: pain in her knee and shoulder  Skin: No lesions or rashes.  Neuro: No headaches or neuropathy.  Lymph: no swelling  Psych: No anxiety or depression.  Endo: weight stable         OBJECTIVE:      /80 (BP Location: Left arm, Patient Position: Sitting, BP Method: Medium (Manual))   Pulse 60   Ht 5' 8" (1.727 m)   Wt 98.9 kg (218 lb)   LMP  (LMP Unknown)   SpO2 (!) 84% Comment: o2 went up to 93  BMI 33.15 kg/m²     Physical Exam  GENERAL: Older patient in no distress.  HEENT: Pupils equal and reactive. Extraocular movements intact. Nose intact.      Pharynx moist.  NECK: Supple.   HEART: Regular rate and rhythm. No murmur or gallop auscultated.  LUNGS:clear  ABDOMEN: Bowel sounds present. Non-tender, no masses palpated.  EXTREMITIES: Normal muscle tone and joint movement, no cyanosis or clubbing. walker  LYMPHATICS: No adenopathy palpated, trace edema   SKIN: no issues   NEURO: Cranial nerves II-XII intact. Motor strength 5/5 " bilaterally, upper and lower extremities.  PSYCH: Appropriate affect.      Assessment:       1. Chronic obstructive pulmonary disease, unspecified COPD type    2. Nocturnal hypoxemia    3. Hypoxemia        Frequent pneumonia concerned with it being from inhaled steroids.   Plan:       Chronic obstructive pulmonary disease, unspecified COPD type  -     Stress test, pulmonary; Future; Expected date: 04/14/2022    Nocturnal hypoxemia    Hypoxemia  -     OXYGEN FOR HOME USE        Use the Stiolto 2 puffs daily  Use your rescue inhaler or the nebulizer as needed every 4-6 hours for     Keep  Sleeping on your oxygen   6 minute walk to see how much oxygen needs to be on   Needs portable oxygen wants a portable concentrator     Follow up in about 6 months (around 10/14/2022).

## 2022-05-04 ENCOUNTER — TELEPHONE (OUTPATIENT)
Dept: PULMONOLOGY | Facility: CLINIC | Age: 72
End: 2022-05-04

## 2022-05-04 ENCOUNTER — HOSPITAL ENCOUNTER (OUTPATIENT)
Dept: RADIOLOGY | Facility: HOSPITAL | Age: 72
Discharge: HOME | End: 2022-05-04
Attending: NURSE PRACTITIONER
Payer: MEDICARE

## 2022-05-04 ENCOUNTER — HOSPITAL ENCOUNTER (OUTPATIENT)
Dept: PULMONOLOGY | Facility: HOSPITAL | Age: 72
Discharge: HOME | End: 2022-05-04
Attending: NURSE PRACTITIONER
Payer: MEDICARE

## 2022-05-04 VITALS — BODY MASS INDEX: 33.04 KG/M2 | HEIGHT: 68 IN | WEIGHT: 218 LBS

## 2022-05-04 DIAGNOSIS — J44.9 CHRONIC OBSTRUCTIVE PULMONARY DISEASE, UNSPECIFIED COPD TYPE: ICD-10-CM

## 2022-05-04 DIAGNOSIS — J96.11 CHRONIC HYPOXEMIC RESPIRATORY FAILURE: Primary | ICD-10-CM

## 2022-05-04 PROCEDURE — 71046 X-RAY EXAM CHEST 2 VIEWS: CPT | Mod: TC

## 2022-05-04 PROCEDURE — 94618 PULMONARY STRESS TESTING: CPT

## 2022-05-04 NOTE — TELEPHONE ENCOUNTER
Chest xray  IMPRESSION:  1. Unchanged pulmonary hyperinflation and emphysema, suggesting chronic obstructive disease.  2. Stable enlarged central pulmonary arteries      6 minute walk is hypoxemic needs to be on 3 liters of oxygen

## 2022-05-04 NOTE — CARE UPDATE
"   05/04/22 1022   6MW Test   Ordering Provider ASH Pemberton   Performing nurse/tech/RT Tess Lopez, RRT   Diagnosis Shortness of Breath   Height 5' 8" (1.727 m)   Weight 98.9 kg (218 lb)   BMI (Calculated) 33.2   Predicted Distance 245.24   Patient Race    6MWT Status completed without stopping   Patient Reported Dyspnea   Was O2 used? Yes   Delivery Method Cannula;Continuous Flow;Pull Tank   6MW Distance walked (feet) 800 feet   Distance walked (meters) 243.84 meters   Did patient stop? No   Type of assistive device(s) used? no assistive devices   Is extra documentation required for this patient? Yes   Pre-Exercise   Oxygen Saturation 91 %   Supplemental Oxygen Room Air   Heart Rate 62 bpm   Liya Dyspnea Rating  light   Exercise 1 Minute   Oxygen Saturation 90 %   Supplemental Oxygen Room Air   Heart Rate 67 bpm   Exercise 2 Minutes   Oxygen Saturation 86 %   Supplemental Oxygen 2 L/M   Heart Rate 74 bpm   Exercise 3 Minutes   Oxygen Saturation 87 %   Supplemental Oxygen 3 L/M   Heart Rate 76 bpm   Exercise 4 Minutes   Oxygen Saturation 92 %   Supplemental Oxygen 3 L/M   Heart Rate 74 bpm   Exercise 5 Minutes   Oxygen Saturation 92 %   Supplemental Oxygen 3 L/M   Heart Rate 74 bpm   Post Exercise   Oxygen Saturation 92 %   Supplemental Oxygen 3 L/M   Heart Rate 74 bpm   Recovery 1 Minute   Oxygen Saturation 93 %   Supplemental Oxygen 3 L/M   Heart Rate 69 bpm   Interpretation   Is procedure ready for interpretation? Yes   Did the patient stop or pause? No   Total Laps Walked 4   Final Partial Lap Distance (feet) 0 feet   Total Distance Feet (Calculated) 800 feet   Total Distance Meters (Calculated) 243.84 meters   Predicted Distance Meters (Calculated) 383.01 meters   Percentage of Predicted (Calculated) 63.66   Peak VO2 (Calculated) 11.3   Mets 3.23   Has The Patient Had a Previous Six Minute Walk Test? No   Comments This is a hypoxic 6 minute walk.  Pt requires 3 lpm oxygen continuously to " adequately oxygenate with ambulation.   Oxygen Qualification   Oxygen Qualification? Yes

## 2022-05-19 ENCOUNTER — TELEPHONE (OUTPATIENT)
Dept: PULMONOLOGY | Facility: CLINIC | Age: 72
End: 2022-05-19

## 2022-05-19 NOTE — TELEPHONE ENCOUNTER
Pt called and LVM stating she has not heard from [employer] about setting her up with o2.  I emailed the rep Ladan Haney today to check the status of her order and will contact pt as soon as I know something.

## 2022-05-23 NOTE — TELEPHONE ENCOUNTER
PLEASE SEE EMAILS FROM [employer] THAT IS SCANNED INTO PTS CHART UNDER MEDIA.  DME INFO ABOUT PTS OXYGEN STATUS AS OF 5/23/22

## 2022-06-16 ENCOUNTER — OFFICE VISIT (OUTPATIENT)
Dept: DERMATOLOGY | Facility: CLINIC | Age: 72
End: 2022-06-16
Payer: MEDICARE

## 2022-06-16 DIAGNOSIS — L82.1 SEBORRHEIC KERATOSES: ICD-10-CM

## 2022-06-16 DIAGNOSIS — D48.5 NEOPLASM OF UNCERTAIN BEHAVIOR OF SKIN: Primary | ICD-10-CM

## 2022-06-16 DIAGNOSIS — L30.9 DERMATITIS: ICD-10-CM

## 2022-06-16 DIAGNOSIS — Z85.828 HISTORY OF NONMELANOMA SKIN CANCER: ICD-10-CM

## 2022-06-16 DIAGNOSIS — D18.01 CHERRY ANGIOMA: ICD-10-CM

## 2022-06-16 DIAGNOSIS — L72.9 CYST OF SKIN: ICD-10-CM

## 2022-06-16 DIAGNOSIS — L90.5 SCAR: ICD-10-CM

## 2022-06-16 PROCEDURE — 1126F PR PAIN SEVERITY QUANTIFIED, NO PAIN PRESENT: ICD-10-PCS | Mod: CPTII,S$GLB,, | Performed by: STUDENT IN AN ORGANIZED HEALTH CARE EDUCATION/TRAINING PROGRAM

## 2022-06-16 PROCEDURE — 1126F AMNT PAIN NOTED NONE PRSNT: CPT | Mod: CPTII,S$GLB,, | Performed by: STUDENT IN AN ORGANIZED HEALTH CARE EDUCATION/TRAINING PROGRAM

## 2022-06-16 PROCEDURE — 11102 TANGNTL BX SKIN SINGLE LES: CPT | Mod: S$GLB,,, | Performed by: STUDENT IN AN ORGANIZED HEALTH CARE EDUCATION/TRAINING PROGRAM

## 2022-06-16 PROCEDURE — 4010F ACE/ARB THERAPY RXD/TAKEN: CPT | Mod: CPTII,S$GLB,, | Performed by: STUDENT IN AN ORGANIZED HEALTH CARE EDUCATION/TRAINING PROGRAM

## 2022-06-16 PROCEDURE — 88305 TISSUE EXAM BY PATHOLOGIST: CPT | Mod: 26,,, | Performed by: DERMATOLOGY

## 2022-06-16 PROCEDURE — 1101F PT FALLS ASSESS-DOCD LE1/YR: CPT | Mod: CPTII,S$GLB,, | Performed by: STUDENT IN AN ORGANIZED HEALTH CARE EDUCATION/TRAINING PROGRAM

## 2022-06-16 PROCEDURE — 1101F PR PT FALLS ASSESS DOC 0-1 FALLS W/OUT INJ PAST YR: ICD-10-PCS | Mod: CPTII,S$GLB,, | Performed by: STUDENT IN AN ORGANIZED HEALTH CARE EDUCATION/TRAINING PROGRAM

## 2022-06-16 PROCEDURE — 99203 OFFICE O/P NEW LOW 30 MIN: CPT | Mod: 25,S$GLB,, | Performed by: STUDENT IN AN ORGANIZED HEALTH CARE EDUCATION/TRAINING PROGRAM

## 2022-06-16 PROCEDURE — 11102 PR TANGENTIAL BIOPSY, SKIN, SINGLE LESION: ICD-10-PCS | Mod: S$GLB,,, | Performed by: STUDENT IN AN ORGANIZED HEALTH CARE EDUCATION/TRAINING PROGRAM

## 2022-06-16 PROCEDURE — 99203 PR OFFICE/OUTPT VISIT, NEW, LEVL III, 30-44 MIN: ICD-10-PCS | Mod: 25,S$GLB,, | Performed by: STUDENT IN AN ORGANIZED HEALTH CARE EDUCATION/TRAINING PROGRAM

## 2022-06-16 PROCEDURE — 1160F RVW MEDS BY RX/DR IN RCRD: CPT | Mod: CPTII,S$GLB,, | Performed by: STUDENT IN AN ORGANIZED HEALTH CARE EDUCATION/TRAINING PROGRAM

## 2022-06-16 PROCEDURE — 1160F PR REVIEW ALL MEDS BY PRESCRIBER/CLIN PHARMACIST DOCUMENTED: ICD-10-PCS | Mod: CPTII,S$GLB,, | Performed by: STUDENT IN AN ORGANIZED HEALTH CARE EDUCATION/TRAINING PROGRAM

## 2022-06-16 PROCEDURE — 99999 PR PBB SHADOW E&M-EST. PATIENT-LVL II: ICD-10-PCS | Mod: PBBFAC,,, | Performed by: STUDENT IN AN ORGANIZED HEALTH CARE EDUCATION/TRAINING PROGRAM

## 2022-06-16 PROCEDURE — 1159F PR MEDICATION LIST DOCUMENTED IN MEDICAL RECORD: ICD-10-PCS | Mod: CPTII,S$GLB,, | Performed by: STUDENT IN AN ORGANIZED HEALTH CARE EDUCATION/TRAINING PROGRAM

## 2022-06-16 PROCEDURE — 3288F FALL RISK ASSESSMENT DOCD: CPT | Mod: CPTII,S$GLB,, | Performed by: STUDENT IN AN ORGANIZED HEALTH CARE EDUCATION/TRAINING PROGRAM

## 2022-06-16 PROCEDURE — 88305 TISSUE EXAM BY PATHOLOGIST: CPT | Performed by: DERMATOLOGY

## 2022-06-16 PROCEDURE — 99999 PR PBB SHADOW E&M-EST. PATIENT-LVL II: CPT | Mod: PBBFAC,,, | Performed by: STUDENT IN AN ORGANIZED HEALTH CARE EDUCATION/TRAINING PROGRAM

## 2022-06-16 PROCEDURE — 1159F MED LIST DOCD IN RCRD: CPT | Mod: CPTII,S$GLB,, | Performed by: STUDENT IN AN ORGANIZED HEALTH CARE EDUCATION/TRAINING PROGRAM

## 2022-06-16 PROCEDURE — 3288F PR FALLS RISK ASSESSMENT DOCUMENTED: ICD-10-PCS | Mod: CPTII,S$GLB,, | Performed by: STUDENT IN AN ORGANIZED HEALTH CARE EDUCATION/TRAINING PROGRAM

## 2022-06-16 PROCEDURE — 88305 TISSUE EXAM BY PATHOLOGIST: ICD-10-PCS | Mod: 26,,, | Performed by: DERMATOLOGY

## 2022-06-16 PROCEDURE — 4010F PR ACE/ARB THEARPY RXD/TAKEN: ICD-10-PCS | Mod: CPTII,S$GLB,, | Performed by: STUDENT IN AN ORGANIZED HEALTH CARE EDUCATION/TRAINING PROGRAM

## 2022-06-16 RX ORDER — HYDROCORTISONE 25 MG/G
CREAM TOPICAL 2 TIMES DAILY
Qty: 20 G | Refills: 0 | Status: SHIPPED | OUTPATIENT
Start: 2022-06-16 | End: 2024-04-03

## 2022-06-16 NOTE — PATIENT INSTRUCTIONS
Shave Biopsy Wound Care    Your doctor has performed a shave biopsy today.  A band aid and vaseline ointment has been placed over the site.  This should remain in place for 24 hours.  It is recommended that you keep the area dry for the first 24 hours.  After 24 hours, you may remove the band aid and wash the area with warm soap and water and apply Vaseline jelly.  Many patients prefer to use Neosporin or Bacitracin ointment.  This is acceptable; however, know that you can develop an allergy to this medication even if you have used it safely for years.  It is important to keep the area moist.  Letting it dry out and get air slows healing time, and will worsen the scar.  Band aid is optional after first 24 hours.      If you notice increasing redness, tenderness, pain, or yellow drainage at the biopsy site, please notify your doctor.  These are signs of an infection.    If your biopsy site is bleeding, apply firm pressure for 15 minutes straight.  Repeat for another 15 minutes, if it is still bleeding.   If the surgical site continues to bleed, then please contact your doctor.      [facility] [place] [address]/ [phone] FAX/ [email or web address]

## 2022-06-16 NOTE — PROGRESS NOTES
Subjective:       Patient ID:  Maddie Otero is a 71 y.o. female who presents for   Chief Complaint   Patient presents with    Skin Check     UBSE     New patient    Patient here today for skin check UBSE  Patient states she has a few spots of concern    Derm Hx:  Skin cancer - uncertain of type, left nose       Review of Systems   Constitutional: Negative for fever, chills and fatigue.   Respiratory: Negative for cough and shortness of breath.    Gastrointestinal: Negative for nausea and vomiting.   Skin: Positive for activity-related sunscreen use. Negative for daily sunscreen use.   Hematologic/Lymphatic: Bruises/bleeds easily.        Objective:    Physical Exam   Constitutional: She appears well-developed and well-nourished. No distress.   Neurological: She is alert and oriented to person, place, and time. She is not disoriented.   Psychiatric: She has a normal mood and affect.   Skin:   Areas Examined (abnormalities noted in diagram):   Scalp / Hair Palpated and Inspected  Head / Face Inspection Performed  Neck Inspection Performed  Chest / Axilla Inspection Performed  Abdomen Inspection Performed  Back Inspection Performed  RUE Inspected  LUE Inspection Performed  Nails and Digits Inspection Performed                   Diagram Legend     Erythematous scaling macule/papule c/w actinic keratosis       Vascular papule c/w angioma      Pigmented verrucoid papule/plaque c/w seborrheic keratosis      Yellow umbilicated papule c/w sebaceous hyperplasia      Irregularly shaped tan macule c/w lentigo     1-2 mm smooth white papules consistent with Milia      Movable subcutaneous cyst with punctum c/w epidermal inclusion cyst      Subcutaneous movable cyst c/w pilar cyst      Firm pink to brown papule c/w dermatofibroma      Pedunculated fleshy papule(s) c/w skin tag(s)      Evenly pigmented macule c/w junctional nevus     Mildly variegated pigmented, slightly irregular-bordered macule c/w mildly atypical nevus       Flesh colored to evenly pigmented papule c/w intradermal nevus       Pink pearly papule/plaque c/w basal cell carcinoma      Erythematous hyperkeratotic cursted plaque c/w SCC      Surgical scar with no sign of skin cancer recurrence      Open and closed comedones      Inflammatory papules and pustules      Verrucoid papule consistent consistent with wart     Erythematous eczematous patches and plaques     Dystrophic onycholytic nail with subungual debris c/w onychomycosis     Umbilicated papule    Erythematous-base heme-crusted tan verrucoid plaque consistent with inflamed seborrheic keratosis     Erythematous Silvery Scaling Plaque c/w Psoriasis     See annotation          Assessment / Plan:      Pathology Orders:     Normal Orders This Visit    Specimen to Pathology, Dermatology     Questions:    Procedure Type: Dermatology and skin neoplasms    Number of Specimens: 1    ------------------------: -------------------------    Spec 1 Procedure: Biopsy    Spec 1 Clinical Impression: r/o bcc    Spec 1 Source: left nasal ala    Release to patient:         Neoplasm of uncertain behavior of skin  -     Specimen to Pathology, Dermatology  Shave biopsy procedure note:    Shave biopsy performed after verbal consent including risk of infection, scar, recurrence, need for additional treatment of site. Area prepped with alcohol, anesthetized with approximately 1.0cc of 1% lidocaine with epinephrine. Lesional tissue shaved with razor blade. Hemostasis achieved with application of aluminum chloride followed by hyfrecation. No complications. Dressing applied. Wound care explained.    History of nonmelanoma skin cancer  Scar  Area(s) of previous NMSC evaluated with no signs of recurrence.    Upper body skin examination performed today including at least 9 points as noted in physical examination. Suspicious lesions noted.  Patient instructed in importance in daily broad spectrum sun protection of at least spf 30. Mineral  sunscreen ingredients preferred (Zinc +/- Titanium) and can be found OTC.   Patient encouraged to wear hat for all outdoor exposure.   Also discussed sun avoidance and use of protective clothing.    Jonas angioma  This is a benign vascular lesion. Reassurance given. No treatment required.     Seborrheic keratoses  These are benign inherited growths without a malignant potential. Reassurance given to patient. No treatment is necessary.     Cyst of skin  Reassurance given to patient. No treatment is necessary.   Discussed treatment options - excision vs observation. Cysts may recur with excision. Pt will defer treatment at this time.     Dermatitis, bilateral earlobes   -     hydrocortisone 2.5 % cream; Apply topically 2 (two) times daily. Use on AA BID x 10-14 days for 14 days  Dispense: 20 g; Refill: 0  - eczema vs. Actinic damage vs. Other  Trial of HC cream           No follow-ups on file.

## 2022-06-21 LAB
FINAL PATHOLOGIC DIAGNOSIS: NORMAL
GROSS: NORMAL
Lab: NORMAL
MICROSCOPIC EXAM: NORMAL

## 2022-06-29 ENCOUNTER — TELEPHONE (OUTPATIENT)
Dept: PULMONOLOGY | Facility: CLINIC | Age: 72
End: 2022-06-29

## 2022-06-29 ENCOUNTER — TELEPHONE (OUTPATIENT)
Dept: DERMATOLOGY | Facility: CLINIC | Age: 72
End: 2022-06-29
Payer: MEDICARE

## 2022-06-29 RX ORDER — ALBUTEROL SULFATE 90 UG/1
2 AEROSOL, METERED RESPIRATORY (INHALATION) EVERY 6 HOURS PRN
Qty: 54 G | Refills: 3 | Status: SHIPPED | OUTPATIENT
Start: 2022-06-29 | End: 2023-04-03

## 2022-06-29 NOTE — TELEPHONE ENCOUNTER
Pt LVM saying her PCP  and wants to know if you could send in rx for her ventolin to [facility] please

## 2022-06-29 NOTE — TELEPHONE ENCOUNTER
Patient notified, message sent to [facility] staff.     ----- Message from Carole Hutchinson sent at 6/29/2022 10:47 AM CDT -----  Contact: patient  Type:  Patient Returning Call    Who Called:  patient  Who Left Message for Patient:    Does the patient know what this is regarding?:  biopsy  Best Call Back Number: [phone]  Additional Information:

## 2022-06-30 ENCOUNTER — TELEPHONE (OUTPATIENT)
Dept: DERMATOLOGY | Facility: CLINIC | Age: 72
End: 2022-06-30
Payer: MEDICARE

## 2022-06-30 NOTE — TELEPHONE ENCOUNTER
Scheduled patient for consult per Dr. Santos for a bcc        ----- Message from Flaquita Dallas RN sent at 6/29/2022  1:56 PM CDT -----  Patient notified of Bx results and voiced understanding that she needs further treatment.  She is an agreement to see Dr. Yañez for Mohs surgery. Please follow up with patient.  Thank you

## 2022-08-25 ENCOUNTER — OFFICE VISIT (OUTPATIENT)
Dept: DERMATOLOGY | Facility: CLINIC | Age: 72
End: 2022-08-25
Payer: MEDICARE

## 2022-08-25 VITALS
BODY MASS INDEX: 35.36 KG/M2 | SYSTOLIC BLOOD PRESSURE: 118 MMHG | HEIGHT: 66 IN | DIASTOLIC BLOOD PRESSURE: 70 MMHG | WEIGHT: 220 LBS | HEART RATE: 60 BPM

## 2022-08-25 DIAGNOSIS — Z79.01 LONG TERM (CURRENT) USE OF ANTICOAGULANTS: ICD-10-CM

## 2022-08-25 DIAGNOSIS — C44.311 BASAL CELL CARCINOMA OF NOSE: Primary | ICD-10-CM

## 2022-08-25 PROCEDURE — 99214 PR OFFICE/OUTPT VISIT, EST, LEVL IV, 30-39 MIN: ICD-10-PCS | Mod: S$GLB,,, | Performed by: DERMATOLOGY

## 2022-08-25 PROCEDURE — 3008F BODY MASS INDEX DOCD: CPT | Mod: CPTII,S$GLB,, | Performed by: DERMATOLOGY

## 2022-08-25 PROCEDURE — 3074F SYST BP LT 130 MM HG: CPT | Mod: CPTII,S$GLB,, | Performed by: DERMATOLOGY

## 2022-08-25 PROCEDURE — 1101F PT FALLS ASSESS-DOCD LE1/YR: CPT | Mod: CPTII,S$GLB,, | Performed by: DERMATOLOGY

## 2022-08-25 PROCEDURE — 3288F PR FALLS RISK ASSESSMENT DOCUMENTED: ICD-10-PCS | Mod: CPTII,S$GLB,, | Performed by: DERMATOLOGY

## 2022-08-25 PROCEDURE — 3078F PR MOST RECENT DIASTOLIC BLOOD PRESSURE < 80 MM HG: ICD-10-PCS | Mod: CPTII,S$GLB,, | Performed by: DERMATOLOGY

## 2022-08-25 PROCEDURE — 1160F RVW MEDS BY RX/DR IN RCRD: CPT | Mod: CPTII,S$GLB,, | Performed by: DERMATOLOGY

## 2022-08-25 PROCEDURE — 4010F PR ACE/ARB THEARPY RXD/TAKEN: ICD-10-PCS | Mod: CPTII,S$GLB,, | Performed by: DERMATOLOGY

## 2022-08-25 PROCEDURE — 99999 PR PBB SHADOW E&M-EST. PATIENT-LVL III: ICD-10-PCS | Mod: PBBFAC,,, | Performed by: DERMATOLOGY

## 2022-08-25 PROCEDURE — 4010F ACE/ARB THERAPY RXD/TAKEN: CPT | Mod: CPTII,S$GLB,, | Performed by: DERMATOLOGY

## 2022-08-25 PROCEDURE — 1101F PR PT FALLS ASSESS DOC 0-1 FALLS W/OUT INJ PAST YR: ICD-10-PCS | Mod: CPTII,S$GLB,, | Performed by: DERMATOLOGY

## 2022-08-25 PROCEDURE — 3288F FALL RISK ASSESSMENT DOCD: CPT | Mod: CPTII,S$GLB,, | Performed by: DERMATOLOGY

## 2022-08-25 PROCEDURE — 3074F PR MOST RECENT SYSTOLIC BLOOD PRESSURE < 130 MM HG: ICD-10-PCS | Mod: CPTII,S$GLB,, | Performed by: DERMATOLOGY

## 2022-08-25 PROCEDURE — 3078F DIAST BP <80 MM HG: CPT | Mod: CPTII,S$GLB,, | Performed by: DERMATOLOGY

## 2022-08-25 PROCEDURE — 1160F PR REVIEW ALL MEDS BY PRESCRIBER/CLIN PHARMACIST DOCUMENTED: ICD-10-PCS | Mod: CPTII,S$GLB,, | Performed by: DERMATOLOGY

## 2022-08-25 PROCEDURE — 99214 OFFICE O/P EST MOD 30 MIN: CPT | Mod: S$GLB,,, | Performed by: DERMATOLOGY

## 2022-08-25 PROCEDURE — 1126F PR PAIN SEVERITY QUANTIFIED, NO PAIN PRESENT: ICD-10-PCS | Mod: CPTII,S$GLB,, | Performed by: DERMATOLOGY

## 2022-08-25 PROCEDURE — 1159F PR MEDICATION LIST DOCUMENTED IN MEDICAL RECORD: ICD-10-PCS | Mod: CPTII,S$GLB,, | Performed by: DERMATOLOGY

## 2022-08-25 PROCEDURE — 1159F MED LIST DOCD IN RCRD: CPT | Mod: CPTII,S$GLB,, | Performed by: DERMATOLOGY

## 2022-08-25 PROCEDURE — 1126F AMNT PAIN NOTED NONE PRSNT: CPT | Mod: CPTII,S$GLB,, | Performed by: DERMATOLOGY

## 2022-08-25 PROCEDURE — 99999 PR PBB SHADOW E&M-EST. PATIENT-LVL III: CPT | Mod: PBBFAC,,, | Performed by: DERMATOLOGY

## 2022-08-25 PROCEDURE — 3008F PR BODY MASS INDEX (BMI) DOCUMENTED: ICD-10-PCS | Mod: CPTII,S$GLB,, | Performed by: DERMATOLOGY

## 2022-08-25 RX ORDER — EMPAGLIFLOZIN 25 MG/1
25 TABLET, FILM COATED ORAL DAILY
COMMUNITY

## 2022-08-25 NOTE — PROGRESS NOTES
On ELIQUIS  ALLERGIES:  Heparin, Hydrocodone, Penicillins, Sulfa (sulfonamide antibiotics), Doxycycline, Clindamycin, and Penicillin v    CHIEF COMPLAINT:  This 71 y.o. female comes for evaluation for Mohs' Micrographic Surgery, Fresh Tissue Technique, for treatment of a biopsy-proven basal cell carcinoma on the left nasal ala. Consultation requested by Rakel Santos M.D...    The patient is accompanied to this visit by her daughter.    HISTORY OF PRESENT ILLNESS:   Location: Left nasal ala..  Duration: 12 months or more  Quality: persistent  Context: status post biopsy by Rakel Santos M.D..; path = as below; pathology accession # [id number]-, [facility]    Prior Treatment: none    Defibrillator: No  Pacemaker: No  Artificial heart valves: No  Artificial joints: No       Final Pathologic Diagnosis   Date Value Ref Range Status   06/16/2022   Final    Skin, left nasal ala, shave biopsy:  -BASAL CELL CARCINOMA, NODULAR TYPE, EXTENDING TO THE BASE OF THE SPECIMEN  This lesion is skin cancer. You will be contacted regarding treatment.       Comment:     Interp By Haley Herrera M.D., Signed on 06/21/2022 at 08:14           REVIEW OF SYSTEMS:   General: general health good  Skin: previous skin cancer(s) BCC left nose   If yes, details:   Relevant other:  No   Cardiovascular:   High Blood Pressure: Yes   Chest Pain:  No   Defibrillator: as above  Pacemaker: as above  Artificial heart valves: as above  Prior Endocarditis: No   Prior Heart Attack/MI: No     If yes, when:   Prior Cardiac Bypass or Stents:  No   If yes, when:   Mitral Valve Prolapse: No   Relevant other:  A-fib   Respiratory:   Shortness of breath:  Yes   Relevant other:  COPD   Endocrine:   Diabetes:  Type II   Relevant other:  No   Hem/Lymph:   Taking Prescribed Blood Thinners: On ELIQUIS  Easy Bleeding:  yes  Relevant other:  No   Allergy/Immuno: as noted above  Relevant other:  No   GI:   Prior Hepatitis:  No     If yes, details:   Relevant  other:  Diverticuitis   Musculoskeletal:   Artificial joints: as above  Relevant other:  No   Neurologic:   Prior Stroke:  No     If yes, details:   Relevant other:  No   Relevant other info:  No      PAST MEDICAL HISTORY:  Past Medical History:   Diagnosis Date    A-fib     Anticoagulant long-term use     Arthritis     COPD (chronic obstructive pulmonary disease)     COPD (chronic obstructive pulmonary disease)     Diabetes mellitus, type 2     Diverticulosis     History of left knee replacement     DR CRENSHAW    HNP (herniated nucleus pulposus)     LUMBAR    Hypertension     Lung disease     Pancreatic insufficiency     s/p whipple, non cancer    Presence of dental bridge     UPPER    Wears glasses        PAST SURGICAL HISTORY:  Past Surgical History:   Procedure Laterality Date    APPENDECTOMY      BACK SURGERY      lower back    BACK SURGERY      lower back    CHOLECYSTECTOMY      HERNIA REPAIR      JOINT REPLACEMENT Left     KNEE    KNEE ARTHROPLASTY Right 5/3/2021    Procedure: ARTHROPLASTY, KNEE;  Surgeon: Manuel Willingham MD;  Location: [facility] OR;  Service: Orthopedics;  Laterality: Right;  guzman    LAPAROSCOPIC REPAIR OF INCISIONAL HERNIA N/A 10/22/2019    Procedure: REPAIR, HERNIA, INCISIONAL, LAPAROSCOPIC;  Surgeon: Pantera Almanza III, MD;  Location: [facility] OR;  Service: General;  Laterality: N/A;    pancrease  2009    stents in pancrease     TONSILLECTOMY      TOTAL KNEE ARTHROPLASTY Left 08/10/2017    WHIPPLE PROCEDURE W/ LAPAROSCOPY  10/09        SOCIAL HISTORY:  Dependencies: smoking status as noted below  Social History     Tobacco Use    Smoking status: Former Smoker     Packs/day: 1.00     Years: 30.00     Pack years: 30.00     Types: Cigarettes     Quit date: 2017     Years since quittin.4    Smokeless tobacco: Never Used   Substance Use Topics    Alcohol use: No    Drug use: No     PERTINENT MEDICATIONS:  See medications list.    Current Outpatient  Medications:     albuterol (PROVENTIL/VENTOLIN) 90 mcg/actuation inhaler, Inhale 2 puffs into the lungs every 6 (six) hours as needed for Wheezing., Disp: 1 each, Rfl: 0    albuterol (VENTOLIN HFA) 90 mcg/actuation inhaler, Inhale 2 puffs into the lungs every 6 (six) hours as needed for Wheezing. Rescue, Disp: 54 g, Rfl: 3    albuterol-ipratropium (DUO-NEB) 2.5 mg-0.5 mg/3 mL nebulizer solution, , Disp: , Rfl:     amLODIPine (NORVASC) 5 MG tablet, , Disp: , Rfl:     apixaban (ELIQUIS) 5 mg Tab, Take 5 mg by mouth 2 (two) times daily., Disp: , Rfl:     cetirizine (ZYRTEC) 10 MG tablet, Take 10 mg by mouth once daily., Disp: , Rfl:     CREON CpDR, Take 2 capsules by mouth 4 (four) times daily., Disp: , Rfl: 5    diltiaZEM (CARDIZEM CD) 120 MG Cp24, , Disp: , Rfl:     empagliflozin (JARDIANCE) 25 mg tablet, Take 25 mg by mouth once daily., Disp: , Rfl:     famotidine (PEPCID) 20 MG tablet, Take 20 mg by mouth 2 (two) times daily. , Disp: , Rfl:     ferrous sulfate 324 mg (65 mg iron) TbEC, Take 1 tablet (324 mg total) by mouth once daily., Disp: 30 tablet, Rfl: 0    flecainide (TAMBOCOR) 100 MG Tab, Take 1 tablet by mouth 2 (two) times daily., Disp: , Rfl:     furosemide (LASIX) 40 MG tablet, Take 40 mg by mouth as needed (edema). , Disp: , Rfl:     lisinopriL (PRINIVIL,ZESTRIL) 5 MG tablet, , Disp: , Rfl:     metoprolol succinate (TOPROL-XL) 100 MG 24 hr tablet, , Disp: , Rfl:     montelukast (SINGULAIR) 10 mg tablet, , Disp: , Rfl:     oxyCODONE (ROXICODONE) 5 MG immediate release tablet, 1-2 tab for pain q4-6 hours, Disp: 10 tablet, Rfl: 0    oxyCODONE-acetaminophen (PERCOCET) 7.5-325 mg per tablet, , Disp: , Rfl:     polyethylene glycol (GLYCOLAX) 17 gram PwPk, Take 17 g by mouth once daily., Disp: , Rfl: 0    potassium chloride SA (K-DUR,KLOR-CON) 20 MEQ tablet, , Disp: , Rfl:     senna-docusate 8.6-50 mg (PERICOLACE) 8.6-50 mg per tablet, Take 1 tablet by mouth 2 (two) times daily., Disp: ,  Rfl:     sodium chloride (OCEAN NASAL NASL), 1 spray by Nasal route daily as needed (rhinitis). , Disp: , Rfl:     tiotropium-olodateroL (STIOLTO RESPIMAT) 2.5-2.5 mcg/actuation Mist, Inhale 1 puff into the lungs 2 (two) times a day. Controller, Disp: , Rfl:     tiotropium-olodateroL (STIOLTO RESPIMAT) 2.5-2.5 mcg/actuation Mist, Inhale 2 puffs into the lungs once daily. Controller, Disp: 12 g, Rfl: 5    TRUE METRIX GLUCOSE TEST STRIP Strp, , Disp: , Rfl:     hydrocortisone 2.5 % cream, Apply topically 2 (two) times daily. Use on AA BID x 10-14 days for 14 days, Disp: 20 g, Rfl: 0    insulin aspart protamine-insulin aspart (NOVOLOG MIX 70-30FLEXPEN U-100) 100 unit/mL (70-30) InPn pen, Inject 20 Units into the skin 3 (three) times daily before meals., Disp: , Rfl: 0    metoprolol tartrate (LOPRESSOR) 50 MG tablet, Take 1 tablet (50 mg total) by mouth 2 (two) times daily., Disp: , Rfl:     ALLERGIES:  Heparin, Hydrocodone, Penicillins, Sulfa (sulfonamide antibiotics), Doxycycline, Clindamycin, and Penicillin v    EXAM:  Constitutional  General appearance: well-developed, well-nourished, well-kempt older white female    Neurologic/Psychiatric  Alert,  normal orientation to time, place, person  Normal mood and affect with no evidence of depression, anxiety, agitation  Skin: see photo(s)  Head: background marked solar damage to exposed areas of skin  inspection/palpation reveals an ill-defined, approximately 6 mm pink biopsy site on the left lower nose, superior to an approx 6 mm depressed scar on the left ala   she confirmed this as the site of the prior biopsy and site(s) confirmed by reference to the photograph(s) attached below taken at the time of the biopsy/biopsies by the referring physician    Photo(s) this visit:       Photo(s) from biopsy visit:      ASSESSMENT: biopsy-proven basal cell carcinoma of the left lower nose  chronic solar damage to areas as noted above  personal history of non-melanoma skin  cancer  Long term current use of anticoagulant      PLAN:  The diagnosis and management options, and risks and benefits of the alternatives, including observation/non-treatment, radiation treatment, excision with vertical frozen section or paraffin-embedded section margin evaluation, and Mohs' Micrographic Surgery, Fresh Tissue Technique, were discussed at length with the patient and her daughter. In particular, the discussion included, but was not limited to, the following:    One alternative at this point would be to defer further treatment and observe the lesion. With small skin cancers of this kind, it is possible that a biopsy can be sufficient to definitively treat a small skin cancer of this kind. Alternatively, some skin cancers are slow growing and do not require immediate treatment. The potential advantage of this choice would be to avoid the need for possibly unnecessary additional surgery. Among the potential disadvantages of this would be the possibility of enlargement of the lesion, more extensive spread of the lesion or recurrence at a later date, which might necessitate a larger and more complex surgery.    Radiation treatment can be an effective treatment for this type of skin cancer. The usual course of treatment is every weekday for several weeks. Local irritation will result from treatment, although no systemic side effects are expected. The potential advantage of radiation treatment is that it avoids the need for surgery. Among the disadvantages of radiation treatment are the length of treatment, the local inflammatory response, the absence of pathologic confirmation of the removal of the skin cancer, a possible increased risk of additional skin cancer in the treated area in later years, and a somewhat increased risk of recurrence at a later date.     Excisional surgery can be an effective treatment for this type of skin cancer. This would involve excision of the lesion with margin evaluation by  submitting the specimen to a pathologist for either immediate marginal assessment via frozen section processing, or delayed marginal assessment by fixed-tissue processing. The potential advantage of this technique is that it offers a way of treating the lesion with some degree of histologic confirmation of tumor removal. Among the disadvantages of this treatment are the possible need for re-excision if marginal involvement is identified, a somewhat greater likelihood of recurrence as compared to Mohs' surgery because of the less comprehensive margin evaluation inherent in the technique, and the general potential risks of surgery, including allergic reactions to the anesthetic and other materials used, infection, injury to nerves in the area with consequent loss of sensation or muscle function, and scarring or distortion of surrounding structures.    Mohs' surgery is a very effective treatment for this type of skin cancer. The potential advantage of Mohs' surgery is that this technique offers the greatest possible certainty of knowing that the skin cancer has been completely removed, with the removal of the least amount of normal tissue. The potential disadvantages of Mohs' surgery include the duration of the surgery, the possible need for a separate surgery for reconstruction following tumor removal, and scarring as a result. In addition, general potential risks of surgery as noted above also apply to treatment via Mohs' surgery.    In light of the nature of this tumor and the location on the in an area of increased risk of recurrence,  Mohs' micrographic surgery was thought to be the most appropriate management choice, and this diagnosis is appropriate for treatment by Mohs' micrographic surgery.     We also discussed options for repair of the site to follow tumor removal via Mohs' surgery, including the participation of a reconstructive surgeon to reconstruct the resultant wound. Given the location, the  anticipated size and potential complexity of the defect to follow tumor removal via Mohs' surgery, reconstruction will be coordinated with Walt Dupree MD.  I will contact Dr. Dupree to arrange for the patient to be seen in consultation, and we will coordinate the surgeries thereafter.    Sufficient time was available for questions, and all questions were answered to her satisfaction. She fully understands the aims, risks, alternatives, and possible complications, and has elected to proceed with the surgery, and verbally consented to do so. The procedure will be scheduled in the near future.    Routine pre-op instructions were given to her.    --------------------------------------  Note: Some or all of this note may have been generated using voice recognition software. There may be voice recognition errors including grammatical and/or spelling errors found in the text. Attempts were made to correct these errors prior to signature.

## 2022-09-21 ENCOUNTER — OFFICE VISIT (OUTPATIENT)
Dept: OTOLARYNGOLOGY | Facility: CLINIC | Age: 72
End: 2022-09-21
Payer: MEDICARE

## 2022-09-21 VITALS — WEIGHT: 215.81 LBS | BODY MASS INDEX: 34.68 KG/M2 | HEIGHT: 66 IN

## 2022-09-21 DIAGNOSIS — C44.311 BASAL CELL CARCINOMA (BCC) OF LEFT NASAL SIDEWALL: Primary | ICD-10-CM

## 2022-09-21 DIAGNOSIS — Z79.01 CHRONIC ANTICOAGULATION: ICD-10-CM

## 2022-09-21 PROCEDURE — 3288F FALL RISK ASSESSMENT DOCD: CPT | Mod: CPTII,S$GLB,, | Performed by: OTOLARYNGOLOGY

## 2022-09-21 PROCEDURE — 1101F PT FALLS ASSESS-DOCD LE1/YR: CPT | Mod: CPTII,S$GLB,, | Performed by: OTOLARYNGOLOGY

## 2022-09-21 PROCEDURE — 4010F ACE/ARB THERAPY RXD/TAKEN: CPT | Mod: CPTII,S$GLB,, | Performed by: OTOLARYNGOLOGY

## 2022-09-21 PROCEDURE — 99999 PR PBB SHADOW E&M-EST. PATIENT-LVL II: ICD-10-PCS | Mod: PBBFAC,,, | Performed by: OTOLARYNGOLOGY

## 2022-09-21 PROCEDURE — 4010F PR ACE/ARB THEARPY RXD/TAKEN: ICD-10-PCS | Mod: CPTII,S$GLB,, | Performed by: OTOLARYNGOLOGY

## 2022-09-21 PROCEDURE — 3288F PR FALLS RISK ASSESSMENT DOCUMENTED: ICD-10-PCS | Mod: CPTII,S$GLB,, | Performed by: OTOLARYNGOLOGY

## 2022-09-21 PROCEDURE — 99204 PR OFFICE/OUTPT VISIT, NEW, LEVL IV, 45-59 MIN: ICD-10-PCS | Mod: S$GLB,,, | Performed by: OTOLARYNGOLOGY

## 2022-09-21 PROCEDURE — 3008F BODY MASS INDEX DOCD: CPT | Mod: CPTII,S$GLB,, | Performed by: OTOLARYNGOLOGY

## 2022-09-21 PROCEDURE — 1160F RVW MEDS BY RX/DR IN RCRD: CPT | Mod: CPTII,S$GLB,, | Performed by: OTOLARYNGOLOGY

## 2022-09-21 PROCEDURE — 3008F PR BODY MASS INDEX (BMI) DOCUMENTED: ICD-10-PCS | Mod: CPTII,S$GLB,, | Performed by: OTOLARYNGOLOGY

## 2022-09-21 PROCEDURE — 1101F PR PT FALLS ASSESS DOC 0-1 FALLS W/OUT INJ PAST YR: ICD-10-PCS | Mod: CPTII,S$GLB,, | Performed by: OTOLARYNGOLOGY

## 2022-09-21 PROCEDURE — 99204 OFFICE O/P NEW MOD 45 MIN: CPT | Mod: S$GLB,,, | Performed by: OTOLARYNGOLOGY

## 2022-09-21 PROCEDURE — 1126F PR PAIN SEVERITY QUANTIFIED, NO PAIN PRESENT: ICD-10-PCS | Mod: CPTII,S$GLB,, | Performed by: OTOLARYNGOLOGY

## 2022-09-21 PROCEDURE — 99999 PR PBB SHADOW E&M-EST. PATIENT-LVL II: CPT | Mod: PBBFAC,,, | Performed by: OTOLARYNGOLOGY

## 2022-09-21 PROCEDURE — 1159F MED LIST DOCD IN RCRD: CPT | Mod: CPTII,S$GLB,, | Performed by: OTOLARYNGOLOGY

## 2022-09-21 PROCEDURE — 1159F PR MEDICATION LIST DOCUMENTED IN MEDICAL RECORD: ICD-10-PCS | Mod: CPTII,S$GLB,, | Performed by: OTOLARYNGOLOGY

## 2022-09-21 PROCEDURE — 1160F PR REVIEW ALL MEDS BY PRESCRIBER/CLIN PHARMACIST DOCUMENTED: ICD-10-PCS | Mod: CPTII,S$GLB,, | Performed by: OTOLARYNGOLOGY

## 2022-09-21 PROCEDURE — 1126F AMNT PAIN NOTED NONE PRSNT: CPT | Mod: CPTII,S$GLB,, | Performed by: OTOLARYNGOLOGY

## 2022-09-21 NOTE — PROGRESS NOTES
Subjective:       Patient ID: Maddie Otero is a 71 y.o. female.    Chief Complaint: Basal Cell Carcinoma (nose)    Maddie is here for evaluation of basal cell of medial nasal sidewall and tip, left.   The lesion has been present for month(s). no pain, norapid growth in a short time.   Notable previous surgery of the head and neck: none  History of cardiac issues: yes  Anti-coagulation: Eliquis  History of H&N radiation: no  History of immunosuppression: no    Social History     Tobacco Use   Smoking Status Former    Packs/day: 1.00    Years: 30.00    Pack years: 30.00    Types: Cigarettes    Quit date: 2017    Years since quittin.4   Smokeless Tobacco Never     Social History     Substance and Sexual Activity   Alcohol Use No          Objective:        Constitutional:   Vital signs are normal. She appears well-developed and well-nourished.     Head:  Normocephalic and atraumatic.     Ears:  Hearing normal to normal and whispered voice; external ear normal without scars, lesions, or masses; ear canal, tympanic membrane, and middle ear normal..     Nose:  Nose normal including turbinates, nasal mucosa, sinuses and nasal septum.   Indurated area of left inferior sidewall paulette to ala    Mouth/Throat  Oropharynx clear and moist without lesions or asymmetry.     Neck:  Neck normal without thyromegaly masses, asymmetry, normal tracheal structure, crepitus, and tenderness.       Tests / Results:  none    Assessment:       1. Basal cell carcinoma (BCC) of left nasal sidewall    2. Chronic anticoagulation          Plan:         Maddie Otero is scheduled to undergo Moh's surgery for excision of the lesion. Dr. Yañez will perform the resection, and I will be available for reconstruction of the defect. I discussed my tentative plan with the patient including skin graft, possible local tissue rearrangement. I discussed that this may change based on the ultimate defect. I discussed the reconstructive ladder  including healing by secondary intention all the way to interpolated flaps, if necessary.     We will plan on reconstruction under Local.  Additional perioperative considerations: Stop Eliquis 3 days prior.

## 2022-09-29 ENCOUNTER — TELEPHONE (OUTPATIENT)
Dept: DERMATOLOGY | Facility: CLINIC | Age: 72
End: 2022-09-29
Payer: MEDICARE

## 2022-09-29 NOTE — TELEPHONE ENCOUNTER
Returned patient call and I told her that I need to get with Dr. Dupree nurse and call her next week for surgery.

## 2022-10-12 ENCOUNTER — OFFICE VISIT (OUTPATIENT)
Dept: PULMONOLOGY | Facility: CLINIC | Age: 72
End: 2022-10-12
Payer: MEDICARE

## 2022-10-12 VITALS
OXYGEN SATURATION: 94 % | DIASTOLIC BLOOD PRESSURE: 76 MMHG | SYSTOLIC BLOOD PRESSURE: 122 MMHG | TEMPERATURE: 98 F | WEIGHT: 218 LBS | HEART RATE: 54 BPM | BODY MASS INDEX: 35.19 KG/M2

## 2022-10-12 DIAGNOSIS — J44.9 CHRONIC OBSTRUCTIVE PULMONARY DISEASE, UNSPECIFIED COPD TYPE: Primary | ICD-10-CM

## 2022-10-12 DIAGNOSIS — Z87.891 FORMER SMOKER: ICD-10-CM

## 2022-10-12 DIAGNOSIS — J96.11 CHRONIC HYPOXEMIC RESPIRATORY FAILURE: ICD-10-CM

## 2022-10-12 PROCEDURE — 1125F AMNT PAIN NOTED PAIN PRSNT: CPT | Mod: CPTII,S$GLB,, | Performed by: NURSE PRACTITIONER

## 2022-10-12 PROCEDURE — 1125F PR PAIN SEVERITY QUANTIFIED, PAIN PRESENT: ICD-10-PCS | Mod: CPTII,S$GLB,, | Performed by: NURSE PRACTITIONER

## 2022-10-12 PROCEDURE — 99214 PR OFFICE/OUTPT VISIT, EST, LEVL IV, 30-39 MIN: ICD-10-PCS | Mod: S$GLB,,, | Performed by: NURSE PRACTITIONER

## 2022-10-12 PROCEDURE — 3008F BODY MASS INDEX DOCD: CPT | Mod: CPTII,S$GLB,, | Performed by: NURSE PRACTITIONER

## 2022-10-12 PROCEDURE — 3078F PR MOST RECENT DIASTOLIC BLOOD PRESSURE < 80 MM HG: ICD-10-PCS | Mod: CPTII,S$GLB,, | Performed by: NURSE PRACTITIONER

## 2022-10-12 PROCEDURE — 3008F PR BODY MASS INDEX (BMI) DOCUMENTED: ICD-10-PCS | Mod: CPTII,S$GLB,, | Performed by: NURSE PRACTITIONER

## 2022-10-12 PROCEDURE — 1159F PR MEDICATION LIST DOCUMENTED IN MEDICAL RECORD: ICD-10-PCS | Mod: CPTII,S$GLB,, | Performed by: NURSE PRACTITIONER

## 2022-10-12 PROCEDURE — 3074F PR MOST RECENT SYSTOLIC BLOOD PRESSURE < 130 MM HG: ICD-10-PCS | Mod: CPTII,S$GLB,, | Performed by: NURSE PRACTITIONER

## 2022-10-12 PROCEDURE — 3074F SYST BP LT 130 MM HG: CPT | Mod: CPTII,S$GLB,, | Performed by: NURSE PRACTITIONER

## 2022-10-12 PROCEDURE — 1159F MED LIST DOCD IN RCRD: CPT | Mod: CPTII,S$GLB,, | Performed by: NURSE PRACTITIONER

## 2022-10-12 PROCEDURE — 4010F ACE/ARB THERAPY RXD/TAKEN: CPT | Mod: CPTII,S$GLB,, | Performed by: NURSE PRACTITIONER

## 2022-10-12 PROCEDURE — 3078F DIAST BP <80 MM HG: CPT | Mod: CPTII,S$GLB,, | Performed by: NURSE PRACTITIONER

## 2022-10-12 PROCEDURE — 99214 OFFICE O/P EST MOD 30 MIN: CPT | Mod: S$GLB,,, | Performed by: NURSE PRACTITIONER

## 2022-10-12 PROCEDURE — 4010F PR ACE/ARB THEARPY RXD/TAKEN: ICD-10-PCS | Mod: CPTII,S$GLB,, | Performed by: NURSE PRACTITIONER

## 2022-10-12 RX ORDER — PIOGLITAZONEHYDROCHLORIDE 30 MG/1
30 TABLET ORAL DAILY
COMMUNITY

## 2022-10-12 NOTE — PROGRESS NOTES
SUBJECTIVE:    Patient ID: Maddie Otero is a 71 y.o. female.    Chief Complaint: COPD (6 month follow up)    Patient here today feeling well. She is using her Stiolto daily.  She is wearing her oxygen most of the time during the day and does wear it to sleep.  Her breathing is stable. She did have an exacerbation in July but did not require hospitalization.    Past Medical History:   Diagnosis Date    A-fib     Anticoagulant long-term use     Arthritis     COPD (chronic obstructive pulmonary disease)     COPD (chronic obstructive pulmonary disease) 2019    Diabetes mellitus, type 2     Diverticulosis     History of left knee replacement 2017    DR CRENSHAW    HNP (herniated nucleus pulposus)     LUMBAR    Hypertension     Lung disease     Pancreatic insufficiency     s/p whipple, non cancer    Presence of dental bridge     UPPER    Wears glasses      Past Surgical History:   Procedure Laterality Date    APPENDECTOMY      BACK SURGERY  1994    lower back    BACK SURGERY  2012    lower back    CHOLECYSTECTOMY      HERNIA REPAIR      JOINT REPLACEMENT Left     KNEE    KNEE ARTHROPLASTY Right 5/3/2021    Procedure: ARTHROPLASTY, KNEE;  Surgeon: Manuel Willingham MD;  Location: [facility] OR;  Service: Orthopedics;  Laterality: Right;  guzman    LAPAROSCOPIC REPAIR OF INCISIONAL HERNIA N/A 10/22/2019    Procedure: REPAIR, HERNIA, INCISIONAL, LAPAROSCOPIC;  Surgeon: Pantera Almanza III, MD;  Location: [facility] OR;  Service: General;  Laterality: N/A;    pancrease  2009    stents in pancrease     TONSILLECTOMY      TOTAL KNEE ARTHROPLASTY Left 08/10/2017    WHIPPLE PROCEDURE W/ LAPAROSCOPY  10/09     Family History   Problem Relation Age of Onset    Diabetes Mother     Cancer Father         Social History:   Marital Status:   Occupation: retired from caring for mentally disabled people  Alcohol History:  reports no history of alcohol use.  Tobacco History:  reports that she quit smoking about 5 years ago. Her smoking use  included cigarettes. She has a 30.00 pack-year smoking history. She has never used smokeless tobacco.  Drug History:  reports no history of drug use.    Review of patient's allergies indicates:   Allergen Reactions    Sulfa (sulfonamide antibiotics) Hives    Penicillin v      Childhood reaction, swelled       Current Outpatient Medications   Medication Sig Dispense Refill    albuterol (PROVENTIL/VENTOLIN) 90 mcg/actuation inhaler Inhale 2 puffs into the lungs every 6 (six) hours as needed for Wheezing. 1 each 0    albuterol-ipratropium (DUO-NEB) 2.5 mg-0.5 mg/3 mL nebulizer solution       amLODIPine (NORVASC) 5 MG tablet       apixaban (ELIQUIS) 5 mg Tab Take 5 mg by mouth 2 (two) times daily.      cetirizine (ZYRTEC) 10 MG tablet Take 10 mg by mouth once daily.      CREON CpDR Take 2 capsules by mouth 4 (four) times daily.  5    empagliflozin (JARDIANCE) 25 mg tablet Take 25 mg by mouth once daily.      famotidine (PEPCID) 20 MG tablet Take 20 mg by mouth 2 (two) times daily.       furosemide (LASIX) 40 MG tablet Take 40 mg by mouth as needed (edema).       lisinopriL (PRINIVIL,ZESTRIL) 5 MG tablet       metoprolol succinate (TOPROL-XL) 100 MG 24 hr tablet       oxyCODONE-acetaminophen (PERCOCET)  mg per tablet 1 tablet every 4 (four) hours as needed.      pioglitazone (ACTOS) 30 MG tablet Take 30 mg by mouth once daily.      potassium chloride SA (K-DUR,KLOR-CON) 20 MEQ tablet       sodium chloride (OCEAN NASAL NASL) 1 spray by Nasal route daily as needed (rhinitis).       tiotropium-olodateroL (STIOLTO RESPIMAT) 2.5-2.5 mcg/actuation Mist Inhale 1 puff into the lungs 2 (two) times a day. Controller      albuterol (VENTOLIN HFA) 90 mcg/actuation inhaler Inhale 2 puffs into the lungs every 6 (six) hours as needed for Wheezing. Rescue 54 g 3    diltiaZEM (CARDIZEM CD) 120 MG Cp24       ferrous sulfate 324 mg (65 mg iron) TbEC Take 1 tablet (324 mg total) by mouth once daily. (Patient not taking: Reported on  10/12/2022) 30 tablet 0    flecainide (TAMBOCOR) 100 MG Tab Take 1 tablet by mouth 2 (two) times daily.      hydrocortisone 2.5 % cream Apply topically 2 (two) times daily. Use on AA BID x 10-14 days for 14 days 20 g 0    insulin aspart protamine-insulin aspart (NOVOLOG MIX 70-30FLEXPEN U-100) 100 unit/mL (70-30) InPn pen Inject 20 Units into the skin 3 (three) times daily before meals.  0    metoprolol tartrate (LOPRESSOR) 50 MG tablet Take 1 tablet (50 mg total) by mouth 2 (two) times daily.      montelukast (SINGULAIR) 10 mg tablet       oxyCODONE (ROXICODONE) 5 MG immediate release tablet 1-2 tab for pain q4-6 hours (Patient not taking: Reported on 10/12/2022) 10 tablet 0    polyethylene glycol (GLYCOLAX) 17 gram PwPk Take 17 g by mouth once daily. (Patient not taking: Reported on 10/12/2022)  0    senna-docusate 8.6-50 mg (PERICOLACE) 8.6-50 mg per tablet Take 1 tablet by mouth 2 (two) times daily. (Patient not taking: Reported on 10/12/2022)      tiotropium-olodateroL (STIOLTO RESPIMAT) 2.5-2.5 mcg/actuation Mist Inhale 2 puffs into the lungs once daily. Controller 12 g 5    TRUE METRIX GLUCOSE TEST STRIP Strp        No current facility-administered medications for this visit.       Chest xray 05/2022  Chest xray  IMPRESSION:  1. Unchanged pulmonary hyperinflation and emphysema, suggesting chronic obstructive disease.  2. Stable enlarged central pulmonary arteries        6 minute walk is hypoxemic needs to be on 3 liters of oxygen     General: feeling well   Eyes: Vision is good  ENT:  No rhinitis or sinusitis  Heart:: No chest pain or palpitations.  Lungs:breathing is stable   GI: no more diarrhea    : No dysuria, hesitancy, or nocturia.  Musculoskeletal: pain in her knee and shoulder  Skin: No lesions or rashes.  Neuro: No headaches or neuropathy.  Lymph: no swelling  Psych: No anxiety or depression.  Endo: weight stable         OBJECTIVE:      /76 (BP Location: Left arm, Patient Position: Sitting,  BP Method: Medium (Manual))   Pulse (!) 54   Temp 97.8 °F (36.6 °C)   Wt 98.9 kg (218 lb)   LMP  (LMP Unknown)   SpO2 (!) 94%   BMI 35.19 kg/m²     Physical Exam  GENERAL: Older patient in no distress.  HEENT: Pupils equal and reactive. Extraocular movements intact. Nose intact.      Pharynx moist.  NECK: Supple.   HEART: Regular rate and rhythm. No murmur or gallop auscultated.  LUNGS:clear  ABDOMEN: Bowel sounds present. Non-tender, no masses palpated.  EXTREMITIES: Normal muscle tone and joint movement, no cyanosis or clubbing. walker  LYMPHATICS: No adenopathy palpated, trace edema   SKIN: no issues   NEURO: Cranial nerves II-XII intact. Motor strength 5/5 bilaterally, upper and lower extremities.  PSYCH: Appropriate affect.      Assessment:       1. Chronic obstructive pulmonary disease, unspecified COPD type    2. Chronic hypoxemic respiratory failure    3. Former smoker          Frequent pneumonia concerned with it being from inhaled steroids.   Plan:       Chronic obstructive pulmonary disease, unspecified COPD type    Chronic hypoxemic respiratory failure    Former smoker  -     CT Chest Lung Screening Low Dose; Future        Use the Stiolto 2 puffs daily  Use your rescue inhaler or the nebulizer as needed every 4-6 hours for shortness of breath no relieved by resting  Wear your oxygen !!!  Screening CT    Follow up in about 6 months (around 4/12/2023).

## 2022-10-20 ENCOUNTER — TELEPHONE (OUTPATIENT)
Dept: PULMONOLOGY | Facility: CLINIC | Age: 72
End: 2022-10-20

## 2022-10-20 ENCOUNTER — HOSPITAL ENCOUNTER (OUTPATIENT)
Dept: RADIOLOGY | Facility: HOSPITAL | Age: 72
Discharge: HOME | End: 2022-10-20
Attending: NURSE PRACTITIONER
Payer: MEDICARE

## 2022-10-20 DIAGNOSIS — Z87.891 FORMER SMOKER: ICD-10-CM

## 2022-10-20 PROCEDURE — 71271 CT THORAX LUNG CANCER SCR C-: CPT | Mod: TC,PO

## 2022-10-20 NOTE — TELEPHONE ENCOUNTER
Ct chest  IMPRESSION:     3 mm pulmonary nodule in the anterior left upper lobe is unchanged. No new pulmonary nodules.     LUNG RADS CATEGORY 2: BENIGN APPEARANCE OR BEHAVIOR     RECOMMENDATION: Continued annual screening with LDCT in 12 months.

## 2022-12-12 NOTE — PROGRESS NOTES
On ELIQUIS  Prior photo(s) of site(s) to confirm location(s):      Defibrillator: No  Pacemaker: No  Artificial heart valves: No  Artificial joints: No    ALLERGIES:   Heparin, Hydrocodone, Penicillins, Sulfa (sulfonamide antibiotics), Doxycycline, Clindamycin, and Penicillin v      Current Outpatient Medications:     albuterol (PROVENTIL/VENTOLIN) 90 mcg/actuation inhaler, Inhale 2 puffs into the lungs every 6 (six) hours as needed for Wheezing., Disp: 1 each, Rfl: 0    albuterol (VENTOLIN HFA) 90 mcg/actuation inhaler, Inhale 2 puffs into the lungs every 6 (six) hours as needed for Wheezing. Rescue, Disp: 54 g, Rfl: 3    albuterol-ipratropium (DUO-NEB) 2.5 mg-0.5 mg/3 mL nebulizer solution, , Disp: , Rfl:     amLODIPine (NORVASC) 5 MG tablet, , Disp: , Rfl:     apixaban (ELIQUIS) 5 mg Tab, Take 5 mg by mouth 2 (two) times daily., Disp: , Rfl:     cetirizine (ZYRTEC) 10 MG tablet, Take 10 mg by mouth once daily., Disp: , Rfl:     CREON CpDR, Take 2 capsules by mouth 4 (four) times daily., Disp: , Rfl: 5    diltiaZEM (CARDIZEM CD) 120 MG Cp24, , Disp: , Rfl:     empagliflozin (JARDIANCE) 25 mg tablet, Take 25 mg by mouth once daily., Disp: , Rfl:     famotidine (PEPCID) 20 MG tablet, Take 20 mg by mouth 2 (two) times daily. , Disp: , Rfl:     ferrous sulfate 324 mg (65 mg iron) TbEC, Take 1 tablet (324 mg total) by mouth once daily. (Patient not taking: Reported on 10/12/2022), Disp: 30 tablet, Rfl: 0    flecainide (TAMBOCOR) 100 MG Tab, Take 1 tablet by mouth 2 (two) times daily., Disp: , Rfl:     furosemide (LASIX) 40 MG tablet, Take 40 mg by mouth as needed (edema). , Disp: , Rfl:     hydrocortisone 2.5 % cream, Apply topically 2 (two) times daily. Use on AA BID x 10-14 days for 14 days, Disp: 20 g, Rfl: 0    insulin aspart protamine-insulin aspart (NOVOLOG MIX 70-30FLEXPEN U-100) 100 unit/mL (70-30) InPn pen, Inject 20 Units into the skin 3 (three) times daily before meals., Disp: , Rfl: 0    lisinopriL  (PRINIVIL,ZESTRIL) 5 MG tablet, , Disp: , Rfl:     metoprolol succinate (TOPROL-XL) 100 MG 24 hr tablet, , Disp: , Rfl:     metoprolol tartrate (LOPRESSOR) 50 MG tablet, Take 1 tablet (50 mg total) by mouth 2 (two) times daily., Disp: , Rfl:     montelukast (SINGULAIR) 10 mg tablet, , Disp: , Rfl:     oxyCODONE (ROXICODONE) 5 MG immediate release tablet, 1-2 tab for pain q4-6 hours (Patient not taking: Reported on 10/12/2022), Disp: 10 tablet, Rfl: 0    oxyCODONE-acetaminophen (PERCOCET)  mg per tablet, 1 tablet every 4 (four) hours as needed., Disp: , Rfl:     pioglitazone (ACTOS) 30 MG tablet, Take 30 mg by mouth once daily., Disp: , Rfl:     polyethylene glycol (GLYCOLAX) 17 gram PwPk, Take 17 g by mouth once daily. (Patient not taking: Reported on 10/12/2022), Disp: , Rfl: 0    potassium chloride SA (K-DUR,KLOR-CON) 20 MEQ tablet, , Disp: , Rfl:     senna-docusate 8.6-50 mg (PERICOLACE) 8.6-50 mg per tablet, Take 1 tablet by mouth 2 (two) times daily. (Patient not taking: Reported on 10/12/2022), Disp: , Rfl:     sodium chloride (OCEAN NASAL NASL), 1 spray by Nasal route daily as needed (rhinitis). , Disp: , Rfl:     tiotropium-olodateroL (STIOLTO RESPIMAT) 2.5-2.5 mcg/actuation Mist, Inhale 1 puff into the lungs 2 (two) times a day. Controller, Disp: , Rfl:     tiotropium-olodateroL (STIOLTO RESPIMAT) 2.5-2.5 mcg/actuation Mist, Inhale 2 puffs into the lungs once daily. Controller, Disp: 12 g, Rfl: 5    TRUE METRIX GLUCOSE TEST STRIP Strp, , Disp: , Rfl:   -------------------------------------------------------------  PROCEDURE: Mohs' Micrographic Surgery    SITE: left nasal ala    INDICATION: basal cell carcinoma in an area at increased risk of recurrence    CASE NUMBER: [id number]      ANESTHETIC: 2.5 mL 2% Lidocaine with Epinephrine 1:200K and    1.5 mL of 0.5% Marcaine    SURGICAL PREP: Ethanol and ophthalmic Betadine    SURGEON: Vicente Yñaez MD    ASSISTANTS: Ricardo English CST     PREOPERATIVE  DIAGNOSIS: basal cell carcinoma    POSTOPERATIVE DIAGNOSIS: basal cell carcinoma    PATHOLOGIC DIAGNOSIS: basal cell carcinoma    STAGES OF MOHS' SURGERY PERFORMED: one    TUMOR-FREE PLANE ACHIEVED: yes    HEMOSTASIS: Hyfrecation     SPECIMENS: one (one in stage A)    INITIAL LESION SIZE: 0.9 x 0.9 cm    FINAL DEFECT SIZE: 0.7 x 0.9 cm    WOUND REPAIR/DISPOSITION: see below    NARRATIVE:    The patient is a 72 y.o.female referred by Rakel Santos MD with a history of cancer on the left lower nose which was biopsied - pathology accession # [id number]-, [facility]. Findings revealed basal cell carcinoma. Examination revealed a pink scar on the left lower nose/ala at the site of prior biopsy, which was confirmed by reference to the photograph taken at the previous patient visit, as attached above. In light of the nature of this tumor and the location on the nose, Mohs' micrographic surgery was thought to be the most appropriate management choice, and this diagnosis is appropriate for treatment by Mohs' micrographic surgery.  I discussed it with the patient and she fully understands the aims, risks, alternatives, and possible complications, and elects to proceed.  There are no medical or surgical contraindications to the procedure.     Of note, there is a depressed scar inferior to the site in question, which she reports is from a procedure 20 or so years ago.    A signed informed consent was obtained.    PROCEDURE:  The patient was placed in the semi-recumbent position on the operating table in the Mohs' Surgery Suite. The area in question was thoroughly prepped with ethanol and ophthalmic Betadine. A sterile surgical marker was used to outline the clinically apparent margins of the involved area, and a narrow margin of normal-appearing skin. Reference marks were made at the periphery of the outlined area with the surgical marker. The proposed area of excision was measured and photographed. Local anesthesia as  "noted above was administered.  The total volume of anesthetic used throughout this portion of the procedure was as documented above. The area was prepared and draped in the standard manner. All of the grossly identifiable area of clinically abnormal tissue and an underlying/peripheral layer was taken and processed by the Mohs' technique.  Hemostasis was obtained with the hyfrecator. Tissue was taken from any areas of residual marginal involvement (if present) and processed by the Mohs' technique in as many stages as needed until a tumor-free plane was achieved.    Colors of inks used in the reference nicks at epidermal margins (if present) and/or inking of non-epithelial edges, if applicable, is represented on the Mohs map as follows: solid lines represent red ink, dots represent blue ink, jagged lines represent black ink, curlicues represent green ink, "xxx" represents yellow ink.    The first Mohs' layer consisted of one section(s) with 4 slide(s) evaluated. Histology of the specimen(s) showed, on the last cuts, a focus  basal cell carcinoma, manifested as a nest of basaloid cells with hyperchromatic nuclei, peripheral palisading, and artifactual clefting around the nest, near the green nick, as noted on the Mohs' map; multiple earlier cuts were clear in this area and actual surgical margins were assessed as clear.     A total of one section(s) and 4 slide(s) were examined under the microscope via the Mohs technique.  A cancer free plane was reached after layer number one. Defect final size was as noted above.      The wound was covered with a nonadherent dressing between stages, and the patient allowed to wait in the waiting area during these periods. The final defect was photographed at the completion of the Mohs' procedure.    The patient was returned to the procedure room following completion of the Mohs' procedure and final slide review. She is scheduled to see Walt Dupree MD for closure of the defect this " afternoon as previously arranged.    An additional 1.5 mL of Marcain 0.5% was injected locally before bandaging to maintain her anesthesia.    Final dressing consisted of Telfa and tape.    Estimated blood loss for the total procedure was less than 5 mL.    Total operative time including tissue processing in the Mohs' laboratory and microscopic Mohs' frozen section slide review was 1 hour(s). Verbal and written wound care instructions were given to the patient, and she expressed understanding of these instructions. The patient tolerated the procedure well and left the operating room in good condition; she is to return PRN to me.    Dr. Yañez's cell phone number was given to the patient with instructions to call prn with any problems.

## 2022-12-13 ENCOUNTER — PROCEDURE VISIT (OUTPATIENT)
Dept: DERMATOLOGY | Facility: CLINIC | Age: 72
End: 2022-12-13
Payer: MEDICARE

## 2022-12-13 ENCOUNTER — PROCEDURE VISIT (OUTPATIENT)
Dept: OTOLARYNGOLOGY | Facility: CLINIC | Age: 72
End: 2022-12-13
Payer: MEDICARE

## 2022-12-13 VITALS
HEART RATE: 69 BPM | SYSTOLIC BLOOD PRESSURE: 139 MMHG | BODY MASS INDEX: 35.04 KG/M2 | WEIGHT: 218.06 LBS | HEIGHT: 66 IN | DIASTOLIC BLOOD PRESSURE: 74 MMHG

## 2022-12-13 VITALS
BODY MASS INDEX: 35.03 KG/M2 | HEIGHT: 66 IN | WEIGHT: 218 LBS | DIASTOLIC BLOOD PRESSURE: 76 MMHG | SYSTOLIC BLOOD PRESSURE: 140 MMHG | HEART RATE: 65 BPM

## 2022-12-13 DIAGNOSIS — Z98.890 MOHS DEFECT OF LEFT NOSE: Primary | ICD-10-CM

## 2022-12-13 DIAGNOSIS — Z79.01 CHRONIC ANTICOAGULATION: ICD-10-CM

## 2022-12-13 DIAGNOSIS — C44.311 BASAL CELL CARCINOMA OF LEFT SIDE OF NOSE: Primary | ICD-10-CM

## 2022-12-13 DIAGNOSIS — C44.311 BASAL CELL CARCINOMA (BCC) OF LEFT NASAL SIDEWALL: ICD-10-CM

## 2022-12-13 DIAGNOSIS — M95.0 MOHS DEFECT OF LEFT NOSE: Primary | ICD-10-CM

## 2022-12-13 PROCEDURE — 17311: ICD-10-PCS | Mod: S$GLB,,, | Performed by: DERMATOLOGY

## 2022-12-13 PROCEDURE — 99499 UNLISTED E&M SERVICE: CPT | Mod: S$GLB,,, | Performed by: DERMATOLOGY

## 2022-12-13 PROCEDURE — 17311 MOHS 1 STAGE H/N/HF/G: CPT | Mod: S$GLB,,, | Performed by: DERMATOLOGY

## 2022-12-13 PROCEDURE — 99499 NO LOS: ICD-10-PCS | Mod: S$GLB,,, | Performed by: DERMATOLOGY

## 2022-12-13 PROCEDURE — 14060 TIS TRNFR E/N/E/L 10 SQ CM/<: CPT | Mod: S$GLB,,, | Performed by: OTOLARYNGOLOGY

## 2022-12-13 PROCEDURE — 14060 PR ADJ TISS XFER LID,NOS,EAR <10 SQCM: ICD-10-PCS | Mod: S$GLB,,, | Performed by: OTOLARYNGOLOGY

## 2022-12-13 RX ORDER — MUPIROCIN 20 MG/G
OINTMENT TOPICAL 2 TIMES DAILY
Qty: 15 G | Refills: 1 | Status: SHIPPED | OUTPATIENT
Start: 2022-12-13 | End: 2022-12-16

## 2022-12-13 NOTE — PATIENT INSTRUCTIONS
Reconstruction After Skin Cancer Removal  Walt Dupree MD  Otolaryngology, [facility]  [address] [facility] [address], [place] [address]  Office: [phone]  Cell (after-hours concerns): [phone]    What to Expect:  Soreness / Tenderness around the area which will decrease over time. The initial swelling will improve over a week then the additional swelling will take a few weeks to improve.  Occasional bruising immediately over the incision line  Swelling (edema) will occur over the first few days of healing, but this should be soft, not discolored, and should not be much more tender  Sutures will need to be removed.     Care for your wound:  Keep the wound dry (out of the shower/bath) for the first 24-36 hours. You may put ointment on the wound during this time.  After 1-2 days, you may get the wound wet, but do not soak it. You may gently clean the incision with warm soapy water.   If crusting builds up over the sutures, You may use diluted hydrogen peroxide (1/2 distilled water and 1/2 peroxide) to gently rub the crusting away. This can be done 3-4 times per day as needed.  Keep the wound moisturized with antibiotic ointment for 2-3 days, then transition to Aquaphor or Vaseline. Moisturization is important to minimize scar.  Applying ice to the wound can help decrease the swelling.     Activity:  Light activity for 2-3 days. You may slowly increase your activity following this, but generally keep it light for the first 7-10 days. Avoid strenuous exercise for the first week.   Avoid pressure to the incision    Medications:  Resume your normal home medications.  Resume your blood thinner TOMORROW  Avoid Motrin or Advil products for the first week. It is OK for an occasional dose of this in between Tylenol or narcotic, but do not take scheduled Advil unless this has been Ok'd by Dr. Dupree.   If you have been prescribed a pain medication (narcotic), use it sparingly and wean yourself from it over the first few  days.  Do not take the following herbal supplements: fish oil, garlic, ginko biloba for 2 weeks. Avoid all supplements for 2 weeks if able, as these can sometimes have medical side effects that can impair wound healing.    Diet:  Resume your normal Diet    Call the office if:  Redness or firm swelling develop over the wound. A small amount of soft swelling is normal, especially after 3-4 days, but if it is hard, painful, or very red, notify Dr. Dupree's office.  Temperature > 101  Drainage from wound  If the wound opens up.

## 2022-12-13 NOTE — PROGRESS NOTES
12/13/2022     Maddie Otero  [id number]  1950    PRE-OPERATIVE DIAGNOSIS  1. Mohs defect of left nose    2. Basal cell carcinoma (BCC) of left nasal sidewall    3. Chronic anticoagulation      POST-OPERATIVE DIAGNOSIS  1. Mohs defect of left nose    2. Basal cell carcinoma (BCC) of left nasal sidewall    3. Chronic anticoagulation      PROCEDURES PERFORMED  Local tissue rearrangemenr closure of left nasal sidewall defect measuring 0.7 x 0.9 cm    SURGEON: Walt Dupree MD    ASSISTANT: None    ANESTHESIA: Local    COMPLICATIONS: None    BLOOD LOSS: minimal    SPECIMENS  None    DISPOSITION  PACU    OPERATIVE FINDINGS  Defect of the inferior left nasal sidewall region, abutting the . Method of closure: note flap    INDICATIONS  Maddie Otero is a 72 y.o. female who was seen in clinic for evaluation of the above diagnosis. she underwent Moh's microsurgical excision this morning. she presented to me for reconstructive efforts. Based on clinical assessment, the following procedures were discussed as an option for treatment. I discussed the reconstructive ladder as well as risks of the procedure including scar, cosmetic deformity, change in appearance, asymmetry, wound healing issues/skin breakdown, hematoma, infection. Facial weakness/paralysis, graft failure. After risks, benefits, and alternatives were discussed the patient decided to proceed.    PROCEDURE IN DETAIL  Consent was signed. A timeout was performed.     I examined the defect. Findings are indicated above. The edges of the defect were freshened and measured. The planned incision was injected with 1% Lidocaine with 1:100,000 epinephrine and 0.25% Marcaine.    I planned a superiorly based note flap in the standard fashion. Incision was made through skin and subcutaneous tissue. I dissected in the subcutaneous tissue above the SMAS. Hemostasis was achieved along the way. I rotated the tissue into place. I meticulously approximated the edges of the  tissue to minimize tension on the wound.    I closed the flap with 4-0 Monocryl in the deep layer and 5-0 Prolene for the skin.    Antibiotic ointment was applied.    This marked the end of the procedure. Counts were correct. I performed the entire procedure.

## 2022-12-20 ENCOUNTER — OFFICE VISIT (OUTPATIENT)
Dept: OTOLARYNGOLOGY | Facility: CLINIC | Age: 72
End: 2022-12-20
Payer: MEDICARE

## 2022-12-20 VITALS — HEIGHT: 66 IN | BODY MASS INDEX: 35.82 KG/M2 | WEIGHT: 222.88 LBS

## 2022-12-20 DIAGNOSIS — Z48.02 ENCOUNTER FOR REMOVAL OF SUTURES: Primary | ICD-10-CM

## 2022-12-20 PROCEDURE — 3288F FALL RISK ASSESSMENT DOCD: CPT | Mod: CPTII,S$GLB,, | Performed by: OTOLARYNGOLOGY

## 2022-12-20 PROCEDURE — 1101F PR PT FALLS ASSESS DOC 0-1 FALLS W/OUT INJ PAST YR: ICD-10-PCS | Mod: CPTII,S$GLB,, | Performed by: OTOLARYNGOLOGY

## 2022-12-20 PROCEDURE — 4010F ACE/ARB THERAPY RXD/TAKEN: CPT | Mod: CPTII,S$GLB,, | Performed by: OTOLARYNGOLOGY

## 2022-12-20 PROCEDURE — 99024 PR POST-OP FOLLOW-UP VISIT: ICD-10-PCS | Mod: S$GLB,,, | Performed by: OTOLARYNGOLOGY

## 2022-12-20 PROCEDURE — 1126F PR PAIN SEVERITY QUANTIFIED, NO PAIN PRESENT: ICD-10-PCS | Mod: CPTII,S$GLB,, | Performed by: OTOLARYNGOLOGY

## 2022-12-20 PROCEDURE — 99024 POSTOP FOLLOW-UP VISIT: CPT | Mod: S$GLB,,, | Performed by: OTOLARYNGOLOGY

## 2022-12-20 PROCEDURE — 3008F PR BODY MASS INDEX (BMI) DOCUMENTED: ICD-10-PCS | Mod: CPTII,S$GLB,, | Performed by: OTOLARYNGOLOGY

## 2022-12-20 PROCEDURE — 3008F BODY MASS INDEX DOCD: CPT | Mod: CPTII,S$GLB,, | Performed by: OTOLARYNGOLOGY

## 2022-12-20 PROCEDURE — 1101F PT FALLS ASSESS-DOCD LE1/YR: CPT | Mod: CPTII,S$GLB,, | Performed by: OTOLARYNGOLOGY

## 2022-12-20 PROCEDURE — 99999 PR PBB SHADOW E&M-EST. PATIENT-LVL II: CPT | Mod: PBBFAC,,, | Performed by: OTOLARYNGOLOGY

## 2022-12-20 PROCEDURE — 4010F PR ACE/ARB THEARPY RXD/TAKEN: ICD-10-PCS | Mod: CPTII,S$GLB,, | Performed by: OTOLARYNGOLOGY

## 2022-12-20 PROCEDURE — 1159F MED LIST DOCD IN RCRD: CPT | Mod: CPTII,S$GLB,, | Performed by: OTOLARYNGOLOGY

## 2022-12-20 PROCEDURE — 1126F AMNT PAIN NOTED NONE PRSNT: CPT | Mod: CPTII,S$GLB,, | Performed by: OTOLARYNGOLOGY

## 2022-12-20 PROCEDURE — 1159F PR MEDICATION LIST DOCUMENTED IN MEDICAL RECORD: ICD-10-PCS | Mod: CPTII,S$GLB,, | Performed by: OTOLARYNGOLOGY

## 2022-12-20 PROCEDURE — 99999 PR PBB SHADOW E&M-EST. PATIENT-LVL II: ICD-10-PCS | Mod: PBBFAC,,, | Performed by: OTOLARYNGOLOGY

## 2022-12-20 PROCEDURE — 3288F PR FALLS RISK ASSESSMENT DOCUMENTED: ICD-10-PCS | Mod: CPTII,S$GLB,, | Performed by: OTOLARYNGOLOGY

## 2022-12-20 NOTE — PROGRESS NOTES
Maddie is here for a post-operative visit following     PROCEDURES PERFORMED  Local tissue rearrangemenr closure of left nasal sidewall defect measuring 0.7 x 0.9 cm    No issues, doing well  Pain controlled    Exam:  Alert, active, appropriate  Incision c/d/i, wound with appropriate mild edema, mild erythema  Sutures removed    Healing well  Follow-up prn

## 2023-03-07 DIAGNOSIS — J44.9 CHRONIC OBSTRUCTIVE PULMONARY DISEASE, UNSPECIFIED COPD TYPE: Primary | ICD-10-CM

## 2023-03-07 DIAGNOSIS — R09.02 HYPOXEMIA: ICD-10-CM

## 2023-03-07 DIAGNOSIS — J96.11 CHRONIC HYPOXEMIC RESPIRATORY FAILURE: ICD-10-CM

## 2023-03-07 RX ORDER — TIOTROPIUM BROMIDE AND OLODATEROL 3.124; 2.736 UG/1; UG/1
2 SPRAY, METERED RESPIRATORY (INHALATION) DAILY
Qty: 12 G | Refills: 5 | Status: SHIPPED | OUTPATIENT
Start: 2023-03-07 | End: 2024-01-05

## 2023-04-12 ENCOUNTER — OFFICE VISIT (OUTPATIENT)
Dept: PULMONOLOGY | Facility: CLINIC | Age: 73
End: 2023-04-12
Payer: MEDICARE

## 2023-04-12 VITALS
OXYGEN SATURATION: 92 % | SYSTOLIC BLOOD PRESSURE: 132 MMHG | BODY MASS INDEX: 35.51 KG/M2 | DIASTOLIC BLOOD PRESSURE: 74 MMHG | WEIGHT: 220 LBS | HEART RATE: 77 BPM

## 2023-04-12 DIAGNOSIS — J44.9 CHRONIC OBSTRUCTIVE PULMONARY DISEASE, UNSPECIFIED COPD TYPE: Primary | ICD-10-CM

## 2023-04-12 DIAGNOSIS — J96.11 CHRONIC HYPOXEMIC RESPIRATORY FAILURE: ICD-10-CM

## 2023-04-12 PROCEDURE — 1125F PR PAIN SEVERITY QUANTIFIED, PAIN PRESENT: ICD-10-PCS | Mod: CPTII,S$GLB,, | Performed by: NURSE PRACTITIONER

## 2023-04-12 PROCEDURE — 1159F PR MEDICATION LIST DOCUMENTED IN MEDICAL RECORD: ICD-10-PCS | Mod: CPTII,S$GLB,, | Performed by: NURSE PRACTITIONER

## 2023-04-12 PROCEDURE — 99213 PR OFFICE/OUTPT VISIT, EST, LEVL III, 20-29 MIN: ICD-10-PCS | Mod: S$GLB,,, | Performed by: NURSE PRACTITIONER

## 2023-04-12 PROCEDURE — 3075F SYST BP GE 130 - 139MM HG: CPT | Mod: CPTII,S$GLB,, | Performed by: NURSE PRACTITIONER

## 2023-04-12 PROCEDURE — 3078F PR MOST RECENT DIASTOLIC BLOOD PRESSURE < 80 MM HG: ICD-10-PCS | Mod: CPTII,S$GLB,, | Performed by: NURSE PRACTITIONER

## 2023-04-12 PROCEDURE — 3075F PR MOST RECENT SYSTOLIC BLOOD PRESS GE 130-139MM HG: ICD-10-PCS | Mod: CPTII,S$GLB,, | Performed by: NURSE PRACTITIONER

## 2023-04-12 PROCEDURE — 3078F DIAST BP <80 MM HG: CPT | Mod: CPTII,S$GLB,, | Performed by: NURSE PRACTITIONER

## 2023-04-12 PROCEDURE — 3008F PR BODY MASS INDEX (BMI) DOCUMENTED: ICD-10-PCS | Mod: CPTII,S$GLB,, | Performed by: NURSE PRACTITIONER

## 2023-04-12 PROCEDURE — 1125F AMNT PAIN NOTED PAIN PRSNT: CPT | Mod: CPTII,S$GLB,, | Performed by: NURSE PRACTITIONER

## 2023-04-12 PROCEDURE — 1159F MED LIST DOCD IN RCRD: CPT | Mod: CPTII,S$GLB,, | Performed by: NURSE PRACTITIONER

## 2023-04-12 PROCEDURE — 4010F ACE/ARB THERAPY RXD/TAKEN: CPT | Mod: CPTII,S$GLB,, | Performed by: NURSE PRACTITIONER

## 2023-04-12 PROCEDURE — 4010F PR ACE/ARB THEARPY RXD/TAKEN: ICD-10-PCS | Mod: CPTII,S$GLB,, | Performed by: NURSE PRACTITIONER

## 2023-04-12 PROCEDURE — 3008F BODY MASS INDEX DOCD: CPT | Mod: CPTII,S$GLB,, | Performed by: NURSE PRACTITIONER

## 2023-04-12 PROCEDURE — 99213 OFFICE O/P EST LOW 20 MIN: CPT | Mod: S$GLB,,, | Performed by: NURSE PRACTITIONER

## 2023-04-12 RX ORDER — TIZANIDINE 4 MG/1
4 TABLET ORAL EVERY 6 HOURS PRN
COMMUNITY

## 2023-04-12 RX ORDER — TIRZEPATIDE 5 MG/.5ML
INJECTION, SOLUTION SUBCUTANEOUS
COMMUNITY

## 2023-04-12 NOTE — PROGRESS NOTES
SUBJECTIVE:    Patient ID: Maddie Otero is a 72 y.o. female.    Chief Complaint: Follow-up and COPD (6 month follow up COPD)      Patient here today feeling well. She is using her Stiolto daily.  She is wearing her oxygen most of the time during the day and does wear it to sleep.  Her breathing is stable.    Past Medical History:   Diagnosis Date    A-fib     Anticoagulant long-term use     Arthritis     COPD (chronic obstructive pulmonary disease)     COPD (chronic obstructive pulmonary disease) 2019    Diabetes mellitus, type 2     Diverticulosis     History of left knee replacement 2017    DR CRENSHAW    HNP (herniated nucleus pulposus)     LUMBAR    Hypertension     Lung disease     Pancreatic insufficiency     s/p whipple, non cancer    Presence of dental bridge     UPPER    Wears glasses      Past Surgical History:   Procedure Laterality Date    APPENDECTOMY      BACK SURGERY  1994    lower back    BACK SURGERY  2012    lower back    CHOLECYSTECTOMY      HERNIA REPAIR      JOINT REPLACEMENT Left     KNEE    KNEE ARTHROPLASTY Right 5/3/2021    Procedure: ARTHROPLASTY, KNEE;  Surgeon: Manuel Willingham MD;  Location: [facility];  Service: Orthopedics;  Laterality: Right;  guzman    LAPAROSCOPIC REPAIR OF INCISIONAL HERNIA N/A 10/22/2019    Procedure: REPAIR, HERNIA, INCISIONAL, LAPAROSCOPIC;  Surgeon: Pantera Almanza III, MD;  Location: [facility] OR;  Service: General;  Laterality: N/A;    pancrease  2009    stents in pancrease     TONSILLECTOMY      TOTAL KNEE ARTHROPLASTY Left 08/10/2017    WHIPPLE PROCEDURE W/ LAPAROSCOPY  10/09     Family History   Problem Relation Age of Onset    Diabetes Mother     Cancer Father         Social History:   Marital Status:   Occupation: retired from caring for mentally disabled people  Alcohol History:  reports no history of alcohol use.  Tobacco History:  reports that she quit smoking about 6 years ago. Her smoking use included cigarettes. She has a 30.00 pack-year smoking  history. She has never used smokeless tobacco.  Drug History:  reports no history of drug use.    Review of patient's allergies indicates:   Allergen Reactions    Sulfa (sulfonamide antibiotics) Hives    Penicillin v      Childhood reaction, swelled       Current Outpatient Medications   Medication Sig Dispense Refill    albuterol (PROVENTIL/VENTOLIN HFA) 90 mcg/actuation inhaler INHALE 2 PUFFS INTO THE LUNGS EVERY 6 (SIX) HOURS AS NEEDED FOR WHEEZING. RESCUE 54 g 3    albuterol-ipratropium (DUO-NEB) 2.5 mg-0.5 mg/3 mL nebulizer solution       amLODIPine (NORVASC) 5 MG tablet       apixaban (ELIQUIS) 5 mg Tab Take 5 mg by mouth 2 (two) times daily.      cetirizine (ZYRTEC) 10 MG tablet Take 10 mg by mouth once daily.      CREON CpDR Take 2 capsules by mouth 4 (four) times daily.  5    famotidine (PEPCID) 20 MG tablet Take 20 mg by mouth 2 (two) times daily.       furosemide (LASIX) 40 MG tablet Take 40 mg by mouth as needed (edema).       lisinopriL (PRINIVIL,ZESTRIL) 5 MG tablet       metoprolol succinate (TOPROL-XL) 100 MG 24 hr tablet       oxyCODONE (ROXICODONE) 5 MG immediate release tablet 1-2 tab for pain q4-6 hours 10 tablet 0    potassium chloride SA (K-DUR,KLOR-CON) 20 MEQ tablet       sodium chloride (OCEAN NASAL NASL) 1 spray by Nasal route daily as needed (rhinitis).       tiotropium-olodateroL (STIOLTO RESPIMAT) 2.5-2.5 mcg/actuation Mist Inhale 1 puff into the lungs 2 (two) times a day. Controller      tirzepatide (MOUNJARO) 5 mg/0.5 mL PnIj Inject into the skin every 7 days.      tiZANidine (ZANAFLEX) 4 MG tablet Take 4 mg by mouth every 6 (six) hours as needed.      albuterol (PROVENTIL/VENTOLIN) 90 mcg/actuation inhaler Inhale 2 puffs into the lungs every 6 (six) hours as needed for Wheezing. 1 each 0    diltiaZEM (CARDIZEM CD) 120 MG Cp24       empagliflozin (JARDIANCE) 25 mg tablet Take 25 mg by mouth once daily.      ferrous sulfate 324 mg (65 mg iron) TbEC Take 1 tablet (324 mg total) by mouth  once daily. (Patient not taking: Reported on 4/12/2023) 30 tablet 0    flecainide (TAMBOCOR) 100 MG Tab Take 1 tablet by mouth 2 (two) times daily.      hydrocortisone 2.5 % cream Apply topically 2 (two) times daily. Use on AA BID x 10-14 days for 14 days 20 g 0    insulin aspart protamine-insulin aspart (NOVOLOG MIX 70-30FLEXPEN U-100) 100 unit/mL (70-30) InPn pen Inject 20 Units into the skin 3 (three) times daily before meals.  0    metoprolol tartrate (LOPRESSOR) 50 MG tablet Take 1 tablet (50 mg total) by mouth 2 (two) times daily.      montelukast (SINGULAIR) 10 mg tablet       oxyCODONE-acetaminophen (PERCOCET)  mg per tablet 1 tablet every 4 (four) hours as needed.      pioglitazone (ACTOS) 30 MG tablet Take 30 mg by mouth once daily.      polyethylene glycol (GLYCOLAX) 17 gram PwPk Take 17 g by mouth once daily. (Patient not taking: Reported on 4/12/2023)  0    senna-docusate 8.6-50 mg (PERICOLACE) 8.6-50 mg per tablet Take 1 tablet by mouth 2 (two) times daily. (Patient not taking: Reported on 4/12/2023)      tiotropium-olodateroL (STIOLTO RESPIMAT) 2.5-2.5 mcg/actuation Mist Inhale 2 puffs into the lungs once daily. Controller 12 g 5    tiotropium-olodateroL (STIOLTO RESPIMAT) 2.5-2.5 mcg/actuation Mist Inhale 2 puffs into the lungs once daily. 12 g 6    TRUE METRIX GLUCOSE TEST STRIP Strp        No current facility-administered medications for this visit.       Ct chest 10/2022  IMPRESSION:     3 mm pulmonary nodule in the anterior left upper lobe is unchanged. No new pulmonary nodules.     General: feeling well   Eyes: Vision is good  ENT:  No rhinitis or sinusitis  Heart:: No chest pain or palpitations.  Lungs:breathing is stable   GI: no more diarrhea    : No dysuria, hesitancy, or nocturia.  Musculoskeletal: pain in her knee and shoulder  Skin: No lesions or rashes.  Neuro: No headaches or neuropathy.  Lymph: no swelling  Psych: No anxiety or depression.  Endo: weight stable          OBJECTIVE:      /74 (BP Location: Right arm, Patient Position: Sitting, BP Method: Medium (Manual))   Pulse 77   Wt 99.8 kg (220 lb)   LMP  (LMP Unknown)   SpO2 (!) 92% Comment: On 3 Liters of Oxygen  BMI 35.51 kg/m²     Physical Exam  GENERAL: Older patient in no distress.  HEENT: Pupils equal and reactive. Extraocular movements intact. Nose intact.      Pharynx moist.  NECK: Supple.   HEART: Regular rate and rhythm. No murmur or gallop auscultated.  LUNGS:clear  ABDOMEN: Bowel sounds present. Non-tender, no masses palpated.  EXTREMITIES: Normal muscle tone and joint movement, no cyanosis or clubbing. walker  LYMPHATICS: No adenopathy palpated, trace edema   SKIN: no issues   NEURO: Cranial nerves II-XII intact. Motor strength 5/5 bilaterally, upper and lower extremities.  PSYCH: Appropriate affect.      Assessment:       1. Chronic obstructive pulmonary disease, unspecified COPD type    2. Chronic hypoxemic respiratory failure            Frequent pneumonia concerned with it being from inhaled steroids.   Plan:       Chronic obstructive pulmonary disease, unspecified COPD type    Chronic hypoxemic respiratory failure            Use the Stiolto 2 puffs daily  Use your rescue inhaler or the nebulizer as needed every 4-6 hours for shortness of breath   Keep wearing your oxygen       Follow up in about 6 months (around 10/12/2023).

## 2023-04-12 NOTE — PATIENT INSTRUCTIONS
Use the Stiolto 2 puffs daily  Use your rescue inhaler or the nebulizer as needed every 4-6 hours for shortness of breath   Keep wearing your oxygen       Follow up in about 6 months (around 10/12/2023).

## 2023-10-11 ENCOUNTER — OFFICE VISIT (OUTPATIENT)
Dept: PULMONOLOGY | Facility: CLINIC | Age: 73
End: 2023-10-11
Payer: MEDICARE

## 2023-10-11 ENCOUNTER — TELEPHONE (OUTPATIENT)
Dept: PULMONOLOGY | Facility: CLINIC | Age: 73
End: 2023-10-11

## 2023-10-11 ENCOUNTER — HOSPITAL ENCOUNTER (OUTPATIENT)
Dept: RADIOLOGY | Facility: HOSPITAL | Age: 73
Discharge: HOME | End: 2023-10-11
Attending: NURSE PRACTITIONER
Payer: MEDICARE

## 2023-10-11 VITALS
OXYGEN SATURATION: 93 % | BODY MASS INDEX: 34.87 KG/M2 | HEART RATE: 66 BPM | DIASTOLIC BLOOD PRESSURE: 80 MMHG | HEIGHT: 66 IN | WEIGHT: 217 LBS | SYSTOLIC BLOOD PRESSURE: 120 MMHG

## 2023-10-11 DIAGNOSIS — R06.00 DYSPNEA, UNSPECIFIED TYPE: ICD-10-CM

## 2023-10-11 DIAGNOSIS — J96.11 CHRONIC HYPOXEMIC RESPIRATORY FAILURE: ICD-10-CM

## 2023-10-11 DIAGNOSIS — Z87.891 FORMER SMOKER: ICD-10-CM

## 2023-10-11 DIAGNOSIS — J44.9 CHRONIC OBSTRUCTIVE PULMONARY DISEASE, UNSPECIFIED COPD TYPE: Primary | ICD-10-CM

## 2023-10-11 PROCEDURE — 3074F PR MOST RECENT SYSTOLIC BLOOD PRESSURE < 130 MM HG: ICD-10-PCS | Mod: CPTII,S$GLB,, | Performed by: NURSE PRACTITIONER

## 2023-10-11 PROCEDURE — 3079F PR MOST RECENT DIASTOLIC BLOOD PRESSURE 80-89 MM HG: ICD-10-PCS | Mod: CPTII,S$GLB,, | Performed by: NURSE PRACTITIONER

## 2023-10-11 PROCEDURE — 4010F ACE/ARB THERAPY RXD/TAKEN: CPT | Mod: CPTII,S$GLB,, | Performed by: NURSE PRACTITIONER

## 2023-10-11 PROCEDURE — 1159F PR MEDICATION LIST DOCUMENTED IN MEDICAL RECORD: ICD-10-PCS | Mod: CPTII,S$GLB,, | Performed by: NURSE PRACTITIONER

## 2023-10-11 PROCEDURE — 71046 X-RAY EXAM CHEST 2 VIEWS: CPT | Mod: TC

## 2023-10-11 PROCEDURE — 99213 PR OFFICE/OUTPT VISIT, EST, LEVL III, 20-29 MIN: ICD-10-PCS | Mod: S$GLB,,, | Performed by: NURSE PRACTITIONER

## 2023-10-11 PROCEDURE — 99213 OFFICE O/P EST LOW 20 MIN: CPT | Mod: S$GLB,,, | Performed by: NURSE PRACTITIONER

## 2023-10-11 PROCEDURE — 4010F PR ACE/ARB THEARPY RXD/TAKEN: ICD-10-PCS | Mod: CPTII,S$GLB,, | Performed by: NURSE PRACTITIONER

## 2023-10-11 PROCEDURE — 3008F BODY MASS INDEX DOCD: CPT | Mod: CPTII,S$GLB,, | Performed by: NURSE PRACTITIONER

## 2023-10-11 PROCEDURE — 1159F MED LIST DOCD IN RCRD: CPT | Mod: CPTII,S$GLB,, | Performed by: NURSE PRACTITIONER

## 2023-10-11 PROCEDURE — 1126F PR PAIN SEVERITY QUANTIFIED, NO PAIN PRESENT: ICD-10-PCS | Mod: CPTII,S$GLB,, | Performed by: NURSE PRACTITIONER

## 2023-10-11 PROCEDURE — 3008F PR BODY MASS INDEX (BMI) DOCUMENTED: ICD-10-PCS | Mod: CPTII,S$GLB,, | Performed by: NURSE PRACTITIONER

## 2023-10-11 PROCEDURE — 1126F AMNT PAIN NOTED NONE PRSNT: CPT | Mod: CPTII,S$GLB,, | Performed by: NURSE PRACTITIONER

## 2023-10-11 PROCEDURE — 3079F DIAST BP 80-89 MM HG: CPT | Mod: CPTII,S$GLB,, | Performed by: NURSE PRACTITIONER

## 2023-10-11 PROCEDURE — 3074F SYST BP LT 130 MM HG: CPT | Mod: CPTII,S$GLB,, | Performed by: NURSE PRACTITIONER

## 2023-10-11 RX ORDER — PREDNISONE 10 MG/1
TABLET ORAL
Qty: 20 TABLET | Refills: 0 | Status: SHIPPED | OUTPATIENT
Start: 2023-10-11

## 2023-10-11 NOTE — PROGRESS NOTES
SUBJECTIVE:    Patient ID: Maddie Otero is a 72 y.o. female.    Chief Complaint: COPD and Shortness of Breath      Patient here today feeling well. She is using her Stiolto daily.  She is wearing her oxygen most of the time during the day and does wear it to sleep.   She has felt more short of breath over the last couple of days and wheezing. She denies fever. She does cough up mucous at times as well.   Past Medical History:   Diagnosis Date    A-fib     Anticoagulant long-term use     Arthritis     COPD (chronic obstructive pulmonary disease)     COPD (chronic obstructive pulmonary disease) 2019    Diabetes mellitus, type 2     Diverticulosis     History of left knee replacement 2017    DR CRENSHAW    HNP (herniated nucleus pulposus)     LUMBAR    Hypertension     Lung disease     Pancreatic insufficiency     s/p whipple, non cancer    Presence of dental bridge     UPPER    Wears glasses      Past Surgical History:   Procedure Laterality Date    APPENDECTOMY      BACK SURGERY  1994    lower back    BACK SURGERY  2012    lower back    CHOLECYSTECTOMY      HERNIA REPAIR      JOINT REPLACEMENT Left     KNEE    KNEE ARTHROPLASTY Right 5/3/2021    Procedure: ARTHROPLASTY, KNEE;  Surgeon: Manuel Willingham MD;  Location: [facility] OR;  Service: Orthopedics;  Laterality: Right;  guzman    LAPAROSCOPIC REPAIR OF INCISIONAL HERNIA N/A 10/22/2019    Procedure: REPAIR, HERNIA, INCISIONAL, LAPAROSCOPIC;  Surgeon: Pantera Almanza III, MD;  Location: [facility] OR;  Service: General;  Laterality: N/A;    pancrease  2009    stents in pancrease     TONSILLECTOMY      TOTAL KNEE ARTHROPLASTY Left 08/10/2017    WHIPPLE PROCEDURE W/ LAPAROSCOPY  10/09     Family History   Problem Relation Age of Onset    Diabetes Mother     Cancer Father         Social History:   Marital Status:   Occupation: retired from caring for mentally disabled people  Alcohol History:  reports no history of alcohol use.  Tobacco History:  reports that she quit  smoking about 6 years ago. Her smoking use included cigarettes. She started smoking about 36 years ago. She has a 30.0 pack-year smoking history. She has never used smokeless tobacco.  Drug History:  reports no history of drug use.    Review of patient's allergies indicates:   Allergen Reactions    Sulfa (sulfonamide antibiotics) Hives    Penicillin v      Childhood reaction, swelled       Current Outpatient Medications   Medication Sig Dispense Refill    albuterol (PROVENTIL/VENTOLIN HFA) 90 mcg/actuation inhaler INHALE 2 PUFFS INTO THE LUNGS EVERY 6 (SIX) HOURS AS NEEDED FOR WHEEZING. RESCUE 54 g 3    albuterol (PROVENTIL/VENTOLIN) 90 mcg/actuation inhaler Inhale 2 puffs into the lungs every 6 (six) hours as needed for Wheezing. 1 each 0    albuterol-ipratropium (DUO-NEB) 2.5 mg-0.5 mg/3 mL nebulizer solution       amLODIPine (NORVASC) 5 MG tablet       apixaban (ELIQUIS) 5 mg Tab Take 5 mg by mouth 2 (two) times daily.      cetirizine (ZYRTEC) 10 MG tablet Take 10 mg by mouth once daily.      CREON CpDR Take 2 capsules by mouth 4 (four) times daily.  5    famotidine (PEPCID) 20 MG tablet Take 20 mg by mouth 2 (two) times daily.       furosemide (LASIX) 40 MG tablet Take 40 mg by mouth as needed (edema).       lisinopriL (PRINIVIL,ZESTRIL) 5 MG tablet       metoprolol succinate (TOPROL-XL) 100 MG 24 hr tablet       montelukast (SINGULAIR) 10 mg tablet       oxyCODONE-acetaminophen (PERCOCET)  mg per tablet 1 tablet every 4 (four) hours as needed.      potassium chloride SA (K-DUR,KLOR-CON) 20 MEQ tablet       tiotropium-olodateroL (STIOLTO RESPIMAT) 2.5-2.5 mcg/actuation Mist Inhale 1 puff into the lungs 2 (two) times a day. Controller      tiotropium-olodateroL (STIOLTO RESPIMAT) 2.5-2.5 mcg/actuation Mist Inhale 2 puffs into the lungs once daily. Controller 12 g 5    tiotropium-olodateroL (STIOLTO RESPIMAT) 2.5-2.5 mcg/actuation Mist Inhale 2 puffs into the lungs once daily. 12 g 6    tiZANidine (ZANAFLEX)  4 MG tablet Take 4 mg by mouth every 6 (six) hours as needed.      TRUE METRIX GLUCOSE TEST STRIP Strp       diltiaZEM (CARDIZEM CD) 120 MG Cp24       empagliflozin (JARDIANCE) 25 mg tablet Take 25 mg by mouth once daily.      ferrous sulfate 324 mg (65 mg iron) TbEC Take 1 tablet (324 mg total) by mouth once daily. (Patient not taking: Reported on 4/12/2023) 30 tablet 0    flecainide (TAMBOCOR) 100 MG Tab Take 1 tablet by mouth 2 (two) times daily.      hydrocortisone 2.5 % cream Apply topically 2 (two) times daily. Use on AA BID x 10-14 days for 14 days 20 g 0    insulin aspart protamine-insulin aspart (NOVOLOG MIX 70-30FLEXPEN U-100) 100 unit/mL (70-30) InPn pen Inject 20 Units into the skin 3 (three) times daily before meals.  0    metoprolol tartrate (LOPRESSOR) 50 MG tablet Take 1 tablet (50 mg total) by mouth 2 (two) times daily.      oxyCODONE (ROXICODONE) 5 MG immediate release tablet 1-2 tab for pain q4-6 hours 10 tablet 0    pioglitazone (ACTOS) 30 MG tablet Take 30 mg by mouth once daily.      polyethylene glycol (GLYCOLAX) 17 gram PwPk Take 17 g by mouth once daily. (Patient not taking: Reported on 4/12/2023)  0    senna-docusate 8.6-50 mg (PERICOLACE) 8.6-50 mg per tablet Take 1 tablet by mouth 2 (two) times daily. (Patient not taking: Reported on 4/12/2023)      sodium chloride (OCEAN NASAL NASL) 1 spray by Nasal route daily as needed (rhinitis).       tirzepatide (MOUNJARO) 5 mg/0.5 mL PnIj Inject into the skin every 7 days.       No current facility-administered medications for this visit.       Ct chest 10/2022  IMPRESSION:     3 mm pulmonary nodule in the anterior left upper lobe is unchanged. No new pulmonary nodules.     General: feeling well   Eyes: Vision is good  ENT:  No rhinitis or sinusitis  Heart:: No chest pain or palpitations.  Lungs:increased dyspnea over the last few days and wheezing  GI: no more diarrhea    : No dysuria, hesitancy, or nocturia.  Musculoskeletal: pain in her knee  "and shoulder  Skin: No lesions or rashes.  Neuro: No headaches or neuropathy.  Lymph: no swelling  Psych: No anxiety or depression.  Endo: weight stable         OBJECTIVE:      /80 (BP Location: Left arm, Patient Position: Sitting, BP Method: Large (Manual))   Pulse 66   Ht 5' 6" (1.676 m)   Wt 98.4 kg (217 lb)   LMP  (LMP Unknown)   SpO2 (!) 93% Comment: 3LPM PD  BMI 35.02 kg/m²     Physical Exam  GENERAL: Older patient in no distress.  HEENT: Pupils equal and reactive. Extraocular movements intact. Nose intact.      Pharynx moist.  NECK: Supple.   HEART: Regular rate and rhythm. No murmur or gallop auscultated.  LUNGS:clear  ABDOMEN: Bowel sounds present. Non-tender, no masses palpated.  EXTREMITIES: Normal muscle tone and joint movement, no cyanosis or clubbing. walker  LYMPHATICS: No adenopathy palpated, trace edema   SKIN: no issues   NEURO: Cranial nerves II-XII intact. Motor strength 5/5 bilaterally, upper and lower extremities.  PSYCH: Appropriate affect.      Assessment:       1. Chronic obstructive pulmonary disease, unspecified COPD type    2. Chronic hypoxemic respiratory failure    3. Former smoker    4. Dyspnea, unspecified type              Frequent pneumonia concerned with it being from inhaled steroids.   Plan:       Chronic obstructive pulmonary disease, unspecified COPD type  -     Complete PFT with bronchodilator; Future    Chronic hypoxemic respiratory failure    Former smoker  -     CT Chest Lung Screening Low Dose; Future; Expected date: 10/11/2023    Dyspnea, unspecified type  -     X-Ray Chest PA And Lateral; Future              Use the Stiolto 2 puffs daily  Use your rescue inhaler or the nebulizer as needed every 4-6 hours for shortness of breath   Keep wearing your oxygen   Chest xray   Pft  Prednisone short taper  Screening CT of chest   Follow up in about 6 months (around 4/11/2024).                           "

## 2023-10-11 NOTE — PATIENT INSTRUCTIONS
Use the Stiolto 2 puffs daily  Use your rescue inhaler or the nebulizer as needed every 4-6 hours for shortness of breath   Keep wearing your oxygen   Chest xray   Pft  No follow-ups on file.

## 2023-10-12 NOTE — TELEPHONE ENCOUNTER
Called patient to see she received my message on yesterday to let her know chest xray was ok. Prednisone taper sent to her pharmacy.  She said yes and picked up Prednisone at Pharmacy yesterday.. Told her okay.

## 2023-11-01 ENCOUNTER — HOSPITAL ENCOUNTER (OUTPATIENT)
Dept: RADIOLOGY | Facility: HOSPITAL | Age: 73
Discharge: HOME | End: 2023-11-01
Attending: NURSE PRACTITIONER
Payer: MEDICARE

## 2023-11-01 ENCOUNTER — HOSPITAL ENCOUNTER (OUTPATIENT)
Dept: PULMONOLOGY | Facility: HOSPITAL | Age: 73
Discharge: HOME | End: 2023-11-01
Attending: NURSE PRACTITIONER
Payer: MEDICARE

## 2023-11-01 ENCOUNTER — TELEPHONE (OUTPATIENT)
Dept: PULMONOLOGY | Facility: CLINIC | Age: 73
End: 2023-11-01

## 2023-11-01 DIAGNOSIS — Z87.891 FORMER SMOKER: ICD-10-CM

## 2023-11-01 DIAGNOSIS — J44.9 CHRONIC OBSTRUCTIVE PULMONARY DISEASE, UNSPECIFIED COPD TYPE: ICD-10-CM

## 2023-11-01 PROCEDURE — 94727 GAS DIL/WSHOT DETER LNG VOL: CPT

## 2023-11-01 PROCEDURE — 94729 DIFFUSING CAPACITY: CPT

## 2023-11-01 PROCEDURE — 94010 BREATHING CAPACITY TEST: CPT | Mod: XB

## 2023-11-01 PROCEDURE — 94060 EVALUATION OF WHEEZING: CPT

## 2023-11-01 PROCEDURE — 71271 CT THORAX LUNG CANCER SCR C-: CPT | Mod: TC,PO

## 2023-11-01 NOTE — TELEPHONE ENCOUNTER
Screening CT  IMPRESSION:     1.  2 mm nodule the anterior left upper lobe is unchanged.  2.  No new nodules

## 2023-11-02 ENCOUNTER — TELEPHONE (OUTPATIENT)
Dept: PULMONOLOGY | Facility: CLINIC | Age: 73
End: 2023-11-02

## 2023-11-02 DIAGNOSIS — R94.2 DIFFUSION CAPACITY OF LUNG (DL), DECREASED: ICD-10-CM

## 2023-11-02 DIAGNOSIS — R06.00 DYSPNEA, UNSPECIFIED TYPE: Primary | ICD-10-CM

## 2023-11-02 NOTE — TELEPHONE ENCOUNTER
PFT shows severe obstruction with good response to bronchodilator, mild restriction, severe diffusion defect. DLCO is 17  PFT is worse. She is on Stiolto, no steroid because she had several pneumonia's while on the inhaled steroid.   Needs CBC and ECHO  Spoke to the patient will see what cbc and echo looks like.  She feels like she is getting the Stiolto in fine.

## 2023-12-07 ENCOUNTER — HOSPITAL ENCOUNTER (OUTPATIENT)
Dept: CARDIOLOGY | Facility: HOSPITAL | Age: 73
Discharge: HOME | End: 2023-12-07
Attending: NURSE PRACTITIONER
Payer: MEDICARE

## 2023-12-07 ENCOUNTER — TELEPHONE (OUTPATIENT)
Dept: PULMONOLOGY | Facility: CLINIC | Age: 73
End: 2023-12-07

## 2023-12-07 VITALS — BODY MASS INDEX: 34.86 KG/M2 | HEIGHT: 66 IN | WEIGHT: 216.94 LBS

## 2023-12-07 DIAGNOSIS — R94.2 DIFFUSION CAPACITY OF LUNG (DL), DECREASED: ICD-10-CM

## 2023-12-07 PROCEDURE — 93306 ECHO (CUPID ONLY): ICD-10-PCS | Mod: 26,,, | Performed by: INTERNAL MEDICINE

## 2023-12-07 PROCEDURE — 93306 TTE W/DOPPLER COMPLETE: CPT | Mod: 26,,, | Performed by: INTERNAL MEDICINE

## 2023-12-07 PROCEDURE — 93306 TTE W/DOPPLER COMPLETE: CPT

## 2023-12-07 NOTE — TELEPHONE ENCOUNTER
Called patient let her know her CBC showed she was not anemic. Will let you know results of echo when we get it.   She verbalized an understanding.

## 2023-12-08 LAB
AORTIC ROOT ANNULUS: 3.71 CM
AORTIC VALVE CUSP SEPERATION: 2.21 CM
AV INDEX (PROSTH): 0.73
AV MEAN GRADIENT: 5 MMHG
AV PEAK GRADIENT: 10 MMHG
AV VALVE AREA BY VELOCITY RATIO: 2.55 CM²
AV VALVE AREA: 2.62 CM²
AV VELOCITY RATIO: 0.71
BSA FOR ECHO PROCEDURE: 2.14 M2
CV ECHO LV RWT: 0.52 CM
DOP CALC AO PEAK VEL: 1.55 M/S
DOP CALC AO VTI: 32.5 CM
DOP CALC LVOT AREA: 3.6 CM2
DOP CALC LVOT DIAMETER: 2.14 CM
DOP CALC LVOT PEAK VEL: 1.1 M/S
DOP CALC LVOT STROKE VOLUME: 85.2 CM3
DOP CALC MV VTI: 30.7 CM
DOP CALCLVOT PEAK VEL VTI: 23.7 CM
E WAVE DECELERATION TIME: 256.49 MSEC
E/A RATIO: 0.9
E/E' RATIO: 12.67 M/S
ECHO LV POSTERIOR WALL: 1.23 CM (ref 0.6–1.1)
FRACTIONAL SHORTENING: 28 % (ref 28–44)
INTERVENTRICULAR SEPTUM: 1.19 CM (ref 0.6–1.1)
IVC DIAMETER: 1.66 CM
IVRT: 77.07 MSEC
LA MAJOR: 5.9 CM
LA MINOR: 5.57 CM
LEFT ATRIUM SIZE: 3.53 CM
LEFT INTERNAL DIMENSION IN SYSTOLE: 3.36 CM (ref 2.1–4)
LEFT VENTRICLE DIASTOLIC VOLUME INDEX: 49.14 ML/M2
LEFT VENTRICLE DIASTOLIC VOLUME: 101.73 ML
LEFT VENTRICLE MASS INDEX: 103 G/M2
LEFT VENTRICLE SYSTOLIC VOLUME INDEX: 22.2 ML/M2
LEFT VENTRICLE SYSTOLIC VOLUME: 46 ML
LEFT VENTRICULAR INTERNAL DIMENSION IN DIASTOLE: 4.69 CM (ref 3.5–6)
LEFT VENTRICULAR MASS: 213.81 G
LV LATERAL E/E' RATIO: 10.86 M/S
LV SEPTAL E/E' RATIO: 15.2 M/S
LVOT MG: 2.4 MMHG
LVOT MV: 0.71 CM/S
MV A" WAVE DURATION": 137.01 MSEC
MV MEAN GRADIENT: 1 MMHG
MV PEAK A VEL: 0.84 M/S
MV PEAK E VEL: 0.76 M/S
MV PEAK GRADIENT: 4 MMHG
MV STENOSIS PRESSURE HALF TIME: 42.4 MS
MV VALVE AREA BY CONTINUITY EQUATION: 2.78 CM2
MV VALVE AREA P 1/2 METHOD: 5.19 CM2
PV MV: 0.63 M/S
PV PEAK GRADIENT: 3 MMHG
PV PEAK VELOCITY: 0.93 M/S
RA MAJOR: 5.29 CM
RA PRESSURE ESTIMATED: 3 MMHG
RIGHT VENTRICULAR END-DIASTOLIC DIMENSION: 2.14 CM
RV TISSUE DOPPLER FREE WALL SYSTOLIC VELOCITY 1 (APICAL 4 CHAMBER VIEW): 14.58 CM/S
TDI LATERAL: 0.07 M/S
TDI SEPTAL: 0.05 M/S
TDI: 0.06 M/S
TRICUSPID ANNULAR PLANE SYSTOLIC EXCURSION: 1.96 CM
Z-SCORE OF LEFT VENTRICULAR DIMENSION IN END DIASTOLE: -2.94
Z-SCORE OF LEFT VENTRICULAR DIMENSION IN END SYSTOLE: -1.08

## 2023-12-11 ENCOUNTER — TELEPHONE (OUTPATIENT)
Dept: PULMONOLOGY | Facility: CLINIC | Age: 73
End: 2023-12-11

## 2023-12-11 NOTE — TELEPHONE ENCOUNTER
ECHO  Result Text       Left Ventricle: The left ventricle is normal in size. Increased wall thickness. There is mild concentric hypertrophy. Normal wall motion. There is normal systolic function with a visually estimated ejection fraction of 60 - 65%. There is normal diastolic function.    Right Ventricle: Normal right ventricular cavity size. Wall thickness is normal. Right ventricle wall motion  is normal. Systolic function is normal.    Tricuspid Valve: There is mild regurgitation with a centrally directed jet.    Pulmonary Artery: No pulmonary hypertension.    IVC/SVC: Normal venous pressure at 3 mmHg.

## 2024-01-04 ENCOUNTER — TELEPHONE (OUTPATIENT)
Dept: PULMONOLOGY | Facility: CLINIC | Age: 74
End: 2024-01-04

## 2024-01-04 NOTE — TELEPHONE ENCOUNTER
pt LVM requesting a call back about you possibly changing her meds after you have received her tests results   Since you have results she wants to know what you want to do now.

## 2024-01-05 RX ORDER — BUDESONIDE, GLYCOPYRROLATE, AND FORMOTEROL FUMARATE 160; 9; 4.8 UG/1; UG/1; UG/1
2 AEROSOL, METERED RESPIRATORY (INHALATION) 2 TIMES DAILY
Qty: 3 G | Refills: 6 | Status: SHIPPED | OUTPATIENT
Start: 2024-01-05

## 2024-01-05 NOTE — TELEPHONE ENCOUNTER
I spoke with her. Will try to do inhaled steroid again since her PFT worse.  Breztri sent to pharmacy. Will monitor for pneumonia.

## 2024-02-19 RX ORDER — ALBUTEROL SULFATE 90 UG/1
2 AEROSOL, METERED RESPIRATORY (INHALATION) EVERY 6 HOURS PRN
Qty: 54 G | Refills: 3 | Status: SHIPPED | OUTPATIENT
Start: 2024-02-19

## 2024-04-03 ENCOUNTER — OFFICE VISIT (OUTPATIENT)
Dept: PULMONOLOGY | Facility: CLINIC | Age: 74
End: 2024-04-03
Payer: MEDICARE

## 2024-04-03 VITALS
BODY MASS INDEX: 35.8 KG/M2 | SYSTOLIC BLOOD PRESSURE: 118 MMHG | WEIGHT: 221.81 LBS | OXYGEN SATURATION: 94 % | DIASTOLIC BLOOD PRESSURE: 64 MMHG | HEART RATE: 63 BPM

## 2024-04-03 DIAGNOSIS — J44.9 CHRONIC OBSTRUCTIVE PULMONARY DISEASE, UNSPECIFIED COPD TYPE: Primary | ICD-10-CM

## 2024-04-03 DIAGNOSIS — J96.11 CHRONIC HYPOXEMIC RESPIRATORY FAILURE: ICD-10-CM

## 2024-04-03 PROCEDURE — 99213 OFFICE O/P EST LOW 20 MIN: CPT | Mod: S$GLB,,, | Performed by: NURSE PRACTITIONER

## 2024-04-03 PROCEDURE — 1125F AMNT PAIN NOTED PAIN PRSNT: CPT | Mod: CPTII,S$GLB,, | Performed by: NURSE PRACTITIONER

## 2024-04-03 PROCEDURE — 3074F SYST BP LT 130 MM HG: CPT | Mod: CPTII,S$GLB,, | Performed by: NURSE PRACTITIONER

## 2024-04-03 PROCEDURE — 3078F DIAST BP <80 MM HG: CPT | Mod: CPTII,S$GLB,, | Performed by: NURSE PRACTITIONER

## 2024-04-03 PROCEDURE — 1159F MED LIST DOCD IN RCRD: CPT | Mod: CPTII,S$GLB,, | Performed by: NURSE PRACTITIONER

## 2024-04-03 RX ORDER — OMEPRAZOLE 20 MG/1
20 CAPSULE, DELAYED RELEASE ORAL DAILY
Qty: 90 CAPSULE | Refills: 3 | Status: SHIPPED | OUTPATIENT
Start: 2024-04-03 | End: 2025-04-03

## 2024-04-03 RX ORDER — OMEPRAZOLE 20 MG/1
40 CAPSULE, DELAYED RELEASE ORAL DAILY
Qty: 90 CAPSULE | Refills: 3 | Status: SHIPPED | OUTPATIENT
Start: 2024-04-03 | End: 2024-04-03

## 2024-04-03 NOTE — PROGRESS NOTES
SUBJECTIVE:    Patient ID: Maddie Otero is a 73 y.o. female.    Chief Complaint: Follow-up (6 month follow up COPD)    Patient here today feeling well.  She is using Breztri now and has not had pneumonia since starting back on inhaled steroid. She does feel the Breztri is working better.  Her last PFT showed Psevere obstruction with good response to bronchodilator, mild restriction, severe diffusion defect. DLCO is 17.  Her last  ECHO showed no pulmonary htn. She is wearing her oxygen all of the time. She has been taking her daughters Prilosec because she has had GERD recently.      Past Medical History:   Diagnosis Date    A-fib     Anticoagulant long-term use     Arthritis     COPD (chronic obstructive pulmonary disease)     COPD (chronic obstructive pulmonary disease) 2019    Diabetes mellitus, type 2     Diverticulosis     History of left knee replacement 2017    DR CRENSHWA    HNP (herniated nucleus pulposus)     LUMBAR    Hypertension     Lung disease     Pancreatic insufficiency     s/p whipple, non cancer    Presence of dental bridge     UPPER    Wears glasses      Past Surgical History:   Procedure Laterality Date    APPENDECTOMY      BACK SURGERY  1994    lower back    BACK SURGERY  2012    lower back    CHOLECYSTECTOMY      HERNIA REPAIR      JOINT REPLACEMENT Left     KNEE    KNEE ARTHROPLASTY Right 5/3/2021    Procedure: ARTHROPLASTY, KNEE;  Surgeon: Manuel Willingham MD;  Location: [facility] OR;  Service: Orthopedics;  Laterality: Right;  guzman    LAPAROSCOPIC REPAIR OF INCISIONAL HERNIA N/A 10/22/2019    Procedure: REPAIR, HERNIA, INCISIONAL, LAPAROSCOPIC;  Surgeon: Pantera Almanza III, MD;  Location: [facility] OR;  Service: General;  Laterality: N/A;    pancrease  2009    stents in pancrease     TONSILLECTOMY      TOTAL KNEE ARTHROPLASTY Left 08/10/2017    WHIPPLE PROCEDURE W/ LAPAROSCOPY  10/09     Family History   Problem Relation Age of Onset    Diabetes Mother     Cancer Father         Social History:    Marital Status:   Occupation: retired from caring for mentally disabled people  Alcohol History:  reports no history of alcohol use.  Tobacco History:  reports that she quit smoking about 7 years ago. Her smoking use included cigarettes. She started smoking about 37 years ago. She has a 30.0 pack-year smoking history. She has never used smokeless tobacco.  Drug History:  reports no history of drug use.    Review of patient's allergies indicates:   Allergen Reactions    Sulfa (sulfonamide antibiotics) Hives    Penicillin v      Childhood reaction, swelled       Current Outpatient Medications   Medication Sig Dispense Refill    albuterol (PROVENTIL/VENTOLIN HFA) 90 mcg/actuation inhaler INHALE 2 PUFFS INTO THE LUNGS EVERY 6 (SIX) HOURS AS NEEDED FOR WHEEZING. RESCUE 54 g 3    albuterol-ipratropium (DUO-NEB) 2.5 mg-0.5 mg/3 mL nebulizer solution       amLODIPine (NORVASC) 5 MG tablet       apixaban (ELIQUIS) 5 mg Tab Take 5 mg by mouth 2 (two) times daily.      budesonide-glycopyr-formoterol (BREZTRI AEROSPHERE) 160-9-4.8 mcg/actuation HFAA Inhale 2 puffs into the lungs 2 (two) times a day. 3 g 6    cetirizine (ZYRTEC) 10 MG tablet Take 10 mg by mouth once daily.      CREON CpDR Take 2 capsules by mouth 4 (four) times daily.  5    diltiaZEM (CARDIZEM CD) 120 MG Cp24       famotidine (PEPCID) 20 MG tablet Take 20 mg by mouth 2 (two) times daily.       flecainide (TAMBOCOR) 100 MG Tab Take 1 tablet by mouth 2 (two) times daily.      furosemide (LASIX) 40 MG tablet Take 40 mg by mouth as needed (edema).       hydrocortisone 2.5 % cream Apply topically 2 (two) times daily. Use on AA BID x 10-14 days for 14 days 20 g 0    insulin aspart protamine-insulin aspart (NOVOLOG MIX 70-30FLEXPEN U-100) 100 unit/mL (70-30) InPn pen Inject 20 Units into the skin 3 (three) times daily before meals.  0    lisinopriL (PRINIVIL,ZESTRIL) 5 MG tablet       metoprolol succinate (TOPROL-XL) 100 MG 24 hr tablet        oxyCODONE-acetaminophen (PERCOCET)  mg per tablet 1 tablet every 4 (four) hours as needed.      potassium chloride SA (K-DUR,KLOR-CON) 20 MEQ tablet       predniSONE (DELTASONE) 10 MG tablet Take 4 tabs x 2 days, then take 3 tabs x 2 days, then take 2 tabs x 2 days, then take 1 tab x 2 days. 20 tablet 0    sodium chloride (OCEAN NASAL NASL) 1 spray by Nasal route daily as needed (rhinitis).       albuterol (PROVENTIL/VENTOLIN) 90 mcg/actuation inhaler Inhale 2 puffs into the lungs every 6 (six) hours as needed for Wheezing. 1 each 0    empagliflozin (JARDIANCE) 25 mg tablet Take 25 mg by mouth once daily.      ferrous sulfate 324 mg (65 mg iron) TbEC Take 1 tablet (324 mg total) by mouth once daily. (Patient not taking: Reported on 4/12/2023) 30 tablet 0    metoprolol tartrate (LOPRESSOR) 50 MG tablet Take 1 tablet (50 mg total) by mouth 2 (two) times daily.      montelukast (SINGULAIR) 10 mg tablet       omeprazole (PRILOSEC) 20 MG capsule Take 2 capsules (40 mg total) by mouth once daily. 90 capsule 3    oxyCODONE (ROXICODONE) 5 MG immediate release tablet 1-2 tab for pain q4-6 hours (Patient not taking: Reported on 4/3/2024) 10 tablet 0    pioglitazone (ACTOS) 30 MG tablet Take 30 mg by mouth once daily.      polyethylene glycol (GLYCOLAX) 17 gram PwPk Take 17 g by mouth once daily. (Patient not taking: Reported on 4/12/2023)  0    senna-docusate 8.6-50 mg (PERICOLACE) 8.6-50 mg per tablet Take 1 tablet by mouth 2 (two) times daily. (Patient not taking: Reported on 4/12/2023)      tirzepatide (MOUNJARO) 5 mg/0.5 mL PnIj Inject into the skin every 7 days.      tiZANidine (ZANAFLEX) 4 MG tablet Take 4 mg by mouth every 6 (six) hours as needed.      TRUE METRIX GLUCOSE TEST STRIP Strp        No current facility-administered medications for this visit.     PFT 11/2023  PFT shows severe obstruction with good response to bronchodilator, mild restriction, severe diffusion defect. DLCO is 17       Ct chest  10/2022  IMPRESSION:     3 mm pulmonary nodule in the anterior left upper lobe is unchanged. No new pulmonary nodules.       Last echo 12/2023  ECHO  Result Text       Left Ventricle: The left ventricle is normal in size. Increased wall thickness. There is mild concentric hypertrophy. Normal wall motion. There is normal systolic function with a visually estimated ejection fraction of 60 - 65%. There is normal diastolic function.    Right Ventricle: Normal right ventricular cavity size. Wall thickness is normal. Right ventricle wall motion  is normal. Systolic function is normal.    Tricuspid Valve: There is mild regurgitation with a centrally directed jet.    Pulmonary Artery: No pulmonary hypertension.    IVC/SVC: Normal venous pressure at 3 mmHg.      General: feeling well   Eyes: Vision is good  ENT:  No rhinitis or sinusitis  Heart:: No chest pain or palpitations.  Lungs:breathing is stable  GI: no more diarrhea    : No dysuria, hesitancy, or nocturia.  Musculoskeletal: pain in her knee and shoulder  Skin: No lesions or rashes.  Neuro: No headaches or neuropathy.  Lymph: no swelling  Psych: No anxiety or depression.  Endo: weight stable         OBJECTIVE:      /64 (BP Location: Right arm, Patient Position: Sitting, BP Method: Medium (Manual))   Pulse 63   Wt 100.6 kg (221 lb 12.8 oz)   LMP  (LMP Unknown)   SpO2 (!) 94% Comment: on 2 liters of Oxygen  BMI 35.80 kg/m²     Physical Exam  GENERAL: Older patient in no distress.  HEENT: Pupils equal and reactive. Extraocular movements intact. Nose intact.      Pharynx moist.  NECK: Supple.   HEART: Regular rate and rhythm. No murmur or gallop auscultated.  LUNGS:clear  ABDOMEN: Bowel sounds present. Non-tender, no masses palpated.  EXTREMITIES: Normal muscle tone and joint movement, no cyanosis or clubbing. walker  LYMPHATICS: No adenopathy palpated, trace edema   SKIN: no issues   NEURO: Cranial nerves II-XII intact. Motor strength 5/5 bilaterally, upper  and lower extremities.  PSYCH: Appropriate affect.      Assessment:       1. Chronic obstructive pulmonary disease, unspecified COPD type    2. Chronic hypoxemic respiratory failure                Plan:       Chronic obstructive pulmonary disease, unspecified COPD type    Chronic hypoxemic respiratory failure    Other orders  -     omeprazole (PRILOSEC) 20 MG capsule; Take 2 capsules (40 mg total) by mouth once daily.  Dispense: 90 capsule; Refill: 3            Continue the Breztri twice a day  If you get sick let me know  Prilosec 20mg daily     Follow up in about 6 months (around 10/3/2024).

## 2024-04-23 ENCOUNTER — HOSPITAL ENCOUNTER (INPATIENT)
Facility: HOSPITAL | Age: 74
LOS: 1 days | Discharge: HOME | DRG: 552 | End: 2024-04-26
Attending: EMERGENCY MEDICINE | Admitting: HOSPITALIST
Payer: MEDICARE

## 2024-04-23 DIAGNOSIS — G95.89 MYELOMALACIA OF CERVICAL CORD: Primary | ICD-10-CM

## 2024-04-23 DIAGNOSIS — M54.12 CERVICAL RADICULOPATHY: ICD-10-CM

## 2024-04-23 DIAGNOSIS — M54.2 NECK PAIN, ACUTE: ICD-10-CM

## 2024-04-23 DIAGNOSIS — R07.9 CHEST PAIN: ICD-10-CM

## 2024-04-23 DIAGNOSIS — M48.02 CERVICAL SPINAL STENOSIS: ICD-10-CM

## 2024-04-23 DIAGNOSIS — R52 INTRACTABLE PAIN: ICD-10-CM

## 2024-04-23 PROBLEM — J44.1 COPD EXACERBATION: Status: ACTIVE | Noted: 2024-04-23

## 2024-04-23 PROBLEM — I10 HTN (HYPERTENSION): Status: ACTIVE | Noted: 2024-04-23

## 2024-04-23 PROBLEM — Z79.4 TYPE 2 DIABETES MELLITUS WITH HYPERGLYCEMIA, WITH LONG-TERM CURRENT USE OF INSULIN: Status: ACTIVE | Noted: 2024-04-23

## 2024-04-23 PROBLEM — E11.65 TYPE 2 DIABETES MELLITUS WITH HYPERGLYCEMIA, WITH LONG-TERM CURRENT USE OF INSULIN: Status: ACTIVE | Noted: 2024-04-23

## 2024-04-23 LAB
ALBUMIN SERPL BCP-MCNC: 3.8 G/DL (ref 3.5–5.2)
ALP SERPL-CCNC: 132 U/L (ref 55–135)
ALT SERPL W/O P-5'-P-CCNC: 18 U/L (ref 10–44)
ANION GAP SERPL CALC-SCNC: 8 MMOL/L (ref 8–16)
AST SERPL-CCNC: 22 U/L (ref 10–40)
BASOPHILS # BLD AUTO: 0.03 K/UL (ref 0–0.2)
BASOPHILS NFR BLD: 0.5 % (ref 0–1.9)
BILIRUB SERPL-MCNC: 0.4 MG/DL (ref 0.1–1)
BNP SERPL-MCNC: 140 PG/ML (ref 0–99)
BUN SERPL-MCNC: 15 MG/DL (ref 8–23)
CALCIUM SERPL-MCNC: 9 MG/DL (ref 8.7–10.5)
CHLORIDE SERPL-SCNC: 103 MMOL/L (ref 95–110)
CO2 SERPL-SCNC: 27 MMOL/L (ref 23–29)
CREAT SERPL-MCNC: 0.9 MG/DL (ref 0.5–1.4)
DIFFERENTIAL METHOD BLD: ABNORMAL
EOSINOPHIL # BLD AUTO: 0.2 K/UL (ref 0–0.5)
EOSINOPHIL NFR BLD: 2.6 % (ref 0–8)
ERYTHROCYTE [DISTWIDTH] IN BLOOD BY AUTOMATED COUNT: 12.9 % (ref 11.5–14.5)
EST. GFR  (NO RACE VARIABLE): >60 ML/MIN/1.73 M^2
GLUCOSE SERPL-MCNC: 212 MG/DL (ref 70–110)
GLUCOSE SERPL-MCNC: 216 MG/DL (ref 70–110)
GLUCOSE SERPL-MCNC: 233 MG/DL (ref 70–110)
GLUCOSE SERPL-MCNC: 255 MG/DL (ref 70–110)
HCT VFR BLD AUTO: 41.3 % (ref 37–48.5)
HGB BLD-MCNC: 13.3 G/DL (ref 12–16)
IMM GRANULOCYTES # BLD AUTO: 0.04 K/UL (ref 0–0.04)
IMM GRANULOCYTES NFR BLD AUTO: 0.7 % (ref 0–0.5)
LYMPHOCYTES # BLD AUTO: 1.1 K/UL (ref 1–4.8)
LYMPHOCYTES NFR BLD: 17.3 % (ref 18–48)
MAGNESIUM SERPL-MCNC: 1.7 MG/DL (ref 1.6–2.6)
MAGNESIUM SERPL-MCNC: 1.7 MG/DL (ref 1.6–2.6)
MCH RBC QN AUTO: 31.7 PG (ref 27–31)
MCHC RBC AUTO-ENTMCNC: 32.2 G/DL (ref 32–36)
MCV RBC AUTO: 98 FL (ref 82–98)
MONOCYTES # BLD AUTO: 0.4 K/UL (ref 0.3–1)
MONOCYTES NFR BLD: 7.2 % (ref 4–15)
NEUTROPHILS # BLD AUTO: 4.4 K/UL (ref 1.8–7.7)
NEUTROPHILS NFR BLD: 71.7 % (ref 38–73)
NRBC BLD-RTO: 0 /100 WBC
PLATELET # BLD AUTO: 212 K/UL (ref 150–450)
PMV BLD AUTO: 10.5 FL (ref 9.2–12.9)
POTASSIUM SERPL-SCNC: 4 MMOL/L (ref 3.5–5.1)
PROT SERPL-MCNC: 6.9 G/DL (ref 6–8.4)
RBC # BLD AUTO: 4.2 M/UL (ref 4–5.4)
SODIUM SERPL-SCNC: 138 MMOL/L (ref 136–145)
TROPONIN I SERPL HS-MCNC: 5.5 PG/ML (ref 0–14.9)
WBC # BLD AUTO: 6.11 K/UL (ref 3.9–12.7)

## 2024-04-23 PROCEDURE — 63600175 PHARM REV CODE 636 W HCPCS: Performed by: NURSE PRACTITIONER

## 2024-04-23 PROCEDURE — 25000003 PHARM REV CODE 250: Performed by: HOSPITALIST

## 2024-04-23 PROCEDURE — 94640 AIRWAY INHALATION TREATMENT: CPT

## 2024-04-23 PROCEDURE — 96372 THER/PROPH/DIAG INJ SC/IM: CPT | Performed by: HOSPITALIST

## 2024-04-23 PROCEDURE — 96374 THER/PROPH/DIAG INJ IV PUSH: CPT

## 2024-04-23 PROCEDURE — 99900031 HC PATIENT EDUCATION (STAT)

## 2024-04-23 PROCEDURE — 25000242 PHARM REV CODE 250 ALT 637 W/ HCPCS: Performed by: HOSPITALIST

## 2024-04-23 PROCEDURE — 27000221 HC OXYGEN, UP TO 24 HOURS

## 2024-04-23 PROCEDURE — 84484 ASSAY OF TROPONIN QUANT: CPT | Performed by: NURSE PRACTITIONER

## 2024-04-23 PROCEDURE — 93005 ELECTROCARDIOGRAM TRACING: CPT | Performed by: INTERNAL MEDICINE

## 2024-04-23 PROCEDURE — 63600175 PHARM REV CODE 636 W HCPCS: Performed by: HOSPITALIST

## 2024-04-23 PROCEDURE — G0378 HOSPITAL OBSERVATION PER HR: HCPCS

## 2024-04-23 PROCEDURE — 80053 COMPREHEN METABOLIC PANEL: CPT | Performed by: NURSE PRACTITIONER

## 2024-04-23 PROCEDURE — 83036 HEMOGLOBIN GLYCOSYLATED A1C: CPT | Performed by: HOSPITALIST

## 2024-04-23 PROCEDURE — 85025 COMPLETE CBC W/AUTO DIFF WBC: CPT | Performed by: NURSE PRACTITIONER

## 2024-04-23 PROCEDURE — 82962 GLUCOSE BLOOD TEST: CPT

## 2024-04-23 PROCEDURE — 99900035 HC TECH TIME PER 15 MIN (STAT)

## 2024-04-23 PROCEDURE — 94640 AIRWAY INHALATION TREATMENT: CPT | Mod: XB

## 2024-04-23 PROCEDURE — 94761 N-INVAS EAR/PLS OXIMETRY MLT: CPT

## 2024-04-23 PROCEDURE — 96375 TX/PRO/DX INJ NEW DRUG ADDON: CPT

## 2024-04-23 PROCEDURE — 25000242 PHARM REV CODE 250 ALT 637 W/ HCPCS: Performed by: NURSE PRACTITIONER

## 2024-04-23 PROCEDURE — 94799 UNLISTED PULMONARY SVC/PX: CPT

## 2024-04-23 PROCEDURE — 93010 ELECTROCARDIOGRAM REPORT: CPT | Mod: ,,, | Performed by: INTERNAL MEDICINE

## 2024-04-23 PROCEDURE — 99285 EMERGENCY DEPT VISIT HI MDM: CPT | Mod: 25

## 2024-04-23 PROCEDURE — 83880 ASSAY OF NATRIURETIC PEPTIDE: CPT | Performed by: NURSE PRACTITIONER

## 2024-04-23 PROCEDURE — 83735 ASSAY OF MAGNESIUM: CPT | Performed by: NURSE PRACTITIONER

## 2024-04-23 PROCEDURE — 25500020 PHARM REV CODE 255: Performed by: NURSE PRACTITIONER

## 2024-04-23 RX ORDER — GLUCAGON 1 MG
1 KIT INJECTION
Status: DISCONTINUED | OUTPATIENT
Start: 2024-04-23 | End: 2024-04-24

## 2024-04-23 RX ORDER — IBUPROFEN 200 MG
24 TABLET ORAL
Status: DISCONTINUED | OUTPATIENT
Start: 2024-04-23 | End: 2024-04-24

## 2024-04-23 RX ORDER — FAMOTIDINE 20 MG/1
20 TABLET, FILM COATED ORAL 2 TIMES DAILY
Status: DISCONTINUED | OUTPATIENT
Start: 2024-04-23 | End: 2024-04-26 | Stop reason: HOSPADM

## 2024-04-23 RX ORDER — ALUMINUM HYDROXIDE, MAGNESIUM HYDROXIDE, AND SIMETHICONE 1200; 120; 1200 MG/30ML; MG/30ML; MG/30ML
30 SUSPENSION ORAL 4 TIMES DAILY PRN
Status: DISCONTINUED | OUTPATIENT
Start: 2024-04-23 | End: 2024-04-26 | Stop reason: HOSPADM

## 2024-04-23 RX ORDER — ONDANSETRON HYDROCHLORIDE 2 MG/ML
4 INJECTION, SOLUTION INTRAVENOUS EVERY 6 HOURS PRN
Status: DISCONTINUED | OUTPATIENT
Start: 2024-04-23 | End: 2024-04-26 | Stop reason: HOSPADM

## 2024-04-23 RX ORDER — IPRATROPIUM BROMIDE AND ALBUTEROL SULFATE 2.5; .5 MG/3ML; MG/3ML
3 SOLUTION RESPIRATORY (INHALATION) EVERY 6 HOURS
Status: DISCONTINUED | OUTPATIENT
Start: 2024-04-24 | End: 2024-04-26 | Stop reason: HOSPADM

## 2024-04-23 RX ORDER — DIAZEPAM 10 MG/2ML
5 INJECTION INTRAMUSCULAR
Status: COMPLETED | OUTPATIENT
Start: 2024-04-23 | End: 2024-04-23

## 2024-04-23 RX ORDER — IBUPROFEN 200 MG
16 TABLET ORAL
Status: DISCONTINUED | OUTPATIENT
Start: 2024-04-23 | End: 2024-04-26 | Stop reason: HOSPADM

## 2024-04-23 RX ORDER — INSULIN ASPART 100 [IU]/ML
0-10 INJECTION, SOLUTION INTRAVENOUS; SUBCUTANEOUS
Status: DISCONTINUED | OUTPATIENT
Start: 2024-04-23 | End: 2024-04-26 | Stop reason: HOSPADM

## 2024-04-23 RX ORDER — LANOLIN ALCOHOL/MO/W.PET/CERES
800 CREAM (GRAM) TOPICAL
Status: DISCONTINUED | OUTPATIENT
Start: 2024-04-23 | End: 2024-04-26 | Stop reason: HOSPADM

## 2024-04-23 RX ORDER — SODIUM,POTASSIUM PHOSPHATES 280-250MG
2 POWDER IN PACKET (EA) ORAL
Status: DISCONTINUED | OUTPATIENT
Start: 2024-04-23 | End: 2024-04-26 | Stop reason: HOSPADM

## 2024-04-23 RX ORDER — METHYLPREDNISOLONE SOD SUCC 125 MG
125 VIAL (EA) INJECTION ONCE
Status: COMPLETED | OUTPATIENT
Start: 2024-04-23 | End: 2024-04-23

## 2024-04-23 RX ORDER — IBUPROFEN 200 MG
24 TABLET ORAL
Status: DISCONTINUED | OUTPATIENT
Start: 2024-04-23 | End: 2024-04-26 | Stop reason: HOSPADM

## 2024-04-23 RX ORDER — MORPHINE SULFATE 4 MG/ML
4 INJECTION, SOLUTION INTRAMUSCULAR; INTRAVENOUS
Status: COMPLETED | OUTPATIENT
Start: 2024-04-23 | End: 2024-04-23

## 2024-04-23 RX ORDER — IBUPROFEN 200 MG
200 TABLET ORAL EVERY 6 HOURS PRN
Status: DISCONTINUED | OUTPATIENT
Start: 2024-04-23 | End: 2024-04-26 | Stop reason: HOSPADM

## 2024-04-23 RX ORDER — ACETAMINOPHEN 325 MG/1
650 TABLET ORAL EVERY 8 HOURS PRN
Status: DISCONTINUED | OUTPATIENT
Start: 2024-04-23 | End: 2024-04-23

## 2024-04-23 RX ORDER — TALC
6 POWDER (GRAM) TOPICAL NIGHTLY PRN
Status: DISCONTINUED | OUTPATIENT
Start: 2024-04-23 | End: 2024-04-26 | Stop reason: HOSPADM

## 2024-04-23 RX ORDER — GLUCAGON 1 MG
1 KIT INJECTION
Status: DISCONTINUED | OUTPATIENT
Start: 2024-04-23 | End: 2024-04-26 | Stop reason: HOSPADM

## 2024-04-23 RX ORDER — HYDROCODONE BITARTRATE AND ACETAMINOPHEN 5; 325 MG/1; MG/1
1 TABLET ORAL EVERY 6 HOURS PRN
Status: DISCONTINUED | OUTPATIENT
Start: 2024-04-23 | End: 2024-04-26 | Stop reason: HOSPADM

## 2024-04-23 RX ORDER — IPRATROPIUM BROMIDE AND ALBUTEROL SULFATE 2.5; .5 MG/3ML; MG/3ML
3 SOLUTION RESPIRATORY (INHALATION)
Status: COMPLETED | OUTPATIENT
Start: 2024-04-23 | End: 2024-04-23

## 2024-04-23 RX ORDER — LORAZEPAM 2 MG/ML
2 INJECTION INTRAMUSCULAR
Status: COMPLETED | OUTPATIENT
Start: 2024-04-23 | End: 2024-04-23

## 2024-04-23 RX ORDER — AMLODIPINE BESYLATE 5 MG/1
5 TABLET ORAL DAILY
Status: DISCONTINUED | OUTPATIENT
Start: 2024-04-24 | End: 2024-04-26 | Stop reason: HOSPADM

## 2024-04-23 RX ORDER — ACETAMINOPHEN 325 MG/1
650 TABLET ORAL EVERY 4 HOURS PRN
Status: DISCONTINUED | OUTPATIENT
Start: 2024-04-23 | End: 2024-04-26 | Stop reason: HOSPADM

## 2024-04-23 RX ORDER — MIDAZOLAM HYDROCHLORIDE 2 MG/2ML
2 INJECTION, SOLUTION INTRAMUSCULAR; INTRAVENOUS
Status: COMPLETED | OUTPATIENT
Start: 2024-04-23 | End: 2024-04-23

## 2024-04-23 RX ORDER — IPRATROPIUM BROMIDE AND ALBUTEROL SULFATE 2.5; .5 MG/3ML; MG/3ML
3 SOLUTION RESPIRATORY (INHALATION) EVERY 4 HOURS
Status: DISCONTINUED | OUTPATIENT
Start: 2024-04-23 | End: 2024-04-23

## 2024-04-23 RX ORDER — ACETAMINOPHEN 500 MG
1000 TABLET ORAL 2 TIMES DAILY
Status: DISCONTINUED | OUTPATIENT
Start: 2024-04-23 | End: 2024-04-26 | Stop reason: HOSPADM

## 2024-04-23 RX ORDER — ROPINIROLE 1 MG/1
1 TABLET, FILM COATED ORAL NIGHTLY
COMMUNITY
Start: 2024-03-13

## 2024-04-23 RX ORDER — SODIUM CHLORIDE 0.9 % (FLUSH) 0.9 %
10 SYRINGE (ML) INJECTION EVERY 12 HOURS PRN
Status: DISCONTINUED | OUTPATIENT
Start: 2024-04-23 | End: 2024-04-26 | Stop reason: HOSPADM

## 2024-04-23 RX ORDER — NALOXONE HCL 0.4 MG/ML
0.02 VIAL (ML) INJECTION
Status: DISCONTINUED | OUTPATIENT
Start: 2024-04-23 | End: 2024-04-26 | Stop reason: HOSPADM

## 2024-04-23 RX ORDER — FLECAINIDE ACETATE 100 MG/1
100 TABLET ORAL 2 TIMES DAILY
Status: DISCONTINUED | OUTPATIENT
Start: 2024-04-23 | End: 2024-04-26 | Stop reason: HOSPADM

## 2024-04-23 RX ORDER — LIDOCAINE 50 MG/G
1 PATCH TOPICAL
Status: DISCONTINUED | OUTPATIENT
Start: 2024-04-23 | End: 2024-04-26 | Stop reason: HOSPADM

## 2024-04-23 RX ORDER — OXYCODONE HYDROCHLORIDE 5 MG/1
5 TABLET ORAL EVERY 6 HOURS PRN
Status: DISCONTINUED | OUTPATIENT
Start: 2024-04-23 | End: 2024-04-26 | Stop reason: HOSPADM

## 2024-04-23 RX ORDER — IBUPROFEN 200 MG
16 TABLET ORAL
Status: DISCONTINUED | OUTPATIENT
Start: 2024-04-23 | End: 2024-04-24

## 2024-04-23 RX ORDER — GABAPENTIN 300 MG/1
300 CAPSULE ORAL 2 TIMES DAILY
Status: DISCONTINUED | OUTPATIENT
Start: 2024-04-23 | End: 2024-04-26 | Stop reason: HOSPADM

## 2024-04-23 RX ORDER — BUDESONIDE 0.5 MG/2ML
0.5 INHALANT ORAL
Status: COMPLETED | OUTPATIENT
Start: 2024-04-23 | End: 2024-04-23

## 2024-04-23 RX ORDER — INSULIN ASPART 100 [IU]/ML
6 INJECTION, SOLUTION INTRAVENOUS; SUBCUTANEOUS
Status: DISCONTINUED | OUTPATIENT
Start: 2024-04-23 | End: 2024-04-25

## 2024-04-23 RX ORDER — AZITHROMYCIN 250 MG/1
500 TABLET, FILM COATED ORAL DAILY
Status: COMPLETED | OUTPATIENT
Start: 2024-04-24 | End: 2024-04-26

## 2024-04-23 RX ORDER — METOPROLOL TARTRATE 50 MG/1
50 TABLET ORAL 2 TIMES DAILY
Status: DISCONTINUED | OUTPATIENT
Start: 2024-04-23 | End: 2024-04-26 | Stop reason: HOSPADM

## 2024-04-23 RX ADMIN — PANCRELIPASE 2 CAPSULE: 60000; 12000; 38000 CAPSULE, DELAYED RELEASE PELLETS ORAL at 09:04

## 2024-04-23 RX ADMIN — Medication 6 MG: at 10:04

## 2024-04-23 RX ADMIN — BUDESONIDE INHALATION 0.5 MG: 0.5 SUSPENSION RESPIRATORY (INHALATION) at 09:04

## 2024-04-23 RX ADMIN — INSULIN ASPART 6 UNITS: 100 INJECTION, SOLUTION INTRAVENOUS; SUBCUTANEOUS at 06:04

## 2024-04-23 RX ADMIN — FAMOTIDINE 20 MG: 20 TABLET ORAL at 09:04

## 2024-04-23 RX ADMIN — INSULIN DETEMIR 40 UNITS: 100 INJECTION, SOLUTION SUBCUTANEOUS at 10:04

## 2024-04-23 RX ADMIN — LIDOCAINE 5% 1 PATCH: 700 PATCH TOPICAL at 09:04

## 2024-04-23 RX ADMIN — MORPHINE SULFATE 4 MG: 4 INJECTION, SOLUTION INTRAMUSCULAR; INTRAVENOUS at 04:04

## 2024-04-23 RX ADMIN — INSULIN ASPART 8 UNITS: 100 INJECTION, SOLUTION INTRAVENOUS; SUBCUTANEOUS at 09:04

## 2024-04-23 RX ADMIN — METOPROLOL TARTRATE 50 MG: 50 TABLET, FILM COATED ORAL at 09:04

## 2024-04-23 RX ADMIN — LORAZEPAM 2 MG: 2 INJECTION INTRAMUSCULAR; INTRAVENOUS at 01:04

## 2024-04-23 RX ADMIN — IPRATROPIUM BROMIDE AND ALBUTEROL SULFATE 3 ML: 2.5; .5 SOLUTION RESPIRATORY (INHALATION) at 04:04

## 2024-04-23 RX ADMIN — HYDROCODONE BITARTRATE AND ACETAMINOPHEN 1 TABLET: 5; 325 TABLET ORAL at 10:04

## 2024-04-23 RX ADMIN — GABAPENTIN 300 MG: 300 CAPSULE ORAL at 09:04

## 2024-04-23 RX ADMIN — FLECAINIDE ACETATE 100 MG: 100 TABLET ORAL at 09:04

## 2024-04-23 RX ADMIN — PANCRELIPASE 2 CAPSULE: 60000; 12000; 38000 CAPSULE, DELAYED RELEASE PELLETS ORAL at 05:04

## 2024-04-23 RX ADMIN — METHYLPREDNISOLONE SODIUM SUCCINATE 125 MG: 125 INJECTION, POWDER, FOR SOLUTION INTRAMUSCULAR; INTRAVENOUS at 09:04

## 2024-04-23 RX ADMIN — ACETAMINOPHEN 1000 MG: 500 TABLET ORAL at 09:04

## 2024-04-23 RX ADMIN — IPRATROPIUM BROMIDE AND ALBUTEROL SULFATE 3 ML: 2.5; .5 SOLUTION RESPIRATORY (INHALATION) at 09:04

## 2024-04-23 RX ADMIN — IOHEXOL 100 ML: 350 INJECTION, SOLUTION INTRAVENOUS at 12:04

## 2024-04-23 RX ADMIN — DIAZEPAM 5 MG: 10 INJECTION, SOLUTION INTRAMUSCULAR; INTRAVENOUS at 10:04

## 2024-04-23 RX ADMIN — MIDAZOLAM HYDROCHLORIDE 2 MG: 1 INJECTION, SOLUTION INTRAMUSCULAR; INTRAVENOUS at 02:04

## 2024-04-23 NOTE — H&P
[facility] - Emergency Dept  Hospital Medicine  History & Physical    Patient Name: Maddie Otero  MRN: [id number]  Patient Class: OP- Observation  Admission Date: 4/23/2024  Attending Physician: Brisa Alicea MD  Primary Care Provider: Mery Primary Doctor         Patient information was obtained from patient and ER records.     Subjective:     Principal Problem:Cervical spinal stenosis    Chief Complaint:   Chief Complaint   Patient presents with    Chest Pain     Chest pain radiating to R arm, shoulder and back. Tightness.         HPI: 73-year-old female with past medical history of COPD on 2 L nasal cannula at home presenting because of multiple complaints.  According to the patient, over the last 2-3 days, she has been having severe neck pain radiating to the right shoulder, and right forearm with right hand numbness.  Her neck pain is severe, 9/10 on pain scale, described as sharp.  She also has been short of breath with chest tightness.  She denied any chest pain per se.  Because of her symptoms, she came to the ER for evaluation.    In the ER, vitals showed a blood pressure of 214/126, heart rate of 73, respiratory rate of 18, afebrile satting 95% on room air.  CBC is within normal limits.  CMP showed elevated blood sugar of 233.  Troponin is negative.  EKG showed normal sinus rhythm with no ischemic changes.  Chest x-ray with no pneumonia.  CT head with no acute intracranial process.  CT C-spine showed  Broad-based disc protrusion with osteophytic ridging at C5-C6, producing severe spinal canal stenosis and potential cervical cord compression. This was followed by MRI C-spine which showed Changes of multilevel cervical degenerative disc and facet disease. Findings appear most pronounced at C5-6, where there is probable severe spinal stenosis and severe bilateral foraminal narrowing. Sagittal T2 weighted images demonstrate mild signal increase within the substance of the cervical cord at this  "level most compatible with myelomalacia.  Patient was given Valium, morphine, and DuoNeb treatments.  ER provider did discuss the case with Neurosurgery who recommended admission, and for neurosurgery to be consulted.    Past Medical History:   Diagnosis Date    A-fib     Anticoagulant long-term use     Arthritis     COPD (chronic obstructive pulmonary disease)     COPD (chronic obstructive pulmonary disease) 2019    Diabetes mellitus, type 2     Diverticulosis     History of left knee replacement 2017    DR CRENSHAW    HNP (herniated nucleus pulposus)     LUMBAR    Hypertension     Lung disease     Pancreatic insufficiency     s/p whipple, non cancer    Presence of dental bridge     UPPER    Wears glasses        Past Surgical History:   Procedure Laterality Date    APPENDECTOMY      BACK SURGERY  1994    lower back    BACK SURGERY  2012    lower back    CHOLECYSTECTOMY      HERNIA REPAIR      JOINT REPLACEMENT Left     KNEE    KNEE ARTHROPLASTY Right 5/3/2021    Procedure: ARTHROPLASTY, KNEE;  Surgeon: Manuel Willingham MD;  Location: [facility] OR;  Service: Orthopedics;  Laterality: Right;  guzman    LAPAROSCOPIC REPAIR OF INCISIONAL HERNIA N/A 10/22/2019    Procedure: REPAIR, HERNIA, INCISIONAL, LAPAROSCOPIC;  Surgeon: Pantera Almanza III, MD;  Location: [facility] OR;  Service: General;  Laterality: N/A;    pancrease  2009    stents in pancrease     TONSILLECTOMY      TOTAL KNEE ARTHROPLASTY Left 08/10/2017    WHIPPLE PROCEDURE W/ LAPAROSCOPY  10/09       Review of patient's allergies indicates:   Allergen Reactions    Heparin Itching     Pt states she has no allergy      NOT AN ALLERGY . NOT AWARE OF TAKING IT    Hydrocodone Shortness Of Breath     MED "SHORTENS HER BREATHING"    Penicillins Anaphylaxis, Hives and Itching     Other reaction(s): Unknown  Childhood reaction, swelled      Sulfa (sulfonamide antibiotics) Hives    Doxycycline Nausea And Vomiting     Other reaction(s): Abdominal Pain    Clindamycin      "Felt like " "I was on fire down through throat and felt like I was having spasms in my throat"    Penicillin v      Childhood reaction, swelled       Current Facility-Administered Medications   Medication Dose Route Frequency Provider Last Rate Last Admin    acetaminophen tablet 650 mg  650 mg Oral Q8H PRN Brisa Alicea MD        acetaminophen tablet 650 mg  650 mg Oral Q4H PRN Brisa Alicea MD        albuterol-ipratropium 2.5 mg-0.5 mg/3 mL nebulizer solution 3 mL  3 mL Nebulization Q4H Brisa Alicea MD   3 mL at 04/23/24 1616    aluminum-magnesium hydroxide-simethicone 200-200-20 mg/5 mL suspension 30 mL  30 mL Oral QID PRN Brisa Alicea MD        [START ON 4/24/2024] amLODIPine tablet 5 mg  5 mg Oral Daily Brisa Alicea MD        apixaban tablet 5 mg  5 mg Oral BID Brisa Alicea MD        dextrose 50% injection 12.5 g  12.5 g Intravenous PRN Brisa Alicea MD        dextrose 50% injection 12.5 g  12.5 g Intravenous PRN Brisa Alicea MD        dextrose 50% injection 25 g  25 g Intravenous PRN Brisa Alicea MD        dextrose 50% injection 25 g  25 g Intravenous PRN Brisa Alicea MD        famotidine tablet 20 mg  20 mg Oral BID Brisa Alicea MD        flecainide tablet 100 mg  100 mg Oral BID Brisa Alicea MD        glucagon (human recombinant) injection 1 mg  1 mg Intramuscular PRN Brisa Alicea MD        glucagon (human recombinant) injection 1 mg  1 mg Intramuscular PRN Brisa Alicea MD        glucose chewable tablet 16 g  16 g Oral PRN Brisa Alicea MD        glucose chewable tablet 16 g  16 g Oral PRN Brisa Alicea MD        glucose chewable tablet 24 g  24 g Oral PRN Brisa Alicea MD        glucose chewable tablet 24 g  24 g Oral PRN Brisa Alicea MD        HYDROcodone-acetaminophen 5-325 mg per tablet 1 tablet  1 tablet Oral Q6H PRN Brisa Alicea MD        insulin aspart U-100 pen 0-10 Units  0-10 Units Subcutaneous QID (AC + HS) PRN Brisa Alicea MD        insulin aspart U-100 pen " 6 Units  6 Units Subcutaneous TIDWM Brisa Alicea MD        insulin detemir U-100 (Levemir) pen 40 Units  40 Units Subcutaneous QHS Brisa Alicea MD        lipase-protease-amylase 12,000-38,000-60,000 units per capsule 2 capsule  2 capsule Oral QID Brisa Alicea MD        magnesium oxide tablet 800 mg  800 mg Oral PRN Brisa Alicea MD        magnesium oxide tablet 800 mg  800 mg Oral PRN Brisa Alicea MD        melatonin tablet 6 mg  6 mg Oral Nightly PRN Brisa Alicea MD        metoprolol tartrate (LOPRESSOR) tablet 50 mg  50 mg Oral BID Brisa Alicea MD        naloxone 0.4 mg/mL injection 0.02 mg  0.02 mg Intravenous PRN Brisa Alicea MD        ondansetron injection 4 mg  4 mg Intravenous Q6H PRN Brisa Alicea MD        potassium bicarbonate disintegrating tablet 35 mEq  35 mEq Oral PRN Brisa Alicea MD        potassium bicarbonate disintegrating tablet 50 mEq  50 mEq Oral PRN Brisa Alicea MD        potassium bicarbonate disintegrating tablet 60 mEq  60 mEq Oral PRN Brisa Alicea MD        potassium, sodium phosphates 280-160-250 mg packet 2 packet  2 packet Oral PRN Brisa Alicea MD        potassium, sodium phosphates 280-160-250 mg packet 2 packet  2 packet Oral PRN Brisa Alicea MD        potassium, sodium phosphates 280-160-250 mg packet 2 packet  2 packet Oral PRN Brisa Alicea MD        sodium chloride 0.9% flush 10 mL  10 mL Intravenous Q12H PRN Brisa Alicea MD         Current Outpatient Medications   Medication Sig Dispense Refill    albuterol (PROVENTIL/VENTOLIN HFA) 90 mcg/actuation inhaler INHALE 2 PUFFS INTO THE LUNGS EVERY 6 (SIX) HOURS AS NEEDED FOR WHEEZING. RESCUE 54 g 3    amLODIPine (NORVASC) 5 MG tablet Take 5 mg by mouth once daily.      apixaban (ELIQUIS) 5 mg Tab Take 5 mg by mouth 2 (two) times daily.      budesonide-glycopyr-formoterol (BREZTRI AEROSPHERE) 160-9-4.8 mcg/actuation HFAA Inhale 2 puffs into the lungs 2 (two) times a day. 3 g 6    cetirizine  (ZYRTEC) 10 MG tablet Take 10 mg by mouth once daily.      CREON CpDR Take 2 capsules by mouth 4 (four) times daily.  5    famotidine (PEPCID) 20 MG tablet Take 20 mg by mouth 2 (two) times daily.       flecainide (TAMBOCOR) 100 MG Tab Take 1 tablet by mouth 2 (two) times daily.      furosemide (LASIX) 40 MG tablet Take 40 mg by mouth as needed (edema).       insulin aspart protamine-insulin aspart (NOVOLOG MIX 70-30FLEXPEN U-100) 100 unit/mL (70-30) InPn pen Inject 20 Units into the skin 3 (three) times daily before meals. (Patient taking differently: Inject 22 Units into the skin 3 (three) times daily before meals.)  0    lisinopriL (PRINIVIL,ZESTRIL) 5 MG tablet Take 5 mg by mouth once daily.      omeprazole (PRILOSEC) 20 MG capsule Take 1 capsule (20 mg total) by mouth once daily. 90 capsule 3    oxyCODONE-acetaminophen (PERCOCET)  mg per tablet Take 1 tablet by mouth every 4 (four) hours as needed.      rOPINIRole (REQUIP) 1 MG tablet Take 1 mg by mouth every evening.      sodium chloride (OCEAN NASAL NASL) 1 spray by Nasal route daily as needed (rhinitis).       tiZANidine (ZANAFLEX) 4 MG tablet Take 4 mg by mouth every 6 (six) hours as needed.      albuterol (PROVENTIL/VENTOLIN) 90 mcg/actuation inhaler Inhale 2 puffs into the lungs every 6 (six) hours as needed for Wheezing. 1 each 0    albuterol-ipratropium (DUO-NEB) 2.5 mg-0.5 mg/3 mL nebulizer solution       diltiaZEM (CARDIZEM CD) 120 MG Cp24 Take 120 mg by mouth once daily.      hydrocortisone 2.5 % cream Apply topically 2 (two) times daily. Use on AA BID x 10-14 days for 14 days 20 g 0    metoprolol succinate (TOPROL-XL) 100 MG 24 hr tablet Take 100 mg by mouth 2 (two) times daily.      metoprolol tartrate (LOPRESSOR) 50 MG tablet Take 1 tablet (50 mg total) by mouth 2 (two) times daily.      oxyCODONE (ROXICODONE) 5 MG immediate release tablet 1-2 tab for pain q4-6 hours (Patient not taking: Reported on 4/3/2024) 10 tablet 0    potassium  chloride SA (K-DUR,KLOR-CON) 20 MEQ tablet Take 20 mEq by mouth once daily.      predniSONE (DELTASONE) 10 MG tablet Take 4 tabs x 2 days, then take 3 tabs x 2 days, then take 2 tabs x 2 days, then take 1 tab x 2 days. 20 tablet 0    tirzepatide (MOUNJARO) 5 mg/0.5 mL PnIj Inject into the skin every 7 days.      TRUE METRIX GLUCOSE TEST STRIP Strp        Family History       Problem Relation (Age of Onset)    Cancer Father    Diabetes Mother          Tobacco Use    Smoking status: Former     Current packs/day: 0.00     Average packs/day: 1 pack/day for 30.0 years (30.0 ttl pk-yrs)     Types: Cigarettes     Start date: 1987     Quit date: 2017     Years since quittin.0    Smokeless tobacco: Never   Substance and Sexual Activity    Alcohol use: No    Drug use: No    Sexual activity: Not on file     Review of Systems   Constitutional:  Negative for chills and fever.   HENT:  Negative for sore throat.    Eyes:  Negative for visual disturbance.   Respiratory:  Positive for chest tightness and shortness of breath. Negative for cough.    Cardiovascular:  Negative for chest pain and palpitations.   Gastrointestinal:  Negative for abdominal pain, nausea and vomiting.   Endocrine: Negative for polydipsia and polyphagia.   Genitourinary:  Negative for dysuria, frequency and urgency.   Musculoskeletal:  Positive for neck pain.        See above.   Skin:  Negative for rash.   Neurological:         Neck radiculopathy.   Psychiatric/Behavioral:  Negative for confusion.      Objective:     Vital Signs (Most Recent):  Temp: 98.6 °F (37 °C) (24 0845)  Pulse: 75 (24 1630)  Resp: 20 (24 1630)  BP: (!) 143/66 (24 1630)  SpO2: 96 % (24 1630) Vital Signs (24h Range):  Temp:  [98.6 °F (37 °C)] 98.6 °F (37 °C)  Pulse:  [66-87] 75  Resp:  [18-22] 20  SpO2:  [95 %-100 %] 96 %  BP: (136-214)/() 143/66     Weight: 99.8 kg (220 lb)  Body mass index is 35.51 kg/m².     Physical Exam  Vitals reviewed.    Constitutional:       Appearance: Normal appearance.   HENT:      Head: Normocephalic and atraumatic.      Mouth/Throat:      Mouth: Mucous membranes are moist.   Eyes:      Extraocular Movements: Extraocular movements intact.      Conjunctiva/sclera: Conjunctivae normal.      Pupils: Pupils are equal, round, and reactive to light.   Cardiovascular:      Rate and Rhythm: Normal rate and regular rhythm.      Pulses: Normal pulses.      Heart sounds: Normal heart sounds.   Pulmonary:      Effort: Wheezing bilateral lung fields.   Abdominal:      General: Abdomen is flat. Bowel sounds are normal.      Palpations: Abdomen is soft.   Musculoskeletal:      Comments: Posterior neck tenderness to palpation, with decreased range of motion secondary to pain.   Skin:     General: Skin is warm.   Neurological:      General: No focal deficit present.      Mental Status: She is alert and oriented to person, place, and time.   Psychiatric:         Mood and Affect: Mood normal.         Behavior: Behavior normal.              CRANIAL NERVES     CN III, IV, VI   Pupils are equal, round, and reactive to light.       Significant Labs: All pertinent labs within the past 24 hours have been reviewed.  Recent Lab Results         04/23/24  1615   04/23/24  0913        Albumin   3.8       ALP   132       ALT   18       Anion Gap   8       AST   22       Baso #   0.03       Basophil %   0.5       BILIRUBIN TOTAL   0.4  Comment: For infants and newborns, interpretation of results should be based  on gestational age, weight and in agreement with clinical  observations.    Premature Infant recommended reference ranges:  Up to 24 hours.............<8.0 mg/dL  Up to 48 hours............<12.0 mg/dL  3-5 days..................<15.0 mg/dL  6-29 days.................<15.0 mg/dL         BNP   140  Comment: Values of less than 100 pg/ml are consistent with non-CHF populations.       BUN   15       Calcium   9.0       Chloride   103       CO2   27        Creatinine   0.9       Differential Method   Automated       eGFR   >60.0       Eos #   0.2       Eos %   2.6       Glucose   233       Gran # (ANC)   4.4       Gran %   71.7       Hematocrit   41.3       Hemoglobin   13.3       Immature Grans (Abs)   0.04  Comment: Mild elevation in immature granulocytes is non specific and   can be seen in a variety of conditions including stress response,   acute inflammation, trauma and pregnancy. Correlation with other   laboratory and clinical findings is essential.         Immature Granulocytes   0.7       Lymph #   1.1       Lymph %   17.3       Magnesium    1.7          1.7       MCH   31.7       MCHC   32.2       MCV   98       Mono #   0.4       Mono %   7.2       MPV   10.5       nRBC   0       Platelet Count   212       POC Glucose 216         Potassium   4.0       PROTEIN TOTAL   6.9       RBC   4.20       RDW   12.9       Sodium   138       Troponin I High Sensitivity   5.5  Comment: Troponin results differ between methods. Do not use   results between Troponin methods interchangeably.    Alkaline Phospatase levels above 400 U/L may   cause false positive results.    Access hsTnI should not be used for patients taking   Asfotase attila (Strensiq).         WBC   6.11               Significant Imaging: I have reviewed all pertinent imaging results/findings within the past 24 hours.  Assessment/Plan:     * Cervical spinal stenosis  Neurosurgery has been consulted.  Appreciate recommendation.  I will start Robaxin 500 mg t.i.d..  Also gabapentin 300 mg b.i.d. given neuropathy.  Tylenol a 1000 mg b.i.d, and ibuprofen 200 mg b.i.d..  Lidocaine patches.  PT/OT.        COPD exacerbation  I will give the patient methylprednisolone 125 mg IV once, start prednisone 50 mg tomorrow.  Also DuoNeb q.4 hours.  Azithromycin prophylaxis.     HTN (hypertension)  Resume norvasc and metoprolol.     Type 2 diabetes mellitus with hyperglycemia, with long-term current use of insulin  Pt is  70/30, 22 units tid.   Will order lantus 40 units qhs, humalog 6 units TIDAC and moderate sliding scale.       PAF (paroxysmal atrial fibrillation)  Resume eliquis, flecainide and metoprolol.       VTE Risk Mitigation (From admission, onward)           Ordered     apixaban tablet 5 mg  2 times daily         04/23/24 1558     IP VTE HIGH RISK PATIENT  Once         04/23/24 1558     Place sequential compression device  Until discontinued         04/23/24 1558                       On 04/23/2024, patient should be placed in hospital observation services under my care.             Brisa Alicea MD  Department of Hospital Medicine  [facility] - Emergency Dept

## 2024-04-23 NOTE — ASSESSMENT & PLAN NOTE
Neurosurgery has been consulted.  Appreciate recommendation.  I will start Robaxin 500 mg t.i.d..  Also gabapentin 300 mg b.i.d. given neuropathy.  Tylenol a 1000 mg b.i.d, and ibuprofen 200 mg b.i.d..  Lidocaine patches.  PT/OT.

## 2024-04-23 NOTE — PHARMACY MED REC
"Admission Medication History     The home medication history was taken by Joe Nava.    You may go to "Admission" then "Reconcile Home Medications" tabs to review and/or act upon these items.     The home medication list has been updated by the Pharmacy department.   Please read ALL comments highlighted in yellow.   Please address this information as you see fit.    Feel free to contact us if you have any questions or require assistance.      The medications listed below were removed from the home medication list. Please reorder if appropriate:  Patient reports no longer taking the following medication(s):  Jardiance 25  Ferrous sulfate   Montelukast 10  Actos 30  Polyethylene   Pericolace     Medications listed below were obtained from: Patient/family and Analytic software- [employer]  No current facility-administered medications on file prior to encounter.     Current Outpatient Medications on File Prior to Encounter   Medication Sig Dispense Refill    albuterol (PROVENTIL/VENTOLIN HFA) 90 mcg/actuation inhaler INHALE 2 PUFFS INTO THE LUNGS EVERY 6 (SIX) HOURS AS NEEDED FOR WHEEZING. RESCUE 54 g 3    amLODIPine (NORVASC) 5 MG tablet Take 5 mg by mouth once daily.      apixaban (ELIQUIS) 5 mg Tab Take 5 mg by mouth 2 (two) times daily.      budesonide-glycopyr-formoterol (BREZTRI AEROSPHERE) 160-9-4.8 mcg/actuation HFAA Inhale 2 puffs into the lungs 2 (two) times a day. 3 g 6    cetirizine (ZYRTEC) 10 MG tablet Take 10 mg by mouth once daily.      CREON CpDR Take 2 capsules by mouth 4 (four) times daily.  5    famotidine (PEPCID) 20 MG tablet Take 20 mg by mouth 2 (two) times daily.       flecainide (TAMBOCOR) 100 MG Tab Take 1 tablet by mouth 2 (two) times daily.      furosemide (LASIX) 40 MG tablet Take 40 mg by mouth as needed (edema).       insulin aspart protamine-insulin aspart (NOVOLOG MIX 70-30FLEXPEN U-100) 100 unit/mL (70-30) InPn pen Inject 20 Units into the skin 3 (three) times daily before meals. " (Patient taking differently: Inject 22 Units into the skin 3 (three) times daily before meals.)  0    lisinopriL (PRINIVIL,ZESTRIL) 5 MG tablet Take 5 mg by mouth once daily.      omeprazole (PRILOSEC) 20 MG capsule Take 1 capsule (20 mg total) by mouth once daily. 90 capsule 3    oxyCODONE-acetaminophen (PERCOCET)  mg per tablet Take 1 tablet by mouth every 4 (four) hours as needed.      rOPINIRole (REQUIP) 1 MG tablet Take 1 mg by mouth every evening.      sodium chloride (OCEAN NASAL NASL) 1 spray by Nasal route daily as needed (rhinitis).       tiZANidine (ZANAFLEX) 4 MG tablet Take 4 mg by mouth every 6 (six) hours as needed.      albuterol (PROVENTIL/VENTOLIN) 90 mcg/actuation inhaler Inhale 2 puffs into the lungs every 6 (six) hours as needed for Wheezing. 1 each 0    albuterol-ipratropium (DUO-NEB) 2.5 mg-0.5 mg/3 mL nebulizer solution       diltiaZEM (CARDIZEM CD) 120 MG Cp24 Take 120 mg by mouth once daily.      hydrocortisone 2.5 % cream Apply topically 2 (two) times daily. Use on AA BID x 10-14 days for 14 days 20 g 0    metoprolol succinate (TOPROL-XL) 100 MG 24 hr tablet Take 100 mg by mouth 2 (two) times daily.      metoprolol tartrate (LOPRESSOR) 50 MG tablet Take 1 tablet (50 mg total) by mouth 2 (two) times daily.      oxyCODONE (ROXICODONE) 5 MG immediate release tablet 1-2 tab for pain q4-6 hours (Patient not taking: Reported on 4/3/2024) 10 tablet 0    potassium chloride SA (K-DUR,KLOR-CON) 20 MEQ tablet Take 20 mEq by mouth once daily.      predniSONE (DELTASONE) 10 MG tablet Take 4 tabs x 2 days, then take 3 tabs x 2 days, then take 2 tabs x 2 days, then take 1 tab x 2 days. 20 tablet 0    tirzepatide (MOUNJARO) 5 mg/0.5 mL PnIj Inject into the skin every 7 days.      TRUE METRIX GLUCOSE TEST STRIP Strp       [DISCONTINUED] empagliflozin (JARDIANCE) 25 mg tablet Take 25 mg by mouth once daily.      [DISCONTINUED] ferrous sulfate 324 mg (65 mg iron) TbEC Take 1 tablet (324 mg total) by  mouth once daily. (Patient not taking: Reported on 4/12/2023) 30 tablet 0    [DISCONTINUED] montelukast (SINGULAIR) 10 mg tablet       [DISCONTINUED] pioglitazone (ACTOS) 30 MG tablet Take 30 mg by mouth once daily.      [DISCONTINUED] polyethylene glycol (GLYCOLAX) 17 gram PwPk Take 17 g by mouth once daily. (Patient not taking: Reported on 4/12/2023)  0    [DISCONTINUED] senna-docusate 8.6-50 mg (PERICOLACE) 8.6-50 mg per tablet Take 1 tablet by mouth 2 (two) times daily. (Patient not taking: Reported on 4/12/2023)         Potential issues to be addressed PRIOR TO DISCHARGE  Patient reported not taking the following medications: (Diltiazem 120, Metoprolol Tart 50, Oxycodone 5,Prednisone 10, Mounjaro 7.5). These medications remain on the home medication list. Please address accordingly.     Joe Nava  [id number]              .

## 2024-04-23 NOTE — HPI
73-year-old female with past medical history of COPD on 2 L nasal cannula at home presenting because of multiple complaints.  According to the patient, over the last 2-3 days, she has been having severe neck pain radiating to the right shoulder, and right forearm with right hand numbness.  Her neck pain is severe, 9/10 on pain scale, described as sharp.  She also has been short of breath with chest tightness.  She denied any chest pain per se.  Because of her symptoms, she came to the ER for evaluation.    In the ER, vitals showed a blood pressure of 214/126, heart rate of 73, respiratory rate of 18, afebrile satting 95% on room air.  CBC is within normal limits.  CMP showed elevated blood sugar of 233.  Troponin is negative.  EKG showed normal sinus rhythm with no ischemic changes.  Chest x-ray with no pneumonia.  CT head with no acute intracranial process.  CT C-spine showed  Broad-based disc protrusion with osteophytic ridging at C5-C6, producing severe spinal canal stenosis and potential cervical cord compression. This was followed by MRI C-spine which showed Changes of multilevel cervical degenerative disc and facet disease. Findings appear most pronounced at C5-6, where there is probable severe spinal stenosis and severe bilateral foraminal narrowing. Sagittal T2 weighted images demonstrate mild signal increase within the substance of the cervical cord at this level most compatible with myelomalacia.  Patient was given Valium, morphine, and DuoNeb treatments.  ER provider did discuss the case with Neurosurgery who recommended admission, and for neurosurgery to be consulted.

## 2024-04-23 NOTE — ASSESSMENT & PLAN NOTE
Pt is 70/30, 22 units tid.   Will order lantus 40 units qhs, humalog 6 units TIDAC and moderate sliding scale.

## 2024-04-23 NOTE — ED PROVIDER NOTES
"Source of History:  Patient, chart    Chief complaint:  Chest Pain (Chest pain radiating to R arm, shoulder and back. Tightness. )      HPI:  Maddie Otero is a 73 y.o. female with medical history of COPD, diabetes, AFib, hypertension presenting with right-sided chest pain, neck pain and back pain.  Patient describes the pain as an ache.  Patient denies any ripping or tearing pain.  Patient states pain has been ongoing for the past few days.  Patient denies taking anything for her symptoms.  Patient states she is supposed to be on home O2.  Patient denies any fever or chills.  Patient states she has been compliant with her medications.    This is the extent to the patients complaints today here in the emergency department.    ROS: As per HPI and below:  Constitutional: No fever.  No chills.  Eyes: No visual changes.  ENT: No sore throat. No ear pain    Cardiovascular:  Positive for chest pain.  Respiratory:  Positive for shortness of breath.  Positive for chest tightness.  GI: No abdominal pain.  No nausea or vomiting.  Genitourinary: No abnormal urination.  Neurologic: No headache. No focal weakness.  No numbness.  MSK:  Positive for back pain.  Positive for neck pain.  Integument: No rashes or lesions.  Hematologic: No easy bruising.  Endocrine: No excessive thirst or urination.    Review of patient's allergies indicates:   Allergen Reactions    Heparin Itching     Pt states she has no allergy      NOT AN ALLERGY . NOT AWARE OF TAKING IT    Hydrocodone Shortness Of Breath     MED "SHORTENS HER BREATHING"    Penicillins Anaphylaxis, Hives and Itching     Other reaction(s): Unknown  Childhood reaction, swelled      Sulfa (sulfonamide antibiotics) Hives    Doxycycline Nausea And Vomiting     Other reaction(s): Abdominal Pain    Clindamycin      "Felt like I was on fire down through throat and felt like I was having spasms in my throat"    Penicillin v      Childhood reaction, swelled       PMH:  As per HPI and " "below:  Past Medical History:   Diagnosis Date    A-fib     Anticoagulant long-term use     Arthritis     COPD (chronic obstructive pulmonary disease)     COPD (chronic obstructive pulmonary disease)     Diabetes mellitus, type 2     Diverticulosis     History of left knee replacement     DR CRENSHAW    HNP (herniated nucleus pulposus)     LUMBAR    Hypertension     Lung disease     Pancreatic insufficiency     s/p whipple, non cancer    Presence of dental bridge     UPPER    Wears glasses      Past Surgical History:   Procedure Laterality Date    APPENDECTOMY      BACK SURGERY      lower back    BACK SURGERY      lower back    CHOLECYSTECTOMY      HERNIA REPAIR      JOINT REPLACEMENT Left     KNEE    KNEE ARTHROPLASTY Right 5/3/2021    Procedure: ARTHROPLASTY, KNEE;  Surgeon: Manuel Willingham MD;  Location: [facility] OR;  Service: Orthopedics;  Laterality: Right;  guzman    LAPAROSCOPIC REPAIR OF INCISIONAL HERNIA N/A 10/22/2019    Procedure: REPAIR, HERNIA, INCISIONAL, LAPAROSCOPIC;  Surgeon: Pantera Almanza III, MD;  Location: [facility] OR;  Service: General;  Laterality: N/A;    pancrease  2009    stents in pancrease     TONSILLECTOMY      TOTAL KNEE ARTHROPLASTY Left 08/10/2017    WHIPPLE PROCEDURE W/ LAPAROSCOPY  10/09       Social History     Tobacco Use    Smoking status: Former     Current packs/day: 0.00     Average packs/day: 1 pack/day for 30.0 years (30.0 ttl pk-yrs)     Types: Cigarettes     Start date: 1987     Quit date: 2017     Years since quittin.0    Smokeless tobacco: Never   Substance Use Topics    Alcohol use: No    Drug use: No       Physical Exam:    BP (!) 146/88   Pulse 74   Temp 98.6 °F (37 °C) (Oral)   Resp 18   Ht 5' 6" (1.676 m)   Wt 99.8 kg (220 lb)   LMP  (LMP Unknown)   SpO2 97%   BMI 35.51 kg/m²   Nursing note and vital signs reviewed.  Constitutional:  Distressed due to pain.  Nontoxic  Eyes: No conjunctival injection.  Extraocular muscles are intact.  ENT: " Oropharynx clear.  Cardiovascular:  Regular rate and rhythm no murmurs gallops or rubs  Chest/ Respiratory:  Breath sounds diminished bilaterally.  Mild expiratory wheezes noted in all lobes. Tachypnia noted on exam.   No rhonchi or rales appreciated.  Abdomen: Soft.  Not distended.  Nontender.  No guarding.  No rebound. Non-peritoneal.  Musculoskeletal: TTP to right trapezius. TTP to right upper back.  No midline cervical spine TTP noted on exam.  No step offs appreciated.  Good range of motion all joints.  No deformities.  Neck supple.  No meningismus.  Skin: No rashes seen.  Good turgor.  No abrasions.  No ecchymoses.  Neuro: alert and oriented x3,  no focal neurological deficits.  Psych: Appropriate, conversant    MDM    Emergent evaluation of a 74 yo female presenting for chest pain, neck pain and right upper back pain for the past few days.  Pt states that pain worsened this morning.  Pt does have history of COPD and is suppose to wear 2L NC oxygen at all times but did not wear it to there ED.  On exam pt is A&Ox3. VSS.  Distressed due to pain.  Nonfebrile and nontoxic appearing.  Mucous membranes pink and moist.  Breath sounds diminished bilaterally with no rhonchi, rales noted.  Mild expiratory wheezes noted in all lobes.  Tachypneic on initial exam.  Abdomen soft and nontender. No rebound or guarding appreciated on exam.   BS WNL.  Tenderness palpation over right and right upper back.  No midline cervical spine tenderness to palpation.  No step-offs appreciated.  Pt speaking in full sentences.  Steady gait appreciated. Cap refill < 3 seconds.      History Acquisition   Additional history was acquired from other historians.  Chart, family    The patient's list of active medical problems, social history, medications, and allergies as documented per RN staff has been reviewed.     Differential Diagnoses   Based on available information and the initial assessment, the working differential diagnoses considered  during this evaluation include but are not limited to ischemic chest pain, COPD, cervical strain, cervical radiculopathy, pneumonia, bronchitis, chest wall pain, others.    I will get labs, imaging, medicate and reassess.  I discussed this case with my supervising physician.      LABS     Labs Reviewed   CBC W/ AUTO DIFFERENTIAL - Abnormal; Notable for the following components:       Result Value    MCH 31.7 (*)     Immature Granulocytes 0.7 (*)     Lymph % 17.3 (*)     All other components within normal limits   COMPREHENSIVE METABOLIC PANEL - Abnormal; Notable for the following components:    Glucose 233 (*)     All other components within normal limits   B-TYPE NATRIURETIC PEPTIDE - Abnormal; Notable for the following components:     (*)     All other components within normal limits   MAGNESIUM   TROPONIN I HIGH SENSITIVITY   MAGNESIUM         Imaging     Imaging Results              MRI Cervical Spine Without Contrast (Final result)  Result time 04/23/24 15:15:47      Final result by Emir Chen MD (04/23/24 15:15:47)                   Impression:      1. Exam is significantly compromised by repetitive motion artifact.  2. Changes of multilevel cervical degenerative disc and facet disease.  Findings appear most pronounced at C5-6, where there is probable severe spinal stenosis and severe bilateral foraminal narrowing.  Sagittal T2 weighted images demonstrate mild signal increase within the substance of the cervical cord at this level most compatible with myelomalacia.  Please see additional details as noted at each individual level.  3. Mild C5 retrolisthesis, felt to be degenerative in nature.  4. Additional details as above.      Electronically signed by: Emir Chen  Date:    04/23/2024  Time:    15:15               Narrative:    EXAMINATION:  MRI CERVICAL SPINE WITHOUT CONTRAST    CLINICAL HISTORY:  Neck pain, spinal stenosis, abnormal CT    COMPARISON:  Same date CT cervical  spine    FINDINGS:  Multiplanar noncontrast imaging was performed.  Exam is significantly limited by repetitive motion artifact, which persists upon multiple repeat attempts.    There is no evidence of cervical fracture or osseous destructive lesion.  C5 retrolisthesis of 3 mm is noted, with normal alignment at all other levels.  While poorly visualized because of motion artifact, there is probable signal increase within the cervical cord at the C5-6 level suggesting myelomalacia.    Axial images are very limited secondary to motion artifact.  The following findings are noted:    C2-3: Right-sided facet hypertrophy with probable mild foraminal stenosis.    C3-4: Better demonstrated on CT, there is bilateral facet hypertrophy with severe left and moderate right foraminal stenosis.    C4-5: Better demonstrated on CT, there is severe right-sided degenerative facet hypertrophy with uncinate spurring and severe right-sided foraminal stenosis.  There is mild foraminal narrowing on the left.    C5-6: Broad-based disc/osteophyte complex combines with bilateral degenerative facet hypertrophy to result in probable severe narrowing of the spinal canal.  Uncinate process and facet hypertrophy cause severe bilateral foraminal stenosis, better demonstrated on CT.    C6-7: Mild broad-based disc bulge without significant spinal stenosis.  Foramina are poorly visualized, but appeared within normal limits on prior CT.    C7-T1: No significant abnormalities are identified.                                       CTA Chest Aorta Non Coronary (Final result)  Result time 04/23/24 12:49:22   Procedure changed from CT Chest With Contrast     Final result by Bashir Hahn MD (04/23/24 12:49:22)                   Impression:      1. Negative for pulmonary embolism or aortic dissection.  2. Mild cardiomegaly, with enlarged central pulmonary arteries, nonspecific but suggestive of pulmonary arterial hypertension.  3. Pulmonary emphysema.  4.  Scattered bronchial wall thickening suggesting nonspecific bronchitis-bronchiolitis.      Electronically signed by: Bashir Hahn  Date:    04/23/2024  Time:    12:49               Narrative:    EXAMINATION:  CTA CHEST AORTA NON CORONARY    CLINICAL HISTORY:  Aortic dissection suspected;Interstitial lung disease; acute chest pain.    TECHNIQUE:  Thin axial imaging through the chest was performed with 100 mL Omnipaque 350 IV contrast, with sagittal and coronal reformatted images and MIP reconstructions performed, and images stored in the patient's permanent electronic medical record.    FINDINGS:  Comparison to multiple prior exams.  There are no pulmonary arterial filling defects to suggest pulmonary thromboembolism.  The central pulmonary arteries are enlarged.    Diffuse calcified and soft plaque involves normal-caliber thoracic aorta, with no aortic dissection or evidence of aortic intramural hematoma.  The heart is mildly enlarged, with no pericardial effusion.  No enlarged mediastinal or hilar lymph nodes.    Upper lobe predominant lucencies in both lungs reflecting emphysema, with no consolidation or evidence of interstitial pulmonary edema.  There is scattered bronchial wall thickening, with no bronchiectasis or central mucous plugging.  Linear and reticular densities in the lower lungs reflect subsegmental atelectasis, with bandlike opacity suggesting atelectasis in the lingula.  No pleural effusion or pneumothorax.    Images of the upper abdomen show pneumobilia, and are otherwise unremarkable.  There are no acute fractures or destructive osseous lesions.                                       CT Cervical Spine Without Contrast (Final result)  Result time 04/23/24 12:38:34      Final result by Bashir Hahn MD (04/23/24 12:38:34)                   Impression:      1. Broad-based disc protrusion with osteophytic ridging at C5-C6, producing severe spinal canal stenosis and potential cervical cord compression.   Consider further evaluation with cervical spine MRI.  2. Multilevel cervical degenerative disc disease and facet osteoarthritis, with no acute fractures or destructive osseous lesions.  3. Straightening of the normal cervical spinal curvature, which could be positional, or reflective of paraspinal muscular spasm.  .      Electronically signed by: Bashir Hahn  Date:    04/23/2024  Time:    12:38               Narrative:    EXAMINATION:  CT CERVICAL SPINE WITHOUT CONTRAST    CLINICAL HISTORY:  Neck pain, chronic, degenerative changes on xray;    TECHNIQUE:  Axial noncontrast imaging was performed, with sagittal and coronal reformatted images reviewed.    COMPARISON:  None.    FINDINGS:  There is straightening of the normal cervical spinal curvature, with normal vertebral body heights and alignment, and no acute fractures or destructive osseous lesions.  There is moderate to advanced multilevel intervertebral disc space narrowing with vertebral osteophytes, and moderate to severe multilevel cervical facet arthropathy.    The craniocervical junction and prevertebral soft tissues are normal.  Axial images show varying degrees of spinal canal stenosis and neural foraminal narrowing.  Of note, there is broad-based disc bulging with osteophytic ridging at C5-C6, producing severe spinal canal stenosis and potential cervical cord compression, with severe bilateral neural foraminal narrowing.    There is no evidence of spinal epidural hematoma, with no acute cervical soft tissue abnormalities.  Hypodensity along the lower margin of the left thyroid lobe is nonspecific.  The lung apices are clear.  Sagittal and coronal reformatted images show no acute fracture or malalignment.                                       CT Head Without Contrast (Final result)  Result time 04/23/24 12:34:12      Final result by Bashir Hahn MD (04/23/24 12:34:12)                   Impression:      1. Scattered areas of nonspecific white matter  hypoattenuation, presumably reflecting gliosis from chronic small vessel ischemic disease.  2. No acute intracranial hemorrhage, mass effect, or loss of gray differentiation.      Electronically signed by: Bashir Hahn  Date:    04/23/2024  Time:    12:34               Narrative:    EXAMINATION:  CT HEAD WITHOUT CONTRAST    CLINICAL HISTORY:  Dizziness, persistent/recurrent, cardiac or vascular cause suspected;    FINDINGS:  No prior studies for comparison. There is no acute intracranial hemorrhage, with no mass effect or abnormal extra-axial fluid. Scattered areas of nonspecific hypoattenuation involve the white matter, with gray-white differentiation maintained.    There is mild generalized prominence of the cortical sulci and ventricles. The cerebellum and brainstem are unremarkable.  There are carotid siphon vascular calcifications.    There is paranasal sinus mucosal thickening, with no sinus air-fluid levels.  The mastoid air cells are clear.  There are no acute fractures or destructive osseous lesions.                                       X-Ray Chest AP Portable (Final result)  Result time 04/23/24 09:37:39      Final result by Bashir Hahn MD (04/23/24 09:37:39)                   Impression:      No evidence of acute cardiopulmonary disease.      Electronically signed by: Bashir Hahn  Date:    04/23/2024  Time:    09:37               Narrative:    EXAMINATION:  XR CHEST AP PORTABLE    CLINICAL HISTORY:  Chest Pain;    FINDINGS:  Portable chest radiograph at 09:22 hours compared to prior exams including 10/11/2023 shows the cardiomediastinal silhouette is within normal limits.  The central pulmonary arteries are prominent, unchanged.    The lungs are normally expanded, with no consolidation, pleural effusion, or evidence of pulmonary edema.  Lucencies reflecting emphysema are unchanged, with no pneumothorax or acute fracture.                                      A radiology report was available for jonas  review at the time of the encounter.    EKG   EKG Readings: (Independently Interpreted)   Initial Reading: No STEMI. Previous EKG: Compared with most recent EKG Rhythm: Normal Sinus Rhythm. Heart Rate: 67. Ectopy: No Ectopy. Conduction: Normal. ST Segments: Normal ST Segments. T Waves: Normal. Clinical Impression: Normal Sinus Rhythm   My independent interpretation    No STEMI  Normal sinus rhythm  Rate of 67       Additional Consideration   All available testing was considered during the course of this workup.  Comorbidities taken into consideration during the patient's evaluation and treatment include weight, age.    Social determinants of health were taken into consideration during development of our treatment plan.    Medications   morphine injection 4 mg (has no administration in time range)   albuterol-ipratropium 2.5 mg-0.5 mg/3 mL nebulizer solution 3 mL (3 mLs Nebulization Given 4/23/24 0916)   budesonide nebulizer solution 0.5 mg (0.5 mg Nebulization Given 4/23/24 0922)   diazePAM injection 5 mg (5 mg Intravenous Given 4/23/24 1048)   iohexoL (OMNIPAQUE 350) injection 100 mL (100 mLs Intravenous Given 4/23/24 1232)   LORazepam injection 2 mg (2 mg Intravenous Given 4/23/24 1318)   midazolam (PF) (VERSED) 1 mg/mL injection 2 mg (2 mg Intravenous Given 4/23/24 1430)      ED Course as of 04/23/24 1553   Tue Apr 23, 2024   0941 CBC unremarkable.  No leukocytosis noted.  H&H stable at 13 and 41.3. [RZ]   1113 CMP shows a mildly elevated glucose at 2:33 a.m..  No signs of DKA.  BNP of 140.  Mag within normal limits.  Troponin negative at 5.5.  Chest x-ray independently reviewed by myself and Radiology.  Negative for any acute abnormalities.  Patient reassessed.  Patient states pain is slightly improved with Valium but has continued.  Will get PE study to rule out any pulmonary embolism or aortic dissection due to ongoing pain.  Awaiting results. [RZ]   1252 CTA chest negative for any acute abnormalities.  CT  head negative.  CT cervical spine concerning for broad-based disc protrusion with osteophytic ridging at C5-C6, producing severe spinal canal stenosis and potential cervical cord compression. Straightening of the normal cervical spinal curvature, which could be positional, or reflective of paraspinal muscular spasm.    Will order MRI to rule out any cord compression.  Awaiting results.   [RZ]   1538 MRI shows C5-6 severe spinal stenosis and severe bilateral foraminal narrowing.  Sagittal T2 weighted images demonstrate mild signal increase within the substance of the cervical cord at this level most compatible with myelomalacia.     Discussed case with neurosurgery, Dr. Pierson.  Recommends admission for pain control and will consult for further recommendations.  Will discussed case with hospital medicine to admit for neck pain, chest pain and shortness of breath.  Awaiting callback. [RZ]   1551 Discussed case with hospital medicine.  Will admit for pain control and neurosurgery evaluation.  Awaiting bed assignment. [RZ]      ED Course User Index  [RZ] Cassie Garibay NP             CLINICAL IMPRESSION  1. Myelomalacia of cervical cord    2. Chest pain    3. Neck pain, acute    4. Cervical radiculopathy    5. Intractable pain         ED Disposition Condition    Admit Stable          This note was created using dictation software.  This program may occasionally mistype words and phrases.          Cassie Garibay NP  04/23/24 1245

## 2024-04-23 NOTE — ED NOTES
1428- patient unable to keep still in MRI. Test repeated multiple times without success. Provider ordered dose of versed to give in MRI.

## 2024-04-23 NOTE — ASSESSMENT & PLAN NOTE
I will give the patient methylprednisolone 125 mg IV once, start prednisone 50 mg tomorrow.  Also DuoNeb q.4 hours.  Azithromycin prophylaxis.

## 2024-04-23 NOTE — SUBJECTIVE & OBJECTIVE
"Past Medical History:   Diagnosis Date    A-fib     Anticoagulant long-term use     Arthritis     COPD (chronic obstructive pulmonary disease)     COPD (chronic obstructive pulmonary disease) 2019    Diabetes mellitus, type 2     Diverticulosis     History of left knee replacement 2017    DR CRENSHAW    HNP (herniated nucleus pulposus)     LUMBAR    Hypertension     Lung disease     Pancreatic insufficiency     s/p whipple, non cancer    Presence of dental bridge     UPPER    Wears glasses        Past Surgical History:   Procedure Laterality Date    APPENDECTOMY      BACK SURGERY  1994    lower back    BACK SURGERY  2012    lower back    CHOLECYSTECTOMY      HERNIA REPAIR      JOINT REPLACEMENT Left     KNEE    KNEE ARTHROPLASTY Right 5/3/2021    Procedure: ARTHROPLASTY, KNEE;  Surgeon: Manuel Willingham MD;  Location: [facility] OR;  Service: Orthopedics;  Laterality: Right;  guzman    LAPAROSCOPIC REPAIR OF INCISIONAL HERNIA N/A 10/22/2019    Procedure: REPAIR, HERNIA, INCISIONAL, LAPAROSCOPIC;  Surgeon: Pantera Almanza III, MD;  Location: [facility] OR;  Service: General;  Laterality: N/A;    pancrease  2009    stents in pancrease     TONSILLECTOMY      TOTAL KNEE ARTHROPLASTY Left 08/10/2017    WHIPPLE PROCEDURE W/ LAPAROSCOPY  10/09       Review of patient's allergies indicates:   Allergen Reactions    Heparin Itching     Pt states she has no allergy      NOT AN ALLERGY . NOT AWARE OF TAKING IT    Hydrocodone Shortness Of Breath     MED "SHORTENS HER BREATHING"    Penicillins Anaphylaxis, Hives and Itching     Other reaction(s): Unknown  Childhood reaction, swelled      Sulfa (sulfonamide antibiotics) Hives    Doxycycline Nausea And Vomiting     Other reaction(s): Abdominal Pain    Clindamycin      "Felt like I was on fire down through throat and felt like I was having spasms in my throat"    Penicillin v      Childhood reaction, swelled       Current Facility-Administered Medications   Medication Dose Route Frequency Provider " Last Rate Last Admin    acetaminophen tablet 650 mg  650 mg Oral Q8H PRN Brisa Alicea MD        acetaminophen tablet 650 mg  650 mg Oral Q4H PRN Brisa Alicea MD        albuterol-ipratropium 2.5 mg-0.5 mg/3 mL nebulizer solution 3 mL  3 mL Nebulization Q4H Brisa Alicea MD   3 mL at 04/23/24 1616    aluminum-magnesium hydroxide-simethicone 200-200-20 mg/5 mL suspension 30 mL  30 mL Oral QID PRN Brisa Alicea MD        [START ON 4/24/2024] amLODIPine tablet 5 mg  5 mg Oral Daily Brisa Alicea MD        apixaban tablet 5 mg  5 mg Oral BID Brisa Alicea MD        dextrose 50% injection 12.5 g  12.5 g Intravenous PRN Brisa Alicea MD        dextrose 50% injection 12.5 g  12.5 g Intravenous PRN Brisa Alicea MD        dextrose 50% injection 25 g  25 g Intravenous PRN Brisa Alicea MD        dextrose 50% injection 25 g  25 g Intravenous PRN Brisa Alicea MD        famotidine tablet 20 mg  20 mg Oral BID Brisa Alicea MD        flecainide tablet 100 mg  100 mg Oral BID Brisa Alicea MD        glucagon (human recombinant) injection 1 mg  1 mg Intramuscular PRN Brisa Alicea MD        glucagon (human recombinant) injection 1 mg  1 mg Intramuscular PRN Brisa Alicea MD        glucose chewable tablet 16 g  16 g Oral PRN Brisa Alicea MD        glucose chewable tablet 16 g  16 g Oral PRN Brisa Alicea MD        glucose chewable tablet 24 g  24 g Oral PRN Brisa Alicea MD        glucose chewable tablet 24 g  24 g Oral PRN Brisa Alicea MD        HYDROcodone-acetaminophen 5-325 mg per tablet 1 tablet  1 tablet Oral Q6H PRN Brisa Alicea MD        insulin aspart U-100 pen 0-10 Units  0-10 Units Subcutaneous QID (AC + HS) PRN Brisa Alicea MD        insulin aspart U-100 pen 6 Units  6 Units Subcutaneous TIDWM Brisa Alicea MD        insulin detemir U-100 (Levemir) pen 40 Units  40 Units Subcutaneous QHS Brisa Alicea MD        lipase-protease-amylase 12,000-38,000-60,000 units per  capsule 2 capsule  2 capsule Oral QID Brisa Alicea MD        magnesium oxide tablet 800 mg  800 mg Oral PRN Brisa Alicea MD        magnesium oxide tablet 800 mg  800 mg Oral PRN Brisa Alicea MD        melatonin tablet 6 mg  6 mg Oral Nightly PRN Brisa Alicea MD        metoprolol tartrate (LOPRESSOR) tablet 50 mg  50 mg Oral BID Brisa Alicea MD        naloxone 0.4 mg/mL injection 0.02 mg  0.02 mg Intravenous PRN Brisa Alicea MD        ondansetron injection 4 mg  4 mg Intravenous Q6H PRN Brisa Alicea MD        potassium bicarbonate disintegrating tablet 35 mEq  35 mEq Oral PRN Brisa Alicea MD        potassium bicarbonate disintegrating tablet 50 mEq  50 mEq Oral PRN Brisa Alicea MD        potassium bicarbonate disintegrating tablet 60 mEq  60 mEq Oral PRN Brisa Alicea MD        potassium, sodium phosphates 280-160-250 mg packet 2 packet  2 packet Oral PRN Brisa Alicea MD        potassium, sodium phosphates 280-160-250 mg packet 2 packet  2 packet Oral PRN Brisa Alicea MD        potassium, sodium phosphates 280-160-250 mg packet 2 packet  2 packet Oral PRN Brisa Alicea MD        sodium chloride 0.9% flush 10 mL  10 mL Intravenous Q12H PRN Brisa Alicea MD         Current Outpatient Medications   Medication Sig Dispense Refill    albuterol (PROVENTIL/VENTOLIN HFA) 90 mcg/actuation inhaler INHALE 2 PUFFS INTO THE LUNGS EVERY 6 (SIX) HOURS AS NEEDED FOR WHEEZING. RESCUE 54 g 3    amLODIPine (NORVASC) 5 MG tablet Take 5 mg by mouth once daily.      apixaban (ELIQUIS) 5 mg Tab Take 5 mg by mouth 2 (two) times daily.      budesonide-glycopyr-formoterol (BREZTRI AEROSPHERE) 160-9-4.8 mcg/actuation HFAA Inhale 2 puffs into the lungs 2 (two) times a day. 3 g 6    cetirizine (ZYRTEC) 10 MG tablet Take 10 mg by mouth once daily.      CREON CpDR Take 2 capsules by mouth 4 (four) times daily.  5    famotidine (PEPCID) 20 MG tablet Take 20 mg by mouth 2 (two) times daily.       flecainide  (TAMBOCOR) 100 MG Tab Take 1 tablet by mouth 2 (two) times daily.      furosemide (LASIX) 40 MG tablet Take 40 mg by mouth as needed (edema).       insulin aspart protamine-insulin aspart (NOVOLOG MIX 70-30FLEXPEN U-100) 100 unit/mL (70-30) InPn pen Inject 20 Units into the skin 3 (three) times daily before meals. (Patient taking differently: Inject 22 Units into the skin 3 (three) times daily before meals.)  0    lisinopriL (PRINIVIL,ZESTRIL) 5 MG tablet Take 5 mg by mouth once daily.      omeprazole (PRILOSEC) 20 MG capsule Take 1 capsule (20 mg total) by mouth once daily. 90 capsule 3    oxyCODONE-acetaminophen (PERCOCET)  mg per tablet Take 1 tablet by mouth every 4 (four) hours as needed.      rOPINIRole (REQUIP) 1 MG tablet Take 1 mg by mouth every evening.      sodium chloride (OCEAN NASAL NASL) 1 spray by Nasal route daily as needed (rhinitis).       tiZANidine (ZANAFLEX) 4 MG tablet Take 4 mg by mouth every 6 (six) hours as needed.      albuterol (PROVENTIL/VENTOLIN) 90 mcg/actuation inhaler Inhale 2 puffs into the lungs every 6 (six) hours as needed for Wheezing. 1 each 0    albuterol-ipratropium (DUO-NEB) 2.5 mg-0.5 mg/3 mL nebulizer solution       diltiaZEM (CARDIZEM CD) 120 MG Cp24 Take 120 mg by mouth once daily.      hydrocortisone 2.5 % cream Apply topically 2 (two) times daily. Use on AA BID x 10-14 days for 14 days 20 g 0    metoprolol succinate (TOPROL-XL) 100 MG 24 hr tablet Take 100 mg by mouth 2 (two) times daily.      metoprolol tartrate (LOPRESSOR) 50 MG tablet Take 1 tablet (50 mg total) by mouth 2 (two) times daily.      oxyCODONE (ROXICODONE) 5 MG immediate release tablet 1-2 tab for pain q4-6 hours (Patient not taking: Reported on 4/3/2024) 10 tablet 0    potassium chloride SA (K-DUR,KLOR-CON) 20 MEQ tablet Take 20 mEq by mouth once daily.      predniSONE (DELTASONE) 10 MG tablet Take 4 tabs x 2 days, then take 3 tabs x 2 days, then take 2 tabs x 2 days, then take 1 tab x 2 days. 20  tablet 0    tirzepatide (MOUNJARO) 5 mg/0.5 mL PnIj Inject into the skin every 7 days.      TRUE METRIX GLUCOSE TEST STRIP Strp        Family History       Problem Relation (Age of Onset)    Cancer Father    Diabetes Mother          Tobacco Use    Smoking status: Former     Current packs/day: 0.00     Average packs/day: 1 pack/day for 30.0 years (30.0 ttl pk-yrs)     Types: Cigarettes     Start date: 1987     Quit date: 2017     Years since quittin.0    Smokeless tobacco: Never   Substance and Sexual Activity    Alcohol use: No    Drug use: No    Sexual activity: Not on file     Review of Systems   Constitutional:  Negative for chills and fever.   HENT:  Negative for sore throat.    Eyes:  Negative for visual disturbance.   Respiratory:  Positive for chest tightness and shortness of breath. Negative for cough.    Cardiovascular:  Negative for chest pain and palpitations.   Gastrointestinal:  Negative for abdominal pain, nausea and vomiting.   Endocrine: Negative for polydipsia and polyphagia.   Genitourinary:  Negative for dysuria, frequency and urgency.   Musculoskeletal:  Positive for neck pain.        See above.   Skin:  Negative for rash.   Neurological:         Neck radiculopathy.   Psychiatric/Behavioral:  Negative for confusion.      Objective:     Vital Signs (Most Recent):  Temp: 98.6 °F (37 °C) (24 0845)  Pulse: 75 (24 1630)  Resp: 20 (24 1630)  BP: (!) 143/66 (24 1630)  SpO2: 96 % (24 1630) Vital Signs (24h Range):  Temp:  [98.6 °F (37 °C)] 98.6 °F (37 °C)  Pulse:  [66-87] 75  Resp:  [18-22] 20  SpO2:  [95 %-100 %] 96 %  BP: (136-214)/() 143/66     Weight: 99.8 kg (220 lb)  Body mass index is 35.51 kg/m².     Physical Exam  Vitals reviewed.   Constitutional:       Appearance: Normal appearance.   HENT:      Head: Normocephalic and atraumatic.      Mouth/Throat:      Mouth: Mucous membranes are moist.   Eyes:      Extraocular Movements: Extraocular movements  intact.      Conjunctiva/sclera: Conjunctivae normal.      Pupils: Pupils are equal, round, and reactive to light.   Cardiovascular:      Rate and Rhythm: Normal rate and regular rhythm.      Pulses: Normal pulses.      Heart sounds: Normal heart sounds.   Pulmonary:      Effort: Pulmonary effort is normal.   Abdominal:      General: Abdomen is flat. Bowel sounds are normal.      Palpations: Abdomen is soft.   Musculoskeletal:      Comments: Posterior neck tenderness to palpation, with decreased range of motion secondary to pain.   Skin:     General: Skin is warm.   Neurological:      General: No focal deficit present.      Mental Status: She is alert and oriented to person, place, and time.   Psychiatric:         Mood and Affect: Mood normal.         Behavior: Behavior normal.              CRANIAL NERVES     CN III, IV, VI   Pupils are equal, round, and reactive to light.       Significant Labs: All pertinent labs within the past 24 hours have been reviewed.  Recent Lab Results         04/23/24  1615   04/23/24  0913        Albumin   3.8       ALP   132       ALT   18       Anion Gap   8       AST   22       Baso #   0.03       Basophil %   0.5       BILIRUBIN TOTAL   0.4  Comment: For infants and newborns, interpretation of results should be based  on gestational age, weight and in agreement with clinical  observations.    Premature Infant recommended reference ranges:  Up to 24 hours.............<8.0 mg/dL  Up to 48 hours............<12.0 mg/dL  3-5 days..................<15.0 mg/dL  6-29 days.................<15.0 mg/dL         BNP   140  Comment: Values of less than 100 pg/ml are consistent with non-CHF populations.       BUN   15       Calcium   9.0       Chloride   103       CO2   27       Creatinine   0.9       Differential Method   Automated       eGFR   >60.0       Eos #   0.2       Eos %   2.6       Glucose   233       Gran # (ANC)   4.4       Gran %   71.7       Hematocrit   41.3       Hemoglobin    13.3       Immature Grans (Abs)   0.04  Comment: Mild elevation in immature granulocytes is non specific and   can be seen in a variety of conditions including stress response,   acute inflammation, trauma and pregnancy. Correlation with other   laboratory and clinical findings is essential.         Immature Granulocytes   0.7       Lymph #   1.1       Lymph %   17.3       Magnesium    1.7          1.7       MCH   31.7       MCHC   32.2       MCV   98       Mono #   0.4       Mono %   7.2       MPV   10.5       nRBC   0       Platelet Count   212       POC Glucose 216         Potassium   4.0       PROTEIN TOTAL   6.9       RBC   4.20       RDW   12.9       Sodium   138       Troponin I High Sensitivity   5.5  Comment: Troponin results differ between methods. Do not use   results between Troponin methods interchangeably.    Alkaline Phospatase levels above 400 U/L may   cause false positive results.    Access hsTnI should not be used for patients taking   Asfotase attila (Strensiq).         WBC   6.11               Significant Imaging: I have reviewed all pertinent imaging results/findings within the past 24 hours.

## 2024-04-23 NOTE — ED NOTES
Care assumed of patient at this time. Plan of care ongoing. Patient returned to room from restroom. Patient reconnected to BP cuff, pulse ox, and and cardiac monitor. Patient on 2 L NC for SOB. Patient has no needs at this time. Bed locked and in lowest position. Side rails raised x 1. Call light left in reach of patient and patient instructed to call staff for assistance.

## 2024-04-24 LAB
ALBUMIN SERPL BCP-MCNC: 4.1 G/DL (ref 3.5–5.2)
ALP SERPL-CCNC: 139 U/L (ref 55–135)
ALT SERPL W/O P-5'-P-CCNC: 16 U/L (ref 10–44)
ANION GAP SERPL CALC-SCNC: 6 MMOL/L (ref 8–16)
AST SERPL-CCNC: 15 U/L (ref 10–40)
BASOPHILS # BLD AUTO: 0.02 K/UL (ref 0–0.2)
BASOPHILS NFR BLD: 0.3 % (ref 0–1.9)
BILIRUB SERPL-MCNC: 0.5 MG/DL (ref 0.1–1)
BUN SERPL-MCNC: 18 MG/DL (ref 8–23)
CALCIUM SERPL-MCNC: 9.5 MG/DL (ref 8.7–10.5)
CHLORIDE SERPL-SCNC: 98 MMOL/L (ref 95–110)
CO2 SERPL-SCNC: 30 MMOL/L (ref 23–29)
CREAT SERPL-MCNC: 1 MG/DL (ref 0.5–1.4)
DIFFERENTIAL METHOD BLD: ABNORMAL
EOSINOPHIL # BLD AUTO: 0 K/UL (ref 0–0.5)
EOSINOPHIL NFR BLD: 0 % (ref 0–8)
ERYTHROCYTE [DISTWIDTH] IN BLOOD BY AUTOMATED COUNT: 12.8 % (ref 11.5–14.5)
EST. GFR  (NO RACE VARIABLE): 58.8 ML/MIN/1.73 M^2
ESTIMATED AVG GLUCOSE: 197 MG/DL (ref 68–131)
GLUCOSE SERPL-MCNC: 331 MG/DL (ref 70–110)
GLUCOSE SERPL-MCNC: 345 MG/DL (ref 70–110)
GLUCOSE SERPL-MCNC: 352 MG/DL (ref 70–110)
GLUCOSE SERPL-MCNC: 381 MG/DL (ref 70–110)
GLUCOSE SERPL-MCNC: 383 MG/DL (ref 70–110)
GLUCOSE SERPL-MCNC: 408 MG/DL (ref 70–110)
HBA1C MFR BLD: 8.5 % (ref 4.5–6.2)
HCT VFR BLD AUTO: 44.9 % (ref 37–48.5)
HGB BLD-MCNC: 14.2 G/DL (ref 12–16)
IMM GRANULOCYTES # BLD AUTO: 0.05 K/UL (ref 0–0.04)
IMM GRANULOCYTES NFR BLD AUTO: 0.8 % (ref 0–0.5)
LYMPHOCYTES # BLD AUTO: 0.6 K/UL (ref 1–4.8)
LYMPHOCYTES NFR BLD: 9.6 % (ref 18–48)
MAGNESIUM SERPL-MCNC: 1.8 MG/DL (ref 1.6–2.6)
MCH RBC QN AUTO: 31.3 PG (ref 27–31)
MCHC RBC AUTO-ENTMCNC: 31.6 G/DL (ref 32–36)
MCV RBC AUTO: 99 FL (ref 82–98)
MONOCYTES # BLD AUTO: 0.1 K/UL (ref 0.3–1)
MONOCYTES NFR BLD: 1.1 % (ref 4–15)
NEUTROPHILS # BLD AUTO: 5.4 K/UL (ref 1.8–7.7)
NEUTROPHILS NFR BLD: 88.2 % (ref 38–73)
NRBC BLD-RTO: 0 /100 WBC
PLATELET # BLD AUTO: 241 K/UL (ref 150–450)
PMV BLD AUTO: 11 FL (ref 9.2–12.9)
POTASSIUM SERPL-SCNC: 5.2 MMOL/L (ref 3.5–5.1)
PROT SERPL-MCNC: 7.6 G/DL (ref 6–8.4)
RBC # BLD AUTO: 4.53 M/UL (ref 4–5.4)
SODIUM SERPL-SCNC: 134 MMOL/L (ref 136–145)
WBC # BLD AUTO: 6.12 K/UL (ref 3.9–12.7)

## 2024-04-24 PROCEDURE — 27000221 HC OXYGEN, UP TO 24 HOURS

## 2024-04-24 PROCEDURE — 83735 ASSAY OF MAGNESIUM: CPT | Performed by: HOSPITALIST

## 2024-04-24 PROCEDURE — 94761 N-INVAS EAR/PLS OXIMETRY MLT: CPT

## 2024-04-24 PROCEDURE — 25000003 PHARM REV CODE 250: Performed by: HOSPITALIST

## 2024-04-24 PROCEDURE — 36415 COLL VENOUS BLD VENIPUNCTURE: CPT | Performed by: HOSPITALIST

## 2024-04-24 PROCEDURE — 63600175 PHARM REV CODE 636 W HCPCS: Performed by: HOSPITALIST

## 2024-04-24 PROCEDURE — 97116 GAIT TRAINING THERAPY: CPT

## 2024-04-24 PROCEDURE — 99900031 HC PATIENT EDUCATION (STAT)

## 2024-04-24 PROCEDURE — 63700000 PHARM REV CODE 250 ALT 637 W/O HCPCS: Performed by: HOSPITALIST

## 2024-04-24 PROCEDURE — 85025 COMPLETE CBC W/AUTO DIFF WBC: CPT | Performed by: HOSPITALIST

## 2024-04-24 PROCEDURE — 99232 SBSQ HOSP IP/OBS MODERATE 35: CPT | Mod: ,,, | Performed by: NEUROLOGICAL SURGERY

## 2024-04-24 PROCEDURE — 94640 AIRWAY INHALATION TREATMENT: CPT | Mod: XB

## 2024-04-24 PROCEDURE — 97165 OT EVAL LOW COMPLEX 30 MIN: CPT

## 2024-04-24 PROCEDURE — 97535 SELF CARE MNGMENT TRAINING: CPT

## 2024-04-24 PROCEDURE — G0378 HOSPITAL OBSERVATION PER HR: HCPCS

## 2024-04-24 PROCEDURE — 25000242 PHARM REV CODE 250 ALT 637 W/ HCPCS: Performed by: HOSPITALIST

## 2024-04-24 PROCEDURE — 97162 PT EVAL MOD COMPLEX 30 MIN: CPT

## 2024-04-24 PROCEDURE — 80053 COMPREHEN METABOLIC PANEL: CPT | Performed by: HOSPITALIST

## 2024-04-24 RX ORDER — METHOCARBAMOL 500 MG/1
500 TABLET, FILM COATED ORAL 3 TIMES DAILY
Status: DISCONTINUED | OUTPATIENT
Start: 2024-04-24 | End: 2024-04-24

## 2024-04-24 RX ORDER — METHOCARBAMOL 500 MG/1
500 TABLET, FILM COATED ORAL 3 TIMES DAILY
Status: DISCONTINUED | OUTPATIENT
Start: 2024-04-24 | End: 2024-04-26 | Stop reason: HOSPADM

## 2024-04-24 RX ADMIN — PANCRELIPASE 2 CAPSULE: 60000; 12000; 38000 CAPSULE, DELAYED RELEASE PELLETS ORAL at 05:04

## 2024-04-24 RX ADMIN — METHOCARBAMOL TABLETS 500 MG: 500 TABLET, COATED ORAL at 10:04

## 2024-04-24 RX ADMIN — INSULIN ASPART 10 UNITS: 100 INJECTION, SOLUTION INTRAVENOUS; SUBCUTANEOUS at 12:04

## 2024-04-24 RX ADMIN — IPRATROPIUM BROMIDE AND ALBUTEROL SULFATE 3 ML: 2.5; .5 SOLUTION RESPIRATORY (INHALATION) at 12:04

## 2024-04-24 RX ADMIN — INSULIN ASPART 6 UNITS: 100 INJECTION, SOLUTION INTRAVENOUS; SUBCUTANEOUS at 12:04

## 2024-04-24 RX ADMIN — OXYCODONE HYDROCHLORIDE 5 MG: 5 TABLET ORAL at 10:04

## 2024-04-24 RX ADMIN — IPRATROPIUM BROMIDE AND ALBUTEROL SULFATE 3 ML: 2.5; .5 SOLUTION RESPIRATORY (INHALATION) at 07:04

## 2024-04-24 RX ADMIN — Medication 6 MG: at 10:04

## 2024-04-24 RX ADMIN — METHOCARBAMOL TABLETS 500 MG: 500 TABLET, COATED ORAL at 02:04

## 2024-04-24 RX ADMIN — PANCRELIPASE 2 CAPSULE: 60000; 12000; 38000 CAPSULE, DELAYED RELEASE PELLETS ORAL at 09:04

## 2024-04-24 RX ADMIN — AZITHROMYCIN DIHYDRATE 500 MG: 250 TABLET ORAL at 09:04

## 2024-04-24 RX ADMIN — METHOCARBAMOL TABLETS 500 MG: 500 TABLET, COATED ORAL at 09:04

## 2024-04-24 RX ADMIN — LIDOCAINE 5% 1 PATCH: 700 PATCH TOPICAL at 10:04

## 2024-04-24 RX ADMIN — FLECAINIDE ACETATE 100 MG: 100 TABLET ORAL at 10:04

## 2024-04-24 RX ADMIN — PANCRELIPASE 2 CAPSULE: 60000; 12000; 38000 CAPSULE, DELAYED RELEASE PELLETS ORAL at 12:04

## 2024-04-24 RX ADMIN — INSULIN DETEMIR 40 UNITS: 100 INJECTION, SOLUTION SUBCUTANEOUS at 10:04

## 2024-04-24 RX ADMIN — IPRATROPIUM BROMIDE AND ALBUTEROL SULFATE 3 ML: 2.5; .5 SOLUTION RESPIRATORY (INHALATION) at 01:04

## 2024-04-24 RX ADMIN — INSULIN ASPART 8 UNITS: 100 INJECTION, SOLUTION INTRAVENOUS; SUBCUTANEOUS at 09:04

## 2024-04-24 RX ADMIN — INSULIN ASPART 5 UNITS: 100 INJECTION, SOLUTION INTRAVENOUS; SUBCUTANEOUS at 10:04

## 2024-04-24 RX ADMIN — METOPROLOL TARTRATE 50 MG: 50 TABLET, FILM COATED ORAL at 09:04

## 2024-04-24 RX ADMIN — GABAPENTIN 300 MG: 300 CAPSULE ORAL at 09:04

## 2024-04-24 RX ADMIN — INSULIN ASPART 6 UNITS: 100 INJECTION, SOLUTION INTRAVENOUS; SUBCUTANEOUS at 09:04

## 2024-04-24 RX ADMIN — ACETAMINOPHEN 1000 MG: 500 TABLET ORAL at 10:04

## 2024-04-24 RX ADMIN — ACETAMINOPHEN 1000 MG: 500 TABLET ORAL at 09:04

## 2024-04-24 RX ADMIN — INSULIN ASPART 10 UNITS: 100 INJECTION, SOLUTION INTRAVENOUS; SUBCUTANEOUS at 05:04

## 2024-04-24 RX ADMIN — FLECAINIDE ACETATE 100 MG: 100 TABLET ORAL at 09:04

## 2024-04-24 RX ADMIN — FAMOTIDINE 20 MG: 20 TABLET ORAL at 10:04

## 2024-04-24 RX ADMIN — GABAPENTIN 300 MG: 300 CAPSULE ORAL at 10:04

## 2024-04-24 RX ADMIN — FAMOTIDINE 20 MG: 20 TABLET ORAL at 09:04

## 2024-04-24 RX ADMIN — PANCRELIPASE 2 CAPSULE: 60000; 12000; 38000 CAPSULE, DELAYED RELEASE PELLETS ORAL at 10:04

## 2024-04-24 RX ADMIN — INSULIN ASPART 6 UNITS: 100 INJECTION, SOLUTION INTRAVENOUS; SUBCUTANEOUS at 05:04

## 2024-04-24 RX ADMIN — METOPROLOL TARTRATE 50 MG: 50 TABLET, FILM COATED ORAL at 10:04

## 2024-04-24 RX ADMIN — AMLODIPINE BESYLATE 5 MG: 5 TABLET ORAL at 09:04

## 2024-04-24 RX ADMIN — PREDNISONE 50 MG: 20 TABLET ORAL at 09:04

## 2024-04-24 NOTE — PLAN OF CARE
Goals to be met by: 24     Patient will increase functional independence with mobility by performin. Supine to sit with Supervision  2. Sit to stand transfer with Supervision  3. Bed to chair transfer with Supervision using Rolling Walker  4. Gait  x 200 feet with Supervision using Rolling Walker.

## 2024-04-24 NOTE — H&P (VIEW-ONLY)
[facility]  Neurosurgery  Consult Note    Inpatient consult to Neurosurgery  Consult performed by: Lucia Dewey PA-C  Consult ordered by: Cassie Garibay NP  Reason for consult: Cervical myelomalacia        Subjective:     Chief Complaint/Reason for Admission:  Myelomalacia of cervical cord    History of Present Illness: Ms. Maddie Otero is a 73-year-old female  with past medical history of atrial fibrillation on Eliquis, COPD on oxygen at home, type 2 diabetes, hypertension, hyperlipidemia, diverticulosis presented to the emergency department on 04/23/2024 complaining of right-sided chest pain, neck pain and back pain.  During workup, MRI cervical spine was most significant for severe spinal stenosis with severe bilateral foraminal narrowing at C5-6 and cervical myelomalacia.  Neurosurgery consulted for cervical myelomalacia.    On initiation of encounter patient was found sitting at the edge of bed eating breakfast.  She reports positional posterior cervical pain that radiates down right shoulder, right anterior upper arm and right thumb that began approximate 3 days ago without an inciting event.  Also reports right hand paresthesias, subjective right arm weakness and intermittent imbalance.  Denies left arm symptoms, falls, bladder/bowel dysfunction, hand incoordination/clumsiness, chronic cervical pain.    Medications Prior to Admission   Medication Sig Dispense Refill Last Dose    albuterol (PROVENTIL/VENTOLIN HFA) 90 mcg/actuation inhaler INHALE 2 PUFFS INTO THE LUNGS EVERY 6 (SIX) HOURS AS NEEDED FOR WHEEZING. RESCUE 54 g 3 4/22/2024    amLODIPine (NORVASC) 5 MG tablet Take 5 mg by mouth once daily.   4/23/2024    apixaban (ELIQUIS) 5 mg Tab Take 5 mg by mouth 2 (two) times daily.   4/23/2024 at 08:00    budesonide-glycopyr-formoterol (BREZTRI AEROSPHERE) 160-9-4.8 mcg/actuation HFAA Inhale 2 puffs into the lungs 2 (two) times a day. 3 g 6 4/22/2024    cetirizine (ZYRTEC) 10 MG tablet  Take 10 mg by mouth once daily.   4/22/2024    CREON CpDR Take 2 capsules by mouth 4 (four) times daily.  5 4/23/2024    famotidine (PEPCID) 20 MG tablet Take 20 mg by mouth 2 (two) times daily.    4/23/2024    flecainide (TAMBOCOR) 100 MG Tab Take 1 tablet by mouth 2 (two) times daily.   4/23/2024    furosemide (LASIX) 40 MG tablet Take 40 mg by mouth as needed (edema).    Past Week    insulin aspart protamine-insulin aspart (NOVOLOG MIX 70-30FLEXPEN U-100) 100 unit/mL (70-30) InPn pen Inject 20 Units into the skin 3 (three) times daily before meals. (Patient taking differently: Inject 22 Units into the skin 3 (three) times daily before meals.)  0 4/23/2024    lisinopriL (PRINIVIL,ZESTRIL) 5 MG tablet Take 5 mg by mouth once daily.   4/23/2024    omeprazole (PRILOSEC) 20 MG capsule Take 1 capsule (20 mg total) by mouth once daily. 90 capsule 3 4/22/2024    oxyCODONE-acetaminophen (PERCOCET)  mg per tablet Take 1 tablet by mouth every 4 (four) hours as needed.   Past Month    rOPINIRole (REQUIP) 1 MG tablet Take 1 mg by mouth every evening.       sodium chloride (OCEAN NASAL NASL) 1 spray by Nasal route daily as needed (rhinitis).    Past Week    tiZANidine (ZANAFLEX) 4 MG tablet Take 4 mg by mouth every 6 (six) hours as needed.   4/22/2024    albuterol (PROVENTIL/VENTOLIN) 90 mcg/actuation inhaler Inhale 2 puffs into the lungs every 6 (six) hours as needed for Wheezing. 1 each 0     albuterol-ipratropium (DUO-NEB) 2.5 mg-0.5 mg/3 mL nebulizer solution        diltiaZEM (CARDIZEM CD) 120 MG Cp24 Take 120 mg by mouth once daily.       hydrocortisone 2.5 % cream Apply topically 2 (two) times daily. Use on AA BID x 10-14 days for 14 days 20 g 0     metoprolol succinate (TOPROL-XL) 100 MG 24 hr tablet Take 100 mg by mouth 2 (two) times daily.       metoprolol tartrate (LOPRESSOR) 50 MG tablet Take 1 tablet (50 mg total) by mouth 2 (two) times daily.       oxyCODONE (ROXICODONE) 5 MG immediate release tablet 1-2 tab  "for pain q4-6 hours (Patient not taking: Reported on 4/3/2024) 10 tablet 0     potassium chloride SA (K-DUR,KLOR-CON) 20 MEQ tablet Take 20 mEq by mouth once daily.   More than a month    predniSONE (DELTASONE) 10 MG tablet Take 4 tabs x 2 days, then take 3 tabs x 2 days, then take 2 tabs x 2 days, then take 1 tab x 2 days. 20 tablet 0     tirzepatide (MOUNJARO) 5 mg/0.5 mL PnIj Inject into the skin every 7 days.       TRUE METRIX GLUCOSE TEST STRIP Strp           Review of patient's allergies indicates:   Allergen Reactions    Heparin Itching     Pt states she has no allergy      NOT AN ALLERGY . NOT AWARE OF TAKING IT    Hydrocodone Shortness Of Breath     MED "SHORTENS HER BREATHING"    Penicillins Anaphylaxis, Hives and Itching     Other reaction(s): Unknown  Childhood reaction, swelled      Sulfa (sulfonamide antibiotics) Hives    Doxycycline Nausea And Vomiting     Other reaction(s): Abdominal Pain    Clindamycin      "Felt like I was on fire down through throat and felt like I was having spasms in my throat"    Penicillin v      Childhood reaction, swelled       Past Medical History:   Diagnosis Date    A-fib     Anticoagulant long-term use     Arthritis     COPD (chronic obstructive pulmonary disease)     COPD (chronic obstructive pulmonary disease) 2019    Diabetes mellitus, type 2     Diverticulosis     History of left knee replacement 2017    DR CRENSHAW    HNP (herniated nucleus pulposus)     LUMBAR    Hypertension     Lung disease     Pancreatic insufficiency     s/p whipple, non cancer    Presence of dental bridge     UPPER    Wears glasses      Past Surgical History:   Procedure Laterality Date    APPENDECTOMY      BACK SURGERY  1994    lower back    BACK SURGERY  2012    lower back    CHOLECYSTECTOMY      HERNIA REPAIR      JOINT REPLACEMENT Left     KNEE    KNEE ARTHROPLASTY Right 5/3/2021    Procedure: ARTHROPLASTY, KNEE;  Surgeon: Manuel Willingham MD;  Location: [facility];  Service: Orthopedics;  " Laterality: Right;  guzman    LAPAROSCOPIC REPAIR OF INCISIONAL HERNIA N/A 10/22/2019    Procedure: REPAIR, HERNIA, INCISIONAL, LAPAROSCOPIC;  Surgeon: Pantera Almanza III, MD;  Location: [facility] OR;  Service: General;  Laterality: N/A;    pancrease  2009    stents in pancrease     TONSILLECTOMY      TOTAL KNEE ARTHROPLASTY Left 08/10/2017    WHIPPLE PROCEDURE W/ LAPAROSCOPY  10/09     Family History       Problem Relation (Age of Onset)    Cancer Father    Diabetes Mother          Tobacco Use    Smoking status: Former     Current packs/day: 0.00     Average packs/day: 1 pack/day for 30.0 years (30.0 ttl pk-yrs)     Types: Cigarettes     Start date: 1987     Quit date: 2017     Years since quittin.0    Smokeless tobacco: Never   Substance and Sexual Activity    Alcohol use: No    Drug use: No    Sexual activity: Not on file       Objective:     Weight: 99.8 kg (220 lb)  Body mass index is 35.51 kg/m².  Vital Signs (Most Recent):  Temp: 98 °F (36.7 °C) (24 1133)  Pulse: 71 (24 1322)  Resp: 18 (24 1322)  BP: (!) 140/75 (24 1133)  SpO2: 99 % (24 1322) Vital Signs (24h Range):  Temp:  [97.6 °F (36.4 °C)-98.2 °F (36.8 °C)] 98 °F (36.7 °C)  Pulse:  [70-87] 71  Resp:  [14-22] 18  SpO2:  [94 %-99 %] 99 %  BP: (140-165)/(66-84) 140/75     Date 24 0700 - 24 0659   Shift 1427-7929 5185-4560 0886-1795 24 Hour Total   INTAKE   P.O. 480   480   Shift Total(mL/kg) 480(4.8)   480(4.8)   OUTPUT   Urine(mL/kg/hr) 500(0.6)   500   Shift Total(mL/kg) 500(5)   500(5)   Weight (kg) 99.8 99.8 99.8 99.8                       Female External Urinary Catheter w/ Suction 24 1642 (Active)   Skin no redness;no breakdown 24   Tolerance no signs/symptoms of discomfort 24           Neurosurgery Physical Exam  General:  No acute distress  Neurologic: Alert and oriented. Thought content appropriate.  GCS: E4 V5 M6; Total: 15  Pulmonary: normal respirations, no signs of  "respiratory distress  Skin: Skin is warm, dry and intact.    Motor Strength: Moves all extremities.    Right upper extremity strength deltoid/biceps 5/5, triceps 4/5, hand  5/5   Left upper extremity strength deltoid/biceps/triceps 5/5, hand  5/5   Right lower extremity strength 5/5 throughout  Left lower extremity iliopsoas 5/5, TA/gastrocnemius 5/5, EHL 3/5 (chronic per patient).  No abnormal movements seen.     Bilateral [employer] negative       Significant Labs:  Recent Labs   Lab 04/23/24  0913 04/24/24  0442   * 408*    134*   K 4.0 5.2*    98   CO2 27 30*   BUN 15 18   CREATININE 0.9 1.0   CALCIUM 9.0 9.5   MG 1.7  1.7 1.8     Recent Labs   Lab 04/23/24  0913 04/24/24  0442   WBC 6.11 6.12   HGB 13.3 14.2   HCT 41.3 44.9    241     No results for input(s): "LABPT", "INR", "APTT" in the last 48 hours.  Microbiology Results (last 7 days)       ** No results found for the last 168 hours. **            Assessment/Plan:     * Cervical spinal stenosis  Ms. Maddie Otero is a 73-year-old female presented to the emergency department on 04/23/2024 complaining of right-sided chest pain, neck pain and back pain.  During workup, MRI cervical spine was most significant for severe spinal stenosis with severe bilateral foraminal narrowing at C5-6 and cervical myelomalacia.  Neurosurgery consulted for cervical myelomalacia.    Mild deficit seen in right triceps strength graded 4/5.  Left EHL deficit graded 3/5 which is chronic per patient.    Pending preoperative medical and pulmonary clearances, anticipate ACDF C5-6 on Friday.   Continue to hold Eliquis.    Discussed with Dr. Pierson.        Thank you for your consult. I will follow-up with patient. Please contact us if you have any additional questions.    APRIL GARG PA-C  Neurosurgery  [facility]  "

## 2024-04-24 NOTE — PLAN OF CARE
04/24/24 1843   BREWSTER Message   Medicare Outpatient and Observation Notification regarding financial responsibility Given to patient/caregiver;Explained to patient/caregiver;Signed/date by patient/caregiver   Date BREWSTER was signed 04/24/24   Time BREWSTER was signed [id number]

## 2024-04-24 NOTE — PLAN OF CARE
Problem: Adult Inpatient Plan of Care  Goal: Plan of Care Review  Outcome: Progressing  Goal: Patient-Specific Goal (Individualized)  Outcome: Progressing  Goal: Absence of Hospital-Acquired Illness or Injury  Outcome: Progressing  Goal: Optimal Comfort and Wellbeing  Outcome: Progressing  Goal: Readiness for Transition of Care  Outcome: Progressing     Problem: Diabetes Comorbidity  Goal: Blood Glucose Level Within Targeted Range  Outcome: Progressing     Problem: Acute Kidney Injury/Impairment  Goal: Fluid and Electrolyte Balance  Outcome: Progressing  Goal: Improved Oral Intake  Outcome: Progressing  Goal: Effective Renal Function  Outcome: Progressing     Problem: Pneumonia  Goal: Fluid Balance  Outcome: Progressing  Goal: Resolution of Infection Signs and Symptoms  Outcome: Progressing  Goal: Effective Oxygenation and Ventilation  Outcome: Progressing     Problem: Wound  Goal: Optimal Coping  Outcome: Progressing  Goal: Optimal Functional Ability  Outcome: Progressing  Goal: Absence of Infection Signs and Symptoms  Outcome: Progressing  Goal: Improved Oral Intake  Outcome: Progressing  Goal: Optimal Pain Control and Function  Outcome: Progressing  Goal: Skin Health and Integrity  Outcome: Progressing  Goal: Optimal Wound Healing  Outcome: Progressing

## 2024-04-24 NOTE — PT/OT/SLP EVAL
Physical Therapy Evaluation    Patient Name:  Maddie Otero   MRN:  [id number]    Recommendations:     Discharge Recommendations: Low Intensity Therapy (vs No therapy depending on pt's status/progression after planned surgery.)   Discharge Equipment Recommendations: none   Barriers to discharge:  will need to re-assess pt's status after planned cervical surgery.    Assessment:     Maddie Otero is a 73 y.o. female admitted with a medical diagnosis of Cervical spinal stenosis.  She presents with the following impairments/functional limitations: weakness, impaired endurance, impaired functional mobility, gait instability, impaired balance, decreased safety awareness, pain, impaired cardiopulmonary response to activity.    Pt found sitting up EOB and agreeable to working with PT. Pt A & O x  4 and has the following co-morbidities: DM, PAF, COPD, HTN, Hx Lsp surgery '12, R TKA '17, L TKA '20.  Pt tolerated session fairly, being limited by shortness of breath, needing a standing rest break to catch her breath on 3Lpm O2.  Pt required CGA for safe mobilization during session today with pt noted to demonstrate a Trendelenburg pattern due to L hip girdle weakness resulting in instability. The pt did not agree with PT's assessment of her gait pattern.  PT feels the pt would benefit from acute PT during hospitalization to increase strength, endurance and safety with mobility and would benefit from Low Intensity Therapy upon discharge home.  The pt is, however, scheduled to undergo an ACDF on Friday, so there is a possibility that her discharge recommendation with change depending on status at re-evaluation.       Rehab Prognosis: Fair; patient would benefit from acute skilled PT services to address these deficits and reach maximum level of function.    Recent Surgery: * No surgery found *      Plan:     During this hospitalization, patient to be seen 6 x/week to address the identified rehab impairments via gait training,  therapeutic activities, therapeutic exercises and progress toward the following goals:    Plan of Care Expires:  05/24/24    Subjective     Chief Complaint: R neck & UE pain.  Patient/Family Comments/goals: Return to pain free independent mobility.  Pain/Comfort:  Pain Rating 1:  (not rated)  Location - Side 1: Right  Location 1: cervical spine  Pain Addressed 1: Reposition, Distraction  Pain Rating 2:  (not rated)  Location - Side 2: Right  Location 2: arm  Pain Addressed 2: Reposition, Distraction    Patients cultural, spiritual, Congregation conflicts given the current situation:      Living Environment:  Pt lives with her daughter in a H   Prior to admission, patients level of function was independent with gait, but with instability.  Equipment used at home: none.  DME owned (not currently used): none.  Upon discharge, patient will have assistance from her daughter.    Objective:     Communicated with NICKOLAS Romero prior to session.  Patient found sitting edge of bed with oxygen, peripheral IV, telemetry  upon PT entry to room.    General Precautions: Standard, fall  Orthopedic Precautions:spinal precautions   Braces: N/A  Respiratory Status: Nasal cannula, flow 2 L/min    Exams:  Cognitive Exam:  Patient is oriented to Person, Place, Time, and Situation  RLE ROM: WFL  RLE Strength: NT  LLE ROM: WFL  LLE Strength: NT    Functional Mobility:  Transfers:     Sit to Stand:  modified independence and supervision with no AD  Gait: 2 x 100' with no AD with pt requiring CGA for safety due to instability related to L hip girdle weakness/Trendelenburg pattern. PT stopped the pt at 100' due to shortness of breath with standing rest break. Once pt recovered she was able to continue to return to room with R HHA/min > CGA for 2nd 100' for increased safety/activity tolerance.      AM-PAC 6 CLICK MOBILITY  Total Score:21       Treatment & Education:  Pt was educated on the following: call light use, importance of OOB activity and  functional mobility to negate the negative effects of prolonged bed rest during this hospitalization, safe transfers/ambulation and discharge planning recommendations/options.      Patient left sitting edge of bed with all lines intact, call button in reach, and RN notified.    GOALS:   Multidisciplinary Problems       Physical Therapy Goals          Problem: Physical Therapy    Goal Priority Disciplines Outcome Goal Variances Interventions   Physical Therapy Goal     PT, PT/OT      Description: Goals to be met by: 24     Patient will increase functional independence with mobility by performin. Supine to sit with Supervision  2. Sit to stand transfer with Supervision  3. Bed to chair transfer with Supervision using Rolling Walker  4. Gait  x 200 feet with Supervision using Rolling Walker.                              History:     Past Medical History:   Diagnosis Date    A-fib     Anticoagulant long-term use     Arthritis     COPD (chronic obstructive pulmonary disease)     COPD (chronic obstructive pulmonary disease)     Diabetes mellitus, type 2     Diverticulosis     History of left knee replacement     DR CRENSHAW    HNP (herniated nucleus pulposus)     LUMBAR    Hypertension     Lung disease     Pancreatic insufficiency     s/p whipple, non cancer    Presence of dental bridge     UPPER    Wears glasses        Past Surgical History:   Procedure Laterality Date    APPENDECTOMY      BACK SURGERY      lower back    BACK SURGERY      lower back    CHOLECYSTECTOMY      HERNIA REPAIR      JOINT REPLACEMENT Left     KNEE    KNEE ARTHROPLASTY Right 5/3/2021    Procedure: ARTHROPLASTY, KNEE;  Surgeon: Manuel Willingham MD;  Location: [facility] OR;  Service: Orthopedics;  Laterality: Right;  guzman    LAPAROSCOPIC REPAIR OF INCISIONAL HERNIA N/A 10/22/2019    Procedure: REPAIR, HERNIA, INCISIONAL, LAPAROSCOPIC;  Surgeon: Pantera Almanza III, MD;  Location: [facility] OR;  Service: General;  Laterality: N/A;     pancrease  2009    stents in pancrease     TONSILLECTOMY      TOTAL KNEE ARTHROPLASTY Left 08/10/2017    WHIPPLE PROCEDURE W/ LAPAROSCOPY  10/09       Time Tracking:     PT Received On: 04/24/24  PT Start Time: 1010     PT Stop Time: 1029  PT Total Time (min): 19 min     Billable Minutes: Evaluation 8 and Therapeutic Activity 11      04/24/2024

## 2024-04-24 NOTE — NURSING
Nurses Note -- 4 Eyes      4/23/2024   10:48 PM      Skin assessed during: Admit      [] No Altered Skin Integrity Present    []Prevention Measures Documented      [x] Yes- Altered Skin Integrity Present or Discovered   [x] LDA Added if Not in Epic (Describe Wound)   [x] New Altered Skin Integrity was Present on Admit and Documented in LDA   [x] Wound Image Taken    Wound Care Consulted? No    Attending Nurse:  Greer Adame    Second RN/Staff Member:  Francheska ADAME

## 2024-04-24 NOTE — HPI
Ms. Maddie Otero is a 73-year-old female  with past medical history of atrial fibrillation on Eliquis, COPD on oxygen at home, type 2 diabetes, hypertension, hyperlipidemia, diverticulosis presented to the emergency department on 04/23/2024 complaining of right-sided chest pain, neck pain and back pain.  During workup, MRI cervical spine was most significant for severe spinal stenosis with severe bilateral foraminal narrowing at C5-6 and cervical myelomalacia.  Neurosurgery consulted for cervical myelomalacia.    On initiation of encounter patient was found sitting at the edge of bed eating breakfast.  She reports positional posterior cervical pain that radiates down right shoulder, right anterior upper arm and right thumb that began approximate 3 days ago without an inciting event.  Also reports right hand paresthesias, subjective right arm weakness and intermittent imbalance.  Denies left arm symptoms, falls, bladder/bowel dysfunction, hand incoordination/clumsiness, chronic cervical pain.

## 2024-04-24 NOTE — CARE UPDATE
Case discussed with ED FNP.  MRI reviewed.  Anticipate ACDF C5-6 Friday.  She will need medical optimization with preoperative medical and pulmonary clearance.  Eliquis held.  Last dose given for 04/23/2024 at 1558  Formal consult to follow.

## 2024-04-24 NOTE — PLAN OF CARE
[facility]  Initial Discharge Assessment    Assessment completed at bedside with Pt, and all information on FaceSheet confirmed, including demographics, PCP, pharmacy and insurance.  Pt has not addressed advance directives, and reports Mariana Schmitz, daughter ([phone]) is her NOK.  She currently lives at home with her daughter.  Her PCP is Carrie Shook NP, and last appointment was a few months ago.   Her preferred pharmacy is [facility].   She denies any HH/HD.  She was on Eliquis prior to admission and denies any issues with affordability.  She has Home O2 and portable concentrator prior to admission through [employer] and reports she was on 2L.  For transportation to appointments, she usually drives self.   At discharge, daughter to provide.  Plan for discharge is home with daughter.  Case Management to continue to follow for discharge planning needs.         Primary Care Provider: Carrie Gorman FNP    Admission Diagnosis: Myelomalacia of cervical cord [G95.89]    Admission Date: 4/23/2024  Expected Discharge Date: 4/29/2024    Transition of Care Barriers: None    Payor: HU[employer] MANAGED MEDICARE / Plan: HU[employer] MEDICARE PPO / Product Type: Medicare Advantage /     Extended Emergency Contact Information  Primary Emergency Contact: [place] [address] [place]  Home Phone: [phone]  Mobile Phone: [phone]  Relation: Daughter    Discharge Plan A: Home  Discharge Plan B: Home      [facility] [place] - [address]  [address]  [place] [address]  Phone: [phone] Fax: [phone]    [facility] - [facility] [address]  [address] [facility] [address]  Phone: [phone] Fax: [phone]      Initial Assessment (most recent)       Adult Discharge Assessment - 04/24/24 1148          Discharge Assessment    Assessment Type Discharge Planning Assessment      Confirmed/corrected address, phone number and insurance Yes     Confirmed Demographics Correct on Facesheet     Source of Information patient;health record     Communicated ALICE with patient/caregiver Yes     People in Home child(tami), adult     Do you expect to return to your current living situation? Yes     Do you have help at home or someone to help you manage your care at home? Yes     Who are your caregiver(s) and their phone number(s)? Mariana Schmitz, daughter ([phone])     Prior to hospitilization cognitive status: Alert/Oriented     Current cognitive status: Alert/Oriented     Walking or Climbing Stairs Difficulty no     Dressing/Bathing Difficulty no     Home Layout Able to live on 1st floor     Equipment Currently Used at Home oxygen;glucometer     Readmission within 30 days? No     Patient currently being followed by outpatient case management? No     Do you currently have service(s) that help you manage your care at home? No     Do you take prescription medications? Yes     Do you have prescription coverage? Yes     Coverage [employer] Managed Medicare     Do you have any problems affording any of your prescribed medications? No     Is the patient taking medications as prescribed? yes     Who is going to help you get home at discharge? Mariana Schmitz, daughter ([phone])     How do you get to doctors appointments? car, drives self     Are you on dialysis? No     Do you take coumadin? No     Discharge Plan A Home     Discharge Plan B Home     DME Needed Upon Discharge  none     Discharge Plan discussed with: Patient     Transition of Care Barriers None

## 2024-04-24 NOTE — CARE UPDATE
04/23/24 2133   Patient Assessment/Suction   Level of Consciousness (AVPU) alert   Respiratory Effort Normal   Expansion/Accessory Muscles/Retractions no use of accessory muscles   All Lung Fields Breath Sounds diminished   Rhythm/Pattern, Respiratory unlabored   Cough Frequency no cough   PRE-TX-O2   Device (Oxygen Therapy) nasal cannula   Flow (L/min) (Oxygen Therapy) 2   SpO2 98 %   Pulse Oximetry Type Intermittent   $ Pulse Oximetry - Multiple Charge Pulse Oximetry - Multiple   Pulse 71   Resp 19   Aerosol Therapy   $ Aerosol Therapy Charges Aerosol Treatment   Daily Review of Necessity (SVN) completed   Respiratory Treatment Status (SVN) given   Treatment Route (SVN) mask   Patient Position sitting on edge of bed   Post Treatment Assessment (SVN) breath sounds unchanged;vital signs unchanged   Signs of Intolerance (SVN) none   Education   $ Education Bronchodilator;15 min   Respiratory Evaluation   $ Care Plan Tech Time 15 min

## 2024-04-24 NOTE — CONSULTS
[facility]  Neurosurgery  Consult Note    Inpatient consult to Neurosurgery  Consult performed by: Lucia Dewey PA-C  Consult ordered by: Cassie Garibay NP  Reason for consult: Cervical myelomalacia        Subjective:     Chief Complaint/Reason for Admission:  Myelomalacia of cervical cord    History of Present Illness: Ms. Maddie Otero is a 73-year-old female  with past medical history of atrial fibrillation on Eliquis, COPD on oxygen at home, type 2 diabetes, hypertension, hyperlipidemia, diverticulosis presented to the emergency department on 04/23/2024 complaining of right-sided chest pain, neck pain and back pain.  During workup, MRI cervical spine was most significant for severe spinal stenosis with severe bilateral foraminal narrowing at C5-6 and cervical myelomalacia.  Neurosurgery consulted for cervical myelomalacia.    On initiation of encounter patient was found sitting at the edge of bed eating breakfast.  She reports positional posterior cervical pain that radiates down right shoulder, right anterior upper arm and right thumb that began approximate 3 days ago without an inciting event.  Also reports right hand paresthesias, subjective right arm weakness and intermittent imbalance.  Denies left arm symptoms, falls, bladder/bowel dysfunction, hand incoordination/clumsiness, chronic cervical pain.    Medications Prior to Admission   Medication Sig Dispense Refill Last Dose    albuterol (PROVENTIL/VENTOLIN HFA) 90 mcg/actuation inhaler INHALE 2 PUFFS INTO THE LUNGS EVERY 6 (SIX) HOURS AS NEEDED FOR WHEEZING. RESCUE 54 g 3 4/22/2024    amLODIPine (NORVASC) 5 MG tablet Take 5 mg by mouth once daily.   4/23/2024    apixaban (ELIQUIS) 5 mg Tab Take 5 mg by mouth 2 (two) times daily.   4/23/2024 at 08:00    budesonide-glycopyr-formoterol (BREZTRI AEROSPHERE) 160-9-4.8 mcg/actuation HFAA Inhale 2 puffs into the lungs 2 (two) times a day. 3 g 6 4/22/2024    cetirizine (ZYRTEC) 10 MG tablet  Take 10 mg by mouth once daily.   4/22/2024    CREON CpDR Take 2 capsules by mouth 4 (four) times daily.  5 4/23/2024    famotidine (PEPCID) 20 MG tablet Take 20 mg by mouth 2 (two) times daily.    4/23/2024    flecainide (TAMBOCOR) 100 MG Tab Take 1 tablet by mouth 2 (two) times daily.   4/23/2024    furosemide (LASIX) 40 MG tablet Take 40 mg by mouth as needed (edema).    Past Week    insulin aspart protamine-insulin aspart (NOVOLOG MIX 70-30FLEXPEN U-100) 100 unit/mL (70-30) InPn pen Inject 20 Units into the skin 3 (three) times daily before meals. (Patient taking differently: Inject 22 Units into the skin 3 (three) times daily before meals.)  0 4/23/2024    lisinopriL (PRINIVIL,ZESTRIL) 5 MG tablet Take 5 mg by mouth once daily.   4/23/2024    omeprazole (PRILOSEC) 20 MG capsule Take 1 capsule (20 mg total) by mouth once daily. 90 capsule 3 4/22/2024    oxyCODONE-acetaminophen (PERCOCET)  mg per tablet Take 1 tablet by mouth every 4 (four) hours as needed.   Past Month    rOPINIRole (REQUIP) 1 MG tablet Take 1 mg by mouth every evening.       sodium chloride (OCEAN NASAL NASL) 1 spray by Nasal route daily as needed (rhinitis).    Past Week    tiZANidine (ZANAFLEX) 4 MG tablet Take 4 mg by mouth every 6 (six) hours as needed.   4/22/2024    albuterol (PROVENTIL/VENTOLIN) 90 mcg/actuation inhaler Inhale 2 puffs into the lungs every 6 (six) hours as needed for Wheezing. 1 each 0     albuterol-ipratropium (DUO-NEB) 2.5 mg-0.5 mg/3 mL nebulizer solution        diltiaZEM (CARDIZEM CD) 120 MG Cp24 Take 120 mg by mouth once daily.       hydrocortisone 2.5 % cream Apply topically 2 (two) times daily. Use on AA BID x 10-14 days for 14 days 20 g 0     metoprolol succinate (TOPROL-XL) 100 MG 24 hr tablet Take 100 mg by mouth 2 (two) times daily.       metoprolol tartrate (LOPRESSOR) 50 MG tablet Take 1 tablet (50 mg total) by mouth 2 (two) times daily.       oxyCODONE (ROXICODONE) 5 MG immediate release tablet 1-2 tab  "for pain q4-6 hours (Patient not taking: Reported on 4/3/2024) 10 tablet 0     potassium chloride SA (K-DUR,KLOR-CON) 20 MEQ tablet Take 20 mEq by mouth once daily.   More than a month    predniSONE (DELTASONE) 10 MG tablet Take 4 tabs x 2 days, then take 3 tabs x 2 days, then take 2 tabs x 2 days, then take 1 tab x 2 days. 20 tablet 0     tirzepatide (MOUNJARO) 5 mg/0.5 mL PnIj Inject into the skin every 7 days.       TRUE METRIX GLUCOSE TEST STRIP Strp           Review of patient's allergies indicates:   Allergen Reactions    Heparin Itching     Pt states she has no allergy      NOT AN ALLERGY . NOT AWARE OF TAKING IT    Hydrocodone Shortness Of Breath     MED "SHORTENS HER BREATHING"    Penicillins Anaphylaxis, Hives and Itching     Other reaction(s): Unknown  Childhood reaction, swelled      Sulfa (sulfonamide antibiotics) Hives    Doxycycline Nausea And Vomiting     Other reaction(s): Abdominal Pain    Clindamycin      "Felt like I was on fire down through throat and felt like I was having spasms in my throat"    Penicillin v      Childhood reaction, swelled       Past Medical History:   Diagnosis Date    A-fib     Anticoagulant long-term use     Arthritis     COPD (chronic obstructive pulmonary disease)     COPD (chronic obstructive pulmonary disease) 2019    Diabetes mellitus, type 2     Diverticulosis     History of left knee replacement 2017    DR CRENSHAW    HNP (herniated nucleus pulposus)     LUMBAR    Hypertension     Lung disease     Pancreatic insufficiency     s/p whipple, non cancer    Presence of dental bridge     UPPER    Wears glasses      Past Surgical History:   Procedure Laterality Date    APPENDECTOMY      BACK SURGERY  1994    lower back    BACK SURGERY  2012    lower back    CHOLECYSTECTOMY      HERNIA REPAIR      JOINT REPLACEMENT Left     KNEE    KNEE ARTHROPLASTY Right 5/3/2021    Procedure: ARTHROPLASTY, KNEE;  Surgeon: Manuel Willingham MD;  [facility]: [facility];  Service: Orthopedics;  " Laterality: Right;  guzman    LAPAROSCOPIC REPAIR OF INCISIONAL HERNIA N/A 10/22/2019    Procedure: REPAIR, HERNIA, INCISIONAL, LAPAROSCOPIC;  Surgeon: Pantera Almanza III, MD;  Location: [facility] OR;  Service: General;  Laterality: N/A;    pancrease  2009    stents in pancrease     TONSILLECTOMY      TOTAL KNEE ARTHROPLASTY Left 08/10/2017    WHIPPLE PROCEDURE W/ LAPAROSCOPY  10/09     Family History       Problem Relation (Age of Onset)    Cancer Father    Diabetes Mother          Tobacco Use    Smoking status: Former     Current packs/day: 0.00     Average packs/day: 1 pack/day for 30.0 years (30.0 ttl pk-yrs)     Types: Cigarettes     Start date: 1987     Quit date: 2017     Years since quittin.0    Smokeless tobacco: Never   Substance and Sexual Activity    Alcohol use: No    Drug use: No    Sexual activity: Not on file       Objective:     Weight: 99.8 kg (220 lb)  Body mass index is 35.51 kg/m².  Vital Signs (Most Recent):  Temp: 98 °F (36.7 °C) (24 1133)  Pulse: 71 (24 1322)  Resp: 18 (24 1322)  BP: (!) 140/75 (24 1133)  SpO2: 99 % (24 1322) Vital Signs (24h Range):  Temp:  [97.6 °F (36.4 °C)-98.2 °F (36.8 °C)] 98 °F (36.7 °C)  Pulse:  [70-87] 71  Resp:  [14-22] 18  SpO2:  [94 %-99 %] 99 %  BP: (140-165)/(66-84) 140/75     Date 24 0700 - 24 0659   Shift 3825-8217 7646-7854 0506-6818 24 Hour Total   INTAKE   P.O. 480   480   Shift Total(mL/kg) 480(4.8)   480(4.8)   OUTPUT   Urine(mL/kg/hr) 500(0.6)   500   Shift Total(mL/kg) 500(5)   500(5)   Weight (kg) 99.8 99.8 99.8 99.8                       Female External Urinary Catheter w/ Suction 24 1642 (Active)   Skin no redness;no breakdown 24   Tolerance no signs/symptoms of discomfort 24           Neurosurgery Physical Exam  General:  No acute distress  Neurologic: Alert and oriented. Thought content appropriate.  GCS: E4 V5 M6; Total: 15  Pulmonary: normal respirations, no signs of  "respiratory distress  Skin: Skin is warm, dry and intact.    Motor Strength: Moves all extremities.    Right upper extremity strength deltoid/biceps 5/5, triceps 4/5, hand  5/5   Left upper extremity strength deltoid/biceps/triceps 5/5, hand  5/5   Right lower extremity strength 5/5 throughout  Left lower extremity iliopsoas 5/5, TA/gastrocnemius 5/5, EHL 3/5 (chronic per patient).  No abnormal movements seen.     Bilateral [employer] negative       Significant Labs:  Recent Labs   Lab 04/23/24  0913 04/24/24  0442   * 408*    134*   K 4.0 5.2*    98   CO2 27 30*   BUN 15 18   CREATININE 0.9 1.0   CALCIUM 9.0 9.5   MG 1.7  1.7 1.8     Recent Labs   Lab 04/23/24  0913 04/24/24  0442   WBC 6.11 6.12   HGB 13.3 14.2   HCT 41.3 44.9    241     No results for input(s): "LABPT", "INR", "APTT" in the last 48 hours.  Microbiology Results (last 7 days)       ** No results found for the last 168 hours. **            Assessment/Plan:     * Cervical spinal stenosis  Ms. Maddie Otero is a 73-year-old female presented to the emergency department on 04/23/2024 complaining of right-sided chest pain, neck pain and back pain.  During workup, MRI cervical spine was most significant for severe spinal stenosis with severe bilateral foraminal narrowing at C5-6 and cervical myelomalacia.  Neurosurgery consulted for cervical myelomalacia.    Mild deficit seen in right triceps strength graded 4/5.  Left EHL deficit graded 3/5 which is chronic per patient.    Pending preoperative medical and pulmonary clearances, anticipate ACDF C5-6 on Friday.   Continue to hold Eliquis.    Discussed with Dr. Pierson.        Thank you for your consult. I will follow-up with patient. Please contact us if you have any additional questions.    APRIL GARG PA-C  Neurosurgery  [facility]  "

## 2024-04-24 NOTE — ASSESSMENT & PLAN NOTE
Ms. Maddie Otero is a 73-year-old female presented to the emergency department on 04/23/2024 complaining of right-sided chest pain, neck pain and back pain.  During workup, MRI cervical spine was most significant for severe spinal stenosis with severe bilateral foraminal narrowing at C5-6 and cervical myelomalacia.  Neurosurgery consulted for cervical myelomalacia.    Mild deficit seen in right triceps strength graded 4/5.  Left EHL deficit graded 3/5 which is chronic per patient.    Pending preoperative medical and pulmonary clearances, anticipate ACDF C5-6 on Friday.   Continue to hold Eliquis.    Discussed with Dr. Pierson.

## 2024-04-24 NOTE — CARE UPDATE
04/24/24 0745   Patient Assessment/Suction   Level of Consciousness (AVPU) alert   Respiratory Effort Unlabored   Expansion/Accessory Muscles/Retractions no use of accessory muscles   All Lung Fields Breath Sounds Anterior:;Lateral:;wheezes, expiratory   Rhythm/Pattern, Respiratory pattern regular;depth regular;unlabored   Cough Frequency infrequent   PRE-TX-O2   Device (Oxygen Therapy) nasal cannula   $ Is the patient on Low Flow Oxygen? Yes   Flow (L/min) (Oxygen Therapy) 2   SpO2 96 %   Pulse Oximetry Type Intermittent   $ Pulse Oximetry - Multiple Charge Pulse Oximetry - Multiple   Pulse 77   Resp 14   Positioning HOB elevated 30 degrees   Aerosol Therapy   $ Aerosol Therapy Charges Aerosol Treatment   Daily Review of Necessity (SVN) completed   Respiratory Treatment Status (SVN) given   Treatment Route (SVN) mask;oxygen   Patient Position HOB elevated   Post Treatment Assessment (SVN) breath sounds improved   Signs of Intolerance (SVN) none   Breath Sounds Post-Respiratory Treatment   Throughout All Fields Post-Treatment All Fields   Throughout All Fields Post-Treatment aeration increased   Post-treatment Heart Rate (beats/min) 78   Post-treatment Resp Rate (breaths/min) 14   Education   $ Education Bronchodilator;15 min

## 2024-04-24 NOTE — SUBJECTIVE & OBJECTIVE
Medications Prior to Admission   Medication Sig Dispense Refill Last Dose    albuterol (PROVENTIL/VENTOLIN HFA) 90 mcg/actuation inhaler INHALE 2 PUFFS INTO THE LUNGS EVERY 6 (SIX) HOURS AS NEEDED FOR WHEEZING. RESCUE 54 g 3 4/22/2024    amLODIPine (NORVASC) 5 MG tablet Take 5 mg by mouth once daily.   4/23/2024    apixaban (ELIQUIS) 5 mg Tab Take 5 mg by mouth 2 (two) times daily.   4/23/2024 at 08:00    budesonide-glycopyr-formoterol (BREZTRI AEROSPHERE) 160-9-4.8 mcg/actuation HFAA Inhale 2 puffs into the lungs 2 (two) times a day. 3 g 6 4/22/2024    cetirizine (ZYRTEC) 10 MG tablet Take 10 mg by mouth once daily.   4/22/2024    CREON CpDR Take 2 capsules by mouth 4 (four) times daily.  5 4/23/2024    famotidine (PEPCID) 20 MG tablet Take 20 mg by mouth 2 (two) times daily.    4/23/2024    flecainide (TAMBOCOR) 100 MG Tab Take 1 tablet by mouth 2 (two) times daily.   4/23/2024    furosemide (LASIX) 40 MG tablet Take 40 mg by mouth as needed (edema).    Past Week    insulin aspart protamine-insulin aspart (NOVOLOG MIX 70-30FLEXPEN U-100) 100 unit/mL (70-30) InPn pen Inject 20 Units into the skin 3 (three) times daily before meals. (Patient taking differently: Inject 22 Units into the skin 3 (three) times daily before meals.)  0 4/23/2024    lisinopriL (PRINIVIL,ZESTRIL) 5 MG tablet Take 5 mg by mouth once daily.   4/23/2024    omeprazole (PRILOSEC) 20 MG capsule Take 1 capsule (20 mg total) by mouth once daily. 90 capsule 3 4/22/2024    oxyCODONE-acetaminophen (PERCOCET)  mg per tablet Take 1 tablet by mouth every 4 (four) hours as needed.   Past Month    rOPINIRole (REQUIP) 1 MG tablet Take 1 mg by mouth every evening.       sodium chloride (OCEAN NASAL NASL) 1 spray by Nasal route daily as needed (rhinitis).    Past Week    tiZANidine (ZANAFLEX) 4 MG tablet Take 4 mg by mouth every 6 (six) hours as needed.   4/22/2024    albuterol (PROVENTIL/VENTOLIN) 90 mcg/actuation inhaler Inhale 2 puffs into the lungs  "every 6 (six) hours as needed for Wheezing. 1 each 0     albuterol-ipratropium (DUO-NEB) 2.5 mg-0.5 mg/3 mL nebulizer solution        diltiaZEM (CARDIZEM CD) 120 MG Cp24 Take 120 mg by mouth once daily.       hydrocortisone 2.5 % cream Apply topically 2 (two) times daily. Use on AA BID x 10-14 days for 14 days 20 g 0     metoprolol succinate (TOPROL-XL) 100 MG 24 hr tablet Take 100 mg by mouth 2 (two) times daily.       metoprolol tartrate (LOPRESSOR) 50 MG tablet Take 1 tablet (50 mg total) by mouth 2 (two) times daily.       oxyCODONE (ROXICODONE) 5 MG immediate release tablet 1-2 tab for pain q4-6 hours (Patient not taking: Reported on 4/3/2024) 10 tablet 0     potassium chloride SA (K-DUR,KLOR-CON) 20 MEQ tablet Take 20 mEq by mouth once daily.   More than a month    predniSONE (DELTASONE) 10 MG tablet Take 4 tabs x 2 days, then take 3 tabs x 2 days, then take 2 tabs x 2 days, then take 1 tab x 2 days. 20 tablet 0     tirzepatide (MOUNJARO) 5 mg/0.5 mL PnIj Inject into the skin every 7 days.       TRUE METRIX GLUCOSE TEST STRIP Strp           Review of patient's allergies indicates:   Allergen Reactions    Heparin Itching     Pt states she has no allergy      NOT AN ALLERGY . NOT AWARE OF TAKING IT    Hydrocodone Shortness Of Breath     MED "SHORTENS HER BREATHING"    Penicillins Anaphylaxis, Hives and Itching     Other reaction(s): Unknown  Childhood reaction, swelled      Sulfa (sulfonamide antibiotics) Hives    Doxycycline Nausea And Vomiting     Other reaction(s): Abdominal Pain    Clindamycin      "Felt like I was on fire down through throat and felt like I was having spasms in my throat"    Penicillin v      Childhood reaction, swelled       Past Medical History:   Diagnosis Date    A-fib     Anticoagulant long-term use     Arthritis     COPD (chronic obstructive pulmonary disease)     COPD (chronic obstructive pulmonary disease) 2019    Diabetes mellitus, type 2     Diverticulosis     History of left knee " replacement     DR CRENSHAW    HNP (herniated nucleus pulposus)     LUMBAR    Hypertension     Lung disease     Pancreatic insufficiency     s/p whipple, non cancer    Presence of dental bridge     UPPER    Wears glasses      Past Surgical History:   Procedure Laterality Date    APPENDECTOMY      BACK SURGERY      lower back    BACK SURGERY      lower back    CHOLECYSTECTOMY      HERNIA REPAIR      JOINT REPLACEMENT Left     KNEE    KNEE ARTHROPLASTY Right 5/3/2021    Procedure: ARTHROPLASTY, KNEE;  Surgeon: Manuel Willingham MD;  Location: [facility] OR;  Service: Orthopedics;  Laterality: Right;  guzman    LAPAROSCOPIC REPAIR OF INCISIONAL HERNIA N/A 10/22/2019    Procedure: REPAIR, HERNIA, INCISIONAL, LAPAROSCOPIC;  Surgeon: Pantera Almanza III, MD;  Location: [facility] OR;  Service: General;  Laterality: N/A;    pancrease  2009    stents in pancrease     TONSILLECTOMY      TOTAL KNEE ARTHROPLASTY Left 08/10/2017    WHIPPLE PROCEDURE W/ LAPAROSCOPY  10/09     Family History       Problem Relation (Age of Onset)    Cancer Father    Diabetes Mother          Tobacco Use    Smoking status: Former     Current packs/day: 0.00     Average packs/day: 1 pack/day for 30.0 years (30.0 ttl pk-yrs)     Types: Cigarettes     Start date: 1987     Quit date: 2017     Years since quittin.0    Smokeless tobacco: Never   Substance and Sexual Activity    Alcohol use: No    Drug use: No    Sexual activity: Not on file       Objective:     Weight: 99.8 kg (220 lb)  Body mass index is 35.51 kg/m².  Vital Signs (Most Recent):  Temp: 98 °F (36.7 °C) (24 1133)  Pulse: 71 (24 1322)  Resp: 18 (24 1322)  BP: (!) 140/75 (24 1133)  SpO2: 99 % (24 1322) Vital Signs (24h Range):  Temp:  [97.6 °F (36.4 °C)-98.2 °F (36.8 °C)] 98 °F (36.7 °C)  Pulse:  [70-87] 71  Resp:  [14-22] 18  SpO2:  [94 %-99 %] 99 %  BP: (140-165)/(66-84) 140/75     Date 24 0700 - 24 0659   Shift 5800-9100 0476-3738 0705-8594  "24 Hour Total   INTAKE   P.O. 480   480   Shift Total(mL/kg) 480(4.8)   480(4.8)   OUTPUT   Urine(mL/kg/hr) 500(0.6)   500   Shift Total(mL/kg) 500(5)   500(5)   Weight (kg) 99.8 99.8 99.8 99.8                       Female External Urinary Catheter w/ Suction 04/23/24 1642 (Active)   Skin no redness;no breakdown 04/23/24 1930   Tolerance no signs/symptoms of discomfort 04/23/24 1930           Neurosurgery Physical Exam  General:  No acute distress  Neurologic: Alert and oriented. Thought content appropriate.  GCS: E4 V5 M6; Total: 15  Pulmonary: normal respirations, no signs of respiratory distress  Skin: Skin is warm, dry and intact.    Motor Strength: Moves all extremities.    Right upper extremity strength deltoid/biceps 5/5, triceps 4/5, hand  5/5   Left upper extremity strength deltoid/biceps/triceps 5/5, hand  5/5   Right lower extremity strength 5/5 throughout  Left lower extremity iliopsoas 5/5, TA/gastrocnemius 5/5, EHL 3/5 (chronic per patient).  No abnormal movements seen.     Bilateral [employer] negative       Significant Labs:  Recent Labs   Lab 04/23/24  0913 04/24/24  0442   * 408*    134*   K 4.0 5.2*    98   CO2 27 30*   BUN 15 18   CREATININE 0.9 1.0   CALCIUM 9.0 9.5   MG 1.7  1.7 1.8     Recent Labs   Lab 04/23/24  0913 04/24/24  0442   WBC 6.11 6.12   HGB 13.3 14.2   HCT 41.3 44.9    241     No results for input(s): "LABPT", "INR", "APTT" in the last 48 hours.  Microbiology Results (last 7 days)       ** No results found for the last 168 hours. **            "

## 2024-04-24 NOTE — PT/OT/SLP EVAL
Occupational Therapy   Evaluation and Discharge Note    Name: Maddie Otero  MRN: [id number]  Admitting Diagnosis: Cervical spinal stenosis  Recent Surgery: * No surgery found *      Recommendations:     Discharge Recommendations: No Therapy Indicated  Discharge Equipment Recommendations: none  Barriers to discharge:  None    Assessment:     Maddie Otero is a 73 y.o. female with a medical diagnosis of Cervical spinal stenosis. At this time, patient is functioning at their prior level of function and does not require further acute OT services.     Plan:     During this hospitalization, patient does not require further acute OT services.  Please re-consult if situation changes.    Plan of Care Reviewed with: patient, family    Subjective       Occupational Profile:  Living Environment: Lives with her daughter in a H with no steps; tub/shower combination  Previous level of function: Independent  Equipment Used at home: none  Assistance upon Discharge: daughter    Pain/Comfort:  Pain Rating 1:  (not rated; pt reports pain from her right shoulder down to her right hand)  Pain Rating Post-Intervention 1:  (no change; not rated)    Objective:     Communicated with: nurse prior to session.  Patient found  standing at the sink brushing her teeth  with  her daughter present upon OT entry to room.    General Precautions: Standard, fall  Orthopedic Precautions: spinal precautions  Braces: N/A  Respiratory Status: Nasal cannula, flow 2 L/min     Occupational Performance:    Functional Mobility/Transfers:  Functional Mobility: SBA in hospital room no AD used from sink to bedside    Activities of Daily Living:  Grooming: stand by assistance standing at the sink    Cognitive/Visual Perceptual:  Cognitive/Psychosocial Skills:     -       Oriented to: Person, Place, Time, and Situation   -       Follows Commands/attention:Follows multistep  commands  -       Communication: clear/fluent    Physical Exam:  Sensation:    -        Intact  Dominant hand:    -       Right  Upper Extremity Range of Motion:     -       Right Upper Extremity: WFL  -       Left Upper Extremity: WFL  Upper Extremity Strength:    -       Right Upper Extremity: 3+/5 at shoulder; 4+/5 distally  -       Left Upper Extremity: 4+/5 at shoulder; 5/5 distally   Strength:    -       Right Upper Extremity: fair  -       Left Upper Extremity: fair  Fine Motor Coordination:    -       Intact  Gross motor coordination:   WFL    AMPAC 6 Click ADL:  AMPAC Total Score: 24    Treatment & Education:  Pt educated on OT role/discharge    Patient left sitting edge of bed with all lines intact, call button in reach, and daughter present      History:     Past Medical History:   Diagnosis Date    A-fib     Anticoagulant long-term use     Arthritis     COPD (chronic obstructive pulmonary disease)     COPD (chronic obstructive pulmonary disease) 2019    Diabetes mellitus, type 2     Diverticulosis     History of left knee replacement 2017    DR CRENSHAW    HNP (herniated nucleus pulposus)     LUMBAR    Hypertension     Lung disease     Pancreatic insufficiency     s/p whipple, non cancer    Presence of dental bridge     UPPER    Wears glasses          Past Surgical History:   Procedure Laterality Date    APPENDECTOMY      BACK SURGERY  1994    lower back    BACK SURGERY  2012    lower back    CHOLECYSTECTOMY      HERNIA REPAIR      JOINT REPLACEMENT Left     KNEE    KNEE ARTHROPLASTY Right 5/3/2021    Procedure: ARTHROPLASTY, KNEE;  Surgeon: Manuel Willingham MD;  Location: [facility] [place] OR;  Service: Orthopedics;  Laterality: Right;  guzman    LAPAROSCOPIC REPAIR OF INCISIONAL HERNIA N/A 10/22/2019    Procedure: REPAIR, HERNIA, INCISIONAL, LAPAROSCOPIC;  Surgeon: Pantera Almanza III, MD;  Location: [facility] OR;  Service: General;  Laterality: N/A;    pancrease  2009    stents in pancrease     TONSILLECTOMY      TOTAL KNEE ARTHROPLASTY Left 08/10/2017    WHIPPLE PROCEDURE W/ LAPAROSCOPY  10/09        Time Tracking:     OT Date of Treatment: 04/24/24  OT Start Time: 1347  OT Stop Time: 1403  OT Total Time (min): 16 min    Billable Minutes:Evaluation 08  Self Care/Home Management 08 4/24/2024

## 2024-04-25 ENCOUNTER — TELEPHONE (OUTPATIENT)
Dept: PULMONOLOGY | Facility: CLINIC | Age: 74
End: 2024-04-25

## 2024-04-25 DIAGNOSIS — M48.02 CERVICAL SPINAL STENOSIS: Primary | ICD-10-CM

## 2024-04-25 LAB
ALBUMIN SERPL BCP-MCNC: 3.8 G/DL (ref 3.5–5.2)
ALP SERPL-CCNC: 120 U/L (ref 55–135)
ALT SERPL W/O P-5'-P-CCNC: 13 U/L (ref 10–44)
ANION GAP SERPL CALC-SCNC: 6 MMOL/L (ref 8–16)
AST SERPL-CCNC: 9 U/L (ref 10–40)
BASOPHILS # BLD AUTO: 0.02 K/UL (ref 0–0.2)
BASOPHILS NFR BLD: 0.2 % (ref 0–1.9)
BILIRUB SERPL-MCNC: 0.4 MG/DL (ref 0.1–1)
BUN SERPL-MCNC: 28 MG/DL (ref 8–23)
CALCIUM SERPL-MCNC: 9.1 MG/DL (ref 8.7–10.5)
CHLORIDE SERPL-SCNC: 98 MMOL/L (ref 95–110)
CO2 SERPL-SCNC: 32 MMOL/L (ref 23–29)
CREAT SERPL-MCNC: 1.2 MG/DL (ref 0.5–1.4)
DIFFERENTIAL METHOD BLD: ABNORMAL
EOSINOPHIL # BLD AUTO: 0 K/UL (ref 0–0.5)
EOSINOPHIL NFR BLD: 0 % (ref 0–8)
ERYTHROCYTE [DISTWIDTH] IN BLOOD BY AUTOMATED COUNT: 12.8 % (ref 11.5–14.5)
EST. GFR  (NO RACE VARIABLE): 47.2 ML/MIN/1.73 M^2
GLUCOSE SERPL-MCNC: 214 MG/DL (ref 70–110)
GLUCOSE SERPL-MCNC: 250 MG/DL (ref 70–110)
GLUCOSE SERPL-MCNC: 255 MG/DL (ref 70–110)
GLUCOSE SERPL-MCNC: 321 MG/DL (ref 70–110)
GLUCOSE SERPL-MCNC: 384 MG/DL (ref 70–110)
HCT VFR BLD AUTO: 39.4 % (ref 37–48.5)
HGB BLD-MCNC: 12.8 G/DL (ref 12–16)
IMM GRANULOCYTES # BLD AUTO: 0.08 K/UL (ref 0–0.04)
IMM GRANULOCYTES NFR BLD AUTO: 0.8 % (ref 0–0.5)
LYMPHOCYTES # BLD AUTO: 1.2 K/UL (ref 1–4.8)
LYMPHOCYTES NFR BLD: 12.1 % (ref 18–48)
MAGNESIUM SERPL-MCNC: 2 MG/DL (ref 1.6–2.6)
MCH RBC QN AUTO: 31.8 PG (ref 27–31)
MCHC RBC AUTO-ENTMCNC: 32.5 G/DL (ref 32–36)
MCV RBC AUTO: 98 FL (ref 82–98)
MONOCYTES # BLD AUTO: 0.8 K/UL (ref 0.3–1)
MONOCYTES NFR BLD: 8.1 % (ref 4–15)
NEUTROPHILS # BLD AUTO: 7.7 K/UL (ref 1.8–7.7)
NEUTROPHILS NFR BLD: 78.8 % (ref 38–73)
NRBC BLD-RTO: 0 /100 WBC
PLATELET # BLD AUTO: 224 K/UL (ref 150–450)
PMV BLD AUTO: 10.5 FL (ref 9.2–12.9)
POTASSIUM SERPL-SCNC: 4.2 MMOL/L (ref 3.5–5.1)
PROT SERPL-MCNC: 6.5 G/DL (ref 6–8.4)
RBC # BLD AUTO: 4.02 M/UL (ref 4–5.4)
SODIUM SERPL-SCNC: 136 MMOL/L (ref 136–145)
WBC # BLD AUTO: 9.78 K/UL (ref 3.9–12.7)

## 2024-04-25 PROCEDURE — 80053 COMPREHEN METABOLIC PANEL: CPT | Performed by: HOSPITALIST

## 2024-04-25 PROCEDURE — 21400001 HC TELEMETRY ROOM

## 2024-04-25 PROCEDURE — 25000242 PHARM REV CODE 250 ALT 637 W/ HCPCS: Performed by: HOSPITALIST

## 2024-04-25 PROCEDURE — 25000003 PHARM REV CODE 250: Performed by: HOSPITALIST

## 2024-04-25 PROCEDURE — 63700000 PHARM REV CODE 250 ALT 637 W/O HCPCS: Performed by: HOSPITALIST

## 2024-04-25 PROCEDURE — 27000221 HC OXYGEN, UP TO 24 HOURS

## 2024-04-25 PROCEDURE — 63600175 PHARM REV CODE 636 W HCPCS: Performed by: HOSPITALIST

## 2024-04-25 PROCEDURE — 99900031 HC PATIENT EDUCATION (STAT)

## 2024-04-25 PROCEDURE — 36415 COLL VENOUS BLD VENIPUNCTURE: CPT | Performed by: HOSPITALIST

## 2024-04-25 PROCEDURE — 99900035 HC TECH TIME PER 15 MIN (STAT)

## 2024-04-25 PROCEDURE — 94761 N-INVAS EAR/PLS OXIMETRY MLT: CPT

## 2024-04-25 PROCEDURE — 85025 COMPLETE CBC W/AUTO DIFF WBC: CPT | Performed by: HOSPITALIST

## 2024-04-25 PROCEDURE — 82962 GLUCOSE BLOOD TEST: CPT

## 2024-04-25 PROCEDURE — 94640 AIRWAY INHALATION TREATMENT: CPT | Mod: XB

## 2024-04-25 PROCEDURE — 83735 ASSAY OF MAGNESIUM: CPT | Performed by: HOSPITALIST

## 2024-04-25 RX ORDER — MUPIROCIN 20 MG/G
1 OINTMENT TOPICAL 2 TIMES DAILY
Status: CANCELLED | OUTPATIENT
Start: 2024-04-25 | End: 2024-04-26

## 2024-04-25 RX ORDER — INSULIN ASPART 100 [IU]/ML
10 INJECTION, SOLUTION INTRAVENOUS; SUBCUTANEOUS
Status: DISCONTINUED | OUTPATIENT
Start: 2024-04-26 | End: 2024-04-26 | Stop reason: HOSPADM

## 2024-04-25 RX ORDER — VANCOMYCIN/0.9 % SOD CHLORIDE 1 G/100 ML
1000 PLASTIC BAG, INJECTION (ML) INTRAVENOUS
Status: CANCELLED | OUTPATIENT
Start: 2024-04-25

## 2024-04-25 RX ORDER — MUPIROCIN 20 MG/G
OINTMENT TOPICAL
Status: CANCELLED | OUTPATIENT
Start: 2024-04-25

## 2024-04-25 RX ADMIN — ACETAMINOPHEN 1000 MG: 500 TABLET ORAL at 08:04

## 2024-04-25 RX ADMIN — PANCRELIPASE 2 CAPSULE: 60000; 12000; 38000 CAPSULE, DELAYED RELEASE PELLETS ORAL at 08:04

## 2024-04-25 RX ADMIN — FLECAINIDE ACETATE 100 MG: 100 TABLET ORAL at 08:04

## 2024-04-25 RX ADMIN — INSULIN ASPART 6 UNITS: 100 INJECTION, SOLUTION INTRAVENOUS; SUBCUTANEOUS at 12:04

## 2024-04-25 RX ADMIN — PREDNISONE 50 MG: 20 TABLET ORAL at 08:04

## 2024-04-25 RX ADMIN — FAMOTIDINE 20 MG: 20 TABLET ORAL at 08:04

## 2024-04-25 RX ADMIN — METOPROLOL TARTRATE 50 MG: 50 TABLET, FILM COATED ORAL at 08:04

## 2024-04-25 RX ADMIN — INSULIN ASPART 6 UNITS: 100 INJECTION, SOLUTION INTRAVENOUS; SUBCUTANEOUS at 04:04

## 2024-04-25 RX ADMIN — LIDOCAINE 5% 1 PATCH: 700 PATCH TOPICAL at 09:04

## 2024-04-25 RX ADMIN — METHOCARBAMOL TABLETS 500 MG: 500 TABLET, COATED ORAL at 09:04

## 2024-04-25 RX ADMIN — PANCRELIPASE 2 CAPSULE: 60000; 12000; 38000 CAPSULE, DELAYED RELEASE PELLETS ORAL at 09:04

## 2024-04-25 RX ADMIN — PANCRELIPASE 2 CAPSULE: 60000; 12000; 38000 CAPSULE, DELAYED RELEASE PELLETS ORAL at 04:04

## 2024-04-25 RX ADMIN — GABAPENTIN 300 MG: 300 CAPSULE ORAL at 08:04

## 2024-04-25 RX ADMIN — INSULIN ASPART 4 UNITS: 100 INJECTION, SOLUTION INTRAVENOUS; SUBCUTANEOUS at 12:04

## 2024-04-25 RX ADMIN — PANCRELIPASE 2 CAPSULE: 60000; 12000; 38000 CAPSULE, DELAYED RELEASE PELLETS ORAL at 12:04

## 2024-04-25 RX ADMIN — IPRATROPIUM BROMIDE AND ALBUTEROL SULFATE 3 ML: 2.5; .5 SOLUTION RESPIRATORY (INHALATION) at 07:04

## 2024-04-25 RX ADMIN — METHOCARBAMOL TABLETS 500 MG: 500 TABLET, COATED ORAL at 08:04

## 2024-04-25 RX ADMIN — IPRATROPIUM BROMIDE AND ALBUTEROL SULFATE 3 ML: 2.5; .5 SOLUTION RESPIRATORY (INHALATION) at 01:04

## 2024-04-25 RX ADMIN — GABAPENTIN 300 MG: 300 CAPSULE ORAL at 09:04

## 2024-04-25 RX ADMIN — OXYCODONE HYDROCHLORIDE 5 MG: 5 TABLET ORAL at 09:04

## 2024-04-25 RX ADMIN — INSULIN ASPART 5 UNITS: 100 INJECTION, SOLUTION INTRAVENOUS; SUBCUTANEOUS at 09:04

## 2024-04-25 RX ADMIN — ACETAMINOPHEN 1000 MG: 500 TABLET ORAL at 09:04

## 2024-04-25 RX ADMIN — FAMOTIDINE 20 MG: 20 TABLET ORAL at 09:04

## 2024-04-25 RX ADMIN — Medication 6 MG: at 09:04

## 2024-04-25 RX ADMIN — AZITHROMYCIN DIHYDRATE 500 MG: 250 TABLET ORAL at 08:04

## 2024-04-25 RX ADMIN — INSULIN ASPART 4 UNITS: 100 INJECTION, SOLUTION INTRAVENOUS; SUBCUTANEOUS at 04:04

## 2024-04-25 RX ADMIN — FLECAINIDE ACETATE 100 MG: 100 TABLET ORAL at 09:04

## 2024-04-25 RX ADMIN — METHOCARBAMOL TABLETS 500 MG: 500 TABLET, COATED ORAL at 04:04

## 2024-04-25 RX ADMIN — METOPROLOL TARTRATE 50 MG: 50 TABLET, FILM COATED ORAL at 09:04

## 2024-04-25 RX ADMIN — INSULIN ASPART 6 UNITS: 100 INJECTION, SOLUTION INTRAVENOUS; SUBCUTANEOUS at 08:04

## 2024-04-25 RX ADMIN — AMLODIPINE BESYLATE 5 MG: 5 TABLET ORAL at 08:04

## 2024-04-25 RX ADMIN — OXYCODONE HYDROCHLORIDE 5 MG: 5 TABLET ORAL at 12:04

## 2024-04-25 NOTE — ASSESSMENT & PLAN NOTE
Neurosurgery has been consulted.  Appreciate recommendation.  Plan is decompression and fusion in two days.  Continue muscle relaxant and gabapentin.  Continue analgesics.  Lidocaine patches.  PT/OT.

## 2024-04-25 NOTE — PT/OT/SLP PROGRESS
Physical Therapy      Patient Name:  Maddie Otero   MRN:  [id number]    Patient not seen today secondary to Other (Comment) (PT unavailable.). Will follow-up 4/26/24.

## 2024-04-25 NOTE — PROGRESS NOTES
Patient seen and examined     She reports improved neck and right shoulder/arm pain     Denies weakness or myelopathy symptoms    Her COPD exacerbation has improved per hospital medicine    On entering the room she is sleeping soundly in bedside chair.    Exam:  No distress  Awake, alert, oriented to person place and time.    Cranial nerves 2-12 grossly intact.    Strength is graded 5/5 in all muscle groups.    Sensation is grossly intact throughout.    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  She is neuro intact without signs or symptoms of myelopathy.    Given her findings she would benefit from surgical intervention, ACDF C4-5 C5-6 with partial C5 corpectomy.  This does not need to be done emergently given her intact neuro exam and clinical improvement.  As she has been off the high dose Eliquis for only 2 days I recommend postponing the procedure until early next week.   We will move her surgery date to Tuesday April 30th.  I outlined the procedure in detail.  I outlined the material risks and treatment alternatives.  She elects to proceed with surgery as above.  If she is discharged home prior to surgery she should wear a Crow J collar when ambulating or riding car.    Please call with questions

## 2024-04-25 NOTE — SUBJECTIVE & OBJECTIVE
Interval History:  no new issues since admission.  Surgery is planned Friday.  Patient states her breathing is at baseline.    Review of Systems   Constitutional:  Negative for fever.   Respiratory:  Negative for cough.    Cardiovascular:  Negative for chest pain.     Objective:     Vital Signs (Most Recent):  Temp: 97.7 °F (36.5 °C) (04/24/24 1914)  Pulse: 79 (04/24/24 1949)  Resp: 17 (04/24/24 1949)  BP: (!) 151/73 (04/24/24 1914)  SpO2: 95 % (04/24/24 1949) Vital Signs (24h Range):  Temp:  [97.6 °F (36.4 °C)-98.3 °F (36.8 °C)] 97.7 °F (36.5 °C)  Pulse:  [70-84] 79  Resp:  [14-22] 17  SpO2:  [94 %-99 %] 95 %  BP: (140-156)/(70-84) 151/73     Weight: 99.8 kg (220 lb)  Body mass index is 35.51 kg/m².    Intake/Output Summary (Last 24 hours) at 4/24/2024 1959  Last data filed at 4/24/2024 1312  Gross per 24 hour   Intake 840 ml   Output 1000 ml   Net -160 ml         Physical Exam  Constitutional:       General: She is not in acute distress.     Appearance: She is not ill-appearing.   Eyes:      General:         Right eye: No discharge.         Left eye: No discharge.   Neck:      Vascular: No JVD.   Cardiovascular:      Rate and Rhythm: Normal rate and regular rhythm.   Pulmonary:      Effort: Pulmonary effort is normal.      Breath sounds: Normal breath sounds. Decreased air movement present.   Abdominal:      General: Abdomen is flat. Bowel sounds are normal. There is no distension.      Palpations: Abdomen is soft.      Tenderness: There is no abdominal tenderness.   Musculoskeletal:      Right lower leg: No edema.      Left lower leg: No edema.   Skin:     General: Skin is warm and moist.      Findings: No rash.   Neurological:      Mental Status: She is alert and oriented to person, place, and time.   Psychiatric:         Attention and Perception: Attention normal.         Mood and Affect: Mood and affect normal.         Speech: Speech normal.             Significant Labs: All pertinent labs within the past 24  hours have been reviewed.    Significant Imaging: I have reviewed all pertinent imaging results/findings within the past 24 hours.

## 2024-04-25 NOTE — ASSESSMENT & PLAN NOTE
Patient's FSGs are uncontrolled due to hyperglycemia on current medication regimen.  Will increase mealtime aspart to 10 TID and decrease detemir to 30.  Last A1c reviewed-   Lab Results   Component Value Date    HGBA1C 8.5 (H) 04/23/2024     Most recent fingerstick glucose reviewed-   POC Glucose   Date Value Ref Range Status   04/24/2024 381 (H) 70 - 110 Final   04/24/2024 383 (H) 70 - 110 Final   04/24/2024 331 (H) 70 - 110 Final       Current correctional scale  Medium    Antihyperglycemics (From admission, onward)      Start     Stop Route Frequency Ordered    04/23/24 2100  insulin detemir U-100 (Levemir) pen 40 Units         -- SubQ Nightly 04/23/24 1558    04/23/24 1654  insulin aspart U-100 pen 0-10 Units         -- SubQ Before meals & nightly PRN 04/23/24 1558    04/23/24 1645  insulin aspart U-100 pen 6 Units         -- SubQ 3 times daily with meals 04/23/24 1558          Hold Oral hypoglycemics while patient is in the hospital.

## 2024-04-25 NOTE — PROGRESS NOTES
[facility] Medicine  Progress Note    Patient Name: Maddie Otero  MRN: [id number]  Patient Class: OP- Observation   Admission Date: 4/23/2024  Length of Stay: 0 days  Attending Physician: Petey Mares MD  Primary Care Provider: Carrie Gorman FNP        Subjective:     Principal Problem:Cervical spinal stenosis        HPI:  73-year-old female with past medical history of COPD on 2 L nasal cannula at home presenting because of multiple complaints.  According to the patient, over the last 2-3 days, she has been having severe neck pain radiating to the right shoulder, and right forearm with right hand numbness.  Her neck pain is severe, 9/10 on pain scale, described as sharp.  She also has been short of breath with chest tightness.  She denied any chest pain per se.  Because of her symptoms, she came to the ER for evaluation.    In the ER, vitals showed a blood pressure of 214/126, heart rate of 73, respiratory rate of 18, afebrile satting 95% on room air.  CBC is within normal limits.  CMP showed elevated blood sugar of 233.  Troponin is negative.  EKG showed normal sinus rhythm with no ischemic changes.  Chest x-ray with no pneumonia.  CT head with no acute intracranial process.  CT C-spine showed  Broad-based disc protrusion with osteophytic ridging at C5-C6, producing severe spinal canal stenosis and potential cervical cord compression. This was followed by MRI C-spine which showed Changes of multilevel cervical degenerative disc and facet disease. Findings appear most pronounced at C5-6, where there is probable severe spinal stenosis and severe bilateral foraminal narrowing. Sagittal T2 weighted images demonstrate mild signal increase within the substance of the cervical cord at this level most compatible with myelomalacia.  Patient was given Valium, morphine, and DuoNeb treatments.  ER provider did discuss the case with Neurosurgery who recommended admission, and for  neurosurgery to be consulted.    Overview/Hospital Course:  No notes on file    Interval History:  no new issues since admission.  Surgery is planned Friday.  Patient states her breathing is at baseline.    Review of Systems   Constitutional:  Negative for fever.   Respiratory:  Negative for cough.    Cardiovascular:  Negative for chest pain.     Objective:     Vital Signs (Most Recent):  Temp: 97.7 °F (36.5 °C) (04/24/24 1914)  Pulse: 79 (04/24/24 1949)  Resp: 17 (04/24/24 1949)  BP: (!) 151/73 (04/24/24 1914)  SpO2: 95 % (04/24/24 1949) Vital Signs (24h Range):  Temp:  [97.6 °F (36.4 °C)-98.3 °F (36.8 °C)] 97.7 °F (36.5 °C)  Pulse:  [70-84] 79  Resp:  [14-22] 17  SpO2:  [94 %-99 %] 95 %  BP: (140-156)/(70-84) 151/73     Weight: 99.8 kg (220 lb)  Body mass index is 35.51 kg/m².    Intake/Output Summary (Last 24 hours) at 4/24/2024 1959  Last data filed at 4/24/2024 1312  Gross per 24 hour   Intake 840 ml   Output 1000 ml   Net -160 ml         Physical Exam  Constitutional:       General: She is not in acute distress.     Appearance: She is not ill-appearing.   Eyes:      General:         Right eye: No discharge.         Left eye: No discharge.   Neck:      Vascular: No JVD.   Cardiovascular:      Rate and Rhythm: Normal rate and regular rhythm.   Pulmonary:      Effort: Pulmonary effort is normal.      Breath sounds: Normal breath sounds. Decreased air movement present.   Abdominal:      General: Abdomen is flat. Bowel sounds are normal. There is no distension.      Palpations: Abdomen is soft.      Tenderness: There is no abdominal tenderness.   Musculoskeletal:      Right lower leg: No edema.      Left lower leg: No edema.   Skin:     General: Skin is warm and moist.      Findings: No rash.   Neurological:      Mental Status: She is alert and oriented to person, place, and time.   Psychiatric:         Attention and Perception: Attention normal.         Mood and Affect: Mood and affect normal.         Speech:  Speech normal.             Significant Labs: All pertinent labs within the past 24 hours have been reviewed.    Significant Imaging: I have reviewed all pertinent imaging results/findings within the past 24 hours.    Assessment/Plan:      * Cervical spinal stenosis  Neurosurgery has been consulted.  Appreciate recommendation.  Plan is decompression and fusion in two days.  Continue muscle relaxant and gabapentin.  Continue analgesics.  Lidocaine patches.  PT/OT.        HTN (hypertension)  Resumed norvasc and metoprolol.   Monitor blood pressure.  Controlled at present.    Type 2 diabetes mellitus with hyperglycemia, with long-term current use of insulin  Patient's FSGs are uncontrolled due to hyperglycemia on current medication regimen.  Will increase mealtime aspart to 10 TID and decrease detemir to 30.  Last A1c reviewed-   Lab Results   Component Value Date    HGBA1C 8.5 (H) 04/23/2024     Most recent fingerstick glucose reviewed-   POC Glucose   Date Value Ref Range Status   04/24/2024 381 (H) 70 - 110 Final   04/24/2024 383 (H) 70 - 110 Final   04/24/2024 331 (H) 70 - 110 Final       Current correctional scale  Medium    Antihyperglycemics (From admission, onward)      Start     Stop Route Frequency Ordered    04/23/24 2100  insulin detemir U-100 (Levemir) pen 40 Units         -- SubQ Nightly 04/23/24 1558    04/23/24 1654  insulin aspart U-100 pen 0-10 Units         -- SubQ Before meals & nightly PRN 04/23/24 1558    04/23/24 1645  insulin aspart U-100 pen 6 Units         -- SubQ 3 times daily with meals 04/23/24 1558          Hold Oral hypoglycemics while patient is in the hospital.      COPD exacerbation  Continue prednisone 50 daily.  Also DuoNeb q.6 hours.  Azithromycin prophylaxis.     PAF (paroxysmal atrial fibrillation)  Hold apixaban.  She's to have surgery in two days.  Continue flecainide and metoprolol.   Problem is controlled.      VTE Risk Mitigation (From admission, onward)           Ordered      IP VTE HIGH RISK PATIENT  Once         04/23/24 1558     Place sequential compression device  Until discontinued         04/23/24 1558                    Discharge Planning   ALICE: 4/29/2024     Code Status: Full Code   Is the patient medically ready for discharge?:     Reason for patient still in hospital (select all that apply): Patient trending condition and Treatment  Discharge Plan A: Home                  Petey Mares MD  Department of Hospital Medicine   [facility]

## 2024-04-25 NOTE — CARE UPDATE
04/24/24 1949   Patient Assessment/Suction   Level of Consciousness (AVPU) alert   Respiratory Effort Unlabored;Normal   Expansion/Accessory Muscles/Retractions no use of accessory muscles;no retractions;expansion symmetric   All Lung Fields Breath Sounds diminished   Rhythm/Pattern, Respiratory pattern regular;unlabored;no shortness of breath reported;shallow   Cough Frequency infrequent   PRE-TX-O2   Device (Oxygen Therapy) nasal cannula   $ Is the patient on Low Flow Oxygen? Yes   Flow (L/min) (Oxygen Therapy) 2   SpO2 95 %   Pulse Oximetry Type Intermittent   $ Pulse Oximetry - Multiple Charge Pulse Oximetry - Multiple   Pulse 79   Resp 17   Aerosol Therapy   $ Aerosol Therapy Charges Aerosol Treatment  (duo)   Daily Review of Necessity (SVN) completed   Respiratory Treatment Status (SVN) given   Treatment Route (SVN) mask;oxygen   Patient Position HOB elevated   Post Treatment Assessment (SVN) increased aeration   Signs of Intolerance (SVN) none   Breath Sounds Post-Respiratory Treatment   Throughout All Fields Post-Treatment All Fields   Throughout All Fields Post-Treatment aeration increased   Post-treatment Heart Rate (beats/min) 81   Post-treatment Resp Rate (breaths/min) 16

## 2024-04-25 NOTE — CARE UPDATE
04/25/24 0742   Patient Assessment/Suction   Level of Consciousness (AVPU) alert   Respiratory Effort Normal;Unlabored   Expansion/Accessory Muscles/Retractions no use of accessory muscles;no retractions   All Lung Fields Breath Sounds clear;diminished   Rhythm/Pattern, Respiratory no shortness of breath reported;pattern regular;unlabored   Cough Frequency infrequent   Cough Type nonproductive;congested   Skin Integrity   $ Wound Care Tech Time 15 min   Area Observed Left;Right;Cheek;Upper lip;Nares   Skin Appearance without discoloration   PRE-TX-O2   Device (Oxygen Therapy) nasal cannula   $ Is the patient on Low Flow Oxygen? Yes   Flow (L/min) (Oxygen Therapy) 2   SpO2 96 %   Pulse Oximetry Type Intermittent   $ Pulse Oximetry - Multiple Charge Pulse Oximetry - Multiple   Pulse 74   Resp 18   Aerosol Therapy   $ Aerosol Therapy Charges Aerosol Treatment   Daily Review of Necessity (SVN) completed   Respiratory Treatment Status (SVN) given   Treatment Route (SVN) mask;oxygen   Patient Position sitting on edge of bed   Post Treatment Assessment (SVN) increased aeration;breath sounds improved   Signs of Intolerance (SVN) none   Breath Sounds Post-Respiratory Treatment   Throughout All Fields Post-Treatment All Fields   Throughout All Fields Post-Treatment aeration increased   Post-treatment Heart Rate (beats/min) 69   Post-treatment Resp Rate (breaths/min) 17   Education   $ Education Bronchodilator;15 min

## 2024-04-26 VITALS
BODY MASS INDEX: 35.36 KG/M2 | DIASTOLIC BLOOD PRESSURE: 66 MMHG | SYSTOLIC BLOOD PRESSURE: 145 MMHG | TEMPERATURE: 97 F | HEART RATE: 70 BPM | OXYGEN SATURATION: 96 % | WEIGHT: 220 LBS | RESPIRATION RATE: 19 BRPM | HEIGHT: 66 IN

## 2024-04-26 LAB
ALBUMIN SERPL BCP-MCNC: 3.7 G/DL (ref 3.5–5.2)
ALP SERPL-CCNC: 115 U/L (ref 55–135)
ALT SERPL W/O P-5'-P-CCNC: 12 U/L (ref 10–44)
ANION GAP SERPL CALC-SCNC: 2 MMOL/L (ref 8–16)
AST SERPL-CCNC: 9 U/L (ref 10–40)
BASOPHILS # BLD AUTO: 0.04 K/UL (ref 0–0.2)
BASOPHILS NFR BLD: 0.4 % (ref 0–1.9)
BILIRUB SERPL-MCNC: 0.4 MG/DL (ref 0.1–1)
BUN SERPL-MCNC: 26 MG/DL (ref 8–23)
CALCIUM SERPL-MCNC: 9 MG/DL (ref 8.7–10.5)
CHLORIDE SERPL-SCNC: 99 MMOL/L (ref 95–110)
CO2 SERPL-SCNC: 34 MMOL/L (ref 23–29)
CREAT SERPL-MCNC: 1 MG/DL (ref 0.5–1.4)
DIFFERENTIAL METHOD BLD: ABNORMAL
EOSINOPHIL # BLD AUTO: 0 K/UL (ref 0–0.5)
EOSINOPHIL NFR BLD: 0.1 % (ref 0–8)
ERYTHROCYTE [DISTWIDTH] IN BLOOD BY AUTOMATED COUNT: 12.7 % (ref 11.5–14.5)
EST. GFR  (NO RACE VARIABLE): 58.8 ML/MIN/1.73 M^2
GLUCOSE SERPL-MCNC: 152 MG/DL (ref 70–110)
GLUCOSE SERPL-MCNC: 287 MG/DL (ref 70–110)
GLUCOSE SERPL-MCNC: 334 MG/DL (ref 70–110)
HCT VFR BLD AUTO: 40.1 % (ref 37–48.5)
HGB BLD-MCNC: 12.8 G/DL (ref 12–16)
IMM GRANULOCYTES # BLD AUTO: 0.06 K/UL (ref 0–0.04)
IMM GRANULOCYTES NFR BLD AUTO: 0.6 % (ref 0–0.5)
LYMPHOCYTES # BLD AUTO: 1.4 K/UL (ref 1–4.8)
LYMPHOCYTES NFR BLD: 15.2 % (ref 18–48)
MAGNESIUM SERPL-MCNC: 2.2 MG/DL (ref 1.6–2.6)
MCH RBC QN AUTO: 32 PG (ref 27–31)
MCHC RBC AUTO-ENTMCNC: 31.9 G/DL (ref 32–36)
MCV RBC AUTO: 100 FL (ref 82–98)
MONOCYTES # BLD AUTO: 0.7 K/UL (ref 0.3–1)
MONOCYTES NFR BLD: 7.6 % (ref 4–15)
NEUTROPHILS # BLD AUTO: 7.2 K/UL (ref 1.8–7.7)
NEUTROPHILS NFR BLD: 76.1 % (ref 38–73)
NRBC BLD-RTO: 0 /100 WBC
PLATELET # BLD AUTO: 220 K/UL (ref 150–450)
PMV BLD AUTO: 10.8 FL (ref 9.2–12.9)
POTASSIUM SERPL-SCNC: 5.3 MMOL/L (ref 3.5–5.1)
PROT SERPL-MCNC: 6.4 G/DL (ref 6–8.4)
RBC # BLD AUTO: 4 M/UL (ref 4–5.4)
SODIUM SERPL-SCNC: 135 MMOL/L (ref 136–145)
WBC # BLD AUTO: 9.45 K/UL (ref 3.9–12.7)

## 2024-04-26 PROCEDURE — 97110 THERAPEUTIC EXERCISES: CPT | Mod: CQ

## 2024-04-26 PROCEDURE — 27000221 HC OXYGEN, UP TO 24 HOURS

## 2024-04-26 PROCEDURE — 85025 COMPLETE CBC W/AUTO DIFF WBC: CPT | Performed by: HOSPITALIST

## 2024-04-26 PROCEDURE — 94640 AIRWAY INHALATION TREATMENT: CPT

## 2024-04-26 PROCEDURE — 94760 N-INVAS EAR/PLS OXIMETRY 1: CPT

## 2024-04-26 PROCEDURE — 99900031 HC PATIENT EDUCATION (STAT)

## 2024-04-26 PROCEDURE — 36415 COLL VENOUS BLD VENIPUNCTURE: CPT | Performed by: HOSPITALIST

## 2024-04-26 PROCEDURE — 63700000 PHARM REV CODE 250 ALT 637 W/O HCPCS: Performed by: HOSPITALIST

## 2024-04-26 PROCEDURE — 25000242 PHARM REV CODE 250 ALT 637 W/ HCPCS: Performed by: HOSPITALIST

## 2024-04-26 PROCEDURE — 80053 COMPREHEN METABOLIC PANEL: CPT | Performed by: HOSPITALIST

## 2024-04-26 PROCEDURE — 63600175 PHARM REV CODE 636 W HCPCS: Performed by: HOSPITALIST

## 2024-04-26 PROCEDURE — 94761 N-INVAS EAR/PLS OXIMETRY MLT: CPT

## 2024-04-26 PROCEDURE — 25000003 PHARM REV CODE 250: Performed by: HOSPITALIST

## 2024-04-26 PROCEDURE — 83735 ASSAY OF MAGNESIUM: CPT | Performed by: HOSPITALIST

## 2024-04-26 RX ORDER — PREDNISONE 50 MG/1
50 TABLET ORAL DAILY
Qty: 3 TABLET | Refills: 0 | Status: SHIPPED | OUTPATIENT
Start: 2024-04-27 | End: 2024-04-29 | Stop reason: CLARIF

## 2024-04-26 RX ORDER — METHOCARBAMOL 500 MG/1
500 TABLET, FILM COATED ORAL 3 TIMES DAILY
Qty: 21 TABLET | Refills: 0 | Status: ON HOLD | OUTPATIENT
Start: 2024-04-26 | End: 2024-04-30 | Stop reason: HOSPADM

## 2024-04-26 RX ADMIN — METHOCARBAMOL TABLETS 500 MG: 500 TABLET, COATED ORAL at 08:04

## 2024-04-26 RX ADMIN — IPRATROPIUM BROMIDE AND ALBUTEROL SULFATE 3 ML: 2.5; .5 SOLUTION RESPIRATORY (INHALATION) at 12:04

## 2024-04-26 RX ADMIN — IPRATROPIUM BROMIDE AND ALBUTEROL SULFATE 3 ML: 2.5; .5 SOLUTION RESPIRATORY (INHALATION) at 08:04

## 2024-04-26 RX ADMIN — AZITHROMYCIN DIHYDRATE 500 MG: 250 TABLET ORAL at 08:04

## 2024-04-26 RX ADMIN — GABAPENTIN 300 MG: 300 CAPSULE ORAL at 08:04

## 2024-04-26 RX ADMIN — OXYCODONE HYDROCHLORIDE 5 MG: 5 TABLET ORAL at 10:04

## 2024-04-26 RX ADMIN — INSULIN ASPART 6 UNITS: 100 INJECTION, SOLUTION INTRAVENOUS; SUBCUTANEOUS at 08:04

## 2024-04-26 RX ADMIN — FAMOTIDINE 20 MG: 20 TABLET ORAL at 08:04

## 2024-04-26 RX ADMIN — INSULIN ASPART 10 UNITS: 100 INJECTION, SOLUTION INTRAVENOUS; SUBCUTANEOUS at 08:04

## 2024-04-26 RX ADMIN — AMLODIPINE BESYLATE 5 MG: 5 TABLET ORAL at 08:04

## 2024-04-26 RX ADMIN — PANCRELIPASE 2 CAPSULE: 60000; 12000; 38000 CAPSULE, DELAYED RELEASE PELLETS ORAL at 08:04

## 2024-04-26 RX ADMIN — INSULIN ASPART 10 UNITS: 100 INJECTION, SOLUTION INTRAVENOUS; SUBCUTANEOUS at 12:04

## 2024-04-26 RX ADMIN — FLECAINIDE ACETATE 100 MG: 100 TABLET ORAL at 08:04

## 2024-04-26 RX ADMIN — PANCRELIPASE 2 CAPSULE: 60000; 12000; 38000 CAPSULE, DELAYED RELEASE PELLETS ORAL at 12:04

## 2024-04-26 RX ADMIN — ACETAMINOPHEN 1000 MG: 500 TABLET ORAL at 08:04

## 2024-04-26 RX ADMIN — METOPROLOL TARTRATE 50 MG: 50 TABLET, FILM COATED ORAL at 08:04

## 2024-04-26 RX ADMIN — PREDNISONE 50 MG: 20 TABLET ORAL at 08:04

## 2024-04-26 NOTE — PROGRESS NOTES
[place] J collar provided to pt, discharge instructions provided to pt, pt voices understanding. PIVx1 discontinued. Pt discharged via wheelchair with all belongings, home O2, [place] J collar on.

## 2024-04-26 NOTE — PROGRESS NOTES
[facility] Medicine  Progress Note    Patient Name: Maddie Otero  MRN: [id number]  Patient Class: IP- Inpatient   Admission Date: 4/23/2024  Length of Stay: 0 days  Attending Physician: Petey Mares MD  Primary Care Provider: Carrie Gorman FNP        Subjective:     Principal Problem:Cervical spinal stenosis        HPI:  73-year-old female with past medical history of COPD on 2 L nasal cannula at home presenting because of multiple complaints.  According to the patient, over the last 2-3 days, she has been having severe neck pain radiating to the right shoulder, and right forearm with right hand numbness.  Her neck pain is severe, 9/10 on pain scale, described as sharp.  She also has been short of breath with chest tightness.  She denied any chest pain per se.  Because of her symptoms, she came to the ER for evaluation.    In the ER, vitals showed a blood pressure of 214/126, heart rate of 73, respiratory rate of 18, afebrile satting 95% on room air.  CBC is within normal limits.  CMP showed elevated blood sugar of 233.  Troponin is negative.  EKG showed normal sinus rhythm with no ischemic changes.  Chest x-ray with no pneumonia.  CT head with no acute intracranial process.  CT C-spine showed  Broad-based disc protrusion with osteophytic ridging at C5-C6, producing severe spinal canal stenosis and potential cervical cord compression. This was followed by MRI C-spine which showed Changes of multilevel cervical degenerative disc and facet disease. Findings appear most pronounced at C5-6, where there is probable severe spinal stenosis and severe bilateral foraminal narrowing. Sagittal T2 weighted images demonstrate mild signal increase within the substance of the cervical cord at this level most compatible with myelomalacia.  Patient was given Valium, morphine, and DuoNeb treatments.  ER provider did discuss the case with Neurosurgery who recommended admission, and for  neurosurgery to be consulted.    Overview/Hospital Course:  No notes on file    Interval History:  no new issues since admission.  Surgery is planned Friday.  She is not extraordinarily short of breath today.  Feels at her baseline.    Review of Systems   Constitutional:  Negative for fever.   Respiratory:  Negative for cough.    Cardiovascular:  Negative for chest pain.     Objective:     Vital Signs (Most Recent):  Temp: 98.1 °F (36.7 °C) (04/25/24 1914)  Pulse: 77 (04/25/24 1958)  Resp: 16 (04/25/24 1958)  BP: (!) 142/67 (04/25/24 1914)  SpO2: 97 % (04/25/24 1958) Vital Signs (24h Range):  Temp:  [97.5 °F (36.4 °C)-98.1 °F (36.7 °C)] 98.1 °F (36.7 °C)  Pulse:  [69-88] 77  Resp:  [16-20] 16  SpO2:  [91 %-97 %] 97 %  BP: (141-177)/(67-93) 142/67     Weight: 99.8 kg (220 lb)  Body mass index is 35.51 kg/m².    Intake/Output Summary (Last 24 hours) at 4/25/2024 2032  Last data filed at 4/25/2024 0845  Gross per 24 hour   Intake 460 ml   Output --   Net 460 ml         Physical Exam  Constitutional:       General: She is not in acute distress.     Appearance: She is not ill-appearing.   Eyes:      General:         Right eye: No discharge.         Left eye: No discharge.   Neck:      Vascular: No JVD.   Cardiovascular:      Rate and Rhythm: Normal rate and regular rhythm.   Pulmonary:      Effort: Pulmonary effort is normal.      Breath sounds: Normal breath sounds. Decreased air movement present.   Abdominal:      General: Abdomen is flat. Bowel sounds are normal. There is no distension.      Palpations: Abdomen is soft.      Tenderness: There is no abdominal tenderness.   Musculoskeletal:      Right lower leg: No edema.      Left lower leg: No edema.   Skin:     General: Skin is warm and moist.      Findings: No rash.   Neurological:      Mental Status: She is alert and oriented to person, place, and time.   Psychiatric:         Attention and Perception: Attention normal.         Mood and Affect: Mood and affect  normal.         Speech: Speech normal.             Significant Labs: All pertinent labs within the past 24 hours have been reviewed.    Significant Imaging:  no new imaging    Assessment/Plan:      * Cervical spinal stenosis  Neurosurgery has been consulted.  Appreciate recommendation.  Plan is decompression and fusion soon.  Continue muscle relaxant and gabapentin.  Continue analgesics.  Lidocaine patches.  PT/OT.        HTN (hypertension)  Resumed norvasc and metoprolol.   Monitor blood pressure.  Controlled at present.    Type 2 diabetes mellitus with hyperglycemia, with long-term current use of insulin  Patient's FSGs are uncontrolled due to hyperglycemia on current medication regimen.  Will increase mealtime aspart to 10 TID and decrease detemir to 30.  Last A1c reviewed-   Lab Results   Component Value Date    HGBA1C 8.5 (H) 04/23/2024     Most recent fingerstick glucose reviewed-   POC Glucose   Date Value Ref Range Status   04/25/2024 250 (H) 70 - 110 Final   04/25/2024 214 (H) 70 - 110 Final   04/25/2024 255 (H) 70 - 110 Final       Current correctional scale  Medium    Antihyperglycemics (From admission, onward)      Start     Stop Route Frequency Ordered    04/23/24 2100  insulin detemir U-100 (Levemir) pen 40 Units         -- SubQ Nightly 04/23/24 1558    04/23/24 1654  insulin aspart U-100 pen 0-10 Units         -- SubQ Before meals & nightly PRN 04/23/24 1558    04/23/24 1645  insulin aspart U-100 pen 6 Units         -- SubQ 3 times daily with meals 04/23/24 1558          Hold Oral hypoglycemics while patient is in the hospital.      COPD exacerbation  Continue prednisone 50 daily.  Also DuoNeb q.6 hours.  Azithromycin prophylaxis.  Exacerbation almost resolved.     PAF (paroxysmal atrial fibrillation)  Hold apixaban.  She will have surgery soon.  Continue flecainide and metoprolol.   Problem is controlled.      VTE Risk Mitigation (From admission, onward)           Ordered     IP VTE HIGH RISK PATIENT   Once         04/23/24 1558     Place sequential compression device  Until discontinued         04/23/24 1558                    Discharge Planning   ALICE: 4/29/2024     Code Status: Full Code   Is the patient medically ready for discharge?:     Reason for patient still in hospital (select all that apply): Patient trending condition and Treatment  Discharge Plan A: Home                  Petey Mares MD  Department of Hospital Medicine   [facility]

## 2024-04-26 NOTE — ASSESSMENT & PLAN NOTE
Hold apixaban.  She will have surgery soon.  Continue flecainide and metoprolol.   Problem is controlled.

## 2024-04-26 NOTE — PLAN OF CARE
Pt cleared for DC by CM to home with no needs once [place] J collar applied.     Spoke with Belen in surgery to verify they do carry the [place] J collar; secure chat sent to pt's nurse, Ting to make sure pt has prior to DC.    04/26/24 1146   Final Note   Assessment Type Final Discharge Note   Anticipated Discharge Disposition Home

## 2024-04-26 NOTE — PLAN OF CARE
Spoke with the pt at the bedside regarding her DC home today; her daughter will be coming to get her and will bring her home oxygen.   Verified the pt and her daughter's phone number in Epic; spoke with Mariah in Day surgery, they will contact the pt with information regarding what time to arrive for her surgery on Monday.

## 2024-04-26 NOTE — PT/OT/SLP PROGRESS
"Physical Therapy Treatment    Patient Name:  Maddie Otero   MRN:  [id number]    Recommendations:     Discharge Recommendations: Low Intensity Therapy (vs No therapy depending on pt's status/progression after planned surgery.)  Discharge Equipment Recommendations: none  Barriers to discharge:  weakness, impaired endurance, impaired functional mobility, decreased safety awareness, impaired activity tolerance.     Assessment:     Maddie Otero is a 73 y.o. female admitted with a medical diagnosis of Cervical spinal stenosis.  She presents with the following impairments/functional limitations: weakness, impaired endurance, impaired functional mobility, gait instability, impaired balance, decreased safety awareness, pain, impaired cardiopulmonary response to activity.    Pt found resting with HOB elevated, agreeable to skilled physical therapy session today. She states "I was just given some pain meds and I was told to not get up and move around."    She performed supine therapeutic exercises including ankle pumps, SLR's, hip AB/ADD, heel slides, and glute sets x20 reps. She denied any increased pain but she did required occasional rest breaks secondary to LE muscle fatigue.     Pt was left with HOB elevated, bed alarm on, call light within reach, all needs met, and RN notified.     Rehab Prognosis: Fair; patient would benefit from acute skilled PT services to address these deficits and reach maximum level of function.    Recent Surgery: * No surgery found *      Plan:     During this hospitalization, patient to be seen 6 x/week to address the identified rehab impairments via gait training, therapeutic activities, therapeutic exercises and progress toward the following goals:    Plan of Care Expires:  05/24/24    Subjective     Chief Complaint: cervical and RUE pain  Patient/Family Comments/goals: to get better   Pain/Comfort:  Location - Side 1: Right  Location 1: cervical spine  Location - Side 2: Left  Location 2: " arm      Objective:     Communicated with RN prior to session.  Patient found HOB elevated with oxygen, peripheral IV, telemetry upon PT entry to room.     General Precautions: Standard, fall  Orthopedic Precautions: spinal precautions  Braces: N/A  Respiratory Status: Nasal cannula, flow 2 L/min     Functional Mobility:  No functional mobility performed today.       AM-PAC 6 CLICK MOBILITY          Treatment & Education:  Pt educated on importance of time OOB, importance of intermittent mobility, safe techniques for transfers/ambulation, discharge recommendations/options, and use of call light for assistance and fall prevention.      Patient left HOB elevated with all lines intact, call button in reach, bed alarm on, and RN notified..    GOALS:   Multidisciplinary Problems       Physical Therapy Goals          Problem: Physical Therapy    Goal Priority Disciplines Outcome Goal Variances Interventions   Physical Therapy Goal     PT, PT/OT      Description: Goals to be met by: 24     Patient will increase functional independence with mobility by performin. Supine to sit with Supervision  2. Sit to stand transfer with Supervision  3. Bed to chair transfer with Supervision using Rolling Walker  4. Gait  x 200 feet with Supervision using Rolling Walker.                              Time Tracking:     PT Received On: 24  PT Start Time: 1015     PT Stop Time: 1026  PT Total Time (min): 11 min     Billable Minutes: Therapeutic Exercise 11    Treatment Type: Treatment  PT/PTA: PTA     Number of PTA visits since last PT visit: 2024

## 2024-04-26 NOTE — ASSESSMENT & PLAN NOTE
Neurosurgery has been consulted.  Appreciate recommendation.  Plan is decompression and fusion soon.  Continue muscle relaxant and gabapentin.  Continue analgesics.  Lidocaine patches.  PT/OT.

## 2024-04-26 NOTE — CARE UPDATE
04/25/24 1958   Patient Assessment/Suction   Level of Consciousness (AVPU) alert   Respiratory Effort Normal;Unlabored   Expansion/Accessory Muscles/Retractions no use of accessory muscles   All Lung Fields Breath Sounds equal bilaterally;diminished;wheezes, expiratory;coarse   Rhythm/Pattern, Respiratory unlabored   Cough Frequency frequent   Cough Type good;loose;nonproductive   Skin Integrity   $ Wound Care Tech Time 15 min   Area Observed Left;Right;Behind ear   Skin Appearance without discoloration   PRE-TX-O2   Device (Oxygen Therapy) nasal cannula   Flow (L/min) (Oxygen Therapy) 2   SpO2 97 %   Pulse Oximetry Type Intermittent   $ Pulse Oximetry - Multiple Charge Pulse Oximetry - Multiple   Pulse 77   Resp 16   Aerosol Therapy   $ Aerosol Therapy Charges Aerosol Treatment   Daily Review of Necessity (SVN) completed   Respiratory Treatment Status (SVN) given   Treatment Route (SVN) mask   Patient Position HOB elevated   Post Treatment Assessment (SVN) breath sounds unchanged   Signs of Intolerance (SVN) none   Breath Sounds Post-Respiratory Treatment   Throughout All Fields Post-Treatment All Fields   Throughout All Fields Post-Treatment no change   Post-treatment Heart Rate (beats/min) 78   Post-treatment Resp Rate (breaths/min) 18   Education   $ Education Bronchodilator;15 min

## 2024-04-26 NOTE — PLAN OF CARE
04/26/24 0806   Patient Assessment/Suction   Level of Consciousness (AVPU) alert   Respiratory Effort Normal;Unlabored   Expansion/Accessory Muscles/Retractions no use of accessory muscles   All Lung Fields Breath Sounds Anterior:;Lateral:;diminished;wheezes, expiratory   Rhythm/Pattern, Respiratory pattern regular   Cough Frequency infrequent   Cough Type productive   Secretions Amount small   Secretions Color white   Secretions Characteristics thick   PRE-TX-O2   Device (Oxygen Therapy) nasal cannula   $ Is the patient on Low Flow Oxygen? Yes   Flow (L/min) (Oxygen Therapy) 2   Oxygen Concentration (%) 28   SpO2 95 %   Pulse Oximetry Type Intermittent   $ Pulse Oximetry - Single Charge Pulse Oximetry - Single   Pulse 68   Resp 18   Aerosol Therapy   $ Aerosol Therapy Charges Aerosol Treatment   Daily Review of Necessity (SVN) completed   Respiratory Treatment Status (SVN) given   Treatment Route (SVN) mask;oxygen   Patient Position HOB elevated   Post Treatment Assessment (SVN) breath sounds improved   Signs of Intolerance (SVN) none   Breath Sounds Post-Respiratory Treatment   Throughout All Fields Post-Treatment All Fields   Throughout All Fields Post-Treatment aeration increased   Post-treatment Heart Rate (beats/min) 72   Post-treatment Resp Rate (breaths/min) 18   General Safety Checklist   Safety Promotion/Fall Prevention side rails raised   Respiratory Interventions   Cough And Deep Breathing done with encouragement   Ready to Wean/Extubation Screen   FIO2<=50 (chart decimal) 0.28   Education   $ Education Bronchodilator;15 min

## 2024-04-26 NOTE — ASSESSMENT & PLAN NOTE
Patient's FSGs are uncontrolled due to hyperglycemia on current medication regimen.  Will increase mealtime aspart to 10 TID and decrease detemir to 30.  Last A1c reviewed-   Lab Results   Component Value Date    HGBA1C 8.5 (H) 04/23/2024     Most recent fingerstick glucose reviewed-   POC Glucose   Date Value Ref Range Status   04/25/2024 250 (H) 70 - 110 Final   04/25/2024 214 (H) 70 - 110 Final   04/25/2024 255 (H) 70 - 110 Final       Current correctional scale  Medium    Antihyperglycemics (From admission, onward)    Start     Stop Route Frequency Ordered    04/23/24 2100  insulin detemir U-100 (Levemir) pen 40 Units         -- SubQ Nightly 04/23/24 1558    04/23/24 1654  insulin aspart U-100 pen 0-10 Units         -- SubQ Before meals & nightly PRN 04/23/24 1558    04/23/24 1645  insulin aspart U-100 pen 6 Units         -- SubQ 3 times daily with meals 04/23/24 1558        Hold Oral hypoglycemics while patient is in the hospital.

## 2024-04-26 NOTE — SUBJECTIVE & OBJECTIVE
Interval History:  no new issues since admission.  Surgery is planned Friday.  She is not extraordinarily short of breath today.  Feels at her baseline.    Review of Systems   Constitutional:  Negative for fever.   Respiratory:  Negative for cough.    Cardiovascular:  Negative for chest pain.     Objective:     Vital Signs (Most Recent):  Temp: 98.1 °F (36.7 °C) (04/25/24 1914)  Pulse: 77 (04/25/24 1958)  Resp: 16 (04/25/24 1958)  BP: (!) 142/67 (04/25/24 1914)  SpO2: 97 % (04/25/24 1958) Vital Signs (24h Range):  Temp:  [97.5 °F (36.4 °C)-98.1 °F (36.7 °C)] 98.1 °F (36.7 °C)  Pulse:  [69-88] 77  Resp:  [16-20] 16  SpO2:  [91 %-97 %] 97 %  BP: (141-177)/(67-93) 142/67     Weight: 99.8 kg (220 lb)  Body mass index is 35.51 kg/m².    Intake/Output Summary (Last 24 hours) at 4/25/2024 2032  Last data filed at 4/25/2024 0845  Gross per 24 hour   Intake 460 ml   Output --   Net 460 ml         Physical Exam  Constitutional:       General: She is not in acute distress.     Appearance: She is not ill-appearing.   Eyes:      General:         Right eye: No discharge.         Left eye: No discharge.   Neck:      Vascular: No JVD.   Cardiovascular:      Rate and Rhythm: Normal rate and regular rhythm.   Pulmonary:      Effort: Pulmonary effort is normal.      Breath sounds: Normal breath sounds. Decreased air movement present.   Abdominal:      General: Abdomen is flat. Bowel sounds are normal. There is no distension.      Palpations: Abdomen is soft.      Tenderness: There is no abdominal tenderness.   Musculoskeletal:      Right lower leg: No edema.      Left lower leg: No edema.   Skin:     General: Skin is warm and moist.      Findings: No rash.   Neurological:      Mental Status: She is alert and oriented to person, place, and time.   Psychiatric:         Attention and Perception: Attention normal.         Mood and Affect: Mood and affect normal.         Speech: Speech normal.             Significant Labs: All pertinent  labs within the past 24 hours have been reviewed.    Significant Imaging:  no new imaging

## 2024-04-26 NOTE — ASSESSMENT & PLAN NOTE
Continue prednisone 50 daily.  Also DuoNeb q.6 hours.  Azithromycin prophylaxis.  Exacerbation almost resolved.

## 2024-04-30 ENCOUNTER — ANESTHESIA EVENT (OUTPATIENT)
Dept: SURGERY | Facility: HOSPITAL | Age: 74
DRG: 472 | End: 2024-04-30
Payer: MEDICARE

## 2024-04-30 ENCOUNTER — HOSPITAL ENCOUNTER (INPATIENT)
Facility: HOSPITAL | Age: 74
LOS: 1 days | Discharge: HOME HEALTH | DRG: 472 | End: 2024-05-01
Attending: NEUROLOGICAL SURGERY | Admitting: NEUROLOGICAL SURGERY
Payer: MEDICARE

## 2024-04-30 ENCOUNTER — ANESTHESIA (OUTPATIENT)
Dept: SURGERY | Facility: HOSPITAL | Age: 74
DRG: 472 | End: 2024-04-30
Payer: MEDICARE

## 2024-04-30 DIAGNOSIS — J44.1 COPD EXACERBATION: ICD-10-CM

## 2024-04-30 DIAGNOSIS — Z98.890 S/P LUMBAR DISCECTOMY: ICD-10-CM

## 2024-04-30 DIAGNOSIS — Z01.818 PRE-OP TESTING: ICD-10-CM

## 2024-04-30 DIAGNOSIS — M48.02 CERVICAL SPINAL STENOSIS: ICD-10-CM

## 2024-04-30 DIAGNOSIS — Z98.1 STATUS POST CERVICAL SPINAL FUSION: Primary | ICD-10-CM

## 2024-04-30 PROBLEM — J44.9 COPD (CHRONIC OBSTRUCTIVE PULMONARY DISEASE): Status: ACTIVE | Noted: 2024-04-23

## 2024-04-30 LAB
ANION GAP SERPL CALC-SCNC: 5 MMOL/L (ref 8–16)
BUN SERPL-MCNC: 22 MG/DL (ref 8–23)
CALCIUM SERPL-MCNC: 8.9 MG/DL (ref 8.7–10.5)
CHLORIDE SERPL-SCNC: 98 MMOL/L (ref 95–110)
CO2 SERPL-SCNC: 33 MMOL/L (ref 23–29)
CREAT SERPL-MCNC: 1 MG/DL (ref 0.5–1.4)
EST. GFR  (NO RACE VARIABLE): 58.8 ML/MIN/1.73 M^2
GLUCOSE SERPL-MCNC: 244 MG/DL (ref 70–110)
GLUCOSE SERPL-MCNC: 250 MG/DL (ref 70–110)
GLUCOSE SERPL-MCNC: 287 MG/DL (ref 70–110)
GLUCOSE SERPL-MCNC: 325 MG/DL (ref 70–110)
GLUCOSE SERPL-MCNC: 327 MG/DL (ref 70–110)
GLUCOSE SERPL-MCNC: 331 MG/DL (ref 70–110)
POTASSIUM SERPL-SCNC: 4.2 MMOL/L (ref 3.5–5.1)
SODIUM SERPL-SCNC: 136 MMOL/L (ref 136–145)

## 2024-04-30 PROCEDURE — 25000003 PHARM REV CODE 250: Performed by: NURSE ANESTHETIST, CERTIFIED REGISTERED

## 2024-04-30 PROCEDURE — 27800903 OPTIME MED/SURG SUP & DEVICES OTHER IMPLANTS: Performed by: NEUROLOGICAL SURGERY

## 2024-04-30 PROCEDURE — 63600175 PHARM REV CODE 636 W HCPCS: Performed by: NEUROLOGICAL SURGERY

## 2024-04-30 PROCEDURE — 37000008 HC ANESTHESIA 1ST 15 MINUTES: Performed by: NEUROLOGICAL SURGERY

## 2024-04-30 PROCEDURE — 0RG20A0 FUSION OF 2 OR MORE CERVICAL VERTEBRAL JOINTS WITH INTERBODY FUSION DEVICE, ANTERIOR APPROACH, ANTERIOR COLUMN, OPEN APPROACH: ICD-10-PCS | Performed by: NEUROLOGICAL SURGERY

## 2024-04-30 PROCEDURE — 0RT30ZZ RESECTION OF CERVICAL VERTEBRAL DISC, OPEN APPROACH: ICD-10-PCS | Performed by: NEUROLOGICAL SURGERY

## 2024-04-30 PROCEDURE — 99900035 HC TECH TIME PER 15 MIN (STAT)

## 2024-04-30 PROCEDURE — 63600175 PHARM REV CODE 636 W HCPCS: Performed by: ANESTHESIOLOGY

## 2024-04-30 PROCEDURE — 25000003 PHARM REV CODE 250

## 2024-04-30 PROCEDURE — D9220A PRA ANESTHESIA: Mod: CRNA,,, | Performed by: NURSE ANESTHETIST, CERTIFIED REGISTERED

## 2024-04-30 PROCEDURE — 21400001 HC TELEMETRY ROOM

## 2024-04-30 PROCEDURE — 25000242 PHARM REV CODE 250 ALT 637 W/ HCPCS: Performed by: INTERNAL MEDICINE

## 2024-04-30 PROCEDURE — 80048 BASIC METABOLIC PNL TOTAL CA: CPT | Performed by: STUDENT IN AN ORGANIZED HEALTH CARE EDUCATION/TRAINING PROGRAM

## 2024-04-30 PROCEDURE — 25000003 PHARM REV CODE 250: Performed by: NEUROLOGICAL SURGERY

## 2024-04-30 PROCEDURE — 36620 INSERTION CATHETER ARTERY: CPT | Mod: 59,,, | Performed by: STUDENT IN AN ORGANIZED HEALTH CARE EDUCATION/TRAINING PROGRAM

## 2024-04-30 PROCEDURE — 27000221 HC OXYGEN, UP TO 24 HOURS

## 2024-04-30 PROCEDURE — 63600175 PHARM REV CODE 636 W HCPCS

## 2024-04-30 PROCEDURE — 01N10ZZ RELEASE CERVICAL NERVE, OPEN APPROACH: ICD-10-PCS | Performed by: NEUROLOGICAL SURGERY

## 2024-04-30 PROCEDURE — 63081 REMOVE VERT BODY DCMPRN CRVL: CPT | Mod: ,,, | Performed by: NEUROLOGICAL SURGERY

## 2024-04-30 PROCEDURE — 71000033 HC RECOVERY, INTIAL HOUR: Performed by: NEUROLOGICAL SURGERY

## 2024-04-30 PROCEDURE — 94799 UNLISTED PULMONARY SVC/PX: CPT

## 2024-04-30 PROCEDURE — 82962 GLUCOSE BLOOD TEST: CPT | Performed by: NEUROLOGICAL SURGERY

## 2024-04-30 PROCEDURE — 27201423 OPTIME MED/SURG SUP & DEVICES STERILE SUPPLY: Performed by: NEUROLOGICAL SURGERY

## 2024-04-30 PROCEDURE — 94761 N-INVAS EAR/PLS OXIMETRY MLT: CPT

## 2024-04-30 PROCEDURE — 25000003 PHARM REV CODE 250: Performed by: STUDENT IN AN ORGANIZED HEALTH CARE EDUCATION/TRAINING PROGRAM

## 2024-04-30 PROCEDURE — 00NW0ZZ RELEASE CERVICAL SPINAL CORD, OPEN APPROACH: ICD-10-PCS | Performed by: NEUROLOGICAL SURGERY

## 2024-04-30 PROCEDURE — 22853 INSJ BIOMECHANICAL DEVICE: CPT | Mod: ,,, | Performed by: NEUROLOGICAL SURGERY

## 2024-04-30 PROCEDURE — 22585 ARTHRD ANT NTRBD MIN DSC EA: CPT | Mod: ,,, | Performed by: NEUROLOGICAL SURGERY

## 2024-04-30 PROCEDURE — 99900031 HC PATIENT EDUCATION (STAT)

## 2024-04-30 PROCEDURE — 25000003 PHARM REV CODE 250: Performed by: HEALTH CARE PROVIDER

## 2024-04-30 PROCEDURE — 20930 SP BONE ALGRFT MORSEL ADD-ON: CPT | Mod: ,,, | Performed by: NEUROLOGICAL SURGERY

## 2024-04-30 PROCEDURE — 36000710: Performed by: NEUROLOGICAL SURGERY

## 2024-04-30 PROCEDURE — 22845 INSERT SPINE FIXATION DEVICE: CPT | Mod: 59,,, | Performed by: NEUROLOGICAL SURGERY

## 2024-04-30 PROCEDURE — 94640 AIRWAY INHALATION TREATMENT: CPT

## 2024-04-30 PROCEDURE — 22554 ARTHRD ANT NTRBD MIN DSC CRV: CPT | Mod: 51,,, | Performed by: NEUROLOGICAL SURGERY

## 2024-04-30 PROCEDURE — 63600175 PHARM REV CODE 636 W HCPCS: Performed by: NURSE ANESTHETIST, CERTIFIED REGISTERED

## 2024-04-30 PROCEDURE — 20936 SP BONE AGRFT LOCAL ADD-ON: CPT | Mod: ,,, | Performed by: NEUROLOGICAL SURGERY

## 2024-04-30 PROCEDURE — C1713 ANCHOR/SCREW BN/BN,TIS/BN: HCPCS | Performed by: NEUROLOGICAL SURGERY

## 2024-04-30 PROCEDURE — 63600175 PHARM REV CODE 636 W HCPCS: Performed by: HEALTH CARE PROVIDER

## 2024-04-30 PROCEDURE — 71000039 HC RECOVERY, EACH ADD'L HOUR: Performed by: NEUROLOGICAL SURGERY

## 2024-04-30 PROCEDURE — 4A11X4G MONITORING OF PERIPHERAL NERVOUS ELECTRICAL ACTIVITY, INTRAOPERATIVE, EXTERNAL APPROACH: ICD-10-PCS | Performed by: NEUROLOGICAL SURGERY

## 2024-04-30 PROCEDURE — D9220A PRA ANESTHESIA: Mod: ANES,,, | Performed by: STUDENT IN AN ORGANIZED HEALTH CARE EDUCATION/TRAINING PROGRAM

## 2024-04-30 PROCEDURE — 36000711: Performed by: NEUROLOGICAL SURGERY

## 2024-04-30 PROCEDURE — 94760 N-INVAS EAR/PLS OXIMETRY 1: CPT

## 2024-04-30 PROCEDURE — 37000009 HC ANESTHESIA EA ADD 15 MINS: Performed by: NEUROLOGICAL SURGERY

## 2024-04-30 PROCEDURE — 63600175 PHARM REV CODE 636 W HCPCS: Performed by: STUDENT IN AN ORGANIZED HEALTH CARE EDUCATION/TRAINING PROGRAM

## 2024-04-30 DEVICE — IMPLANTABLE DEVICE: Type: IMPLANTABLE DEVICE | Site: SPINE CERVICAL | Status: FUNCTIONAL

## 2024-04-30 DEVICE — PUTTY I-FACTOR PEPTIDE 2.5CC: Type: IMPLANTABLE DEVICE | Site: SPINE CERVICAL | Status: FUNCTIONAL

## 2024-04-30 RX ORDER — ONDANSETRON HYDROCHLORIDE 2 MG/ML
INJECTION, SOLUTION INTRAMUSCULAR; INTRAVENOUS
Status: DISCONTINUED | OUTPATIENT
Start: 2024-04-30 | End: 2024-04-30

## 2024-04-30 RX ORDER — MUPIROCIN 20 MG/G
OINTMENT TOPICAL
Status: DISCONTINUED | OUTPATIENT
Start: 2024-04-30 | End: 2024-04-30 | Stop reason: HOSPADM

## 2024-04-30 RX ORDER — ROPINIROLE 1 MG/1
1 TABLET, FILM COATED ORAL NIGHTLY
Status: DISCONTINUED | OUTPATIENT
Start: 2024-04-30 | End: 2024-05-01 | Stop reason: HOSPADM

## 2024-04-30 RX ORDER — MUPIROCIN 20 MG/G
1 OINTMENT TOPICAL 2 TIMES DAILY
Status: DISCONTINUED | OUTPATIENT
Start: 2024-04-30 | End: 2024-04-30 | Stop reason: HOSPADM

## 2024-04-30 RX ORDER — LIDOCAINE HYDROCHLORIDE 20 MG/ML
INJECTION, SOLUTION EPIDURAL; INFILTRATION; INTRACAUDAL; PERINEURAL
Status: DISCONTINUED | OUTPATIENT
Start: 2024-04-30 | End: 2024-04-30

## 2024-04-30 RX ORDER — ROCURONIUM BROMIDE 10 MG/ML
INJECTION, SOLUTION INTRAVENOUS
Status: DISCONTINUED | OUTPATIENT
Start: 2024-04-30 | End: 2024-04-30

## 2024-04-30 RX ORDER — CETIRIZINE HYDROCHLORIDE 10 MG/1
10 TABLET ORAL DAILY
Status: DISCONTINUED | OUTPATIENT
Start: 2024-05-01 | End: 2024-05-01 | Stop reason: HOSPADM

## 2024-04-30 RX ORDER — OXYCODONE HYDROCHLORIDE 5 MG/1
5 TABLET ORAL EVERY 4 HOURS PRN
Status: DISCONTINUED | OUTPATIENT
Start: 2024-04-30 | End: 2024-05-01 | Stop reason: HOSPADM

## 2024-04-30 RX ORDER — ONDANSETRON 4 MG/1
8 TABLET, ORALLY DISINTEGRATING ORAL EVERY 6 HOURS PRN
Status: DISCONTINUED | OUTPATIENT
Start: 2024-04-30 | End: 2024-05-01 | Stop reason: HOSPADM

## 2024-04-30 RX ORDER — BISACODYL 10 MG/1
10 SUPPOSITORY RECTAL DAILY PRN
Status: DISCONTINUED | OUTPATIENT
Start: 2024-04-30 | End: 2024-05-01 | Stop reason: HOSPADM

## 2024-04-30 RX ORDER — HYDROMORPHONE HYDROCHLORIDE 1 MG/ML
0.2 INJECTION, SOLUTION INTRAMUSCULAR; INTRAVENOUS; SUBCUTANEOUS EVERY 5 MIN PRN
Status: DISCONTINUED | OUTPATIENT
Start: 2024-04-30 | End: 2024-04-30 | Stop reason: HOSPADM

## 2024-04-30 RX ORDER — MEPERIDINE HYDROCHLORIDE 50 MG/ML
12.5 INJECTION INTRAMUSCULAR; INTRAVENOUS; SUBCUTANEOUS EVERY 10 MIN PRN
Status: DISCONTINUED | OUTPATIENT
Start: 2024-04-30 | End: 2024-04-30 | Stop reason: HOSPADM

## 2024-04-30 RX ORDER — DIPHENHYDRAMINE HYDROCHLORIDE 50 MG/ML
12.5 INJECTION INTRAMUSCULAR; INTRAVENOUS
Status: DISCONTINUED | OUTPATIENT
Start: 2024-04-30 | End: 2024-04-30 | Stop reason: HOSPADM

## 2024-04-30 RX ORDER — VANCOMYCIN HCL IN 5 % DEXTROSE 1G/250ML
2000 PLASTIC BAG, INJECTION (ML) INTRAVENOUS
Status: DISCONTINUED | OUTPATIENT
Start: 2024-04-30 | End: 2024-04-30 | Stop reason: HOSPADM

## 2024-04-30 RX ORDER — OXYCODONE HYDROCHLORIDE 5 MG/1
5 TABLET ORAL
Status: DISCONTINUED | OUTPATIENT
Start: 2024-04-30 | End: 2024-04-30 | Stop reason: HOSPADM

## 2024-04-30 RX ORDER — FENTANYL CITRATE 50 UG/ML
INJECTION, SOLUTION INTRAMUSCULAR; INTRAVENOUS
Status: DISCONTINUED | OUTPATIENT
Start: 2024-04-30 | End: 2024-04-30

## 2024-04-30 RX ORDER — PANTOPRAZOLE SODIUM 40 MG/1
40 TABLET, DELAYED RELEASE ORAL DAILY
Status: DISCONTINUED | OUTPATIENT
Start: 2024-05-01 | End: 2024-05-01 | Stop reason: HOSPADM

## 2024-04-30 RX ORDER — IBUPROFEN 200 MG
24 TABLET ORAL
Status: DISCONTINUED | OUTPATIENT
Start: 2024-04-30 | End: 2024-05-01 | Stop reason: HOSPADM

## 2024-04-30 RX ORDER — KETAMINE HCL IN 0.9 % NACL 50 MG/5 ML
SYRINGE (ML) INTRAVENOUS
Status: DISCONTINUED | OUTPATIENT
Start: 2024-04-30 | End: 2024-04-30

## 2024-04-30 RX ORDER — ALBUTEROL SULFATE 0.83 MG/ML
2.5 SOLUTION RESPIRATORY (INHALATION) EVERY 4 HOURS PRN
Status: DISCONTINUED | OUTPATIENT
Start: 2024-04-30 | End: 2024-05-01 | Stop reason: HOSPADM

## 2024-04-30 RX ORDER — FAMOTIDINE 10 MG/ML
INJECTION INTRAVENOUS
Status: DISCONTINUED | OUTPATIENT
Start: 2024-04-30 | End: 2024-04-30

## 2024-04-30 RX ORDER — OXYCODONE AND ACETAMINOPHEN 7.5; 325 MG/1; MG/1
1 TABLET ORAL EVERY 6 HOURS
Status: DISCONTINUED | OUTPATIENT
Start: 2024-04-30 | End: 2024-05-01 | Stop reason: HOSPADM

## 2024-04-30 RX ORDER — METHYLPREDNISOLONE ACETATE 80 MG/ML
INJECTION, SUSPENSION INTRA-ARTICULAR; INTRALESIONAL; INTRAMUSCULAR; SOFT TISSUE
Status: DISCONTINUED | OUTPATIENT
Start: 2024-04-30 | End: 2024-04-30 | Stop reason: HOSPADM

## 2024-04-30 RX ORDER — PROPOFOL 10 MG/ML
VIAL (ML) INTRAVENOUS CONTINUOUS PRN
Status: DISCONTINUED | OUTPATIENT
Start: 2024-04-30 | End: 2024-04-30

## 2024-04-30 RX ORDER — DEXAMETHASONE SODIUM PHOSPHATE 4 MG/ML
2 INJECTION, SOLUTION INTRA-ARTICULAR; INTRALESIONAL; INTRAMUSCULAR; INTRAVENOUS; SOFT TISSUE EVERY 6 HOURS
Status: DISCONTINUED | OUTPATIENT
Start: 2024-04-30 | End: 2024-05-01 | Stop reason: HOSPADM

## 2024-04-30 RX ORDER — IPRATROPIUM BROMIDE AND ALBUTEROL SULFATE 2.5; .5 MG/3ML; MG/3ML
3 SOLUTION RESPIRATORY (INHALATION) EVERY 6 HOURS
Status: DISCONTINUED | OUTPATIENT
Start: 2024-04-30 | End: 2024-05-01 | Stop reason: HOSPADM

## 2024-04-30 RX ORDER — ONDANSETRON HYDROCHLORIDE 2 MG/ML
4 INJECTION, SOLUTION INTRAVENOUS DAILY PRN
Status: DISCONTINUED | OUTPATIENT
Start: 2024-04-30 | End: 2024-04-30 | Stop reason: HOSPADM

## 2024-04-30 RX ORDER — MUPIROCIN 20 MG/G
OINTMENT TOPICAL 2 TIMES DAILY
Status: DISCONTINUED | OUTPATIENT
Start: 2024-04-30 | End: 2024-05-01 | Stop reason: HOSPADM

## 2024-04-30 RX ORDER — BUDESONIDE 0.5 MG/2ML
0.5 INHALANT ORAL EVERY 12 HOURS
Status: DISCONTINUED | OUTPATIENT
Start: 2024-04-30 | End: 2024-05-01 | Stop reason: HOSPADM

## 2024-04-30 RX ORDER — DEXAMETHASONE SODIUM PHOSPHATE 4 MG/ML
INJECTION, SOLUTION INTRA-ARTICULAR; INTRALESIONAL; INTRAMUSCULAR; INTRAVENOUS; SOFT TISSUE
Status: DISCONTINUED | OUTPATIENT
Start: 2024-04-30 | End: 2024-04-30

## 2024-04-30 RX ORDER — METOPROLOL SUCCINATE 50 MG/1
100 TABLET, EXTENDED RELEASE ORAL 2 TIMES DAILY
Status: DISCONTINUED | OUTPATIENT
Start: 2024-04-30 | End: 2024-05-01 | Stop reason: HOSPADM

## 2024-04-30 RX ORDER — SUCCINYLCHOLINE CHLORIDE 20 MG/ML
INJECTION INTRAMUSCULAR; INTRAVENOUS
Status: DISCONTINUED | OUTPATIENT
Start: 2024-04-30 | End: 2024-04-30

## 2024-04-30 RX ORDER — PROCHLORPERAZINE EDISYLATE 5 MG/ML
5 INJECTION INTRAMUSCULAR; INTRAVENOUS EVERY 6 HOURS PRN
Status: DISCONTINUED | OUTPATIENT
Start: 2024-04-30 | End: 2024-05-01 | Stop reason: HOSPADM

## 2024-04-30 RX ORDER — HYDROMORPHONE HYDROCHLORIDE 1 MG/ML
2 INJECTION, SOLUTION INTRAMUSCULAR; INTRAVENOUS; SUBCUTANEOUS
Status: DISCONTINUED | OUTPATIENT
Start: 2024-04-30 | End: 2024-05-01 | Stop reason: HOSPADM

## 2024-04-30 RX ORDER — GLUCAGON 1 MG
1 KIT INJECTION
Status: DISCONTINUED | OUTPATIENT
Start: 2024-04-30 | End: 2024-05-01 | Stop reason: HOSPADM

## 2024-04-30 RX ORDER — ARFORMOTEROL TARTRATE 15 UG/2ML
15 SOLUTION RESPIRATORY (INHALATION) 2 TIMES DAILY
Status: DISCONTINUED | OUTPATIENT
Start: 2024-04-30 | End: 2024-05-01 | Stop reason: HOSPADM

## 2024-04-30 RX ORDER — INSULIN ASPART 100 [IU]/ML
0-10 INJECTION, SOLUTION INTRAVENOUS; SUBCUTANEOUS
Status: DISCONTINUED | OUTPATIENT
Start: 2024-04-30 | End: 2024-05-01 | Stop reason: HOSPADM

## 2024-04-30 RX ORDER — ALUMINUM HYDROXIDE, MAGNESIUM HYDROXIDE, AND SIMETHICONE 1200; 120; 1200 MG/30ML; MG/30ML; MG/30ML
30 SUSPENSION ORAL EVERY 4 HOURS PRN
Status: DISCONTINUED | OUTPATIENT
Start: 2024-04-30 | End: 2024-05-01 | Stop reason: HOSPADM

## 2024-04-30 RX ORDER — AMOXICILLIN 250 MG
2 CAPSULE ORAL NIGHTLY PRN
Status: DISCONTINUED | OUTPATIENT
Start: 2024-04-30 | End: 2024-05-01 | Stop reason: HOSPADM

## 2024-04-30 RX ORDER — BUPIVACAINE HCL/EPINEPHRINE 0.25-.0005
VIAL (ML) INJECTION
Status: DISCONTINUED | OUTPATIENT
Start: 2024-04-30 | End: 2024-04-30 | Stop reason: HOSPADM

## 2024-04-30 RX ORDER — PROPOFOL 10 MG/ML
VIAL (ML) INTRAVENOUS
Status: DISCONTINUED | OUTPATIENT
Start: 2024-04-30 | End: 2024-04-30

## 2024-04-30 RX ORDER — AMLODIPINE BESYLATE 5 MG/1
5 TABLET ORAL DAILY
Status: DISCONTINUED | OUTPATIENT
Start: 2024-05-01 | End: 2024-05-01 | Stop reason: HOSPADM

## 2024-04-30 RX ORDER — TIZANIDINE 4 MG/1
4 TABLET ORAL EVERY 8 HOURS
Status: DISCONTINUED | OUTPATIENT
Start: 2024-04-30 | End: 2024-05-01 | Stop reason: HOSPADM

## 2024-04-30 RX ORDER — IBUPROFEN 200 MG
16 TABLET ORAL
Status: DISCONTINUED | OUTPATIENT
Start: 2024-04-30 | End: 2024-05-01 | Stop reason: HOSPADM

## 2024-04-30 RX ORDER — IPRATROPIUM BROMIDE AND ALBUTEROL SULFATE 2.5; .5 MG/3ML; MG/3ML
3 SOLUTION RESPIRATORY (INHALATION) EVERY 6 HOURS PRN
Status: CANCELLED | OUTPATIENT
Start: 2024-04-30

## 2024-04-30 RX ADMIN — PANCRELIPASE 2 CAPSULE: 60000; 12000; 38000 CAPSULE, DELAYED RELEASE PELLETS ORAL at 08:04

## 2024-04-30 RX ADMIN — HYDROMORPHONE HYDROCHLORIDE 0.2 MG: 1 INJECTION, SOLUTION INTRAMUSCULAR; INTRAVENOUS; SUBCUTANEOUS at 01:04

## 2024-04-30 RX ADMIN — ONDANSETRON 4 MG: 2 INJECTION INTRAMUSCULAR; INTRAVENOUS at 09:04

## 2024-04-30 RX ADMIN — TIZANIDINE 4 MG: 4 TABLET ORAL at 09:04

## 2024-04-30 RX ADMIN — Medication 50 MG: at 09:04

## 2024-04-30 RX ADMIN — INSULIN ASPART 4 UNITS: 100 INJECTION, SOLUTION INTRAVENOUS; SUBCUTANEOUS at 08:04

## 2024-04-30 RX ADMIN — OXYCODONE HYDROCHLORIDE 5 MG: 5 TABLET ORAL at 01:04

## 2024-04-30 RX ADMIN — IPRATROPIUM BROMIDE AND ALBUTEROL SULFATE 3 ML: 2.5; .5 SOLUTION RESPIRATORY (INHALATION) at 09:04

## 2024-04-30 RX ADMIN — ROPINIROLE HYDROCHLORIDE 1 MG: 1 TABLET, FILM COATED ORAL at 08:04

## 2024-04-30 RX ADMIN — DEXAMETHASONE SODIUM PHOSPHATE 10 MG: 4 INJECTION, SOLUTION INTRAMUSCULAR; INTRAVENOUS at 09:04

## 2024-04-30 RX ADMIN — MUPIROCIN 1 G: 20 OINTMENT TOPICAL at 08:04

## 2024-04-30 RX ADMIN — ONDANSETRON 8 MG: 4 TABLET, ORALLY DISINTEGRATING ORAL at 05:04

## 2024-04-30 RX ADMIN — LIDOCAINE HYDROCHLORIDE 50 MG: 20 INJECTION, SOLUTION INTRAVENOUS at 09:04

## 2024-04-30 RX ADMIN — TIZANIDINE 4 MG: 4 TABLET ORAL at 03:04

## 2024-04-30 RX ADMIN — PROPOFOL 150 MCG/KG/MIN: 10 INJECTION, EMULSION INTRAVENOUS at 09:04

## 2024-04-30 RX ADMIN — METOPROLOL SUCCINATE 100 MG: 50 TABLET, FILM COATED, EXTENDED RELEASE ORAL at 08:04

## 2024-04-30 RX ADMIN — OXYCODONE HYDROCHLORIDE AND ACETAMINOPHEN 1 TABLET: 7.5; 325 TABLET ORAL at 11:04

## 2024-04-30 RX ADMIN — INSULIN ASPART 8 UNITS: 100 INJECTION, SOLUTION INTRAVENOUS; SUBCUTANEOUS at 05:04

## 2024-04-30 RX ADMIN — FENTANYL CITRATE 50 MCG: 0.05 INJECTION, SOLUTION INTRAMUSCULAR; INTRAVENOUS at 09:04

## 2024-04-30 RX ADMIN — SODIUM CHLORIDE, SODIUM LACTATE, POTASSIUM CHLORIDE, AND CALCIUM CHLORIDE: .6; .31; .03; .02 INJECTION, SOLUTION INTRAVENOUS at 09:04

## 2024-04-30 RX ADMIN — PROPOFOL 150 MG: 10 INJECTION, EMULSION INTRAVENOUS at 09:04

## 2024-04-30 RX ADMIN — Medication 120 MG: at 09:04

## 2024-04-30 RX ADMIN — ROCURONIUM BROMIDE 5 MG: 10 INJECTION, SOLUTION INTRAVENOUS at 09:04

## 2024-04-30 RX ADMIN — PANCRELIPASE 2 CAPSULE: 60000; 12000; 38000 CAPSULE, DELAYED RELEASE PELLETS ORAL at 05:04

## 2024-04-30 RX ADMIN — INSULIN HUMAN 10 UNITS: 100 INJECTION, SOLUTION PARENTERAL at 01:04

## 2024-04-30 RX ADMIN — DEXAMETHASONE SODIUM PHOSPHATE 2 MG: 4 INJECTION, SOLUTION INTRA-ARTICULAR; INTRALESIONAL; INTRAMUSCULAR; INTRAVENOUS; SOFT TISSUE at 11:04

## 2024-04-30 RX ADMIN — ALUMINUM HYDROXIDE, MAGNESIUM HYDROXIDE, AND SIMETHICONE 30 ML: 200; 200; 20 SUSPENSION ORAL at 08:04

## 2024-04-30 RX ADMIN — SODIUM CHLORIDE: 0.9 INJECTION, SOLUTION INTRAVENOUS at 09:04

## 2024-04-30 RX ADMIN — OXYCODONE HYDROCHLORIDE AND ACETAMINOPHEN 1 TABLET: 7.5; 325 TABLET ORAL at 05:04

## 2024-04-30 RX ADMIN — DEXAMETHASONE SODIUM PHOSPHATE 2 MG: 4 INJECTION, SOLUTION INTRA-ARTICULAR; INTRALESIONAL; INTRAMUSCULAR; INTRAVENOUS; SOFT TISSUE at 05:04

## 2024-04-30 RX ADMIN — HYDROMORPHONE HYDROCHLORIDE 0.2 MG: 1 INJECTION, SOLUTION INTRAMUSCULAR; INTRAVENOUS; SUBCUTANEOUS at 02:04

## 2024-04-30 RX ADMIN — FAMOTIDINE 20 MG: 10 INJECTION, SOLUTION INTRAVENOUS at 09:04

## 2024-04-30 RX ADMIN — BUDESONIDE INHALATION 0.5 MG: 0.5 SUSPENSION RESPIRATORY (INHALATION) at 09:04

## 2024-04-30 NOTE — PROGRESS NOTES
Patient doing well postop.  Bronchodilators ordered.  On 2 L nasal cannula.  Full consult in the morning

## 2024-04-30 NOTE — ANESTHESIA PROCEDURE NOTES
Intubation    Date/Time: 4/30/2024 9:45 AM    Performed by: Joshua Panda CRNA  Authorized by: Miguel Cardenas MD    Intubation:     Induction:  Intravenous    Intubated:  Postinduction    Mask Ventilation:  Not attempted    Attempts:  1    Attempted By:  CRNA    Method of Intubation:  Video laryngoscopy    Blade:  [place] 3    Laryngeal View Grade: Grade I - full view of cords      Difficult Airway Encountered?: No      Complications:  None    Airway Device:  Oral endotracheal tube    Airway Device Size:  7.5    Style/Cuff Inflation:  Cuffed (inflated to minimal occlusive pressure)    Inflation Amount (mL):  5    Tube secured:  21    Secured at:  The lips    Placement Verified By:  Capnometry    Complicating Factors:  None    Findings Post-Intubation:  BS equal bilateral and atraumatic/condition of teeth unchanged  Notes:      Mouth block in (gauze/tape)

## 2024-04-30 NOTE — NURSING
Nurses Note -- 4 Eyes      4/30/2024   3:10 PM      Skin assessed during: Admit      [] No Altered Skin Integrity Present    []Prevention Measures Documented      [x] Yes- Altered Skin Integrity Present or Discovered   [x] LDA Added if Not in Epic (Describe Wound)   [x] New Altered Skin Integrity was Present on Admit and Documented in LDA   [x] Wound Image Taken    Wound Care Consulted? No    Attending Nurse:  Ana Winchester RN/Staff Member:     NICKOLAS Lee               Unable to see SELINA drain placement to right chest wall due to bandage and Site covered S/P cervical spine stenosis SX.

## 2024-04-30 NOTE — OP NOTE
Date of procedure:  April 30, 2024    Preoperative diagnosis:    1. Cervical spinal stenosis, severe    2. Cervical degenerative disc disease     3. Cervical radiculopathy    4. Cervical spondylolisthesis     5. Osteoporosis    6. COPD    Postoperative diagnosis:    1. Same     Procedures:    1. Anterior cervical diskectomy and decompression of spinal cord C4-5, C5-6    2. Partial C5 corpectomy    3. Anterior cervical interbody arthrodesis C4-5, C5-6    4. Insertion of biomechanical device C4-5, C5-6    5. Anterior cervical plating C4-6    6. Harvesting local autograft    7. Implantation of allograft    8. Intraoperative neuro monitoring    9. Use of operative microscope for microdissection    Surgeon:  Phu Pierson D.O., MBA    Assistant:  See medical record     Anesthesia:  General endotracheal      EBL: 100cc    Specimens:  None     Findings: Poor bone quality    Complications:  None       Operative procedure:     The patient was brought to the operating room where adequate IV access was obtained.  Upon anesthesia induction, the patient was gently endotracheally intubated using a glide scope while maintaining the cervical spine in neutral position. A radial arterial line was placed per anesthesia.  A Sharpe catheter was placed without difficulty.  Neuro monitoring leads were placed in the usual fashion, including erbs point bilaterally.  Pre positioning motor and somatosensory evoked potentials were established in all extremities.  The patient was then positioned supine on the operative table with the cervical spine gently extended.  Following surgical positioning, neuro monitoring potentials were unchanged.  All bony prominences were padded.  Eye goggles were applied. The shoulders were gently retracted inferiorly with surgical tape. The anterior cervical region was clipped prepped and draped in sterile fashion.  The C-arm was draped in sterile fashion and brought to the operative field.  Using intraoperative  fluoroscopy the C4-5 and C5-6 disc spaces were identified  A surgical time-out was performed.  The patient received IV antibiotics within 1 hour of incision.  Lidocaine with epinephrine was injected at the incision site.  A 10 blade scalpel was used to make a horizontal incision based on the medial border of the right sternocleidomastoid muscle.  Self retaining retractors were placed.  Sharp dissection was carried along the medial border of the sternocleidomastoid muscle.  The carotid sheath was identified and maintained lateral to the plane of dissection.  Sharp dissection was continued  through the pretracheal and prevertebral fascia.  The C4-5 C5-6 disc spaces were confirmed with intraoperative fluoroscopy.   The longus coli muscles were elevated bilaterally with electrocautery.  Self retaining retractors were placed overlying C4-5 disc space.  Ideal distracting pins were placed in the midportion of C4 and C5 vertebral bodies.  The microscope was draped and brought to the operative field.    Anterior and posterior osteophytes were resected.  A large right paracentral posterior osteophyte contributing to severe foraminal stenosis was resected.  The C4-5 disc was completely removed. The posterior longitudinal ligament was elevated and resected in piecemeal fashion.  Bilateral foraminotomies were completed at C4-5. The spinal canal was inspected and adequately decompressed circumferentially.  Meticulous hemostasis was obtained.  A Medtronic Titan titanium interbody device was sized and packed with autograft and cell based allograft. The interbody device was then placed in the C4-5 interbody space with adequate positioning confirmed via fluoroscopy.  Motor-evoked potentials repeated in stable The retractor and distracting pins were then placed overlying the C5-6 disc and a complete diskectomy was performed.  The disc space was collapsed.  Large anterior and posterior osteophytes were resected.  Preoperative imaging  demonstrated compression extending cephalad from the C5-6 disc space behind the inferior aspect of the C5 vertebral body.  Consequently, partial C5 corpectomy was indicated for adequate decompression of the spinal cord.  Using high-speed bur the lower 50% of C5 vertebral body was resected.  The posterior longitudinal ligament was elevated and resected.  The spinal canal was decompressed circumferentially.  Bilateral foraminotomies at C5-6 were completed.  The neural elements were inspected with a ball ended probe for areas of compression at C5-6 and none was identified.  A Medtronic titanium interbody device was packed with autograft and allograft and placed in the C5-6 disc space.  Fluoroscopy confirmed adequate positioning of the interbody device at C5-6.  Motor-evoked potentials repeated and stable.   A  Medtronic Zevo anterior cervical plate was then sized, contoured, and afixed to the anterior spinal column extending from C4-6 using 4 x 14 mm self drilling screws.  Intermittent lateral and AP fluoroscopic images were utilized to ensure adequate implant positioning.  The wound was copiously irrigated.  Meticulous hemostasis was then again established.  The esophagus and vital neck structures were carefully inspected under the microscope with no evidence of iatrogenic injury.  A soft round silicone perforated drain was placed along with anterior spinal column and tunneled remote to the incision site and secured. The platysma was reapproximated with running 3-0 Vicryl suture.  Subcutaneous tissue was reapproximated with 2-0 Vicryl suture in interrupted fashion.  The dermis was reapproximated with running 4-0 Monocryl.  A sterile surgical dressing was applied.  A rigid cervical orthosis was then placed on the patient. The patient was extubated in the operating room and transferred to the PACU in hemodynamically stable condition.  Sponge and needle counts were correct. Neuromonitoring potentials remained unchanged  throughout the above procedures.

## 2024-04-30 NOTE — CARE UPDATE
04/30/24 1612   Patient Assessment/Suction   Level of Consciousness (AVPU) alert   Respiratory Effort Unlabored   Expansion/Accessory Muscles/Retractions no use of accessory muscles   All Lung Fields Breath Sounds diminished;clear   Rhythm/Pattern, Respiratory depth regular;pattern regular   Cough Frequency no cough   PRE-TX-O2   Device (Oxygen Therapy) nasal cannula   $ Is the patient on Low Flow Oxygen? Yes   Flow (L/min) (Oxygen Therapy) 2   SpO2 96 %   Pulse Oximetry Type Intermittent   $ Pulse Oximetry - Multiple Charge Pulse Oximetry - Multiple   Pulse 66   Resp 15   Incentive Spirometer   $ Incentive Spirometer Charges done with encouragement   Administration (IS) instruction provided, initial   Number of Repetitions (IS) 10   Level Incentive Spirometer (mL) 1500   Patient Tolerance (IS) good   Tobacco Cessation Intervention   Do you use any type of tobacco product? No   Respiratory Evaluation   $ Care Plan Tech Time 15 min   $ Eval/Re-eval Charges Evaluation   Evaluation For New Orders   Admitting Diagnosis Cervical spinal stenosis   Cardiac Diagnosis AFIB   Pulmonary Diagnosis COPD   Current Surgeries Spinal Surgery   Home Oxygen   Has Home Oxygen? Yes   Liter Flow 2   Duration continuous   Route nasal cannula   Mode continuous   Device home concentrator   Home Aerosol, MDI, DPI, and Other Treatments/Therapies   Home Respiratory Therapy Per Patient/Review of Chart Yes   Aerosol Home Meds/Freq Duoneb prn   MDI Home Meds/Freq Breztri Bid   Oxygen Care Plan   Oxygen Care Plan Per Protocol   SPO2 Goal (%) MD order   Rationale Maintain Home oxygen   Bronchodilator Care Plan   Bronchodilator Care Plan Aerosol   Aerosol Meds w/ frequency Albuterol - Ipratropium (DUO-NEB) 0.5mg-3mg(2.5ml) 3ml Nebulizer Solution2.5mg Q 6Hr  (Bid Pulmicort and Brovana)   Rationale Other  (COPD)   Atelectasis Care Plan   Atelectasis Care Plan Incentive Spiromentry   Frequency TID   I.S. Goal (ml) 940 ml   I.S. Minimum (ml) 1880 ml    Airway Clearance Care Plan   Rationale No rationale found

## 2024-04-30 NOTE — ANESTHESIA PREPROCEDURE EVALUATION
04/30/2024  Maddie Otero is a 73 y.o., female.      Results for orders placed or performed during the hospital encounter of 04/23/24   EKG 12-lead    Collection Time: 04/23/24  8:49 AM   Result Value Ref Range    QRS Duration 104 ms    [facility] QTC Calculation 469 ms    Narrative    Test Reason : R07.9,    Vent. Rate : 067 BPM     Atrial Rate : 067 BPM     P-R Int : 192 ms          QRS Dur : 104 ms      QT Int : 444 ms       P-R-T Axes : 074 033 071 degrees     QTc Int : 469 ms    Normal sinus rhythm  Normal ECG  When compared with ECG of 13-MAY-2021 01:31,  No significant change was found    Referred By:             Confirmed By:              Lab Results   Component Value Date    WBC 9.45 04/26/2024    HGB 12.8 04/26/2024    HCT 40.1 04/26/2024     (H) 04/26/2024     04/26/2024     BMP  Lab Results   Component Value Date     (L) 04/26/2024    K 5.3 (H) 04/26/2024    CL 99 04/26/2024    CO2 34 (H) 04/26/2024    BUN 26 (H) 04/26/2024    CREATININE 1.0 04/26/2024    CALCIUM 9.0 04/26/2024    ANIONGAP 2 (L) 04/26/2024     (H) 04/26/2024     (H) 04/25/2024     (H) 04/24/2024       Results for orders placed during the hospital encounter of 12/07/23    Echo    Interpretation Summary    Left Ventricle: The left ventricle is normal in size. Increased wall thickness. There is mild concentric hypertrophy. Normal wall motion. There is normal systolic function with a visually estimated ejection fraction of 60 - 65%. There is normal diastolic function.    Right Ventricle: Normal right ventricular cavity size. Wall thickness is normal. Right ventricle wall motion  is normal. Systolic function is normal.    Tricuspid Valve: There is mild regurgitation with a centrally directed jet.    Pulmonary Artery: No pulmonary hypertension.    IVC/SVC: Normal venous pressure at 3 mmHg.          Pre-op Assessment    I have reviewed the Patient Summary Reports.     I have reviewed the Nursing Notes. I have reviewed the NPO Status.   I have reviewed the Medications.     Review of Systems  Anesthesia Hx:  No problems with previous Anesthesia             Denies Family Hx of Anesthesia complications.    Denies Personal Hx of Anesthesia complications.                    Social:  Former Smoker, No Alcohol Use       Hematology/Oncology:    Oncology Normal                Hematology Comments: Eiquis Therapy held 7 days                    EENT/Dental:  EENT/Dental Normal           Cardiovascular:     Hypertension, well controlled    Dysrhythmias atrial fibrillation      hyperlipidemia   ECG has been reviewed.                          Pulmonary:  Pneumonia COPD Asthma     History of Acute on chronic respiratory failure with hypoxia  History of Exacerbations   Albuterol Inhaler use   Duo Neb use   2-3L NC as needed with exertion   Asthma:   Chronic Obstructive Pulmonary Disease (COPD):   Emphysema                   Renal/:  Chronic Renal Disease   History of STORMY              Hepatic/GI:        Pancreatic insufficiency  Patient post Whipple Procedure           Musculoskeletal:  Arthritis   Cervical spinal stenosis  HNP (herniated nucleus pulposus)  Muscle cramps   Joint Disease:  Arthritis     Spine Disorders: lumbar and cervical Chronic Pain           Neurological:    Neuromuscular Disease,                                   Endocrine:  Diabetes, poorly controlled, type 2, using insulin         Obesity / BMI > 30      Physical Exam  General: Well nourished, Cooperative, Alert and Oriented    Airway:  Mallampati: III   Mouth Opening: Normal  TM Distance: > 6 cm  Tongue: Normal  Neck ROM: Normal ROM    Dental:  Intact  Perm. Dental Bridge   Chest/Lungs:  Clear to auscultation, Normal Respiratory Rate    Heart:  Rate: Normal  Rhythm: Regular Rhythm        Anesthesia Plan  Type of Anesthesia, risks & benefits  discussed:    Anesthesia Type: Gen ETT  Intra-op Monitoring Plan: Standard ASA Monitors and Art Line  Post Op Pain Control Plan: multimodal analgesia and IV/PO Opioids PRN  Induction:  IV  Airway Plan: Video, Post-Induction  Informed Consent: Informed consent signed with the Patient and all parties understand the risks and agree with anesthesia plan.  All questions answered.   ASA Score: 3  Anesthesia Plan Notes:       GETA  Radial Artery Catheter   Second Peripheral IV   Minimal  Versed   Minimal Fentanyl   Benadryl 6.25mg iv, Decadron 8mg, iv Zofran 4mg iv, Pepcid 20mg iv   Ofirmev 1000mg iv Ketamine/Robinul iv  Sugammadex        Ready For Surgery From Anesthesia Perspective.     .

## 2024-04-30 NOTE — ANESTHESIA PROCEDURE NOTES
Arterial    Diagnosis: cervical stenosis    Patient location during procedure: done in OR  Timeout: 4/30/2024 9:50 AM  Procedure end time: 4/30/2024 10:00 AM    Staffing  Authorizing Provider: Miguel Cardenas MD  Performing Provider: Miguel Cardenas MD    Staffing  Performed by: Miguel Cardenas MD  Authorized by: Miguel Cardenas MD    Anesthesiologist was present at the time of the procedure.    Preanesthetic Checklist  Completed: patient identified, IV checked, site marked, risks and benefits discussed, surgical consent, monitors and equipment checked, pre-op evaluation, timeout performed and anesthesia consent givenArterial  Skin Prep: chlorhexidine gluconate  Local Infiltration: none  Orientation: right  Location: radial    Catheter Size: 20 G  Catheter placement by Ultrasound guidance. Heme positive aspiration all ports.   Vessel Caliber: medium, patent, compressibility normal  Needle advanced into vessel with real time Ultrasound guidance.  Sterile sheath used.Insertion Attempts: 1  Assessment  Dressing: secured with tape and tegaderm and sutured in place and taped  Patient: Tolerated well

## 2024-04-30 NOTE — TRANSFER OF CARE
"Anesthesia Transfer of Care Note    Patient: Maddie Otero    Procedure(s) Performed: Procedure(s) (LRB):  DISCECTOMY, SPINE, CERVICAL, ANTERIOR APPROACH, WITH FUSION (N/A)    Patient location: PACU    Anesthesia Type: general    Transport from OR: Transported from OR on room air with adequate spontaneous ventilation    Post pain: adequate analgesia    Post assessment: no apparent anesthetic complications and tolerated procedure well    Post vital signs: stable    Level of consciousness: awake and alert    Nausea/Vomiting: no nausea/vomiting    Complications: none    Transfer of care protocol was followed      Last vitals: Visit Vitals  BP (!) 157/82   Pulse 60   Temp 36.6 °C (97.8 °F) (Oral)   Resp 17   Ht 5' 8" (1.727 m)   Wt 99.8 kg (220 lb)   LMP  (LMP Unknown)   SpO2 99%   Breastfeeding No   BMI 33.45 kg/m²     "

## 2024-04-30 NOTE — PLAN OF CARE
Patient taken to room via bed at this  time. Awake and alert not distressful. NICKOLAS Perez at bedside to assume care of patient

## 2024-04-30 NOTE — ANESTHESIA POSTPROCEDURE EVALUATION
Anesthesia Post Evaluation    Patient: Maddie Otero    Procedure(s) Performed: Procedure(s) (LRB):  DISCECTOMY, SPINE, CERVICAL, ANTERIOR APPROACH, WITH FUSION (N/A)    Final Anesthesia Type: general      Patient location during evaluation: PACU  Patient participation: Yes- Able to Participate  Level of consciousness: awake and alert, oriented and awake  Post-procedure vital signs: reviewed and stable  Pain management: adequate  Airway patency: patent    PONV status at discharge: No PONV  Anesthetic complications: no      Cardiovascular status: blood pressure returned to baseline, hemodynamically stable and stable  Respiratory status: unassisted, spontaneous ventilation and nasal cannula  Hydration status: euvolemic  Follow-up not needed.              Vitals Value Taken Time   /59 04/30/24 1415   Temp 36.2 °C (97.2 °F) 04/30/24 1330   Pulse 61 04/30/24 1421   Resp 16 04/30/24 1421   SpO2 97 % 04/30/24 1421   Vitals shown include unfiled device data.      No case tracking events are documented in the log.      Pain/Gemini Score: Pain Rating Prior to Med Admin: 6 (4/30/2024  2:10 PM)  Gemini Score: 9 (4/30/2024  2:15 PM)

## 2024-04-30 NOTE — PLAN OF CARE
Problem: Adult Inpatient Plan of Care  Goal: Plan of Care Review  Outcome: Progressing  Goal: Patient-Specific Goal (Individualized)  Outcome: Progressing  Goal: Absence of Hospital-Acquired Illness or Injury  Outcome: Progressing  Goal: Optimal Comfort and Wellbeing  Outcome: Progressing  Goal: Readiness for Transition of Care  Outcome: Progressing     Problem: Diabetes Comorbidity  Goal: Blood Glucose Level Within Targeted Range  Outcome: Progressing     Problem: Acute Kidney Injury/Impairment  Goal: Fluid and Electrolyte Balance  Outcome: Progressing  Goal: Improved Oral Intake  Outcome: Progressing  Goal: Effective Renal Function  Outcome: Progressing     Problem: Pneumonia  Goal: Fluid Balance  Outcome: Progressing  Goal: Resolution of Infection Signs and Symptoms  Outcome: Progressing  Goal: Effective Oxygenation and Ventilation  Outcome: Progressing     Problem: Wound  Goal: Optimal Coping  Outcome: Progressing  Goal: Optimal Functional Ability  Outcome: Progressing  Goal: Absence of Infection Signs and Symptoms  Outcome: Progressing  Goal: Improved Oral Intake  Outcome: Progressing  Goal: Optimal Pain Control and Function  Outcome: Progressing  Goal: Skin Health and Integrity  Outcome: Progressing  Goal: Optimal Wound Healing  Outcome: Progressing     Problem: Infection  Goal: Absence of Infection Signs and Symptoms  Outcome: Progressing

## 2024-04-30 NOTE — CARE UPDATE
04/30/24 1612   Patient Assessment/Suction   Level of Consciousness (AVPU) alert   Respiratory Effort Unlabored   Expansion/Accessory Muscles/Retractions no use of accessory muscles   All Lung Fields Breath Sounds diminished;clear   Rhythm/Pattern, Respiratory depth regular;pattern regular   Cough Frequency no cough   PRE-TX-O2   Device (Oxygen Therapy) nasal cannula   $ Is the patient on Low Flow Oxygen? Yes   Flow (L/min) (Oxygen Therapy) 2   SpO2 96 %   Pulse Oximetry Type Intermittent   $ Pulse Oximetry - Multiple Charge Pulse Oximetry - Multiple   Pulse 66   Resp 15   Incentive Spirometer   $ Incentive Spirometer Charges done with encouragement   Administration (IS) instruction provided, initial   Number of Repetitions (IS) 10   Level Incentive Spirometer (mL) 1500   Patient Tolerance (IS) good   Tobacco Cessation Intervention   Do you use any type of tobacco product? No   Respiratory Evaluation   $ Care Plan Tech Time 15 min   $ Eval/Re-eval Charges Evaluation   Evaluation For New Orders   Admitting Diagnosis Cervical spinal stenosis   Cardiac Diagnosis AFIB   Pulmonary Diagnosis COPD   Current Surgeries Spinal Surgery   Home Oxygen   Has Home Oxygen? Yes   Liter Flow 2   Duration continuous   Route nasal cannula   Mode continuous   Device home concentrator   Home Aerosol, MDI, DPI, and Other Treatments/Therapies   Home Respiratory Therapy Per Patient/Review of Chart Yes   Aerosol Home Meds/Freq Duoneb prn   Oxygen Care Plan   Oxygen Care Plan Per Protocol   SPO2 Goal (%) MD order   Rationale Maintain Home oxygen   Bronchodilator Care Plan   Bronchodilator Care Plan Aerosol   Aerosol Meds w/ frequency Albuterol - Ipratropium (DUO-NEB) 0.5mg-3mg(2.5ml) 3ml Nebulizer Solution0.1-0.15mg/kg PRN   Rationale Other  (COPD)   Atelectasis Care Plan   Atelectasis Care Plan Incentive Spiromentry   Frequency TID   Airway Clearance Care Plan   Rationale No rationale found

## 2024-05-01 ENCOUNTER — PATIENT OUTREACH (OUTPATIENT)
Dept: ADMINISTRATIVE | Facility: CLINIC | Age: 74
End: 2024-05-01
Payer: MEDICARE

## 2024-05-01 ENCOUNTER — TELEPHONE (OUTPATIENT)
Dept: NEUROSURGERY | Facility: CLINIC | Age: 74
End: 2024-05-01

## 2024-05-01 VITALS
HEIGHT: 68 IN | OXYGEN SATURATION: 97 % | TEMPERATURE: 98 F | BODY MASS INDEX: 33.34 KG/M2 | RESPIRATION RATE: 16 BRPM | SYSTOLIC BLOOD PRESSURE: 117 MMHG | WEIGHT: 220 LBS | DIASTOLIC BLOOD PRESSURE: 56 MMHG | HEART RATE: 65 BPM

## 2024-05-01 PROBLEM — Z98.1 STATUS POST CERVICAL SPINAL FUSION: Status: ACTIVE | Noted: 2024-05-01

## 2024-05-01 LAB
ANION GAP SERPL CALC-SCNC: 6 MMOL/L (ref 8–16)
BASOPHILS # BLD AUTO: 0.03 K/UL (ref 0–0.2)
BASOPHILS NFR BLD: 0.3 % (ref 0–1.9)
BUN SERPL-MCNC: 23 MG/DL (ref 8–23)
CALCIUM SERPL-MCNC: 8.4 MG/DL (ref 8.7–10.5)
CHLORIDE SERPL-SCNC: 96 MMOL/L (ref 95–110)
CO2 SERPL-SCNC: 31 MMOL/L (ref 23–29)
CREAT SERPL-MCNC: 1 MG/DL (ref 0.5–1.4)
DIFFERENTIAL METHOD BLD: ABNORMAL
EOSINOPHIL # BLD AUTO: 0 K/UL (ref 0–0.5)
EOSINOPHIL NFR BLD: 0 % (ref 0–8)
ERYTHROCYTE [DISTWIDTH] IN BLOOD BY AUTOMATED COUNT: 12.6 % (ref 11.5–14.5)
EST. GFR  (NO RACE VARIABLE): 58.8 ML/MIN/1.73 M^2
GLUCOSE SERPL-MCNC: 366 MG/DL (ref 70–110)
GLUCOSE SERPL-MCNC: 381 MG/DL (ref 70–110)
GLUCOSE SERPL-MCNC: 402 MG/DL (ref 70–110)
GLUCOSE SERPL-MCNC: 444 MG/DL (ref 70–110)
HCT VFR BLD AUTO: 39.6 % (ref 37–48.5)
HGB BLD-MCNC: 12.9 G/DL (ref 12–16)
IMM GRANULOCYTES # BLD AUTO: 0.18 K/UL (ref 0–0.04)
IMM GRANULOCYTES NFR BLD AUTO: 1.6 % (ref 0–0.5)
LYMPHOCYTES # BLD AUTO: 1.1 K/UL (ref 1–4.8)
LYMPHOCYTES NFR BLD: 9.8 % (ref 18–48)
MCH RBC QN AUTO: 31.8 PG (ref 27–31)
MCHC RBC AUTO-ENTMCNC: 32.6 G/DL (ref 32–36)
MCV RBC AUTO: 98 FL (ref 82–98)
MONOCYTES # BLD AUTO: 0.6 K/UL (ref 0.3–1)
MONOCYTES NFR BLD: 5.1 % (ref 4–15)
NEUTROPHILS # BLD AUTO: 9.5 K/UL (ref 1.8–7.7)
NEUTROPHILS NFR BLD: 83.2 % (ref 38–73)
NRBC BLD-RTO: 0 /100 WBC
PLATELET # BLD AUTO: 198 K/UL (ref 150–450)
PMV BLD AUTO: 11 FL (ref 9.2–12.9)
POTASSIUM SERPL-SCNC: 4.8 MMOL/L (ref 3.5–5.1)
RBC # BLD AUTO: 4.06 M/UL (ref 4–5.4)
SODIUM SERPL-SCNC: 133 MMOL/L (ref 136–145)
WBC # BLD AUTO: 11.41 K/UL (ref 3.9–12.7)

## 2024-05-01 PROCEDURE — 97161 PT EVAL LOW COMPLEX 20 MIN: CPT

## 2024-05-01 PROCEDURE — 99232 SBSQ HOSP IP/OBS MODERATE 35: CPT | Mod: ,,, | Performed by: HEALTH CARE PROVIDER

## 2024-05-01 PROCEDURE — 25000242 PHARM REV CODE 250 ALT 637 W/ HCPCS: Performed by: INTERNAL MEDICINE

## 2024-05-01 PROCEDURE — 82962 GLUCOSE BLOOD TEST: CPT

## 2024-05-01 PROCEDURE — 25000003 PHARM REV CODE 250

## 2024-05-01 PROCEDURE — 63600175 PHARM REV CODE 636 W HCPCS: Performed by: HEALTH CARE PROVIDER

## 2024-05-01 PROCEDURE — 25000003 PHARM REV CODE 250: Performed by: HEALTH CARE PROVIDER

## 2024-05-01 PROCEDURE — 27000221 HC OXYGEN, UP TO 24 HOURS

## 2024-05-01 PROCEDURE — 94761 N-INVAS EAR/PLS OXIMETRY MLT: CPT

## 2024-05-01 PROCEDURE — 99223 1ST HOSP IP/OBS HIGH 75: CPT | Mod: ,,, | Performed by: INTERNAL MEDICINE

## 2024-05-01 PROCEDURE — 97116 GAIT TRAINING THERAPY: CPT

## 2024-05-01 PROCEDURE — 94640 AIRWAY INHALATION TREATMENT: CPT

## 2024-05-01 PROCEDURE — 99900031 HC PATIENT EDUCATION (STAT)

## 2024-05-01 PROCEDURE — 94760 N-INVAS EAR/PLS OXIMETRY 1: CPT

## 2024-05-01 PROCEDURE — 85025 COMPLETE CBC W/AUTO DIFF WBC: CPT | Performed by: HEALTH CARE PROVIDER

## 2024-05-01 PROCEDURE — 80048 BASIC METABOLIC PNL TOTAL CA: CPT | Performed by: HEALTH CARE PROVIDER

## 2024-05-01 PROCEDURE — 36415 COLL VENOUS BLD VENIPUNCTURE: CPT | Performed by: HEALTH CARE PROVIDER

## 2024-05-01 PROCEDURE — 99900035 HC TECH TIME PER 15 MIN (STAT)

## 2024-05-01 RX ORDER — SODIUM CHLORIDE 0.9 % (FLUSH) 0.9 %
10 SYRINGE (ML) INJECTION
Status: CANCELLED | OUTPATIENT
Start: 2024-05-01

## 2024-05-01 RX ORDER — AMOXICILLIN 250 MG
1 CAPSULE ORAL DAILY
Qty: 14 TABLET | Refills: 0 | Status: ON HOLD | OUTPATIENT
Start: 2024-05-01 | End: 2024-05-17 | Stop reason: HOSPADM

## 2024-05-01 RX ORDER — METHOCARBAMOL 750 MG/1
750 TABLET, FILM COATED ORAL 3 TIMES DAILY
Qty: 63 TABLET | Refills: 0 | Status: SHIPPED | OUTPATIENT
Start: 2024-05-01 | End: 2024-05-22

## 2024-05-01 RX ORDER — OXYCODONE AND ACETAMINOPHEN 5; 325 MG/1; MG/1
1 TABLET ORAL EVERY 6 HOURS PRN
Qty: 20 TABLET | Refills: 0 | Status: SHIPPED | OUTPATIENT
Start: 2024-05-01 | End: 2024-05-01 | Stop reason: HOSPADM

## 2024-05-01 RX ORDER — OXYCODONE AND ACETAMINOPHEN 5; 325 MG/1; MG/1
1 TABLET ORAL EVERY 6 HOURS PRN
Qty: 20 TABLET | Refills: 0 | Status: SHIPPED | OUTPATIENT
Start: 2024-05-01 | End: 2024-05-08

## 2024-05-01 RX ADMIN — PANTOPRAZOLE SODIUM 40 MG: 40 TABLET, DELAYED RELEASE ORAL at 05:05

## 2024-05-01 RX ADMIN — MUPIROCIN 1 G: 20 OINTMENT TOPICAL at 08:05

## 2024-05-01 RX ADMIN — PANCRELIPASE 2 CAPSULE: 60000; 12000; 38000 CAPSULE, DELAYED RELEASE PELLETS ORAL at 12:05

## 2024-05-01 RX ADMIN — DEXAMETHASONE SODIUM PHOSPHATE 2 MG: 4 INJECTION, SOLUTION INTRA-ARTICULAR; INTRALESIONAL; INTRAMUSCULAR; INTRAVENOUS; SOFT TISSUE at 05:05

## 2024-05-01 RX ADMIN — INSULIN ASPART 10 UNITS: 100 INJECTION, SOLUTION INTRAVENOUS; SUBCUTANEOUS at 05:05

## 2024-05-01 RX ADMIN — BUDESONIDE INHALATION 0.5 MG: 0.5 SUSPENSION RESPIRATORY (INHALATION) at 07:05

## 2024-05-01 RX ADMIN — PANCRELIPASE 2 CAPSULE: 60000; 12000; 38000 CAPSULE, DELAYED RELEASE PELLETS ORAL at 05:05

## 2024-05-01 RX ADMIN — AMLODIPINE BESYLATE 5 MG: 5 TABLET ORAL at 08:05

## 2024-05-01 RX ADMIN — IPRATROPIUM BROMIDE AND ALBUTEROL SULFATE 3 ML: 2.5; .5 SOLUTION RESPIRATORY (INHALATION) at 02:05

## 2024-05-01 RX ADMIN — PANCRELIPASE 2 CAPSULE: 60000; 12000; 38000 CAPSULE, DELAYED RELEASE PELLETS ORAL at 08:05

## 2024-05-01 RX ADMIN — DEXAMETHASONE SODIUM PHOSPHATE 2 MG: 4 INJECTION, SOLUTION INTRA-ARTICULAR; INTRALESIONAL; INTRAMUSCULAR; INTRAVENOUS; SOFT TISSUE at 12:05

## 2024-05-01 RX ADMIN — METOPROLOL SUCCINATE 100 MG: 50 TABLET, FILM COATED, EXTENDED RELEASE ORAL at 08:05

## 2024-05-01 RX ADMIN — TIZANIDINE 4 MG: 4 TABLET ORAL at 01:05

## 2024-05-01 RX ADMIN — ARFORMOTEROL TARTRATE 15 MCG: 15 SOLUTION RESPIRATORY (INHALATION) at 07:05

## 2024-05-01 RX ADMIN — OXYCODONE HYDROCHLORIDE AND ACETAMINOPHEN 1 TABLET: 7.5; 325 TABLET ORAL at 12:05

## 2024-05-01 RX ADMIN — IPRATROPIUM BROMIDE AND ALBUTEROL SULFATE 3 ML: 2.5; .5 SOLUTION RESPIRATORY (INHALATION) at 07:05

## 2024-05-01 RX ADMIN — INSULIN ASPART 10 UNITS: 100 INJECTION, SOLUTION INTRAVENOUS; SUBCUTANEOUS at 08:05

## 2024-05-01 RX ADMIN — IPRATROPIUM BROMIDE AND ALBUTEROL SULFATE 3 ML: 2.5; .5 SOLUTION RESPIRATORY (INHALATION) at 01:05

## 2024-05-01 RX ADMIN — OXYCODONE HYDROCHLORIDE AND ACETAMINOPHEN 1 TABLET: 7.5; 325 TABLET ORAL at 08:05

## 2024-05-01 RX ADMIN — INSULIN ASPART 10 UNITS: 100 INJECTION, SOLUTION INTRAVENOUS; SUBCUTANEOUS at 12:05

## 2024-05-01 RX ADMIN — CETIRIZINE HYDROCHLORIDE 10 MG: 10 TABLET, FILM COATED ORAL at 08:05

## 2024-05-01 RX ADMIN — TIZANIDINE 4 MG: 4 TABLET ORAL at 05:05

## 2024-05-01 NOTE — HPI
Ms. Maddie Otero is a 73-year-old female with past medical history of atrial fibrillation on Eliquis, COPD on oxygen at home, type 2 diabetes, hypertension, hyperlipidemia, diverticulosis was seen and evaluated status-post anterior cervical diskectomy and decompression of the spinal cord C4-5, C5-6 with device insertion with Dr. Pierson. Patient presented to the emergency department on 04/23/2024 complaining of right-sided chest pain, neck pain and back pain. MRI cervical spine was significant for severe spinal stenosis with severe bilateral foraminal narrowing at C5-C6 and cervical myelomalcia. She was evaluated in office for pre-op work. Labs and diagnostic testing were reviewed. Hospital medicine was consulted evaluation and medical management. We will follow as consultants.

## 2024-05-01 NOTE — HOSPITAL COURSE
5/1:  Patient is status post C4-6 anterior cervical diskectomy decompression performed on 04/30/2024.  POD 1.  Afebrile.  Blood pressure 160/93.  Pulse 63.  Respirations 18.  WBC 11.41.  Hemoglobin 12.9.  Sodium 133.  Creatinine 1.0.  SELINA drain x1 in place with 25 mL of output in last 12 hours.  Patient found in bed, awake and alert with nurse at bedside.  She reports anticipated incisional pain 6/10 however she did not receive her pain medication this morning.  She also reported she was able to eat her dinner on yesterday without difficulty.  Denies pain with swallowing.  Preoperative symptoms in right upper extremity have improved.  Denies dysphagia, dysphonia, new extremity weakness.  Furthermore, patient is requesting pain medication upon discharge.  She is on a pain contract and stated she we will not see her pain doctor until 05/09/2024.  Last refill of Percocet  mg, 1 tab every 8 hours was on 04/12/2024 and it was a 30 day supply.  She informed me that per her pain contract she can have pain medication if she underwent surgery.

## 2024-05-01 NOTE — ASSESSMENT & PLAN NOTE
Chronic, controlled. Latest blood pressure and vitals reviewed-     Temp:  [97.5 °F (36.4 °C)-98.4 °F (36.9 °C)]   Pulse:  [63-72]   Resp:  [15-18]   BP: (131-160)/(62-93)   SpO2:  [94 %-99 %] .   Home meds for hypertension were reviewed and noted below.   Hypertension Medications               amLODIPine (NORVASC) 5 MG tablet Take 5 mg by mouth once daily.    furosemide (LASIX) 40 MG tablet Take 40 mg by mouth as needed (edema).     metoprolol succinate (TOPROL-XL) 100 MG 24 hr tablet Take 100 mg by mouth 2 (two) times daily.            While in the hospital, will manage blood pressure as follows; Continue home antihypertensive regimen    Will utilize p.r.n. blood pressure medication only if patient's blood pressure greater than 180/110 and she develops symptoms such as worsening chest pain or shortness of breath.

## 2024-05-01 NOTE — CARE UPDATE
05/01/24 0232   Patient Assessment/Suction   Level of Consciousness (AVPU) alert   Respiratory Effort Normal;Unlabored   Expansion/Accessory Muscles/Retractions no use of accessory muscles   All Lung Fields Breath Sounds diminished;clear   Rhythm/Pattern, Respiratory pattern regular   Cough Frequency no cough   PRE-TX-O2   Device (Oxygen Therapy) nasal cannula   $ Is the patient on Low Flow Oxygen? Yes   Flow (L/min) (Oxygen Therapy) 2   SpO2 (!) 94 %   Pulse Oximetry Type Intermittent   $ Pulse Oximetry - Single Charge Pulse Oximetry - Single   $ Pulse Oximetry - Multiple Charge Pulse Oximetry - Multiple   Pulse 68   Resp 18   Positioning Supine;HOB elevated 30 degrees   Positioning   Body Position position changed independently   Positioning/Transfer Devices pillows;in use   Aerosol Therapy   $ Aerosol Therapy Charges Aerosol Treatment   Daily Review of Necessity (SVN) completed   Respiratory Treatment Status (SVN) given   Treatment Route (SVN) oxygen;mouthpiece utilized   Patient Position HOB elevated   Post Treatment Assessment (SVN) increased aeration   Signs of Intolerance (SVN) none   Education   $ Education Bronchodilator;15 min

## 2024-05-01 NOTE — NURSING
Educated patient on how to properly empty and document SELINA drain output, patient verbalized understanding.

## 2024-05-01 NOTE — PLAN OF CARE
[facility]  Initial Discharge Assessment       Primary Care Provider: Carrie Gorman FNP    Admission Diagnosis: Cervical spinal stenosis [M48.02]    Admission Date: 4/30/2024  Expected Discharge Date: 5/2/2024     completed discharge assessment with pt at bedside. Pt AAOx4s. Pt lives at home with her adult daughter. Demographics, PCP, pharmacy, and insurance verified. No home health. No dialysis. Pt completes ADLs without assistance, but does have some DME listed below. Pt to discharge home via family transport. Pt has no other needs to be addressed at this time.     Transition of Care Barriers: None    Payor: HU[employer] MANAGED MEDICARE / Plan: HU[employer] MEDICARE PPO / Product Type: Medicare Advantage /     Extended Emergency Contact Information  Primary Emergency Contact: [place] [address] [facility]  Home Phone: [phone]  Mobile Phone: [phone]  Relation: Daughter    Discharge Plan A: Home Health, Home with family  Discharge Plan B: Home with family      [facility] [place], [place] - [address]  [address]  [place] [address]  Phone: [phone] Fax: [phone]    [facility] - [facility] [address]  [address] [address]  Phone: [phone] Fax: [phone]      Initial Assessment (most recent)       Adult Discharge Assessment - 05/01/24 1004          Discharge Assessment    Assessment Type Discharge Planning Assessment     Confirmed/corrected address, phone number and insurance Yes     Confirmed Demographics Correct on Facesheet     Source of Information patient     When was your last doctors appointment? --   2-3 weeks ago    Reason For Admission Cervical spinal stenosis     People in Home child(tami), adult     Facility Arrived From: Home     Do you expect to return to your current living situation? Yes     Do you have help at home or someone to help  you manage your care at home? Yes     Who are your caregiver(s) and their phone number(s)? Mariana Cotto [phone]     Prior to hospitilization cognitive status: Alert/Oriented     Current cognitive status: Alert/Oriented     Walking or Climbing Stairs Difficulty no     Dressing/Bathing Difficulty no     Home Accessibility wheelchair accessible     Home Layout Able to live on 1st floor     Equipment Currently Used at Home glucometer;nebulizer;oxygen   Oxygen ([employer]) 2-3L    Readmission within 30 days? Yes     Patient currently being followed by outpatient case management? No     Do you currently have service(s) that help you manage your care at home? No     Do you take prescription medications? Yes     Do you have prescription coverage? Yes     Coverage [employer] MANAGED MEDICARE - [employer]NA MEDICARE PPO     Do you have any problems affording any of your prescribed medications? No     Is the patient taking medications as prescribed? yes     Who is going to help you get home at discharge? Mariana Cotto Daughter [phone]     How do you get to doctors appointments? family or friend will provide;car, drives self   Mariana Cotto Daughter [phone]    Are you on dialysis? No     Do you take coumadin? No     Discharge Plan A Home Health;Home with family     Discharge Plan B Home with family     DME Needed Upon Discharge  none     Discharge Plan discussed with: Patient;Adult children     Name(s) and Number(s) Mariana Cotto Daughter [phone]     Transition of Care Barriers None        OTHER    Name(s) of People in Home Pt and Mariana Cotto Daughter [phone]

## 2024-05-01 NOTE — NURSING
Transferred patient from bed to chair with stand by assistance, patient tolerated well. Patient is now sitting up in chair eating breakfast.

## 2024-05-01 NOTE — CARE UPDATE
05/01/24 0715   Patient Assessment/Suction   Level of Consciousness (AVPU) alert   Respiratory Effort Normal;Unlabored   Expansion/Accessory Muscles/Retractions no use of accessory muscles   All Lung Fields Breath Sounds Anterior:;Lateral:;clear   Rhythm/Pattern, Respiratory unlabored;pattern regular;depth regular   Cough Frequency no cough   Skin Integrity   $ Wound Care Tech Time 15 min   Area Observed Right;Left;Behind ear;Nares   Skin Appearance without discoloration   PRE-TX-O2   Device (Oxygen Therapy) nasal cannula   $ Is the patient on Low Flow Oxygen? Yes   Flow (L/min) (Oxygen Therapy) 2   SpO2 98 %   Pulse Oximetry Type Intermittent   $ Pulse Oximetry - Multiple Charge Pulse Oximetry - Multiple   Pulse 63   Resp 16   Aerosol Therapy   $ Aerosol Therapy Charges Aerosol Treatment   Daily Review of Necessity (SVN) completed   Respiratory Treatment Status (SVN) given   Treatment Route (SVN) mask;oxygen   Patient Position semi-Willis's   Post Treatment Assessment (SVN) breath sounds improved   Signs of Intolerance (SVN) none   Breath Sounds Post-Respiratory Treatment   Throughout All Fields Post-Treatment All Fields   Throughout All Fields Post-Treatment aeration increased   Post-treatment Heart Rate (beats/min) 65   Post-treatment Resp Rate (breaths/min) 16   Incentive Spirometer   Administration (IS) self-administered   Education   $ Education Bronchodilator;15 min

## 2024-05-01 NOTE — ASSESSMENT & PLAN NOTE
S/P cervical diskectomy and decompression of the spinal cord and device insertion with  4/30/2024.  Aggressive IS  PT/OT  Cervical collar in place  PRN pain management  Expect dc later today

## 2024-05-01 NOTE — TELEPHONE ENCOUNTER
Called pt to assess for wound drain removal, pt has less than 12cc of fluid in her drain reservoir and was emptied last about 12 hours ago according to pt. Pt is still in the hospital so this nurse told pt he would call her tomorrow morning, 5/2/24, to see if she has been released and then schedule her for drain removal in our office.

## 2024-05-01 NOTE — SUBJECTIVE & OBJECTIVE
Interval History: pt seen and examined while sitting up in chair eating her lunch. She states she is feeling well.  Cervical collar in place.  Eager for discharge    Review of Systems   Constitutional:  Positive for activity change. Negative for appetite change, chills, fatigue and fever.   Respiratory:  Negative for cough, shortness of breath and wheezing.    Cardiovascular:  Negative for chest pain and palpitations.   Gastrointestinal: Negative.  Negative for abdominal pain, diarrhea, nausea and vomiting.   Musculoskeletal:  Positive for arthralgias. Negative for back pain and myalgias.   Skin:         Surgical incision clean dry and intact    Neurological:  Negative for weakness.   Psychiatric/Behavioral: Negative.     All other systems reviewed and are negative.    Objective:     Vital Signs (Most Recent):  Temp: 97.5 °F (36.4 °C) (05/01/24 1147)  Pulse: 66 (05/01/24 1325)  Resp: 16 (05/01/24 1325)  BP: 134/62 (05/01/24 1147)  SpO2: 98 % (05/01/24 1325) Vital Signs (24h Range):  Temp:  [97.5 °F (36.4 °C)-98.4 °F (36.9 °C)] 97.5 °F (36.4 °C)  Pulse:  [63-72] 66  Resp:  [15-18] 16  SpO2:  [94 %-99 %] 98 %  BP: (131-160)/(62-93) 134/62     Weight: 99.8 kg (220 lb)  Body mass index is 33.45 kg/m².    Intake/Output Summary (Last 24 hours) at 5/1/2024 1500  Last data filed at 5/1/2024 1238  Gross per 24 hour   Intake 600 ml   Output 670 ml   Net -70 ml         Physical Exam  Constitutional:       General: She is not in acute distress.     Appearance: Normal appearance. She is normal weight. She is not ill-appearing.      Interventions: Nasal cannula in place.   Neck:      Comments: Cervical collar in place.  Cardiovascular:      Rate and Rhythm: Normal rate.      Pulses: Normal pulses.      Heart sounds: Normal heart sounds.   Pulmonary:      Effort: Pulmonary effort is normal. No respiratory distress.      Breath sounds: Normal breath sounds.   Abdominal:      General: Bowel sounds are normal. There is no distension.       Palpations: Abdomen is soft.      Tenderness: There is no abdominal tenderness.   Musculoskeletal:      Right lower leg: No edema.      Left lower leg: No edema.   Skin:     General: Skin is warm and dry.      Capillary Refill: Capillary refill takes less than 2 seconds.      Comments: Surgical incision w/ SELINA drain    Neurological:      General: No focal deficit present.      Mental Status: She is alert and oriented to person, place, and time. Mental status is at baseline.   Psychiatric:         Mood and Affect: Mood normal.         Behavior: Behavior normal.             Significant Labs: All pertinent labs within the past 24 hours have been reviewed.  CBC:   Recent Labs   Lab 05/01/24  0504   WBC 11.41   HGB 12.9   HCT 39.6        CMP:   Recent Labs   Lab 04/30/24  0754 05/01/24  0504    133*   K 4.2 4.8   CL 98 96   CO2 33* 31*   * 366*   BUN 22 23   CREATININE 1.0 1.0   CALCIUM 8.9 8.4*   ANIONGAP 5* 6*       Significant Imaging: I have reviewed all pertinent imaging results/findings within the past 24 hours.

## 2024-05-01 NOTE — PLAN OF CARE
Charts and orders reviewed. Pt to discharge home with home health,  sent over AVS and MAR to Aj GONZALEZ.  [facility] rolling walker due to pt's payor source. [employer] to deliver rolling walker to pt's address if approved. Pt has no other needs to be addressed by case management. Pt cleared to discharge from case management standpoint.        05/01/24 1641   Final Note   Assessment Type Final Discharge Note   Anticipated Discharge Disposition Home-Health   What phone number can be called within the next 1-3 days to see how you are doing after discharge? [phone]   Hospital Resources/Appts/Education Provided Provided patient/caregiver with written discharge plan information;Provided education on problems/symptoms using teachback;Appointments scheduled and added to AVS;Post-Acute resouces added to AVS   Post-Acute Status   Post-Acute Authorization Home Health   Home Health Status Set-up Complete/Auth obtained   Coverage HU[employer] MANAGED MEDICARE - [employer] MEDICARE PPO   Discharge Delays None known at this time

## 2024-05-01 NOTE — CONSULTS
"Pulmonary/Critical Care Consult      PATIENT NAME: Maddie Otero  MRN: [id number]  TODAY'S DATE: 2024  7:37 AM  ADMIT DATE: 2024  AGE: 73 y.o. : 1950    CONSULT REQUESTED BY: Phu Pierson DO    REASON FOR CONSULT:   Postop management of her severe COPD    HPI:  The patient is a 73-year-old female with severe obstructive lung disease and a severe diffusion defect who underwent anterior cervical diskectomies and fusions with foraminotomies from C4-C6.  She did well intraoperatively and has done well postoperatively.  She is on her usual 2 L of oxygen.  She is receiving her bronchodilators.  She is asking when she can go home.    REVIEW OF SYSTEMS  GENERAL: Feeling hungry  EYES: Vision is good.  ENT:  Her anterior neck is uncomfortable in the [place] J collar  HEART: No chest pain or palpitations.  LUNGS: No cough, sputum, or wheezing.  GI: No Nausea, vomiting, constipation, diarrhea, or reflux.  : No dysuria, hesitancy, or nocturia.  SKIN: No lesions or rashes.  MUSCULOSKELETAL: No joint pain or myalgias.  NEURO: No headaches or neuropathy.  LYMPH: No edema or adenopathy.  PSYCH: No anxiety or depression.  ENDO: No weight change.    ALLERGIES  Review of patient's allergies indicates:   Allergen Reactions    Heparin Itching     Pt states she has no allergy      NOT AN ALLERGY . NOT AWARE OF TAKING IT    Hydrocodone Shortness Of Breath     MED "SHORTENS HER BREATHING"    Penicillins Anaphylaxis, Hives and Itching     Other reaction(s): Unknown  Childhood reaction, swelled      Sulfa (sulfonamide antibiotics) Hives    Doxycycline Nausea And Vomiting     Other reaction(s): Abdominal Pain    Clindamycin      "Felt like I was on fire down through throat and felt like I was having spasms in my throat"    Penicillin v      Childhood reaction, swelled       INPATIENT SCHEDULED MEDICATIONS  Current Facility-Administered Medications   Medication Dose Route Frequency    albuterol-ipratropium  3 mL " Nebulization Q6H    amLODIPine  5 mg Oral Daily    arformoteroL  15 mcg Nebulization BID    budesonide  0.5 mg Nebulization Q12H    cetirizine  10 mg Oral Daily    dexAMETHasone  2 mg Intravenous Q6H    lipase-protease-amylase 12,000-38,000-60,000 units  2 capsule Oral QID    metoprolol succinate  100 mg Oral BID    mupirocin   Nasal BID    oxyCODONE-acetaminophen  1 tablet Oral Q6H    pantoprazole  40 mg Oral Daily    rOPINIRole  1 mg Oral QHS    tiZANidine  4 mg Oral Q8H     Current Facility-Administered Medications   Medication Dose Route Frequency Last Rate Last Admin       MEDICAL AND SURGICAL HISTORY  Past Medical History:   Diagnosis Date    A-fib     Anticoagulant long-term use     Arthritis     COPD (chronic obstructive pulmonary disease)     COPD (chronic obstructive pulmonary disease) 2019    Diabetes mellitus, type 2     Diverticulosis     History of left knee replacement 2017    DR CRENSHAW    HNP (herniated nucleus pulposus)     LUMBAR    Hypertension     Lung disease     Pancreatic insufficiency     s/p whipple, non cancer    Presence of dental bridge     UPPER    Wears glasses      Past Surgical History:   Procedure Laterality Date    ANTERIOR CERVICAL DISCECTOMY W/ FUSION N/A 4/30/2024    Procedure: DISCECTOMY, SPINE, CERVICAL, ANTERIOR APPROACH, WITH FUSION;  Surgeon: Phu Pierson DO;  Location: [facility] OR;  Service: Neurosurgery;  Laterality: N/A;  ACDF C4-5, C5-6 with partial C5 Corpectomy    APPENDECTOMY      BACK SURGERY  1994    lower back    BACK SURGERY  2012    lower back    CHOLECYSTECTOMY      HERNIA REPAIR      JOINT REPLACEMENT Left     KNEE    KNEE ARTHROPLASTY Right 5/3/2021    Procedure: ARTHROPLASTY, KNEE;  Surgeon: Manule Willingham MD;  Location: [facility] OR;  Service: Orthopedics;  Laterality: Right;  guzman    LAPAROSCOPIC REPAIR OF INCISIONAL HERNIA N/A 10/22/2019    Procedure: REPAIR, HERNIA, INCISIONAL, LAPAROSCOPIC;  Surgeon: Pantera Almanza III, MD;  Location: [facility] OR;  Service:  General;  Laterality: N/A;    pancrease  2009    stents in pancrease     TONSILLECTOMY      TOTAL KNEE ARTHROPLASTY Left 08/10/2017    WHIPPLE PROCEDURE W/ LAPAROSCOPY  10/09       ALCOHOL, TOBACCO AND DRUG USE  Social History     Tobacco Use   Smoking Status Former    Current packs/day: 0.00    Average packs/day: 1 pack/day for 30.0 years (30.0 ttl pk-yrs)    Types: Cigarettes    Start date: 1987    Quit date: 2017    Years since quittin.0   Smokeless Tobacco Never     Social History     Substance and Sexual Activity   Alcohol Use No     Social History     Substance and Sexual Activity   Drug Use No       FAMILY HISTORY  Family History   Problem Relation Name Age of Onset    Diabetes Mother      Cancer Father         VITAL SIGNS (MOST RECENT)  Temp: 98.3 °F (36.8 °C) (24)  Pulse: 63 (24)  Resp: 16 (24)  BP: 133/69 (96) (24)  SpO2: 98 % (24)    INTAKE AND OUTPUT (LAST 24 HOURS):  Intake/Output Summary (Last 24 hours) at 2024 0737  Last data filed at 2024 0603  Gross per 24 hour   Intake 2150 ml   Output 1225 ml   Net 925 ml       WEIGHT  Wt Readings from Last 1 Encounters:   24 99.8 kg (220 lb)       PHYSICAL EXAM  GENERAL: Older patient in no distress.  HEENT: Pupils equal and reactive. Extraocular movements intact. Nose intact. Pharynx moist.  NECK: Supple.  [place] J collar in place.  SELINA drain with some serosanguineous drainage.  HEART: Regular rate and rhythm. No murmur or gallop auscultated.  LUNGS: Clear to auscultation and percussion. Lung excursion symmetrical. No change in fremitus. No adventitial noises.  ABDOMEN: Bowel sounds present. Non-tender, no masses palpated.  : Normal anatomy.  Pure wick in place.  EXTREMITIES: Normal muscle tone and joint movement, no cyanosis or clubbing.   LYMPHATICS: No adenopathy palpated, no edema.  SKIN: Dry, intact, no lesions.   NEURO: Cranial nerves II-XII intact. Motor strength 5/5  bilaterally, upper and lower extremities.  PSYCH: Appropriate affect      CBC LAST (LAST 24 HOURS)  Recent Labs   Lab 05/01/24  0504   WBC 11.41   RBC 4.06   HGB 12.9   HCT 39.6   MCV 98   MCH 31.8*   MCHC 32.6   RDW 12.6      MPV 11.0   GRAN 83.2*  9.5*   LYMPH 9.8*  1.1   MONO 5.1  0.6   BASO 0.03   NRBC 0       CHEMISTRY LAST (LAST 24 HOURS)  Recent Labs   Lab 05/01/24  0504   *   K 4.8   CL 96   CO2 31*   ANIONGAP 6*   BUN 23   CREATININE 1.0   *   CALCIUM 8.4*         LAST 7 DAYS MICROBIOLOGY   Microbiology Results (last 7 days)       ** No results found for the last 168 hours. **            MOST RECENT IMAGING  X-Ray Chest 1 View  Narrative: EXAMINATION:  XR CHEST 1 VIEW    CLINICAL HISTORY:  COPD; Chronic obstructive pulmonary disease with (acute) exacerbation    TECHNIQUE:  Single frontal view of the chest was performed.    COMPARISON:  04/23/2024, multiple prior exams dating back to 2009    FINDINGS:  Cardiac monitor wires overlie the chest.  Cardiomediastinal silhouette appears stable.  Scattered thin bandlike interstitial opacities in bilateral mid and lower lungs suggestive of chronic scarring or subsegmental atelectasis, similar to prior exam.  No definite new focal consolidation, significant pleural effusion or pneumothorax.  No acute osseous abnormality.  Anterior fusion hardware in the lower cervical spine.  Impression: No definite acute radiographic abnormality.    Electronically signed by: Emmanuel Moss  Date:    04/30/2024  Time:    20:07  X-Ray Cervical Spine AP And Lateral  Narrative: EXAMINATION:  XR CERVICAL SPINE AP LATERAL    CLINICAL HISTORY:  Post op cervical spinal fusion;    TECHNIQUE:  AP, lateral and open mouth views of the cervical spine were performed.    COMPARISON:  MRI cervical spine 04/23/2024    FINDINGS:  Recent postoperative changes of anterior cervical discectomy and fusion at C4-C6.  Hardware appears well aligned and intact without evidence of fracture  or loosening.  Surgical drain within the prevertebral soft tissues.  There is no radiographically evident acute fracture or subluxation.  The C7 vertebral body is incompletely visualized on lateral view due to superimposition of tissues.  Multilevel facet arthropathy.  The odontoid process appears intact.    The airway is patent.  Impression: Recent postoperative changes of anterior cervical discectomy and fusion at C4-C6.  No evidence of acute hardware complication.    Electronically signed by: Emmanuel Moss  Date:    04/30/2024  Time:    20:05  FL Flouro Usage  See Notes    This procedure was auto-finalized.      CURRENT VISIT EKG  No results found for this visit on 04/30/24.    ECHOCARDIOGRAM RESULTS  Results for orders placed during the hospital encounter of 12/07/23    Echo    Interpretation Summary    Left Ventricle: The left ventricle is normal in size. Increased wall thickness. There is mild concentric hypertrophy. Normal wall motion. There is normal systolic function with a visually estimated ejection fraction of 60 - 65%. There is normal diastolic function.    Right Ventricle: Normal right ventricular cavity size. Wall thickness is normal. Right ventricle wall motion  is normal. Systolic function is normal.    Tricuspid Valve: There is mild regurgitation with a centrally directed jet.    Pulmonary Artery: No pulmonary hypertension.    IVC/SVC: Normal venous pressure at 3 mmHg.        Oxygen INFORMATION     2 L         IMPRESSION AND PLAN  Status post C4 through C6 anterior cervical fusion and diskectomies and foraminotomies  - doing well  Severe COPD  - receiving her bronchodilators  - patient will resume Breztri on discharge  Chronic hypercapnic hypoxemic respiratory failure  - on her usual 2 L  Diabetes mellitus  - impressive hyperglycemia aggravated with steroids  - Accu-Cheks and sliding scale insulin    Patient needs to be up out of bed and moving about  Discussed with her nurse.    Roseanne Mcneil,  MD  Date of Service: 05/01/2024  7:37 AM

## 2024-05-01 NOTE — ASSESSMENT & PLAN NOTE
"Patient's FSGs are uncontrolled on current medication regimen-likely increased 2/2 steroids.  Last A1c reviewed-   Lab Results   Component Value Date    HGBA1C 8.5 (H) 04/23/2024     Most recent fingerstick glucose reviewed- No results for input(s): "POCTGLUCOSE" in the last 24 hours.  Current correctional scale  Medium  Maintain anti-hyperglycemic dose as follows-   Antihyperglycemics (From admission, onward)      Start     Stop Route Frequency Ordered    04/30/24 1723  insulin aspart U-100 pen 0-10 Units         -- SubQ Before meals & nightly PRN 04/30/24 1623          Hold Oral hypoglycemics while patient is in the hospital.  "

## 2024-05-01 NOTE — ASSESSMENT & PLAN NOTE
Maddie Otero is status post C4-6 anterior cervical diskectomy decompression performed on 04/30/2024.      POD 1.      Neurologically intact.      --All labs and diagnostics reviewed  --Follow-up post-op cervical XRs in appropriate alignment with the hardware malfunction.  --Maintain collar at all times   --SELINA drains to remain at this time on  suction; please record hourly outputs  --PT/OT/OOB  --ADAT  --Pain control with multimodal pain regimen   --TEDs/SCDs  --Continue to monitor clinically, notify NSGY immediately with any changes in neuro status    Dispo: Pending physical therapy evaluation, anticipate discharged home this afternoon.  Patient will be discharged with drain.  Please instruct patient on how to empty and document drain output.  Please also provide urine cup for measuring.  I will not provide additional pain medication since patient refilled her 30 day supply on 04/12/2024 and she should still have medication until her appointment with her pain doctor.  I will provide patient a prescription for Robaxin per her request and stool softener.  Medication will be delivered to bedside per patient request.

## 2024-05-01 NOTE — SUBJECTIVE & OBJECTIVE
"Past Medical History:   Diagnosis Date    A-fib     Anticoagulant long-term use     Arthritis     COPD (chronic obstructive pulmonary disease)     COPD (chronic obstructive pulmonary disease) 2019    Diabetes mellitus, type 2     Diverticulosis     History of left knee replacement 2017    DR CRENSHAW    HNP (herniated nucleus pulposus)     LUMBAR    Hypertension     Lung disease     Pancreatic insufficiency     s/p whipple, non cancer    Presence of dental bridge     UPPER    Wears glasses        Past Surgical History:   Procedure Laterality Date    ANTERIOR CERVICAL DISCECTOMY W/ FUSION N/A 4/30/2024    Procedure: DISCECTOMY, SPINE, CERVICAL, ANTERIOR APPROACH, WITH FUSION;  Surgeon: Phu Pierson DO;  Location: [facility] OR;  Service: Neurosurgery;  Laterality: N/A;  ACDF C4-5, C5-6 with partial C5 Corpectomy    APPENDECTOMY      BACK SURGERY  1994    lower back    BACK SURGERY  2012    lower back    CHOLECYSTECTOMY      HERNIA REPAIR      JOINT REPLACEMENT Left     KNEE    KNEE ARTHROPLASTY Right 5/3/2021    Procedure: ARTHROPLASTY, KNEE;  Surgeon: Manuel Willingham MD;  Location: [facility] OR;  Service: Orthopedics;  Laterality: Right;  guzman    LAPAROSCOPIC REPAIR OF INCISIONAL HERNIA N/A 10/22/2019    Procedure: REPAIR, HERNIA, INCISIONAL, LAPAROSCOPIC;  Surgeon: Pantera Almanza III, MD;  Location: [facility] OR;  Service: General;  Laterality: N/A;    pancrease  2009    stents in pancrease     TONSILLECTOMY      TOTAL KNEE ARTHROPLASTY Left 08/10/2017    WHIPPLE PROCEDURE W/ LAPAROSCOPY  10/09       Review of patient's allergies indicates:   Allergen Reactions    Heparin Itching     Pt states she has no allergy      NOT AN ALLERGY . NOT AWARE OF TAKING IT    Hydrocodone Shortness Of Breath     MED "SHORTENS HER BREATHING"    Penicillins Anaphylaxis, Hives and Itching     Other reaction(s): Unknown  Childhood reaction, swelled      Sulfa (sulfonamide antibiotics) Hives    Doxycycline Nausea And Vomiting     Other " "reaction(s): Abdominal Pain    Clindamycin      "Felt like I was on fire down through throat and felt like I was having spasms in my throat"    Penicillin v      Childhood reaction, swelled       Current Facility-Administered Medications   Medication Dose Route Frequency Provider Last Rate Last Admin    albuterol nebulizer solution 2.5 mg  2.5 mg Nebulization Q4H PRN Carter Golden NP        albuterol-ipratropium 2.5 mg-0.5 mg/3 mL nebulizer solution 3 mL  3 mL Nebulization Q6H Roseanne Mcneil MD        aluminum-magnesium hydroxide-simethicone 200-200-20 mg/5 mL suspension 30 mL  30 mL Oral Q4H PRN Lucia Dewey PA-C        [START ON 5/1/2024] amLODIPine tablet 5 mg  5 mg Oral Daily Carter Golden NP        arformoteroL nebulizer solution 15 mcg  15 mcg Nebulization BID Roseanne Mcneil MD        bisacodyL suppository 10 mg  10 mg Rectal Daily PRN Lucia Dewey PA-C        budesonide nebulizer solution 0.5 mg  0.5 mg Nebulization Q12H Roseanne Mcneil MD        [START ON 5/1/2024] cetirizine tablet 10 mg  10 mg Oral Daily Carter Golden NP        dexAMETHasone injection 2 mg  2 mg Intravenous Q6H Lucia Dewey PA-C   2 mg at 04/30/24 1743    dextrose 50% injection 12.5 g  12.5 g Intravenous PRN Carter Golden NP        dextrose 50% injection 25 g  25 g Intravenous PRN Carter Golden NP        glucagon (human recombinant) injection 1 mg  1 mg Intramuscular PRN Carter Golden, NP        glucose chewable tablet 16 g  16 g Oral PRN Carter Golden NP        glucose chewable tablet 24 g  24 g Oral PRN Carter Golden NP        HYDROmorphone injection 2 mg  2 mg Intravenous Q3H PRN Lucia Dewey PA-C        insulin aspart U-100 pen 0-10 Units  0-10 Units Subcutaneous QID (AC + HS) PRN Carter Golden NP   8 Units at 04/30/24 1759    lipase-protease-amylase 12,000-38,000-60,000 units per capsule 2 capsule  2 capsule Oral QID Carter Golden, NP   2 capsule at " 24 1727    metoprolol succinate (TOPROL-XL) 24 hr tablet 100 mg  100 mg Oral BID Carter Golden, NP        mupirocin 2 % ointment   Nasal BID Lucia Dewey PA-C        ondansetron disintegrating tablet 8 mg  8 mg Oral Q6H PRN Lucia Dewey PA-C   8 mg at 24 1757    oxyCODONE immediate release tablet 5 mg  5 mg Oral Q4H PRN Lucia Dewey PA-ANTIONETTE        oxyCODONE-acetaminophen 7.5-325 mg per tablet 1 tablet  1 tablet Oral Q6H Lucia Dewey PA-C   1 tablet at 24 1727    [START ON 2024] pantoprazole EC tablet 40 mg  40 mg Oral Daily Carter Golden NP        prochlorperazine injection Soln 5 mg  5 mg Intravenous Q6H PRN Lucia Dewey PA-C        rOPINIRole tablet 1 mg  1 mg Oral QHS Carter Golden NP        senna-docusate 8.6-50 mg per tablet 2 tablet  2 tablet Oral Nightly PRN Lucia Dewey PA-C        tiZANidine tablet 4 mg  4 mg Oral Q8H Lucia Dewey PA-C   4 mg at 24 1556     Family History       Problem Relation (Age of Onset)    Cancer Father    Diabetes Mother          Tobacco Use    Smoking status: Former     Current packs/day: 0.00     Average packs/day: 1 pack/day for 30.0 years (30.0 ttl pk-yrs)     Types: Cigarettes     Start date: 1987     Quit date: 2017     Years since quittin.0    Smokeless tobacco: Never   Substance and Sexual Activity    Alcohol use: No    Drug use: No    Sexual activity: Not on file     Review of Systems   Constitutional:  Positive for activity change. Negative for appetite change, chills and fever.   Respiratory:  Negative for cough, shortness of breath and wheezing.    Cardiovascular:  Negative for chest pain.   Gastrointestinal: Negative.  Negative for diarrhea, nausea and vomiting.   Musculoskeletal:  Positive for arthralgias. Negative for back pain, myalgias and neck pain.   Skin:         Surgical incision clean dry and intact    Psychiatric/Behavioral: Negative.       Objective:     Vital Signs (Most Recent):  Temp:  97.8 °F (36.6 °C) (04/30/24 1453)  Pulse: 66 (04/30/24 1612)  Resp: 18 (04/30/24 1727)  BP: (!) 150/74 (04/30/24 1453)  SpO2: 96 % (04/30/24 1612) Vital Signs (24h Range):  Temp:  [97.2 °F (36.2 °C)-97.8 °F (36.6 °C)] 97.8 °F (36.6 °C)  Pulse:  [56-68] 66  Resp:  [10-18] 18  SpO2:  [95 %-99 %] 96 %  BP: (110-164)/(56-82) 150/74     Weight: 99.8 kg (220 lb)  Body mass index is 33.45 kg/m².     Physical Exam  Constitutional:       General: She is not in acute distress.     Appearance: Normal appearance. She is normal weight. She is not ill-appearing.   Neck:      Comments: Cervical collar in place.  Cardiovascular:      Rate and Rhythm: Normal rate.      Pulses: Normal pulses.      Heart sounds: Normal heart sounds.   Pulmonary:      Effort: Pulmonary effort is normal.      Breath sounds: Normal breath sounds.   Musculoskeletal:      Right lower leg: No edema.      Left lower leg: No edema.   Skin:     General: Skin is warm and dry.      Capillary Refill: Capillary refill takes less than 2 seconds.      Comments: Surgical incision w/ SELINA drain    Neurological:      General: No focal deficit present.      Mental Status: She is alert and oriented to person, place, and time. Mental status is at baseline.   Psychiatric:         Mood and Affect: Mood normal.         Behavior: Behavior normal.                Significant Labs: All pertinent labs within the past 24 hours have been reviewed.  Pre-op Labs reviewed     Significant Imaging: I have reviewed all pertinent imaging results/findings within the past 24 hours.  Pre-op images reviewed

## 2024-05-01 NOTE — PLAN OF CARE
[facility] accepted pt. SW talked to pt and pt's daughter they stated they will be looking for the phone call upon d/c. [facility] added to pt's AVS.       05/01/24 1428   Post-Acute Status   Post-Acute Authorization Home Health   Home Health Status Pending medical clearance/testing   Coverage [employer] - HUMAN MEDICARE PPO   Discharge Plan   Discharge Plan [facility]

## 2024-05-01 NOTE — ASSESSMENT & PLAN NOTE
Patient is not chronically on statin.will continue Home medication for now. Monitor clinically. Last LDL was   Lab Results   Component Value Date    LDLCALC 92.4 01/10/2014

## 2024-05-01 NOTE — CARE UPDATE
04/30/24 2106   Patient Assessment/Suction   Level of Consciousness (AVPU) alert   Respiratory Effort Normal;Unlabored   Expansion/Accessory Muscles/Retractions no use of accessory muscles;no retractions   All Lung Fields Breath Sounds clear;diminished   Rhythm/Pattern, Respiratory depth regular;pattern regular   Cough Frequency no cough   PRE-TX-O2   Device (Oxygen Therapy) nasal cannula   $ Is the patient on Low Flow Oxygen? Yes   Flow (L/min) (Oxygen Therapy) 2   SpO2 96 %   Pulse Oximetry Type Intermittent   $ Pulse Oximetry - Single Charge Pulse Oximetry - Single   $ Pulse Oximetry - Multiple Charge Pulse Oximetry - Multiple   Pulse 69   Resp 18   Positioning Supine;HOB elevated 45 degrees   Positioning   Body Position supine   Head of Bed (HOB) Positioning HOB at 45 degrees   Aerosol Therapy   $ Aerosol Therapy Charges Aerosol Treatment   Daily Review of Necessity (SVN) completed   Respiratory Treatment Status (SVN) given   Treatment Route (SVN) oxygen;mouthpiece utilized   Patient Position HOB elevated   Post Treatment Assessment (SVN) increased aeration   Signs of Intolerance (SVN) none   Incentive Spirometer   Administration (IS) self-administered   Number of Repetitions (IS) 10   Level Incentive Spirometer (mL) 1500   Patient Tolerance (IS) good   Education   $ Education Bronchodilator;15 min

## 2024-05-01 NOTE — PLAN OF CARE
Problem: Adult Inpatient Plan of Care  Goal: Plan of Care Review  Outcome: Progressing  Goal: Optimal Comfort and Wellbeing  Outcome: Progressing     Problem: Diabetes Comorbidity  Goal: Blood Glucose Level Within Targeted Range  Outcome: Progressing     Problem: Acute Kidney Injury/Impairment  Goal: Improved Oral Intake  Outcome: Progressing     Problem: Wound  Goal: Optimal Coping  Outcome: Progressing     Problem: Infection  Goal: Absence of Infection Signs and Symptoms  Outcome: Progressing     Problem: Fall Injury Risk  Goal: Absence of Fall and Fall-Related Injury  Outcome: Progressing

## 2024-05-01 NOTE — PROGRESS NOTES
[facility] Medicine  Progress Note    Patient Name: Maddie Otero  MRN: [id number]  Patient Class: IP- Inpatient   Admission Date: 4/30/2024  Length of Stay: 1 days  Attending Physician: Phu Pierson DO  Primary Care Provider: Carrie Gorman FNP        Subjective:     Principal Problem:Cervical spinal stenosis        HPI:  Ms. Maddie Otero is a 73-year-old female with past medical history of atrial fibrillation on Eliquis, COPD on oxygen at home, type 2 diabetes, hypertension, hyperlipidemia, diverticulosis was seen and evaluated status-post anterior cervical diskectomy and decompression of the spinal cord C4-5, C5-6 with device insertion with Dr. Pierson. Patient presented to the emergency department on 04/23/2024 complaining of right-sided chest pain, neck pain and back pain. MRI cervical spine was significant for severe spinal stenosis with severe bilateral foraminal narrowing at C5-C6 and cervical myelomalcia. She was evaluated in office for pre-op work. Labs and diagnostic testing were reviewed. Hospital medicine was consulted evaluation and medical management. We will follow as consultants.         Overview/Hospital Course:  No notes on file    Interval History: pt seen and examined while sitting up in chair eating her lunch. She states she is feeling well.  Cervical collar in place.  Eager for discharge    Review of Systems   Constitutional:  Positive for activity change. Negative for appetite change, chills, fatigue and fever.   Respiratory:  Negative for cough, shortness of breath and wheezing.    Cardiovascular:  Negative for chest pain and palpitations.   Gastrointestinal: Negative.  Negative for abdominal pain, diarrhea, nausea and vomiting.   Musculoskeletal:  Positive for arthralgias. Negative for back pain and myalgias.   Skin:         Surgical incision clean dry and intact    Neurological:  Negative for weakness.   Psychiatric/Behavioral: Negative.     All other  systems reviewed and are negative.    Objective:     Vital Signs (Most Recent):  Temp: 97.5 °F (36.4 °C) (05/01/24 1147)  Pulse: 66 (05/01/24 1325)  Resp: 16 (05/01/24 1325)  BP: 134/62 (05/01/24 1147)  SpO2: 98 % (05/01/24 1325) Vital Signs (24h Range):  Temp:  [97.5 °F (36.4 °C)-98.4 °F (36.9 °C)] 97.5 °F (36.4 °C)  Pulse:  [63-72] 66  Resp:  [15-18] 16  SpO2:  [94 %-99 %] 98 %  BP: (131-160)/(62-93) 134/62     Weight: 99.8 kg (220 lb)  Body mass index is 33.45 kg/m².    Intake/Output Summary (Last 24 hours) at 5/1/2024 1500  Last data filed at 5/1/2024 1238  Gross per 24 hour   Intake 600 ml   Output 670 ml   Net -70 ml         Physical Exam  Constitutional:       General: She is not in acute distress.     Appearance: Normal appearance. She is normal weight. She is not ill-appearing.      Interventions: Nasal cannula in place.   Neck:      Comments: Cervical collar in place.  Cardiovascular:      Rate and Rhythm: Normal rate.      Pulses: Normal pulses.      Heart sounds: Normal heart sounds.   Pulmonary:      Effort: Pulmonary effort is normal. No respiratory distress.      Breath sounds: Normal breath sounds.   Abdominal:      General: Bowel sounds are normal. There is no distension.      Palpations: Abdomen is soft.      Tenderness: There is no abdominal tenderness.   Musculoskeletal:      Right lower leg: No edema.      Left lower leg: No edema.   Skin:     General: Skin is warm and dry.      Capillary Refill: Capillary refill takes less than 2 seconds.      Comments: Surgical incision w/ SELINA drain    Neurological:      General: No focal deficit present.      Mental Status: She is alert and oriented to person, place, and time. Mental status is at baseline.   Psychiatric:         Mood and Affect: Mood normal.         Behavior: Behavior normal.             Significant Labs: All pertinent labs within the past 24 hours have been reviewed.  CBC:   Recent Labs   Lab 05/01/24  0504   WBC 11.41   HGB 12.9   HCT 39.6  "       CMP:   Recent Labs   Lab 04/30/24  0754 05/01/24  0504    133*   K 4.2 4.8   CL 98 96   CO2 33* 31*   * 366*   BUN 22 23   CREATININE 1.0 1.0   CALCIUM 8.9 8.4*   ANIONGAP 5* 6*       Significant Imaging: I have reviewed all pertinent imaging results/findings within the past 24 hours.    Assessment/Plan:      * Cervical spinal stenosis  S/P cervical diskectomy and decompression of the spinal cord and device insertion with  4/30/2024.  Aggressive IS  PT/OT  Cervical collar in place  PRN pain management  Expect dc later today     Status post cervical spinal fusion  Plan as above        COPD (chronic obstructive pulmonary disease)  Patient's COPD is controlled currently.  Patient is currently off COPD Pathway. Continue scheduled inhalers Supplemental oxygen and monitor respiratory status closely.     Hyperlipidemia LDL goal < 100   Patient is not chronically on statin.will continue Home medication for now. Monitor clinically. Last LDL was   Lab Results   Component Value Date    LDLCALC 92.4 01/10/2014           Diabetes mellitus type II  Patient's FSGs are uncontrolled on current medication regimen-likely increased 2/2 steroids.  Last A1c reviewed-   Lab Results   Component Value Date    HGBA1C 8.5 (H) 04/23/2024     Most recent fingerstick glucose reviewed- No results for input(s): "POCTGLUCOSE" in the last 24 hours.  Current correctional scale  Medium  Maintain anti-hyperglycemic dose as follows-   Antihyperglycemics (From admission, onward)      Start     Stop Route Frequency Ordered    04/30/24 1723  insulin aspart U-100 pen 0-10 Units         -- SubQ Before meals & nightly PRN 04/30/24 1623          Hold Oral hypoglycemics while patient is in the hospital.    Hypertension  Chronic, controlled. Latest blood pressure and vitals reviewed-     Temp:  [97.5 °F (36.4 °C)-98.4 °F (36.9 °C)]   Pulse:  [63-72]   Resp:  [15-18]   BP: (131-160)/(62-93)   SpO2:  [94 %-99 %] .   Home meds for " hypertension were reviewed and noted below.   Hypertension Medications               amLODIPine (NORVASC) 5 MG tablet Take 5 mg by mouth once daily.    furosemide (LASIX) 40 MG tablet Take 40 mg by mouth as needed (edema).     metoprolol succinate (TOPROL-XL) 100 MG 24 hr tablet Take 100 mg by mouth 2 (two) times daily.            While in the hospital, will manage blood pressure as follows; Continue home antihypertensive regimen    Will utilize p.r.n. blood pressure medication only if patient's blood pressure greater than 180/110 and she develops symptoms such as worsening chest pain or shortness of breath.      VTE Risk Mitigation (From admission, onward)           Ordered     Place sequential compression device  Until discontinued         04/30/24 0738                    Discharge Planning   ALICE: 5/1/2024     Code Status: Prior   Is the patient medically ready for discharge?:     Reason for patient still in hospital (select all that apply): Patient trending condition and Consult recommendations  Discharge Plan A: Home Health                  ASH Tucker  Department of Hospital Medicine   [facility]

## 2024-05-01 NOTE — ASSESSMENT & PLAN NOTE
"Patient's FSGs are controlled on current medication regimen.  Last A1c reviewed-   Lab Results   Component Value Date    HGBA1C 8.5 (H) 04/23/2024     Most recent fingerstick glucose reviewed- No results for input(s): "POCTGLUCOSE" in the last 24 hours.  Current correctional scale  Medium  Maintain anti-hyperglycemic dose as follows-   Antihyperglycemics (From admission, onward)      Start     Stop Route Frequency Ordered    04/30/24 1723  insulin aspart U-100 pen 0-10 Units         -- SubQ Before meals & nightly PRN 04/30/24 1623          Hold Oral hypoglycemics while patient is in the hospital.  "

## 2024-05-01 NOTE — ASSESSMENT & PLAN NOTE
S/P cervical diskectomy and decompression of the spinal cord and device insertion with  4/30/2024.  Aggressive IS  PT/OT  Cervical collar in place  PRN pain management

## 2024-05-01 NOTE — PLAN OF CARE
Problem: Adult Inpatient Plan of Care  Goal: Plan of Care Review  Outcome: Met  Goal: Patient-Specific Goal (Individualized)  Outcome: Met  Goal: Absence of Hospital-Acquired Illness or Injury  Outcome: Met  Goal: Optimal Comfort and Wellbeing  Outcome: Met  Goal: Readiness for Transition of Care  Outcome: Met     Problem: Diabetes Comorbidity  Goal: Blood Glucose Level Within Targeted Range  Outcome: Met     Problem: Acute Kidney Injury/Impairment  Goal: Fluid and Electrolyte Balance  Outcome: Met  Goal: Improved Oral Intake  Outcome: Met  Goal: Effective Renal Function  Outcome: Met     Problem: Pneumonia  Goal: Fluid Balance  Outcome: Met  Goal: Resolution of Infection Signs and Symptoms  Outcome: Met  Goal: Effective Oxygenation and Ventilation  Outcome: Met     Problem: Wound  Goal: Optimal Coping  Outcome: Met  Goal: Optimal Functional Ability  Outcome: Met  Goal: Absence of Infection Signs and Symptoms  Outcome: Met  Goal: Improved Oral Intake  Outcome: Met  Goal: Optimal Pain Control and Function  Outcome: Met  Goal: Skin Health and Integrity  Outcome: Met  Goal: Optimal Wound Healing  Outcome: Met     Problem: Infection  Goal: Absence of Infection Signs and Symptoms  Outcome: Met     Problem: Fall Injury Risk  Goal: Absence of Fall and Fall-Related Injury  Outcome: Met

## 2024-05-01 NOTE — ASSESSMENT & PLAN NOTE
Chronic, controlled. Latest blood pressure and vitals reviewed-     Temp:  [97.2 °F (36.2 °C)-98.3 °F (36.8 °C)]   Pulse:  [56-72]   Resp:  [10-18]   BP: (110-164)/(56-82)   SpO2:  [95 %-99 %] .   Home meds for hypertension were reviewed and noted below.   Hypertension Medications               amLODIPine (NORVASC) 5 MG tablet Take 5 mg by mouth once daily.    furosemide (LASIX) 40 MG tablet Take 40 mg by mouth as needed (edema).     metoprolol succinate (TOPROL-XL) 100 MG 24 hr tablet Take 100 mg by mouth 2 (two) times daily.            While in the hospital, will manage blood pressure as follows; Continue home antihypertensive regimen    Will utilize p.r.n. blood pressure medication only if patient's blood pressure greater than 180/110 and she develops symptoms such as worsening chest pain or shortness of breath.

## 2024-05-01 NOTE — PROGRESS NOTES
[facility]  Neurosurgery  Progress Note    Subjective:     History of Present Illness: Ms. Maddie Otero is a 73-year-old female  with past medical history of atrial fibrillation on Eliquis, COPD on oxygen at home, type 2 diabetes, hypertension, hyperlipidemia, diverticulosis presented to the emergency department on 04/23/2024 complaining of right-sided chest pain, neck pain and back pain.  During workup, MRI cervical spine was most significant for severe spinal stenosis with severe bilateral foraminal narrowing at C5-6 and cervical myelomalacia.  Neurosurgery consulted for cervical myelomalacia.     On initiation of encounter patient was found sitting at the edge of bed eating breakfast.  She reports positional posterior cervical pain that radiates down right shoulder, right anterior upper arm and right thumb that began approximate 3 days ago without an inciting event.  Also reports right hand paresthesias, subjective right arm weakness and intermittent imbalance.  Denies left arm symptoms, falls, bladder/bowel dysfunction, hand incoordination/clumsiness, chronic cervical pain.    Post-Op Info:  Procedure(s) (LRB):  DISCECTOMY, SPINE, CERVICAL, ANTERIOR APPROACH, WITH FUSION (N/A)   1 Day Post-Op   Interval History: 5/1:  Patient is status post C4-6 anterior cervical diskectomy decompression performed on 04/30/2024.  POD 1.  Afebrile.  Blood pressure 160/93.  Pulse 63.  Respirations 18.  WBC 11.41.  Hemoglobin 12.9.  Sodium 133.  Creatinine 1.0.  SELINA drain x1 in place with 25 mL of output in last 12 hours.  Patient found in bed, awake and alert with nurse at bedside.  She reports anticipated incisional pain 6/10 however she did not receive her pain medication this morning.  She also reported she was able to eat her dinner on yesterday without difficulty.  Denies pain with swallowing.  Preoperative symptoms in right upper extremity have improved.  Denies dysphagia, dysphonia, new extremity weakness.   Furthermore, patient is requesting pain medication upon discharge.  She is on a pain contract and stated she we will not see her pain doctor until 05/09/2024.  Last refill of Percocet  mg, 1 tab every 8 hours was on 04/12/2024 and it was a 30 day supply.  She informed me that per her pain contract she can have pain medication if she underwent surgery.    Medications:  Continuous Infusions:  Current Facility-Administered Medications   Medication Dose Route Frequency Last Rate Last Admin     Scheduled Meds:  Current Facility-Administered Medications   Medication Dose Route Frequency    albuterol-ipratropium  3 mL Nebulization Q6H    amLODIPine  5 mg Oral Daily    arformoteroL  15 mcg Nebulization BID    budesonide  0.5 mg Nebulization Q12H    cetirizine  10 mg Oral Daily    dexAMETHasone  2 mg Intravenous Q6H    lipase-protease-amylase 12,000-38,000-60,000 units  2 capsule Oral QID    metoprolol succinate  100 mg Oral BID    mupirocin   Nasal BID    oxyCODONE-acetaminophen  1 tablet Oral Q6H    pantoprazole  40 mg Oral Daily    rOPINIRole  1 mg Oral QHS    tiZANidine  4 mg Oral Q8H     PRN Meds:  Current Facility-Administered Medications:     albuterol sulfate, 2.5 mg, Nebulization, Q4H PRN    aluminum-magnesium hydroxide-simethicone, 30 mL, Oral, Q4H PRN    bisacodyL, 10 mg, Rectal, Daily PRN    dextrose 50%, 12.5 g, Intravenous, PRN    dextrose 50%, 25 g, Intravenous, PRN    glucagon (human recombinant), 1 mg, Intramuscular, PRN    glucose, 16 g, Oral, PRN    glucose, 24 g, Oral, PRN    HYDROmorphone, 2 mg, Intravenous, Q3H PRN    insulin aspart U-100, 0-10 Units, Subcutaneous, QID (AC + HS) PRN    ondansetron, 8 mg, Oral, Q6H PRN    oxyCODONE, 5 mg, Oral, Q4H PRN    prochlorperazine, 5 mg, Intravenous, Q6H PRN    senna-docusate 8.6-50 mg, 2 tablet, Oral, Nightly PRN       Objective:     Weight: 99.8 kg (220 lb)  Body mass index is 33.45 kg/m².  Vital Signs (Most Recent):  Temp: 98.4 °F (36.9 °C) (05/01/24  "0821)  Pulse: 63 (05/01/24 0821)  Resp: 18 (05/01/24 0821)  BP: (!) 160/93 (05/01/24 0821)  SpO2: 99 % (05/01/24 0821) Vital Signs (24h Range):  Temp:  [97.2 °F (36.2 °C)-98.4 °F (36.9 °C)] 98.4 °F (36.9 °C)  Pulse:  [56-72] 63  Resp:  [10-18] 18  SpO2:  [94 %-99 %] 99 %  BP: (110-164)/(56-93) 160/93                              Closed/Suction Drain 04/30/24 1154 Tube - 1 Anterior Neck Bulb 10 Fr. (Active)   Dressing Type Other (Comment) 05/01/24 0300   Dressing Status Clean;Dry;Intact 05/01/24 0300   Dressing Intervention First dressing 05/01/24 0300   Drainage Bloody 05/01/24 0300   Status To bulb suction 05/01/24 0300   Output (mL) 5 mL 05/01/24 0603            Closed/Suction Drain 04/30/24 1531 Lateral;Right;Superior Chest (Active)           Neurosurgery Physical Exam  General:  Very pleasant.  In no acute distress.  Neck: No tracheal deviation.  Collar in place  Neurologic: Alert and oriented. Thought content appropriate.  GCS: E4 V5 M6; Total: 15  Pulmonary: normal respirations, no signs of respiratory distress  Skin: Skin is warm, dry and intact.    Sensory: intact to light touch throughout  Motor Strength: Moves all extremities spontaneously.  No abnormal movements seen.  Bilateral upper extremity strength 5/5 throughout.      Anterior cervical incision: Incision covered with clean, dry steri strip. No surrounding erythema or edema. No drainage from incision. No palpable hematoma or underlying fluid collection.  SELINA drain x 1 in place       Significant Labs:  Recent Labs   Lab 04/30/24  0754 05/01/24  0504   * 366*    133*   K 4.2 4.8   CL 98 96   CO2 33* 31*   BUN 22 23   CREATININE 1.0 1.0   CALCIUM 8.9 8.4*     Recent Labs   Lab 05/01/24  0504   WBC 11.41   HGB 12.9   HCT 39.6        No results for input(s): "LABPT", "INR", "APTT" in the last 48 hours.  Microbiology Results (last 7 days)       ** No results found for the last 168 hours. **            Assessment/Plan:     Status post " cervical spinal fusion  Maddie Otero is status post C4-6 anterior cervical diskectomy decompression performed on 04/30/2024.      POD 1.      Neurologically intact.      --All labs and diagnostics reviewed.  WBC elevated most likely stress related.  Glucose 366 in patient with known diabetes most likely related to short course of steroids which will be complete today.  --Follow-up post-op cervical XRs in appropriate alignment with the hardware malfunction.  --Maintain collar at all times   --SELINA drains to remain at this time on  suction; please record hourly outputs  --PT/OT/OOB  --ADAT  --Pain control with multimodal pain regimen   --TEDs/SCDs  --Continue to monitor clinically, notify NSGY immediately with any changes in neuro status    Dispo: Pending physical therapy evaluation, anticipate discharged home this afternoon.  Patient will be discharged with drain.  Please instruct patient on how to empty and document drain output.  Please also provide urine cup for measuring.  I will provide Percocet 5-325 mg prescription to patient.  Patient will follow-up with pain doctor for additional prescription.  I will also provide a prescription for Robaxin per patient request and stool softener.  Medication will be delivered to bedside per patient request.        APRIL GARG PA-C  Neurosurgery  [facility]

## 2024-05-01 NOTE — CARE UPDATE
04/30/24 2107   Aerosol Therapy   $ Aerosol Therapy Charges Aerosol Treatment   Daily Review of Necessity (SVN) completed   Respiratory Treatment Status (SVN) given   Post Treatment Assessment (SVN) increased aeration   Signs of Intolerance (SVN) none

## 2024-05-01 NOTE — H&P
Critical access hospital Medicine  History & Physical    Patient Name: Maddie Otero  MRN: [id number]  Patient Class: IP- Inpatient  Admission Date: 4/30/2024  Attending Physician: Phu Pierson DO   Primary Care Provider: Carrie Gorman FNP         Patient information was obtained from patient and past medical records.     Subjective:     Principal Problem:Cervical spinal stenosis    Chief Complaint:   Chief Complaint   Patient presents with    Back Pain        HPI: Ms. Maddie Otero is a 73-year-old female with past medical history of atrial fibrillation on Eliquis, COPD on oxygen at home, type 2 diabetes, hypertension, hyperlipidemia, diverticulosis was seen and evaluated status-post anterior cervical diskectomy and decompression of the spinal cord C4-5, C5-6 with device insertion with Dr. Pierson. Patient presented to the emergency department on 04/23/2024 complaining of right-sided chest pain, neck pain and back pain. MRI cervical spine was significant for severe spinal stenosis with severe bilateral foraminal narrowing at C5-C6 and cervical myelomalcia. She was evaluated in office for pre-op work. Labs and diagnostic testing were reviewed. Hospital medicine was consulted evaluation and medical management. We will follow as consultants.         Past Medical History:   Diagnosis Date    A-fib     Anticoagulant long-term use     Arthritis     COPD (chronic obstructive pulmonary disease)     COPD (chronic obstructive pulmonary disease) 2019    Diabetes mellitus, type 2     Diverticulosis     History of left knee replacement 2017    DR CRENSHAW    HNP (herniated nucleus pulposus)     LUMBAR    Hypertension     Lung disease     Pancreatic insufficiency     s/p whipple, non cancer    Presence of dental bridge     UPPER    Wears glasses        Past Surgical History:   Procedure Laterality Date    ANTERIOR CERVICAL DISCECTOMY W/ FUSION N/A 4/30/2024    Procedure: DISCECTOMY, SPINE, CERVICAL,  "ANTERIOR APPROACH, WITH FUSION;  Surgeon: Phu Pierson DO;  Location: [facility] OR;  Service: Neurosurgery;  Laterality: N/A;  ACDF C4-5, C5-6 with partial C5 Corpectomy    APPENDECTOMY      BACK SURGERY  1994    lower back    BACK SURGERY  2012    lower back    CHOLECYSTECTOMY      HERNIA REPAIR      JOINT REPLACEMENT Left     KNEE    KNEE ARTHROPLASTY Right 5/3/2021    Procedure: ARTHROPLASTY, KNEE;  Surgeon: Manuel Willingham MD;  Location: [facility] OR;  Service: Orthopedics;  Laterality: Right;  guzman    LAPAROSCOPIC REPAIR OF INCISIONAL HERNIA N/A 10/22/2019    Procedure: REPAIR, HERNIA, INCISIONAL, LAPAROSCOPIC;  Surgeon: Pantera Almanza III, MD;  Location: [facility] OR;  Service: General;  Laterality: N/A;    pancrease  2009    stents in pancrease     TONSILLECTOMY      TOTAL KNEE ARTHROPLASTY Left 08/10/2017    WHIPPLE PROCEDURE W/ LAPAROSCOPY  10/09       Review of patient's allergies indicates:   Allergen Reactions    Heparin Itching     Pt states she has no allergy      NOT AN ALLERGY . NOT AWARE OF TAKING IT    Hydrocodone Shortness Of Breath     MED "SHORTENS HER BREATHING"    Penicillins Anaphylaxis, Hives and Itching     Other reaction(s): Unknown  Childhood reaction, swelled      Sulfa (sulfonamide antibiotics) Hives    Doxycycline Nausea And Vomiting     Other reaction(s): Abdominal Pain    Clindamycin      "Felt like I was on fire down through throat and felt like I was having spasms in my throat"    Penicillin v      Childhood reaction, swelled       Current Facility-Administered Medications   Medication Dose Route Frequency Provider Last Rate Last Admin    albuterol nebulizer solution 2.5 mg  2.5 mg Nebulization Q4H PRN Carter Golden NP        albuterol-ipratropium 2.5 mg-0.5 mg/3 mL nebulizer solution 3 mL  3 mL Nebulization Q6H Roseanne Mcneil MD        aluminum-magnesium hydroxide-simethicone 200-200-20 mg/5 mL suspension 30 mL  30 mL Oral Q4H PRN Lucia Dewey PA-C        [START ON 5/1/2024] " amLODIPine tablet 5 mg  5 mg Oral Daily Carter Golden, VIRI        arformoteroL nebulizer solution 15 mcg  15 mcg Nebulization BID Roseanne Mcneil MD        bisacodyL suppository 10 mg  10 mg Rectal Daily PRN Lucia Dewey PA-C        budesonide nebulizer solution 0.5 mg  0.5 mg Nebulization Q12H Roseanne Mcneil MD        [START ON 5/1/2024] cetirizine tablet 10 mg  10 mg Oral Daily Carter Golden, VIRI        dexAMETHasone injection 2 mg  2 mg Intravenous Q6H Lucia Dewey PA-C   2 mg at 04/30/24 1743    dextrose 50% injection 12.5 g  12.5 g Intravenous PRN Carter Golden, NP        dextrose 50% injection 25 g  25 g Intravenous PRN Carter Golden, NP        glucagon (human recombinant) injection 1 mg  1 mg Intramuscular PRN Carter Golden, NP        glucose chewable tablet 16 g  16 g Oral PRN Carter Golden, NP        glucose chewable tablet 24 g  24 g Oral PRN Carter Golden, VIRI        HYDROmorphone injection 2 mg  2 mg Intravenous Q3H PRN Lucia Dewey PA-C        insulin aspart U-100 pen 0-10 Units  0-10 Units Subcutaneous QID (AC + HS) PRN Carter Golden NP   8 Units at 04/30/24 1759    lipase-protease-amylase 12,000-38,000-60,000 units per capsule 2 capsule  2 capsule Oral QID Carter Golden NP   2 capsule at 04/30/24 1727    metoprolol succinate (TOPROL-XL) 24 hr tablet 100 mg  100 mg Oral BID Carter Golden, NP        mupirocin 2 % ointment   Nasal BID Lucia Dewey PA-C        ondansetron disintegrating tablet 8 mg  8 mg Oral Q6H PRN Lucia Dewey PA-C   8 mg at 04/30/24 1757    oxyCODONE immediate release tablet 5 mg  5 mg Oral Q4H PRN Lucia Dewey PA-C        oxyCODONE-acetaminophen 7.5-325 mg per tablet 1 tablet  1 tablet Oral Q6H Lucia Dewey PA-C   1 tablet at 04/30/24 1727    [START ON 5/1/2024] pantoprazole EC tablet 40 mg  40 mg Oral Daily Carter Golden, VIRI        prochlorperazine injection Soln 5 mg  5 mg Intravenous Q6H PRN Maco,  Lucia ADAN PA-C        rOPINIRole tablet 1 mg  1 mg Oral QHS [place] Carter WILLIAMSON, NP        senna-docusate 8.6-50 mg per tablet 2 tablet  2 tablet Oral Nightly PRN Lucia Dewey PA-C        tiZANidine tablet 4 mg  4 mg Oral Q8H Lucia Dewey PA-C   4 mg at 24 1556     Family History       Problem Relation (Age of Onset)    Cancer Father    Diabetes Mother          Tobacco Use    Smoking status: Former     Current packs/day: 0.00     Average packs/day: 1 pack/day for 30.0 years (30.0 ttl pk-yrs)     Types: Cigarettes     Start date: 1987     Quit date: 2017     Years since quittin.0    Smokeless tobacco: Never   Substance and Sexual Activity    Alcohol use: No    Drug use: No    Sexual activity: Not on file     Review of Systems   Constitutional:  Positive for activity change. Negative for appetite change, chills and fever.   Respiratory:  Negative for cough, shortness of breath and wheezing.    Cardiovascular:  Negative for chest pain.   Gastrointestinal: Negative.  Negative for diarrhea, nausea and vomiting.   Musculoskeletal:  Positive for arthralgias. Negative for back pain, myalgias and neck pain.   Skin:         Surgical incision clean dry and intact    Psychiatric/Behavioral: Negative.       Objective:     Vital Signs (Most Recent):  Temp: 97.8 °F (36.6 °C) (24 1453)  Pulse: 66 (24 1612)  Resp: 18 (24 1727)  BP: (!) 150/74 (24 1453)  SpO2: 96 % (24 1612) Vital Signs (24h Range):  Temp:  [97.2 °F (36.2 °C)-97.8 °F (36.6 °C)] 97.8 °F (36.6 °C)  Pulse:  [56-68] 66  Resp:  [10-18] 18  SpO2:  [95 %-99 %] 96 %  BP: (110-164)/(56-82) 150/74     Weight: 99.8 kg (220 lb)  Body mass index is 33.45 kg/m².     Physical Exam  Constitutional:       General: She is not in acute distress.     Appearance: Normal appearance. She is normal weight. She is not ill-appearing.   Neck:      Comments: Cervical collar in place.  Cardiovascular:      Rate and Rhythm: Normal rate.       "Pulses: Normal pulses.      Heart sounds: Normal heart sounds.   Pulmonary:      Effort: Pulmonary effort is normal.      Breath sounds: Normal breath sounds.   Musculoskeletal:      Right lower leg: No edema.      Left lower leg: No edema.   Skin:     General: Skin is warm and dry.      Capillary Refill: Capillary refill takes less than 2 seconds.      Comments: Surgical incision w/ SELINA drain    Neurological:      General: No focal deficit present.      Mental Status: She is alert and oriented to person, place, and time. Mental status is at baseline.   Psychiatric:         Mood and Affect: Mood normal.         Behavior: Behavior normal.                Significant Labs: All pertinent labs within the past 24 hours have been reviewed.  Pre-op Labs reviewed     Significant Imaging: I have reviewed all pertinent imaging results/findings within the past 24 hours.  Pre-op images reviewed  Assessment/Plan:     * Cervical spinal stenosis  S/P cervical diskectomy and decompression of the spinal cord and device insertion with  4/30/2024.  Aggressive IS  PT/OT  Cervical collar in place  PRN pain management     COPD (chronic obstructive pulmonary disease)  Patient's COPD is controlled currently.  Patient is currently off COPD Pathway. Continue scheduled inhalers Supplemental oxygen and monitor respiratory status closely.     Hyperlipidemia LDL goal < 100   Patient is not chronically on statin.will continue Home medication for now. Monitor clinically. Last LDL was   Lab Results   Component Value Date    LDLCALC 92.4 01/10/2014           Diabetes mellitus type II  Patient's FSGs are controlled on current medication regimen.  Last A1c reviewed-   Lab Results   Component Value Date    HGBA1C 8.5 (H) 04/23/2024     Most recent fingerstick glucose reviewed- No results for input(s): "POCTGLUCOSE" in the last 24 hours.  Current correctional scale  Medium  Maintain anti-hyperglycemic dose as follows-   Antihyperglycemics (From " admission, onward)      Start     Stop Route Frequency Ordered    04/30/24 1723  insulin aspart U-100 pen 0-10 Units         -- SubQ Before meals & nightly PRN 04/30/24 1623          Hold Oral hypoglycemics while patient is in the hospital.    Hypertension  Chronic, controlled. Latest blood pressure and vitals reviewed-     Temp:  [97.2 °F (36.2 °C)-98.3 °F (36.8 °C)]   Pulse:  [56-72]   Resp:  [10-18]   BP: (110-164)/(56-82)   SpO2:  [95 %-99 %] .   Home meds for hypertension were reviewed and noted below.   Hypertension Medications               amLODIPine (NORVASC) 5 MG tablet Take 5 mg by mouth once daily.    furosemide (LASIX) 40 MG tablet Take 40 mg by mouth as needed (edema).     metoprolol succinate (TOPROL-XL) 100 MG 24 hr tablet Take 100 mg by mouth 2 (two) times daily.            While in the hospital, will manage blood pressure as follows; Continue home antihypertensive regimen    Will utilize p.r.n. blood pressure medication only if patient's blood pressure greater than 180/110 and she develops symptoms such as worsening chest pain or shortness of breath.      VTE Risk Mitigation (From admission, onward)           Ordered     Place sequential compression device  Until discontinued         04/30/24 0738                               Patient doing well postop.  Bronchodilators ordered.  On 2 L nasal cannula.  Full consult in the morning    Matthew Golden NP  Department of Hospital Medicine  Critical access hospital

## 2024-05-01 NOTE — PLAN OF CARE
JOSÉ MIGUEL sent referrals to [facility] in [facility]. JOSÉ MIGUEL to follow.       05/01/24 6015   Post-Acute Status   Post-Acute Authorization Home Health   Home Health Status Referrals Sent   Coverage [employer] MANAGED MEDICARE - [employer] MEDICARE PPO   Discharge Plan   Discharge Plan A Home Health;Home with family

## 2024-05-01 NOTE — NURSING
Patients IV removed. Monitor removed and returned. Educated patient and patients daughter per patients request how to properly drain and document SELINA drain, verbalized understanding. Reviewed discharge instructions, verbalized understanding. Patient has been ambulating with stand by assistance with no difficulty. Patient denies pain/discomfort at this time. Patient transferred downstairs via wheelchair per staff where daughter awaits at car for discharge home.

## 2024-05-01 NOTE — PT/OT/SLP EVAL
Physical Therapy Evaluation    Patient Name:  Maddie Otero   MRN:  [id number]    Recommendations:     Discharge Recommendations: Low Intensity Therapy   Discharge Equipment Recommendations: none   Barriers to discharge:  shortness of breath with limited mobility, high fall risk, decrease activity tolerance    Assessment:     Maddie Otero is a 73 y.o. female admitted with a medical diagnosis of Cervical spinal stenosis.  She presents with the following impairments/functional limitations: weakness, impaired endurance, impaired self care skills, impaired functional mobility, gait instability, impaired balance, decreased safety awareness, decreased lower extremity function, pain, impaired cardiopulmonary response to activity.    Pt found up in chair. Pt agreeable to visit. Pt ambulated 120 ft with RW and supervision with moderate shortness of breath post ambulation but reports that this is her baseline on 2-3L O2 at home.     Rehab Prognosis: Fair; patient would benefit from acute skilled PT services to address these deficits and reach maximum level of function.    Recent Surgery: Procedure(s) (LRB):  DISCECTOMY, SPINE, CERVICAL, ANTERIOR APPROACH, WITH FUSION (N/A) 1 Day Post-Op    Plan:     During this hospitalization, patient to be seen daily to address the identified rehab impairments via gait training, therapeutic activities, therapeutic exercises, neuromuscular re-education and progress toward the following goals:    Plan of Care Expires:  06/01/24    Subjective     Chief Complaint: surgical pain  Patient/Family Comments/goals: get better  Pain/Comfort:  Pain Rating 1: other (see comments) (did not report surgical pain)  Pain Addressed 1: Reposition, Distraction, Cessation of Activity    Patients cultural, spiritual, Tenriism conflicts given the current situation: no    Living Environment:  Pt's daughter lives with her in a one story home with no MELINDA (+) , 2-3 L  Prior to admission, patients level of  function was Independent no AD.  Equipment used at home: oxygen.  DME owned (not currently used): rolling walker and single point cane.  Upon discharge, patient will have assistance from daughter.    Objective:     Communicated with RN prior to session.  Patient found up in chair with peripheral IV, telemetry  upon PT entry to room.    General Precautions: Standard, fall  Orthopedic Precautions:N/A   Braces: [place] J collar  Respiratory Status: Nasal cannula, flow 3 L/min    Exams:  RLE ROM: WFL  RLE Strength: WFL  LLE ROM: WFL  LLE Strength: WFL    Functional Mobility:  Transfers:     Sit to Stand:  supervision with rolling walker  Gait: 120 ft with RW and supervision      AM-PAC 6 CLICK MOBILITY  Total Score:18       Treatment & Education:  Pt educated on POC, discharge recommendation,[place] J wearing schedule, importance of time Oob, benefit of using RW at this time, need for assist with mobility, use of call bell to seek assistance as needed and fall prevention      Patient left up in chair with all lines intact and call button in reach.    GOALS:   Multidisciplinary Problems       Physical Therapy Goals          Problem: Physical Therapy    Goal Priority Disciplines Outcome Goal Variances Interventions   Physical Therapy Goal     PT, PT/OT      Description: Goals to be met by: 24     Patient will increase functional independence with mobility by performin. Supine to sit with Supervision  2. Sit to stand transfer with Supervision  3. Bed to chair transfer with Supervision using Rolling Walker  4. Gait  x 150 feet with Supervision using Rolling Walker.                              History:     Past Medical History:   Diagnosis Date    A-fib     Anticoagulant long-term use     Arthritis     COPD (chronic obstructive pulmonary disease)     COPD (chronic obstructive pulmonary disease) 2019    Diabetes mellitus, type 2     Diverticulosis     History of left knee replacement 2017    DR CRENSHAW    [facility]  (herniated nucleus pulposus)     LUMBAR    Hypertension     Lung disease     Pancreatic insufficiency     s/p whipple, non cancer    Presence of dental bridge     UPPER    Wears glasses        Past Surgical History:   Procedure Laterality Date    ANTERIOR CERVICAL DISCECTOMY W/ FUSION N/A 4/30/2024    Procedure: DISCECTOMY, SPINE, CERVICAL, ANTERIOR APPROACH, WITH FUSION;  Surgeon: Phu Pierson DO;  Location: [facility];  Service: Neurosurgery;  Laterality: N/A;  ACDF C4-5, C5-6 with partial C5 Corpectomy    APPENDECTOMY      BACK SURGERY  1994    lower back    BACK SURGERY  2012    lower back    CHOLECYSTECTOMY      HERNIA REPAIR      JOINT REPLACEMENT Left     KNEE    KNEE ARTHROPLASTY Right 5/3/2021    Procedure: ARTHROPLASTY, KNEE;  Surgeon: Manuel Willingham MD;  Location: [facility] OR;  Service: Orthopedics;  Laterality: Right;  guzman    LAPAROSCOPIC REPAIR OF INCISIONAL HERNIA N/A 10/22/2019    Procedure: REPAIR, HERNIA, INCISIONAL, LAPAROSCOPIC;  Surgeon: Pantera Almanza III, MD;  Location: [facility];  Service: General;  Laterality: N/A;    pancrease  2009    stents in pancrease     TONSILLECTOMY      TOTAL KNEE ARTHROPLASTY Left 08/10/2017    WHIPPLE PROCEDURE W/ LAPAROSCOPY  10/09       Time Tracking:     PT Received On: 05/01/24  PT Start Time: 1050     PT Stop Time: 1110  PT Total Time (min): 20 min     Billable Minutes: Evaluation 10 and Gait Training 10      05/01/2024

## 2024-05-01 NOTE — SUBJECTIVE & OBJECTIVE
Interval History: 5/1:  Patient is status post C4-6 anterior cervical diskectomy decompression performed on 04/30/2024.  POD 1.  Afebrile.  Blood pressure 160/93.  Pulse 63.  Respirations 18.  WBC 11.41.  Hemoglobin 12.9.  Sodium 133.  Creatinine 1.0.  SELINA drain x1 in place with 25 mL of output in last 12 hours.  Patient found in bed, awake and alert with nurse at bedside.  She reports anticipated incisional pain 6/10 however she did not receive her pain medication this morning.  She also reported she was able to eat her dinner on yesterday without difficulty.  Denies pain with swallowing.  Preoperative symptoms in right upper extremity have improved.  Denies dysphagia, dysphonia, new extremity weakness.  Furthermore, patient is requesting pain medication upon discharge.  She is on a pain contract and stated she we will not see her pain doctor until 05/09/2024.  Last refill of Percocet  mg, 1 tab every 8 hours was on 04/12/2024 and it was a 30 day supply.  She informed me that per her pain contract she can have pain medication if she underwent surgery.    Medications:  Continuous Infusions:  Current Facility-Administered Medications   Medication Dose Route Frequency Last Rate Last Admin     Scheduled Meds:  Current Facility-Administered Medications   Medication Dose Route Frequency    albuterol-ipratropium  3 mL Nebulization Q6H    amLODIPine  5 mg Oral Daily    arformoteroL  15 mcg Nebulization BID    budesonide  0.5 mg Nebulization Q12H    cetirizine  10 mg Oral Daily    dexAMETHasone  2 mg Intravenous Q6H    lipase-protease-amylase 12,000-38,000-60,000 units  2 capsule Oral QID    metoprolol succinate  100 mg Oral BID    mupirocin   Nasal BID    oxyCODONE-acetaminophen  1 tablet Oral Q6H    pantoprazole  40 mg Oral Daily    rOPINIRole  1 mg Oral QHS    tiZANidine  4 mg Oral Q8H     PRN Meds:  Current Facility-Administered Medications:     albuterol sulfate, 2.5 mg, Nebulization, Q4H PRN    aluminum-magnesium  hydroxide-simethicone, 30 mL, Oral, Q4H PRN    bisacodyL, 10 mg, Rectal, Daily PRN    dextrose 50%, 12.5 g, Intravenous, PRN    dextrose 50%, 25 g, Intravenous, PRN    glucagon (human recombinant), 1 mg, Intramuscular, PRN    glucose, 16 g, Oral, PRN    glucose, 24 g, Oral, PRN    HYDROmorphone, 2 mg, Intravenous, Q3H PRN    insulin aspart U-100, 0-10 Units, Subcutaneous, QID (AC + HS) PRN    ondansetron, 8 mg, Oral, Q6H PRN    oxyCODONE, 5 mg, Oral, Q4H PRN    prochlorperazine, 5 mg, Intravenous, Q6H PRN    senna-docusate 8.6-50 mg, 2 tablet, Oral, Nightly PRN       Objective:     Weight: 99.8 kg (220 lb)  Body mass index is 33.45 kg/m².  Vital Signs (Most Recent):  Temp: 98.4 °F (36.9 °C) (05/01/24 0821)  Pulse: 63 (05/01/24 0821)  Resp: 18 (05/01/24 0821)  BP: (!) 160/93 (05/01/24 0821)  SpO2: 99 % (05/01/24 0821) Vital Signs (24h Range):  Temp:  [97.2 °F (36.2 °C)-98.4 °F (36.9 °C)] 98.4 °F (36.9 °C)  Pulse:  [56-72] 63  Resp:  [10-18] 18  SpO2:  [94 %-99 %] 99 %  BP: (110-164)/(56-93) 160/93                              Closed/Suction Drain 04/30/24 1154 Tube - 1 Anterior Neck Bulb 10 Fr. (Active)   Dressing Type Other (Comment) 05/01/24 0300   Dressing Status Clean;Dry;Intact 05/01/24 0300   Dressing Intervention First dressing 05/01/24 0300   Drainage Bloody 05/01/24 0300   Status To bulb suction 05/01/24 0300   Output (mL) 5 mL 05/01/24 0603            Closed/Suction Drain 04/30/24 1531 Lateral;Right;Superior Chest (Active)           Neurosurgery Physical Exam  General:  Very pleasant.  In no acute distress.  Neck: No tracheal deviation.  Collar in place  Neurologic: Alert and oriented. Thought content appropriate.  GCS: E4 V5 M6; Total: 15  Pulmonary: normal respirations, no signs of respiratory distress  Skin: Skin is warm, dry and intact.    Sensory: intact to light touch throughout  Motor Strength: Moves all extremities spontaneously.  No abnormal movements seen.  Bilateral upper extremity strength 5/5  "throughout.      Anterior cervical incision: Incision covered with clean, dry steri strip. No surrounding erythema or edema. No drainage from incision. No palpable hematoma or underlying fluid collection.  SELINA drain x 1 in place       Significant Labs:  Recent Labs   Lab 04/30/24  0754 05/01/24  0504   * 366*    133*   K 4.2 4.8   CL 98 96   CO2 33* 31*   BUN 22 23   CREATININE 1.0 1.0   CALCIUM 8.9 8.4*     Recent Labs   Lab 05/01/24  0504   WBC 11.41   HGB 12.9   HCT 39.6        No results for input(s): "LABPT", "INR", "APTT" in the last 48 hours.  Microbiology Results (last 7 days)       ** No results found for the last 168 hours. **            "

## 2024-05-02 ENCOUNTER — TELEPHONE (OUTPATIENT)
Dept: NEUROSURGERY | Facility: CLINIC | Age: 74
End: 2024-05-02

## 2024-05-02 DIAGNOSIS — Z98.1 S/P CERVICAL SPINAL FUSION: ICD-10-CM

## 2024-05-02 DIAGNOSIS — M48.02 CERVICAL SPINAL STENOSIS: Primary | ICD-10-CM

## 2024-05-02 PROCEDURE — G0180 MD CERTIFICATION HHA PATIENT: HCPCS | Mod: ,,, | Performed by: NEUROLOGICAL SURGERY

## 2024-05-02 NOTE — TELEPHONE ENCOUNTER
Spoke to pt earlier today and informed pt if she can come before 3pm we can see her today for drain removal. Confirmed address with pt. Pt stated she should be able to make it into office in a couple of hours.

## 2024-05-02 NOTE — TELEPHONE ENCOUNTER
----- Message from Sherice Diaz RN sent at 5/2/2024 11:44 AM CDT -----  Contact: self    ----- Message -----  From: Belen Estrada  Sent: 5/2/2024  11:06 AM CDT  To: [facility] Neurosurgery Clinical Support    Type: Needs Medical Advice  Who Called:  pt  Symptoms (please be specific):  drain  How long has patient had these symptoms:  n/a  Pharmacy name and phone #:  n/a  Best Call Back Number: [phone]   Additional Information: please call pt states she did not know her appt was today.pt would like to come later today

## 2024-05-05 NOTE — DISCHARGE SUMMARY
[facility] Medicine  Discharge Summary      Patient Name: Maddie Otero  MRN: [id number]  [facility]: [id number]  Patient Class: IP- Inpatient  Admission Date: 4/23/2024  Hospital Length of Stay: 1 days  Discharge Date and Time: 4/26/2024  3:16 PM  Attending Physician: No att. providers found   Discharging Provider: Petey Mares MD  Primary Care Provider: Carrie Gorman FNP    Primary Care Team: Networked reference to record PCT     HPI:   73-year-old female with past medical history of COPD on 2 L nasal cannula at home presenting because of multiple complaints.  According to the patient, over the last 2-3 days, she has been having severe neck pain radiating to the right shoulder, and right forearm with right hand numbness.  Her neck pain is severe, 9/10 on pain scale, described as sharp.  She also has been short of breath with chest tightness.  She denied any chest pain per se.  Because of her symptoms, she came to the ER for evaluation.    In the ER, vitals showed a blood pressure of 214/126, heart rate of 73, respiratory rate of 18, afebrile satting 95% on room air.  CBC is within normal limits.  CMP showed elevated blood sugar of 233.  Troponin is negative.  EKG showed normal sinus rhythm with no ischemic changes.  Chest x-ray with no pneumonia.  CT head with no acute intracranial process.  CT C-spine showed  Broad-based disc protrusion with osteophytic ridging at C5-C6, producing severe spinal canal stenosis and potential cervical cord compression. This was followed by MRI C-spine which showed Changes of multilevel cervical degenerative disc and facet disease. Findings appear most pronounced at C5-6, where there is probable severe spinal stenosis and severe bilateral foraminal narrowing. Sagittal T2 weighted images demonstrate mild signal increase within the substance of the cervical cord at this level most compatible with myelomalacia.  Patient was given Valium, morphine, and  Physical Therapy Visit    Visit Type: Daily Treatment Note  Visit: 3  Referring Provider: Everton Benavides DO  Medical Diagnosis (from order): Diagnosis Information    Diagnosis  836.0 (ICD-9-CM) - S83.211A (ICD-10-CM) - Bucket-handle tear of medial meniscus of right knee as current injury, initial encounter  844.2 (ICD-9-CM) - S83.511A (ICD-10-CM) - Complete tear of anterior cruciate ligament of right knee, initial encounter  836.1 (ICD-9-CM) - S83.281A (ICD-10-CM) - Tear of lateral meniscus of right knee, current, unspecified tear type, initial encounter         SUBJECTIVE                                                                                                               Patient states that he still has a decent amount of knee pain and is sore from doing home exercise plan.   Functional Change: See above      OBJECTIVE                                                                                                                     Range of Motion (ROM)   (degrees unless noted; active unless noted; norms in ( ); negative=lacking to 0, positive=beyond 0)  Knee:   - Extension (0-10):      • Right:  0                        Treatment     Therapeutic Exercise  Updated and reviewed home exericse program   Quad set  Knee extension stretch pad under ankle  Heel slide: seated xf  LAQx5  straight leg raise x5 thru partial range   standing hamstring curl x5  standing TKE x5      Manual Therapy   Patella mobilizations grade I-II, all directions  Knee PROM with sustained stretches as tolerated     Gait Training  Weight shifts in //'s  Practiced ambulating WBAT in //'s with hands on railing back and forth  Educated patient to try and practice this at home w/ crutches or counter to continue to allow more weight on R leg    Skilled input: verbal instruction/cues    Writer verbally educated and received verbal consent for hand placement, positioning of patient, and techniques to be performed today from patient for  DuoNeb treatments.  ER provider did discuss the case with Neurosurgery who recommended admission, and for neurosurgery to be consulted.    * No surgery found *      Hospital Course:   She was admitted with uncontrolled pain and COPD exacerbation.  She was put on systemic steroid.  For the pain, she was put on analgesics and muscle relaxant, in addition to gabapentin.  We consulted with spine surgery.  Eliquis was put on hold.  The surgeon asked that the Eliquis be held for a few more days to ensure low bleeding risk.  When the patient's respiratory status had improved, we discharged her home and instructed her to follow up with the surgeon in order to undergo ACDF at the cervical level.    Physical Exam  Constitutional:       General: She is not in acute distress.     Appearance: She is not ill-appearing.   Eyes:      General:         Right eye: No discharge.         Left eye: No discharge.   Neck:      Vascular: No JVD.   Cardiovascular:      Rate and Rhythm: Normal rate and regular rhythm.   Pulmonary:      Effort: Pulmonary effort is normal.      Breath sounds: Normal breath sounds. Decreased air movement present.  No wheezing or rales.     Goals of Care Treatment Preferences:  Code Status: Full Code      Consults:   Consults (From admission, onward)          Status Ordering Provider     Inpatient consult to Neurosurgery  Once        Provider:  Phu Pierson, JUNIOR Muñiz            No new Assessment & Plan notes have been filed under this hospital service since the last note was generated.  Service: Hospital Medicine    Final Active Diagnoses:    Diagnosis Date Noted POA    PRINCIPAL PROBLEM:  Cervical spinal stenosis [M48.02] 04/23/2024 Yes     Chronic    COPD (chronic obstructive pulmonary disease) [J44.9] 04/23/2024 Yes    Type 2 diabetes mellitus with hyperglycemia, with long-term current use of insulin [E11.65, Z79.4] 04/23/2024 Not Applicable    HTN (hypertension) [I10] 04/23/2024  therapist position for techniques and hand placement and palpation for techniques as described above and how they are pertinent to the patient's plan of care.  Home Exercise Program  Access Code: [email or web address]  URL: [email or web address]  Date: 06/08/2023  Prepared by: Garrick Amador    Exercises  - Supine Quad Set  - 1-2 x daily - 7 x weekly - 2-3 sets - 10 reps - 3 hold  - Supine Gluteal Sets  - 1-2 x daily - 7 x weekly - 2-3 sets - 10 reps  - Supine Knee Extension Stretch on Towel Roll  - 1-2 x daily - 7 x weekly - 2-3 sets - 120 hold  - Supine Heel Slide  - 1-2 x daily - 7 x weekly - 2-3 sets - 10 reps  - Seated Heel Slide  - 1-2 x daily - 7 x weekly - 2-3 sets - 10 reps  - Side to Side Weight Shift with Counter Support  - 1-2 x daily - 7 x weekly - 2-3 sets - 10 reps  Access Code: [email or web address]  URL: [email or web address]  Date: 06/14/2023  Prepared by: Garrick Amador    Exercises  - Supine Active Straight Leg Raise  - 1-2 x daily - 7 x weekly - 2-3 sets - 10 reps  - Seated Long Arc Quad  - 1-2 x daily - 7 x weekly - 2-3 sets - 10 reps  - Standing Knee Flexion AROM with Chair Support  - 1-2 x daily - 7 x weekly - 2-3 sets - 10 reps  - Standing Quad Set  - 1-2 x daily - 7 x weekly - 2-3 sets - 10 reps          ASSESSMENT                                                                                                            Educated patient to continue to practice WBAT with ambulation and not just NWB w/ crutches. Updated and reviewed home exericse program. Educated patient to follow up with surgeon office regarding work restrictions and time off due to ortho having to reschedule his follow up appointment.   Education:   - Results of above outlined education: Verbalizes understanding       Therapy procedure time and total treatment time can be found documented on the Time Entry flowsheet     Yes    PAF (paroxysmal atrial fibrillation) [I48.0] 05/11/2021 Yes      Problems Resolved During this Admission:       Discharged Condition: good    Disposition: Home or Self Care    Follow Up:   Follow-up Information       Phu Pierson, DO Follow up on 4/30/2024.    Specialty: Neurosurgery  Why: Surgery as previously scheduled  Contact information:  [address] [place] [address] DR ROSS Klever  Kayla KHAN [address]  [phone]                           Patient Instructions:      Diet diabetic     Other restrictions (specify):   Order Comments: Wear the neck collar when driving or when walking around     Activity as tolerated           Pending Diagnostic Studies:       None           Medications:  Reconciled Home Medications:      Medication List        CONTINUE taking these medications      albuterol 90 mcg/actuation inhaler  Commonly known as: PROVENTIL/VENTOLIN HFA  INHALE 2 PUFFS INTO THE LUNGS EVERY 6 (SIX) HOURS AS NEEDED FOR WHEEZING. RESCUE     albuterol-ipratropium 2.5 mg-0.5 mg/3 mL nebulizer solution  Commonly known as: DUO-NEB     amLODIPine 5 MG tablet  Commonly known as: NORVASC  Take 5 mg by mouth once daily.     BREZTRI AEROSPHERE 160-9-4.8 mcg/actuation Hfaa  Generic drug: budesonide-glycopyr-formoterol  Inhale 2 puffs into the lungs 2 (two) times a day.     cetirizine 10 MG tablet  Commonly known as: ZYRTEC  Take 10 mg by mouth once daily.     CREON Cpdr  Generic drug: lipase-protease-amylase 12,000-38,000-60,000 units  Take 2 capsules by mouth 4 (four) times daily.     famotidine 20 MG tablet  Commonly known as: PEPCID  Take 20 mg by mouth 2 (two) times daily.     furosemide 40 MG tablet  Commonly known as: LASIX  Take 40 mg by mouth as needed (edema).     metoprolol succinate 100 MG 24 hr tablet  Commonly known as: TOPROL-XL  Take 100 mg by mouth 2 (two) times daily.     OCEAN NASAL NASL  1 spray by Nasal route daily as needed (rhinitis).     omeprazole 20 MG capsule  Commonly known as: PRILOSEC  Take 1  capsule (20 mg total) by mouth once daily.     rOPINIRole 1 MG tablet  Commonly known as: REQUIP  Take 1 mg by mouth every evening.     TRUE METRIX GLUCOSE TEST STRIP Strp  Generic drug: blood sugar diagnostic            STOP taking these medications      albuterol 90 mcg/actuation inhaler  Commonly known as: PROVENTIL/VENTOLIN     diltiaZEM 120 MG Cp24  Commonly known as: CARDIZEM CD     lisinopriL 5 MG tablet  Commonly known as: PRINIVIL,ZESTRIL     metoprolol tartrate 50 MG tablet  Commonly known as: LOPRESSOR     MOUNJARO 5 mg/0.5 mL Pnij  Generic drug: tirzepatide     oxyCODONE 5 MG immediate release tablet  Commonly known as: ROXICODONE     potassium chloride SA 20 MEQ tablet  Commonly known as: K-DURELBA-CON     predniSONE 10 MG tablet  Commonly known as: DELTASONE     tiZANidine 4 MG tablet  Commonly known as: ZANAFLEX              Indwelling Lines/Drains at time of discharge:   Lines/Drains/Airways       None                   Time spent on the discharge of patient: 22 minutes         Petey Mares MD  Department of Hospital Medicine  [facility]

## 2024-05-05 NOTE — HOSPITAL COURSE
She was admitted with uncontrolled pain and COPD exacerbation.  She was put on systemic steroid.  For the pain, she was put on analgesics and muscle relaxant, in addition to gabapentin.  We consulted with spine surgery.  Eliquis was put on hold.  The surgeon asked that the Eliquis be held for a few more days to ensure low bleeding risk.  When the patient's respiratory status had improved, we discharged her home and instructed her to follow up with the surgeon in order to undergo ACDF at the cervical level.    Physical Exam  Constitutional:       General: She is not in acute distress.     Appearance: She is not ill-appearing.   Eyes:      General:         Right eye: No discharge.         Left eye: No discharge.   Neck:      Vascular: No JVD.   Cardiovascular:      Rate and Rhythm: Normal rate and regular rhythm.   Pulmonary:      Effort: Pulmonary effort is normal.      Breath sounds: Normal breath sounds. Decreased air movement present.  No wheezing or rales.

## 2024-05-06 NOTE — DISCHARGE SUMMARY
[facility]  Neurosurgery  Discharge Summary      Patient Name: Maddie Otero  MRN: [id number]  Admission Date: 4/30/2024  Hospital Length of Stay: 1 days  Discharge Date and Time: 5/1/2024  6:00 PM  Attending Physician: Mery att. providers found   Discharging Provider: APRIL GARG PA-C  Primary Care Provider: Carrie Gorman FNP    HPI:   Ms. Maddie Otero is a 73-year-old female  with past medical history of atrial fibrillation on Eliquis, COPD on oxygen at home, type 2 diabetes, hypertension, hyperlipidemia, diverticulosis presented to the emergency department on 04/23/2024 complaining of right-sided chest pain, neck pain and back pain.  During workup, MRI cervical spine was most significant for severe spinal stenosis with severe bilateral foraminal narrowing at C5-6 and cervical myelomalacia.  Neurosurgery consulted for cervical myelomalacia.     On initiation of encounter patient was found sitting at the edge of bed eating breakfast.  She reports positional posterior cervical pain that radiates down right shoulder, right anterior upper arm and right thumb that began approximate 3 days ago without an inciting event.  Also reports right hand paresthesias, subjective right arm weakness and intermittent imbalance.  Denies left arm symptoms, falls, bladder/bowel dysfunction, hand incoordination/clumsiness, chronic cervical pain.    Procedure(s) (LRB):  DISCECTOMY, SPINE, CERVICAL, ANTERIOR APPROACH, WITH FUSION (N/A)     Hospital Course: 5/1:  Patient is status post C4-6 anterior cervical diskectomy decompression performed on 04/30/2024.  POD 1.  Afebrile.  Blood pressure 160/93.  Pulse 63.  Respirations 18.  WBC 11.41.  Hemoglobin 12.9.  Sodium 133.  Creatinine 1.0.  SELINA drain x1 in place with 25 mL of output in last 12 hours.  Patient found in bed, awake and alert with nurse at bedside.  She reports anticipated incisional pain 6/10 however she did not receive her pain medication this morning.   "She also reported she was able to eat her dinner on yesterday without difficulty.  Denies pain with swallowing.  Preoperative symptoms in right upper extremity have improved.  Denies dysphagia, dysphonia, new extremity weakness.  Furthermore, patient is requesting pain medication upon discharge.  She is on a pain contract and stated she we will not see her pain doctor until 05/09/2024.  Last refill of Percocet  mg, 1 tab every 8 hours was on 04/12/2024 and it was a 30 day supply.  She informed me that per her pain contract she can have pain medication if she underwent surgery.    Neurosurgery Physical Exam  General:  Very pleasant.  In no acute distress.  Neck: No tracheal deviation.  Collar in place  Neurologic: Alert and oriented. Thought content appropriate.  GCS: E4 V5 M6; Total: 15  Pulmonary: normal respirations, no signs of respiratory distress  Skin: Skin is warm, dry and intact.     Sensory: intact to light touch throughout  Motor Strength: Moves all extremities spontaneously.  No abnormal movements seen.  Bilateral upper extremity strength 5/5 throughout.        Anterior cervical incision: Incision covered with clean, dry steri strip. No surrounding erythema or edema. No drainage from incision. No palpable hematoma or underlying fluid collection.  SELINA drain x 1 in place     Goals of Care Treatment Preferences:  Code Status: Full Code      Consults:   Consults (From admission, onward)          Status Ordering Provider     Inpatient consult to Pulmonology  Once        Provider:  Roseanne Mcneil MD    Completed APRIL GARG            Significant Diagnostic Studies: Labs: BMP: No results for input(s): "GLU", "NA", "K", "CL", "CO2", "BUN", "CREATININE", "CALCIUM", "MG" in the last 48 hours. and CBC No results for input(s): "WBC", "HGB", "HCT", "PLT" in the last 48 hours.    Pending Diagnostic Studies:       None          Final Active Diagnoses:    Diagnosis Date Noted POA    PRINCIPAL PROBLEM:  " "Cervical spinal stenosis [M48.02] 04/23/2024 Yes     Chronic    Status post cervical spinal fusion [Z98.1] 05/01/2024 Not Applicable    COPD (chronic obstructive pulmonary disease) [J44.9] 04/23/2024 Yes    Hyperlipidemia LDL goal < 100 [E78.5] 01/09/2014 Yes    Diabetes mellitus type II [E11.9] 10/15/2012 Yes    Hypertension [I10]  Yes      Problems Resolved During this Admission:      Discharged Condition: good     Disposition: Home-Health Care [facility]    Follow Up:   Follow-up Information       [facility]    Specialties: Home Health Services, Home Therapy Services, Home Living Aide Services  Contact information:  [address], [address]  [facility] [id number] [phone]                         Patient Instructions:      WALKER FOR HOME USE     Order Specific Question Answer Comments   Type of Walker: Adult (5'4"-6'6")    With wheels? Yes    Height: 5' 8" (1.727 m)    Weight: 99.8 kg (220 lb)    Length of need (1-99 months): 99    Does patient have medical equipment at home? glucometer Oxygen ([employer]) 2-3L / Oxygen ([employer]) 2-3L / Oxygen ([employer]) 2-3L   Does patient have medical equipment at home? nebulizer    Does patient have medical equipment at home? oxygen    Please check all that apply: Patient's condition impairs ambulation.    Please check all that apply: Patient is unable to safely ambulate without equipment.      Ambulatory referral/consult to Home Health   Standing Status: Future   Referral Priority: Routine Referral Type: Home Health   Referral Reason: Specialty Services Required   Requested Specialty: Home Health Services   Number of Visits Requested: 1     Notify your health care provider if you experience any of the following:  increased confusion or weakness     Notify your health care provider if you experience any of the following:  persistent dizziness, light-headedness, or visual disturbances     Notify your health care provider if you experience any " of the following:  worsening rash     Notify your health care provider if you experience any of the following:  severe persistent headache     Notify your health care provider if you experience any of the following:  difficulty breathing or increased cough     Notify your health care provider if you experience any of the following:  redness, tenderness, or signs of infection (pain, swelling, redness, odor or green/yellow discharge around incision site)     Notify your health care provider if you experience any of the following:  severe uncontrolled pain     Notify your health care provider if you experience any of the following:  persistent nausea and vomiting or diarrhea     Notify your health care provider if you experience any of the following:  temperature >100.4     Activity as tolerated     Medications:  Reconciled Home Medications:      Medication List        START taking these medications      oxyCODONE-acetaminophen 5-325 mg per tablet  Commonly known as: PERCOCET  Take 1 tablet by mouth every 6 (six) hours as needed for Pain.  Replaces: oxyCODONE-acetaminophen  mg per tablet     STOOL SOFTENER-LAXATIVE 8.6-50 mg per tablet  Generic drug: senna-docusate 8.6-50 mg  Take 1 tablet by mouth once daily. for 14 days            CHANGE how you take these medications      methocarbamoL 750 MG Tab  Commonly known as: ROBAXIN  Take 1 tablet (750 mg total) by mouth 3 (three) times daily. for 21 days  What changed:   medication strength  how much to take            CONTINUE taking these medications      albuterol 90 mcg/actuation inhaler  Commonly known as: PROVENTIL/VENTOLIN HFA  INHALE 2 PUFFS INTO THE LUNGS EVERY 6 (SIX) HOURS AS NEEDED FOR WHEEZING. RESCUE     albuterol-ipratropium 2.5 mg-0.5 mg/3 mL nebulizer solution  Commonly known as: DUO-NEB     amLODIPine 5 MG tablet  Commonly known as: NORVASC  Take 5 mg by mouth once daily.     BREZTRI AEROSPHERE 160-9-4.8 mcg/actuation Hfaa  Generic drug:  budesonide-glycopyr-formoterol  Inhale 2 puffs into the lungs 2 (two) times a day.     cetirizine 10 MG tablet  Commonly known as: ZYRTEC  Take 10 mg by mouth once daily.     CREON Cpdr  Generic drug: lipase-protease-amylase 12,000-38,000-60,000 units  Take 2 capsules by mouth 4 (four) times daily.     famotidine 20 MG tablet  Commonly known as: PEPCID  Take 20 mg by mouth 2 (two) times daily.     furosemide 40 MG tablet  Commonly known as: LASIX  Take 40 mg by mouth as needed (edema).     metoprolol succinate 100 MG 24 hr tablet  Commonly known as: TOPROL-XL  Take 100 mg by mouth 2 (two) times daily.     OCEAN NASAL NASL  1 spray by Nasal route daily as needed (rhinitis).     omeprazole 20 MG capsule  Commonly known as: PRILOSEC  Take 1 capsule (20 mg total) by mouth once daily.     rOPINIRole 1 MG tablet  Commonly known as: REQUIP  Take 1 mg by mouth every evening.     TRUE METRIX GLUCOSE TEST STRIP Strp  Generic drug: blood sugar diagnostic            STOP taking these medications      apixaban 5 mg Tab  Commonly known as: ELIQUIS     flecainide 100 MG Tab  Commonly known as: TAMBOCOR     hydrocortisone 2.5 % cream     insulin aspart protamine-insulin aspart 100 unit/mL (70-30) Inpn pen  Commonly known as: NovoLOG Mix 70-30FlexPen U-100     oxyCODONE-acetaminophen  mg per tablet  Commonly known as: PERCOCET  Replaced by: oxyCODONE-acetaminophen 5-325 mg per tablet              APRIL GARG PA-C  Neurosurgery  [facility]

## 2024-05-09 ENCOUNTER — TELEPHONE (OUTPATIENT)
Dept: NEUROSURGERY | Facility: CLINIC | Age: 74
End: 2024-05-09
Payer: MEDICARE

## 2024-05-09 NOTE — TELEPHONE ENCOUNTER
----- Message from Sherice Diaz RN sent at 5/8/2024  4:59 PM CDT -----    ----- Message -----  From: Lefort, Stacey M  Sent: 5/8/2024   2:31 PM CDT  To: [place] Staff    Pearl with [facility] needs a callback at [phone]. She has a question about the incision. Thank you.

## 2024-05-09 NOTE — TELEPHONE ENCOUNTER
Returned call to home health nurse. She reports that there is a hard knot on patient's incision. Pt reported that the knot is new. She reports that it does not seem infected. She also reports that pt is not wearing cervical collar. Informed home health nurse that we will reach out to pt to request a picture of incision be uploaded on [facility] or pt can come to clinic to have surgical site evaluated.

## 2024-05-09 NOTE — TELEPHONE ENCOUNTER
Spoke to Pearl with Home Health and asked that she send picture of pt incision site to the nurses email, provided email to her and she said she would send a picture when she has a chance.

## 2024-05-10 NOTE — TELEPHONE ENCOUNTER
Received photo from home health nurse of pt incision site, knot observed under incision site. Spoke to pt on phone and asked her if the knot was painful/tender to the touch, if swallowing has become more difficult, or if her voice has changed at all; pt confidently answered no to these questions. Asked pt to call this office if anything changes before her next appt on 5/15/24.

## 2024-05-14 ENCOUNTER — HOSPITAL ENCOUNTER (INPATIENT)
Facility: HOSPITAL | Age: 74
LOS: 1 days | Discharge: NURSING, REHAB OR LONG-TERM CARE | DRG: 190 | End: 2024-05-17
Attending: EMERGENCY MEDICINE | Admitting: STUDENT IN AN ORGANIZED HEALTH CARE EDUCATION/TRAINING PROGRAM
Payer: MEDICARE

## 2024-05-14 ENCOUNTER — TELEPHONE (OUTPATIENT)
Dept: NEUROSURGERY | Facility: CLINIC | Age: 74
End: 2024-05-14
Payer: MEDICARE

## 2024-05-14 DIAGNOSIS — R06.02 SOB (SHORTNESS OF BREATH): ICD-10-CM

## 2024-05-14 DIAGNOSIS — R29.6 RECURRENT FALLS: Primary | ICD-10-CM

## 2024-05-14 DIAGNOSIS — J44.1 COPD EXACERBATION: ICD-10-CM

## 2024-05-14 DIAGNOSIS — Z98.1 STATUS POST CERVICAL SPINAL FUSION: ICD-10-CM

## 2024-05-14 DIAGNOSIS — R07.9 CHEST PAIN: ICD-10-CM

## 2024-05-14 PROBLEM — J18.9 ACUTE PNEUMONIA: Status: ACTIVE | Noted: 2024-05-14

## 2024-05-14 PROBLEM — Z99.81 ON HOME OXYGEN THERAPY: Status: ACTIVE | Noted: 2024-05-14

## 2024-05-14 LAB
ALBUMIN SERPL BCP-MCNC: 3.9 G/DL (ref 3.5–5.2)
ALP SERPL-CCNC: 129 U/L (ref 55–135)
ALT SERPL W/O P-5'-P-CCNC: 20 U/L (ref 10–44)
ANION GAP SERPL CALC-SCNC: 8 MMOL/L (ref 8–16)
AST SERPL-CCNC: 17 U/L (ref 10–40)
BASOPHILS # BLD AUTO: 0.03 K/UL (ref 0–0.2)
BASOPHILS NFR BLD: 0.3 % (ref 0–1.9)
BILIRUB SERPL-MCNC: 0.6 MG/DL (ref 0.1–1)
BNP SERPL-MCNC: 47 PG/ML (ref 0–99)
BUN SERPL-MCNC: 34 MG/DL (ref 8–23)
CALCIUM SERPL-MCNC: 8.9 MG/DL (ref 8.7–10.5)
CHLORIDE SERPL-SCNC: 99 MMOL/L (ref 95–110)
CO2 SERPL-SCNC: 25 MMOL/L (ref 23–29)
CREAT SERPL-MCNC: 1.1 MG/DL (ref 0.5–1.4)
D DIMER PPP IA.FEU-MCNC: 3.94 MG/L FEU (ref 0–0.49)
DIFFERENTIAL METHOD BLD: ABNORMAL
EOSINOPHIL # BLD AUTO: 0.1 K/UL (ref 0–0.5)
EOSINOPHIL NFR BLD: 1.1 % (ref 0–8)
ERYTHROCYTE [DISTWIDTH] IN BLOOD BY AUTOMATED COUNT: 13.2 % (ref 11.5–14.5)
EST. GFR  (NO RACE VARIABLE): 52.4 ML/MIN/1.73 M^2
GLUCOSE SERPL-MCNC: 143 MG/DL (ref 70–110)
GLUCOSE SERPL-MCNC: 298 MG/DL (ref 70–110)
HCT VFR BLD AUTO: 40.5 % (ref 37–48.5)
HGB BLD-MCNC: 13 G/DL (ref 12–16)
IMM GRANULOCYTES # BLD AUTO: 0.11 K/UL (ref 0–0.04)
IMM GRANULOCYTES NFR BLD AUTO: 1.1 % (ref 0–0.5)
LYMPHOCYTES # BLD AUTO: 1.2 K/UL (ref 1–4.8)
LYMPHOCYTES NFR BLD: 11.2 % (ref 18–48)
MCH RBC QN AUTO: 32.1 PG (ref 27–31)
MCHC RBC AUTO-ENTMCNC: 32.1 G/DL (ref 32–36)
MCV RBC AUTO: 100 FL (ref 82–98)
MONOCYTES # BLD AUTO: 0.8 K/UL (ref 0.3–1)
MONOCYTES NFR BLD: 8.1 % (ref 4–15)
NEUTROPHILS # BLD AUTO: 8.2 K/UL (ref 1.8–7.7)
NEUTROPHILS NFR BLD: 78.2 % (ref 38–73)
NRBC BLD-RTO: 0 /100 WBC
OHS QRS DURATION: 104 MS
OHS QTC CALCULATION: 469 MS
PLATELET # BLD AUTO: 184 K/UL (ref 150–450)
PMV BLD AUTO: 10.9 FL (ref 9.2–12.9)
POTASSIUM SERPL-SCNC: 5.1 MMOL/L (ref 3.5–5.1)
PROT SERPL-MCNC: 7.1 G/DL (ref 6–8.4)
RBC # BLD AUTO: 4.05 M/UL (ref 4–5.4)
SODIUM SERPL-SCNC: 132 MMOL/L (ref 136–145)
TROPONIN I SERPL HS-MCNC: 5.4 PG/ML (ref 0–14.9)
WBC # BLD AUTO: 10.42 K/UL (ref 3.9–12.7)

## 2024-05-14 PROCEDURE — 63700000 PHARM REV CODE 250 ALT 637 W/O HCPCS: Performed by: INTERNAL MEDICINE

## 2024-05-14 PROCEDURE — 83880 ASSAY OF NATRIURETIC PEPTIDE: CPT | Performed by: EMERGENCY MEDICINE

## 2024-05-14 PROCEDURE — 80053 COMPREHEN METABOLIC PANEL: CPT | Performed by: EMERGENCY MEDICINE

## 2024-05-14 PROCEDURE — 94799 UNLISTED PULMONARY SVC/PX: CPT

## 2024-05-14 PROCEDURE — 94640 AIRWAY INHALATION TREATMENT: CPT | Mod: XB

## 2024-05-14 PROCEDURE — 96365 THER/PROPH/DIAG IV INF INIT: CPT

## 2024-05-14 PROCEDURE — 93005 ELECTROCARDIOGRAM TRACING: CPT | Performed by: INTERNAL MEDICINE

## 2024-05-14 PROCEDURE — 94761 N-INVAS EAR/PLS OXIMETRY MLT: CPT

## 2024-05-14 PROCEDURE — 63600175 PHARM REV CODE 636 W HCPCS: Performed by: INTERNAL MEDICINE

## 2024-05-14 PROCEDURE — 96376 TX/PRO/DX INJ SAME DRUG ADON: CPT

## 2024-05-14 PROCEDURE — 85025 COMPLETE CBC W/AUTO DIFF WBC: CPT | Performed by: EMERGENCY MEDICINE

## 2024-05-14 PROCEDURE — 84484 ASSAY OF TROPONIN QUANT: CPT | Performed by: EMERGENCY MEDICINE

## 2024-05-14 PROCEDURE — 25000242 PHARM REV CODE 250 ALT 637 W/ HCPCS: Performed by: INTERNAL MEDICINE

## 2024-05-14 PROCEDURE — 85379 FIBRIN DEGRADATION QUANT: CPT | Performed by: EMERGENCY MEDICINE

## 2024-05-14 PROCEDURE — 25000003 PHARM REV CODE 250: Performed by: INTERNAL MEDICINE

## 2024-05-14 PROCEDURE — 99900031 HC PATIENT EDUCATION (STAT)

## 2024-05-14 PROCEDURE — G0378 HOSPITAL OBSERVATION PER HR: HCPCS

## 2024-05-14 PROCEDURE — 96367 TX/PROPH/DG ADDL SEQ IV INF: CPT

## 2024-05-14 PROCEDURE — 94640 AIRWAY INHALATION TREATMENT: CPT

## 2024-05-14 PROCEDURE — 82962 GLUCOSE BLOOD TEST: CPT

## 2024-05-14 PROCEDURE — 99900035 HC TECH TIME PER 15 MIN (STAT)

## 2024-05-14 PROCEDURE — 96375 TX/PRO/DX INJ NEW DRUG ADDON: CPT

## 2024-05-14 PROCEDURE — 96366 THER/PROPH/DIAG IV INF ADDON: CPT

## 2024-05-14 PROCEDURE — 99285 EMERGENCY DEPT VISIT HI MDM: CPT | Mod: 25

## 2024-05-14 PROCEDURE — 93010 ELECTROCARDIOGRAM REPORT: CPT | Mod: ,,, | Performed by: INTERNAL MEDICINE

## 2024-05-14 PROCEDURE — 63600175 PHARM REV CODE 636 W HCPCS: Performed by: EMERGENCY MEDICINE

## 2024-05-14 PROCEDURE — 25500020 PHARM REV CODE 255: Performed by: EMERGENCY MEDICINE

## 2024-05-14 PROCEDURE — 27000221 HC OXYGEN, UP TO 24 HOURS

## 2024-05-14 PROCEDURE — 25000242 PHARM REV CODE 250 ALT 637 W/ HCPCS: Performed by: EMERGENCY MEDICINE

## 2024-05-14 RX ORDER — LEVOFLOXACIN 5 MG/ML
500 INJECTION, SOLUTION INTRAVENOUS
Status: DISCONTINUED | OUTPATIENT
Start: 2024-05-14 | End: 2024-05-17

## 2024-05-14 RX ORDER — ACETAMINOPHEN 325 MG/1
650 TABLET ORAL EVERY 8 HOURS PRN
Status: DISCONTINUED | OUTPATIENT
Start: 2024-05-14 | End: 2024-05-17 | Stop reason: HOSPADM

## 2024-05-14 RX ORDER — IPRATROPIUM BROMIDE 0.5 MG/2.5ML
SOLUTION RESPIRATORY (INHALATION)
Status: DISPENSED
Start: 2024-05-14 | End: 2024-05-15

## 2024-05-14 RX ORDER — CETIRIZINE HYDROCHLORIDE 5 MG/1
10 TABLET ORAL DAILY
Status: DISCONTINUED | OUTPATIENT
Start: 2024-05-14 | End: 2024-05-17 | Stop reason: HOSPADM

## 2024-05-14 RX ORDER — LEVALBUTEROL INHALATION SOLUTION 1.25 MG/3ML
SOLUTION RESPIRATORY (INHALATION)
Status: DISPENSED
Start: 2024-05-14 | End: 2024-05-15

## 2024-05-14 RX ORDER — PANCRELIPASE 60000; 12000; 38000 [USP'U]/1; [USP'U]/1; [USP'U]/1
2 CAPSULE, DELAYED RELEASE PELLETS ORAL 4 TIMES DAILY
COMMUNITY

## 2024-05-14 RX ORDER — PREDNISONE 20 MG/1
40 TABLET ORAL DAILY
Status: DISCONTINUED | OUTPATIENT
Start: 2024-05-14 | End: 2024-05-14

## 2024-05-14 RX ORDER — INSULIN ASPART 100 [IU]/ML
0-10 INJECTION, SOLUTION INTRAVENOUS; SUBCUTANEOUS
Status: DISCONTINUED | OUTPATIENT
Start: 2024-05-14 | End: 2024-05-15

## 2024-05-14 RX ORDER — ROPINIROLE 1 MG/1
1 TABLET, FILM COATED ORAL NIGHTLY
Status: DISCONTINUED | OUTPATIENT
Start: 2024-05-14 | End: 2024-05-17 | Stop reason: HOSPADM

## 2024-05-14 RX ORDER — IPRATROPIUM BROMIDE 0.5 MG/2.5ML
0.5 SOLUTION RESPIRATORY (INHALATION) EVERY 8 HOURS
Status: DISCONTINUED | OUTPATIENT
Start: 2024-05-14 | End: 2024-05-17 | Stop reason: HOSPADM

## 2024-05-14 RX ORDER — SODIUM,POTASSIUM PHOSPHATES 280-250MG
2 POWDER IN PACKET (EA) ORAL
Status: DISCONTINUED | OUTPATIENT
Start: 2024-05-14 | End: 2024-05-17 | Stop reason: HOSPADM

## 2024-05-14 RX ORDER — ALUMINUM HYDROXIDE, MAGNESIUM HYDROXIDE, AND SIMETHICONE 1200; 120; 1200 MG/30ML; MG/30ML; MG/30ML
30 SUSPENSION ORAL 4 TIMES DAILY PRN
Status: DISCONTINUED | OUTPATIENT
Start: 2024-05-14 | End: 2024-05-17 | Stop reason: HOSPADM

## 2024-05-14 RX ORDER — OXYCODONE AND ACETAMINOPHEN 10; 325 MG/1; MG/1
1 TABLET ORAL 3 TIMES DAILY PRN
COMMUNITY
Start: 2024-05-11

## 2024-05-14 RX ORDER — LANOLIN ALCOHOL/MO/W.PET/CERES
800 CREAM (GRAM) TOPICAL
Status: DISCONTINUED | OUTPATIENT
Start: 2024-05-14 | End: 2024-05-17 | Stop reason: HOSPADM

## 2024-05-14 RX ORDER — IBUPROFEN 200 MG
16 TABLET ORAL
Status: DISCONTINUED | OUTPATIENT
Start: 2024-05-14 | End: 2024-05-17 | Stop reason: HOSPADM

## 2024-05-14 RX ORDER — FUROSEMIDE 10 MG/ML
40 INJECTION INTRAMUSCULAR; INTRAVENOUS DAILY
Status: DISCONTINUED | OUTPATIENT
Start: 2024-05-14 | End: 2024-05-16

## 2024-05-14 RX ORDER — METOPROLOL SUCCINATE 100 MG/1
100 TABLET, EXTENDED RELEASE ORAL DAILY
Status: ON HOLD | COMMUNITY
End: 2024-05-17 | Stop reason: HOSPADM

## 2024-05-14 RX ORDER — AMLODIPINE BESYLATE 5 MG/1
5 TABLET ORAL DAILY
Status: DISCONTINUED | OUTPATIENT
Start: 2024-05-14 | End: 2024-05-17 | Stop reason: HOSPADM

## 2024-05-14 RX ORDER — IBUPROFEN 200 MG
24 TABLET ORAL
Status: DISCONTINUED | OUTPATIENT
Start: 2024-05-14 | End: 2024-05-17 | Stop reason: HOSPADM

## 2024-05-14 RX ORDER — AZITHROMYCIN 250 MG/1
500 TABLET, FILM COATED ORAL DAILY
Status: DISCONTINUED | OUTPATIENT
Start: 2024-05-14 | End: 2024-05-14

## 2024-05-14 RX ORDER — SODIUM CHLORIDE 0.9 % (FLUSH) 0.9 %
3 SYRINGE (ML) INJECTION
Status: DISCONTINUED | OUTPATIENT
Start: 2024-05-14 | End: 2024-05-17 | Stop reason: HOSPADM

## 2024-05-14 RX ORDER — METOPROLOL SUCCINATE 50 MG/1
100 TABLET, EXTENDED RELEASE ORAL DAILY
Status: DISCONTINUED | OUTPATIENT
Start: 2024-05-14 | End: 2024-05-17 | Stop reason: HOSPADM

## 2024-05-14 RX ORDER — OXYCODONE AND ACETAMINOPHEN 5; 325 MG/1; MG/1
1 TABLET ORAL EVERY 4 HOURS PRN
Status: DISCONTINUED | OUTPATIENT
Start: 2024-05-14 | End: 2024-05-17 | Stop reason: HOSPADM

## 2024-05-14 RX ORDER — METHYLPREDNISOLONE SOD SUCC 125 MG
125 VIAL (EA) INJECTION
Status: COMPLETED | OUTPATIENT
Start: 2024-05-14 | End: 2024-05-14

## 2024-05-14 RX ORDER — ACETAMINOPHEN 325 MG/1
650 TABLET ORAL EVERY 4 HOURS PRN
Status: DISCONTINUED | OUTPATIENT
Start: 2024-05-14 | End: 2024-05-17 | Stop reason: HOSPADM

## 2024-05-14 RX ORDER — TALC
6 POWDER (GRAM) TOPICAL NIGHTLY PRN
Status: DISCONTINUED | OUTPATIENT
Start: 2024-05-14 | End: 2024-05-17 | Stop reason: HOSPADM

## 2024-05-14 RX ORDER — FENTANYL CITRATE 50 UG/ML
50 INJECTION, SOLUTION INTRAMUSCULAR; INTRAVENOUS
Status: COMPLETED | OUTPATIENT
Start: 2024-05-14 | End: 2024-05-14

## 2024-05-14 RX ORDER — LISINOPRIL 5 MG/1
5 TABLET ORAL DAILY
Status: ON HOLD | COMMUNITY
Start: 2024-04-27 | End: 2024-05-17 | Stop reason: HOSPADM

## 2024-05-14 RX ORDER — IPRATROPIUM BROMIDE AND ALBUTEROL SULFATE 2.5; .5 MG/3ML; MG/3ML
3 SOLUTION RESPIRATORY (INHALATION)
Status: COMPLETED | OUTPATIENT
Start: 2024-05-14 | End: 2024-05-14

## 2024-05-14 RX ORDER — HYDROCODONE BITARTRATE AND ACETAMINOPHEN 5; 325 MG/1; MG/1
1 TABLET ORAL EVERY 6 HOURS PRN
Status: DISCONTINUED | OUTPATIENT
Start: 2024-05-14 | End: 2024-05-14

## 2024-05-14 RX ORDER — ONDANSETRON HYDROCHLORIDE 2 MG/ML
4 INJECTION, SOLUTION INTRAVENOUS EVERY 6 HOURS PRN
Status: DISCONTINUED | OUTPATIENT
Start: 2024-05-14 | End: 2024-05-17 | Stop reason: HOSPADM

## 2024-05-14 RX ORDER — LEVALBUTEROL INHALATION SOLUTION 1.25 MG/3ML
1.25 SOLUTION RESPIRATORY (INHALATION) EVERY 8 HOURS
Status: DISCONTINUED | OUTPATIENT
Start: 2024-05-14 | End: 2024-05-17 | Stop reason: HOSPADM

## 2024-05-14 RX ORDER — PANTOPRAZOLE SODIUM 40 MG/1
40 TABLET, DELAYED RELEASE ORAL DAILY
Status: DISCONTINUED | OUTPATIENT
Start: 2024-05-14 | End: 2024-05-14

## 2024-05-14 RX ORDER — METRONIDAZOLE 500 MG/100ML
500 INJECTION, SOLUTION INTRAVENOUS
Status: DISCONTINUED | OUTPATIENT
Start: 2024-05-14 | End: 2024-05-17

## 2024-05-14 RX ORDER — FAMOTIDINE 20 MG/1
20 TABLET, FILM COATED ORAL 2 TIMES DAILY
Status: DISCONTINUED | OUTPATIENT
Start: 2024-05-14 | End: 2024-05-17 | Stop reason: HOSPADM

## 2024-05-14 RX ORDER — GLUCAGON 1 MG
1 KIT INJECTION
Status: DISCONTINUED | OUTPATIENT
Start: 2024-05-14 | End: 2024-05-17 | Stop reason: HOSPADM

## 2024-05-14 RX ADMIN — OXYCODONE HYDROCHLORIDE AND ACETAMINOPHEN 1 TABLET: 5; 325 TABLET ORAL at 03:05

## 2024-05-14 RX ADMIN — LEVALBUTEROL HYDROCHLORIDE 1.25 MG: 1.25 SOLUTION RESPIRATORY (INHALATION) at 11:05

## 2024-05-14 RX ADMIN — ROPINIROLE HYDROCHLORIDE 1 MG: 1 TABLET, FILM COATED ORAL at 05:05

## 2024-05-14 RX ADMIN — LEVOFLOXACIN 500 MG: 5 INJECTION, SOLUTION INTRAVENOUS at 06:05

## 2024-05-14 RX ADMIN — IPRATROPIUM BROMIDE 0.5 MG: 0.5 SOLUTION RESPIRATORY (INHALATION) at 11:05

## 2024-05-14 RX ADMIN — CETIRIZINE HYDROCHLORIDE 10 MG: 10 TABLET, FILM COATED ORAL at 03:05

## 2024-05-14 RX ADMIN — APIXABAN 5 MG: 5 TABLET, FILM COATED ORAL at 06:05

## 2024-05-14 RX ADMIN — FUROSEMIDE 40 MG: 10 INJECTION, SOLUTION INTRAMUSCULAR; INTRAVENOUS at 04:05

## 2024-05-14 RX ADMIN — METHYLPREDNISOLONE SODIUM SUCCINATE 40 MG: 40 INJECTION, POWDER, FOR SOLUTION INTRAMUSCULAR; INTRAVENOUS at 05:05

## 2024-05-14 RX ADMIN — METOPROLOL SUCCINATE 100 MG: 50 TABLET, FILM COATED, EXTENDED RELEASE ORAL at 03:05

## 2024-05-14 RX ADMIN — AZITHROMYCIN DIHYDRATE 500 MG: 250 TABLET ORAL at 03:05

## 2024-05-14 RX ADMIN — METHYLPREDNISOLONE SODIUM SUCCINATE 125 MG: 125 INJECTION, POWDER, FOR SOLUTION INTRAMUSCULAR; INTRAVENOUS at 01:05

## 2024-05-14 RX ADMIN — FENTANYL CITRATE 50 MCG: 50 INJECTION, SOLUTION INTRAMUSCULAR; INTRAVENOUS at 09:05

## 2024-05-14 RX ADMIN — VANCOMYCIN HYDROCHLORIDE 1500 MG: 1.5 INJECTION, POWDER, LYOPHILIZED, FOR SOLUTION INTRAVENOUS at 09:05

## 2024-05-14 RX ADMIN — FAMOTIDINE 20 MG: 20 TABLET ORAL at 09:05

## 2024-05-14 RX ADMIN — IPRATROPIUM BROMIDE AND ALBUTEROL SULFATE 3 ML: 2.5; .5 SOLUTION RESPIRATORY (INHALATION) at 09:05

## 2024-05-14 RX ADMIN — IPRATROPIUM BROMIDE AND ALBUTEROL SULFATE 3 ML: 2.5; .5 SOLUTION RESPIRATORY (INHALATION) at 01:05

## 2024-05-14 RX ADMIN — IOHEXOL 100 ML: 350 INJECTION, SOLUTION INTRAVENOUS at 11:05

## 2024-05-14 RX ADMIN — METRONIDAZOLE 500 MG: 5 INJECTION, SOLUTION INTRAVENOUS at 05:05

## 2024-05-14 NOTE — ASSESSMENT & PLAN NOTE
"Patient's FSGs are controlled on current medication regimen.  Last A1c reviewed-   Lab Results   Component Value Date    HGBA1C 8.5 (H) 04/23/2024     Most recent fingerstick glucose reviewed- No results for input(s): "POCTGLUCOSE" in the last 24 hours.  Current correctional scale  Medium  Maintain anti-hyperglycemic dose as follows-   Antihyperglycemics (From admission, onward)      Start     Stop Route Frequency Ordered    05/14/24 1359  insulin aspart U-100 pen 0-10 Units         -- SubQ Before meals & nightly PRN 05/14/24 1259          Hold Oral hypoglycemics while patient is in the hospital.  "

## 2024-05-14 NOTE — CARE UPDATE
05/14/24 0949   Patient Assessment/Suction   Level of Consciousness (AVPU) alert   Respiratory Effort Normal;Unlabored   Expansion/Accessory Muscles/Retractions no use of accessory muscles;expansion symmetric;no retractions   All Lung Fields Breath Sounds diminished   Rhythm/Pattern, Respiratory unlabored;pattern regular;depth regular   Cough Frequency infrequent   PRE-TX-O2   Device (Oxygen Therapy) nasal cannula   $ Is the patient on Low Flow Oxygen? Yes   Flow (L/min) (Oxygen Therapy) 2  (PT WEARS 2-3L AT HOME)   SpO2 98 %   Pulse Oximetry Type Continuous   $ Pulse Oximetry - Multiple Charge Pulse Oximetry - Multiple   Pulse 87   Resp 19   Aerosol Therapy   $ Aerosol Therapy Charges Aerosol Treatment   Daily Review of Necessity (SVN) completed   Respiratory Treatment Status (SVN) given   Treatment Route (SVN) oxygen;mask   Patient Position HOB elevated   Post Treatment Assessment (SVN) breath sounds unchanged   Signs of Intolerance (SVN) none   Education   $ Education Bronchodilator;15 min

## 2024-05-14 NOTE — HPI
73 year old pt getting admitted with COPD exacerbation and falls  Pt is status post C4-6 anterior cervical diskectomy decompression performed on 04/30/2024   After surgery she did well  Past few days she ahs been suffering from shortness of breath  She felt weak and has to increase her oxygen from 2-4 L  Also pt had episodes of fall and pt came to ER & got admitted   Pt is on lasix and pt reports increased swelling on legs

## 2024-05-14 NOTE — ASSESSMENT & PLAN NOTE
Chronic, controlled. Latest blood pressure and vitals reviewed-     Temp:  [97.7 °F (36.5 °C)-98.2 °F (36.8 °C)]   Pulse:  [81-91]   Resp:  [13-24]   BP: (107-151)/(56-98)   SpO2:  [95 %-100 %] .   Home meds for hypertension were reviewed and noted below.   Hypertension Medications               amLODIPine (NORVASC) 5 MG tablet Take 5 mg by mouth once daily.    furosemide (LASIX) 40 MG tablet Take 40 mg by mouth as needed (edema).     lisinopriL (PRINIVIL,ZESTRIL) 5 MG tablet Take 5 mg by mouth once daily.    metoprolol succinate (TOPROL-XL) 100 MG 24 hr tablet Take 100 mg by mouth 2 (two) times daily.    metoprolol succinate (TOPROL-XL) 100 MG 24 hr tablet Take 100 mg by mouth once daily.            While in the hospital, will manage blood pressure as follows; Continue home antihypertensive regimen    Will utilize p.r.n. blood pressure medication only if patient's blood pressure greater than 140/90 and she develops symptoms such as worsening chest pain or shortness of breath.

## 2024-05-14 NOTE — SUBJECTIVE & OBJECTIVE
"Past Medical History:   Diagnosis Date    A-fib     Anticoagulant long-term use     Arthritis     COPD (chronic obstructive pulmonary disease)     COPD (chronic obstructive pulmonary disease) 2019    Diabetes mellitus, type 2     Diverticulosis     History of left knee replacement 2017    DR CRENSHAW    HNP (herniated nucleus pulposus)     LUMBAR    Hypertension     Lung disease     Pancreatic insufficiency     s/p whipple, non cancer    Presence of dental bridge     UPPER    Wears glasses        Past Surgical History:   Procedure Laterality Date    ANTERIOR CERVICAL DISCECTOMY W/ FUSION N/A 4/30/2024    Procedure: DISCECTOMY, SPINE, CERVICAL, ANTERIOR APPROACH, WITH FUSION;  Surgeon: Phu Pierson DO;  Location: [facility];  Service: Neurosurgery;  Laterality: N/A;  ACDF C4-5, C5-6 with partial C5 Corpectomy    APPENDECTOMY      BACK SURGERY  1994    lower back    BACK SURGERY  2012    lower back    CHOLECYSTECTOMY      HERNIA REPAIR      JOINT REPLACEMENT Left     KNEE    KNEE ARTHROPLASTY Right 5/3/2021    Procedure: ARTHROPLASTY, KNEE;  Surgeon: Manuel Willingham MD;  Location: [facility] OR;  Service: Orthopedics;  Laterality: Right;  guzman    LAPAROSCOPIC REPAIR OF INCISIONAL HERNIA N/A 10/22/2019    Procedure: REPAIR, HERNIA, INCISIONAL, LAPAROSCOPIC;  Surgeon: Pantera Almanza III, MD;  Location: [facility];  Service: General;  Laterality: N/A;    pancrease  2009    stents in pancrease     TONSILLECTOMY      TOTAL KNEE ARTHROPLASTY Left 08/10/2017    WHIPPLE PROCEDURE W/ LAPAROSCOPY  10/09       Review of patient's allergies indicates:   Allergen Reactions    Heparin Itching     Pt states she has no allergy      NOT AN ALLERGY . NOT AWARE OF TAKING IT    Hydrocodone Shortness Of Breath     MED "SHORTENS HER BREATHING"    Penicillins Anaphylaxis, Hives and Itching     Other reaction(s): Unknown  Childhood reaction, swelled      Sulfa (sulfonamide antibiotics) Hives    Doxycycline Nausea And Vomiting     Other " "reaction(s): Abdominal Pain    Clindamycin      "Felt like I was on fire down through throat and felt like I was having spasms in my throat"    Penicillin v      Childhood reaction, swelled       No current facility-administered medications on file prior to encounter.     Current Outpatient Medications on File Prior to Encounter   Medication Sig    albuterol (PROVENTIL/VENTOLIN HFA) 90 mcg/actuation inhaler INHALE 2 PUFFS INTO THE LUNGS EVERY 6 (SIX) HOURS AS NEEDED FOR WHEEZING. RESCUE    amLODIPine (NORVASC) 5 MG tablet Take 5 mg by mouth once daily.    budesonide-glycopyr-formoterol (BREZTRI AEROSPHERE) 160-9-4.8 mcg/actuation HFAA Inhale 2 puffs into the lungs 2 (two) times a day.    cetirizine (ZYRTEC) 10 MG tablet Take 10 mg by mouth once daily.    famotidine (PEPCID) 20 MG tablet Take 20 mg by mouth 2 (two) times daily.     furosemide (LASIX) 40 MG tablet Take 40 mg by mouth as needed (edema).     lipase-protease-amylase 12,000-38,000-60,000 units (CREON) CpDR Take 2 capsules by mouth 4 (four) times daily.    lisinopriL (PRINIVIL,ZESTRIL) 5 MG tablet Take 5 mg by mouth once daily.    methocarbamoL (ROBAXIN) 750 MG Tab Take 1 tablet (750 mg total) by mouth 3 (three) times daily. for 21 days    metoprolol succinate (TOPROL-XL) 100 MG 24 hr tablet Take 100 mg by mouth 2 (two) times daily.    metoprolol succinate (TOPROL-XL) 100 MG 24 hr tablet Take 100 mg by mouth once daily.    omeprazole (PRILOSEC) 20 MG capsule Take 1 capsule (20 mg total) by mouth once daily.    oxyCODONE-acetaminophen (PERCOCET)  mg per tablet Take 1 tablet by mouth 3 (three) times daily as needed for Pain.    rOPINIRole (REQUIP) 1 MG tablet Take 1 mg by mouth every evening.    senna-docusate 8.6-50 mg (PERICOLACE) 8.6-50 mg per tablet Take 1 tablet by mouth once daily. for 14 days    sodium chloride (OCEAN NASAL NASL) 1 spray by Nasal route daily as needed (rhinitis).     TRUE METRIX GLUCOSE TEST STRIP Strp     [DISCONTINUED] " albuterol-ipratropium (DUO-NEB) 2.5 mg-0.5 mg/3 mL nebulizer solution     [DISCONTINUED] CREON CpDR Take 2 capsules by mouth 4 (four) times daily.     Family History       Problem Relation (Age of Onset)    Cancer Father    Diabetes Mother          Tobacco Use    Smoking status: Former     Current packs/day: 0.00     Average packs/day: 1 pack/day for 30.0 years (30.0 ttl pk-yrs)     Types: Cigarettes     Start date: 1987     Quit date: 2017     Years since quittin.1    Smokeless tobacco: Never   Substance and Sexual Activity    Alcohol use: No    Drug use: No    Sexual activity: Not on file     Review of Systems   Constitutional:  Negative for activity change and appetite change.   HENT:  Negative for congestion and dental problem.    Eyes:  Negative for discharge and itching.   Respiratory:  Positive for shortness of breath.    Cardiovascular:  Positive for leg swelling. Negative for chest pain.   Gastrointestinal:  Negative for abdominal distention and abdominal pain.   Endocrine: Negative for cold intolerance.   Genitourinary:  Negative for difficulty urinating and dysuria.   Musculoskeletal:  Negative for arthralgias and back pain.   Skin:  Negative for color change.   Neurological:  Negative for dizziness and facial asymmetry.   Hematological:  Negative for adenopathy.   Psychiatric/Behavioral:  Negative for agitation and behavioral problems.      Objective:     Vital Signs (Most Recent):  Temp: 98.2 °F (36.8 °C) (24 1636)  Pulse: 91 (24 1502)  Resp: 20 (24 1552)  BP: (!) 151/82 (24 1502)  SpO2: 100 % (24 1316) Vital Signs (24h Range):  Temp:  [97.7 °F (36.5 °C)-98.2 °F (36.8 °C)] 98.2 °F (36.8 °C)  Pulse:  [81-91] 91  Resp:  [13-24] 20  SpO2:  [95 %-100 %] 100 %  BP: (107-151)/(56-98) 151/82     Weight: 99.8 kg (220 lb)  Body mass index is 33.45 kg/m².     Physical Exam  Vitals and nursing note reviewed.   Constitutional:       General: She is not in acute  distress.  HENT:      Head: Atraumatic.      Right Ear: External ear normal.      Left Ear: External ear normal.      Nose: Nose normal.      Mouth/Throat:      Mouth: Mucous membranes are moist.   Cardiovascular:      Rate and Rhythm: Normal rate.   Pulmonary:      Effort: Pulmonary effort is normal.      Breath sounds: Rales present.   Abdominal:      Palpations: Abdomen is soft.   Musculoskeletal:         General: Normal range of motion.      Cervical back: Normal range of motion.      Left lower leg: Edema present.   Skin:     General: Skin is warm.   Neurological:      Mental Status: She is alert and oriented to person, place, and time.   Psychiatric:         Behavior: Behavior normal.                Significant Labs: All pertinent labs within the past 24 hours have been reviewed.  CBC:   Recent Labs   Lab 05/14/24  0944   WBC 10.42   HGB 13.0   HCT 40.5        CMP:   Recent Labs   Lab 05/14/24  0944   *   K 5.1   CL 99   CO2 25   *   BUN 34*   CREATININE 1.1   CALCIUM 8.9   PROT 7.1   ALBUMIN 3.9   BILITOT 0.6   ALKPHOS 129   AST 17   ALT 20   ANIONGAP 8       Significant Imaging: I have reviewed all pertinent imaging results/findings within the past 24 hours.

## 2024-05-14 NOTE — ASSESSMENT & PLAN NOTE
Suspected acute pneumonia  CT done today showed right lower lobe bronchitis-bronchiolitis, with scattered lower lung subsegmental atelectasis.   Pt recently was intubated for spine surgery  She is allergic to almost all abx  Start on iv levofloxacin/iv flagyl and de escalate as needed   H/o HCAP in the past per records so will give one dose of iv vancomycin

## 2024-05-14 NOTE — TELEPHONE ENCOUNTER
----- Message from Belen Estrada sent at 5/14/2024  2:15 PM CDT -----  Contact: pt manuel garland  Type: Needs Medical Advice  Who Called:  pt manuel garland  Best Call Back Number: [phone]  Additional Information: pt has 2 appts tomorrow but is in [facility] and needs to cancel.please advise

## 2024-05-14 NOTE — TELEPHONE ENCOUNTER
Called and left voicemail with callback number. Cancelled appts for tomorrow due to pt being in ED at [facility]. Will f/u with pt/ or daughter Mariana once pt is being discharged to reschedule pt for a 2 wk f/u with xray

## 2024-05-14 NOTE — ED PROVIDER NOTES
"Encounter Date: 5/14/2024       History     Chief Complaint   Patient presents with    Shortness of Breath     Had neck surgery 4/30.  Friday tripped and fell at home.  Hx of COPD, wears 2L NC at baseline.  Increased O2 to 3L at home 2 days ago because of increased work of breathing.  Left sided back pain, since fall on Friday     73-year-old female presents to the ER with multiple complaints.  Patient had a recent cervical diskectomy.  Arrives in a cervical collar.  She had a fall on Friday.  She hurt her left great toe and wrapped it up.  She then tripped over the gauze and fell again onto her left knee elbow and ribs.  She has had persistent rib pain since then and worsening shortness of breath.  She is on home oxygen secondary to COPD.  She reports a wet cough no fevers no chills.  She states her shortness of breath is much worse than her usual baseline.  She has a wound to the left toe.    The history is provided by the patient.     Review of patient's allergies indicates:   Allergen Reactions    Heparin Itching     Pt states she has no allergy      NOT AN ALLERGY . NOT AWARE OF TAKING IT    Hydrocodone Shortness Of Breath     MED "SHORTENS HER BREATHING"    Penicillins Anaphylaxis, Hives and Itching     Other reaction(s): Unknown  Childhood reaction, swelled      Sulfa (sulfonamide antibiotics) Hives    Doxycycline Nausea And Vomiting     Other reaction(s): Abdominal Pain    Clindamycin      "Felt like I was on fire down through throat and felt like I was having spasms in my throat"    Penicillin v      Childhood reaction, swelled     Past Medical History:   Diagnosis Date    A-fib     Anticoagulant long-term use     Arthritis     COPD (chronic obstructive pulmonary disease)     COPD (chronic obstructive pulmonary disease) 2019    Diabetes mellitus, type 2     Diverticulosis     History of left knee replacement 2017    DR CRENSHAW    HNP (herniated nucleus pulposus)     LUMBAR    Hypertension     Lung disease  "    Pancreatic insufficiency     s/p whipple, non cancer    Presence of dental bridge     UPPER    Wears glasses      Past Surgical History:   Procedure Laterality Date    ANTERIOR CERVICAL DISCECTOMY W/ FUSION N/A 2024    Procedure: DISCECTOMY, SPINE, CERVICAL, ANTERIOR APPROACH, WITH FUSION;  Surgeon: Phu Pierson DO;  Location: [facility] OR;  Service: Neurosurgery;  Laterality: N/A;  ACDF C4-5, C5-6 with partial C5 Corpectomy    APPENDECTOMY      BACK SURGERY      lower back    BACK SURGERY      lower back    CHOLECYSTECTOMY      HERNIA REPAIR      JOINT REPLACEMENT Left     KNEE    KNEE ARTHROPLASTY Right 5/3/2021    Procedure: ARTHROPLASTY, KNEE;  Surgeon: Manuel Willingham MD;  Location: [facility] OR;  Service: Orthopedics;  Laterality: Right;  guzman    LAPAROSCOPIC REPAIR OF INCISIONAL HERNIA N/A 10/22/2019    Procedure: REPAIR, HERNIA, INCISIONAL, LAPAROSCOPIC;  Surgeon: Pantera Almanza III, MD;  Location: [facility] OR;  Service: General;  Laterality: N/A;    pancrease  2009    stents in pancrease     TONSILLECTOMY      TOTAL KNEE ARTHROPLASTY Left 08/10/2017    WHIPPLE PROCEDURE W/ LAPAROSCOPY  10/09     Family History   Problem Relation Name Age of Onset    Diabetes Mother      Cancer Father       Social History     Tobacco Use    Smoking status: Former     Current packs/day: 0.00     Average packs/day: 1 pack/day for 30.0 years (30.0 ttl pk-yrs)     Types: Cigarettes     Start date: 1987     Quit date: 2017     Years since quittin.1    Smokeless tobacco: Never   Substance Use Topics    Alcohol use: No    Drug use: No     Review of Systems   Constitutional: Negative.    Respiratory:  Positive for shortness of breath. Negative for chest tightness.    Cardiovascular:  Positive for chest pain (rib pain only, no chest heaviness).   Gastrointestinal:  Negative for abdominal pain.   Musculoskeletal:  Positive for arthralgias.   Skin:  Positive for wound.       Physical Exam     Initial Vitals [24  0911]   BP Pulse Resp Temp SpO2   (!) 142/98 91 (!) 24 97.7 °F (36.5 °C) 95 %      MAP       --         Physical Exam    Nursing note and vitals reviewed.  Constitutional: She appears well-developed and well-nourished. She is not diaphoretic. She does not have a sickly appearance. She appears ill. No distress. Cervical collar in place.   Chronically ill-appearing, obese, in cervical collar, appears slightly short of breath and also fatigued   HENT:   Head: Normocephalic and atraumatic.   Eyes: EOM are normal.   Neck: Neck supple.   Normal range of motion.  Cardiovascular:  Normal rate, regular rhythm and normal heart sounds.     Exam reveals no gallop and no friction rub.       No murmur heard.  Pulmonary/Chest: Tachypnea noted. No respiratory distress. She has wheezes. She has rhonchi. She has no rales. She exhibits tenderness. She exhibits no crepitus.     Musculoskeletal:         General: Normal range of motion.      Cervical back: Normal range of motion and neck supple.        Legs:       Comments: Laceration over extensor side of great toe     Neurological: She is alert and oriented to person, place, and time.   Skin: Skin is warm and dry.   Psychiatric: She has a normal mood and affect. Her behavior is normal. Judgment and thought content normal.         ED Course   Procedures  Labs Reviewed   CBC W/ AUTO DIFFERENTIAL - Abnormal; Notable for the following components:       Result Value     (*)     MCH 32.1 (*)     Immature Granulocytes 1.1 (*)     Gran # (ANC) 8.2 (*)     Immature Grans (Abs) 0.11 (*)     Gran % 78.2 (*)     Lymph % 11.2 (*)     All other components within normal limits   COMPREHENSIVE METABOLIC PANEL - Abnormal; Notable for the following components:    Sodium 132 (*)     Glucose 298 (*)     BUN 34 (*)     eGFR 52.4 (*)     All other components within normal limits   D DIMER, QUANTITATIVE - Abnormal; Notable for the following components:    D-Dimer 3.94 (*)     All other components  within normal limits    Narrative:        Ddimer critical result(s) called and verbal readback obtained from   Ricardo Hunt RN ER  by MS1 05/14/2024 10:31   TROPONIN I HIGH SENSITIVITY   B-TYPE NATRIURETIC PEPTIDE        ECG Results              EKG 12-lead (In process)        Collection Time Result Time QRS Duration OHS QTC Calculation    05/14/24 09:37:25 05/14/24 09:40:14 102 464                     In process by Interface, Lab In [facility] (05/14/24 09:40:18)                   Narrative:    Test Reason : R06.02,    Vent. Rate : 088 BPM     Atrial Rate : 088 BPM     P-R Int : 184 ms          QRS Dur : 102 ms      QT Int : 384 ms       P-R-T Axes : 045 -34 059 degrees     QTc Int : 464 ms    Normal sinus rhythm  Left axis deviation  Abnormal ECG  When compared with ECG of 23-APR-2024 08:49,  No significant change was found    Referred By: [email or web address]   SELF           Confirmed By:                                   Imaging Results              CTA Chest Non-Coronary (PE Studies) (Final result)  Result time 05/14/24 12:13:08      Final result by Bashir Hahn MD (05/14/24 12:13:08)                   Impression:      1. Negative for pulmonary thromboembolism.  2. Localized right lower lobe bronchitis-bronchiolitis, with scattered lower lung subsegmental atelectasis.  .      Electronically signed by: Bashir Hahn  Date:    05/14/2024  Time:    12:13               Narrative:    EXAMINATION:  CTA CHEST NON CORONARY (PE STUDIES)    CLINICAL HISTORY:  Pulmonary embolism (PE) suspected, unknown D-dimer;    TECHNIQUE:  Thin axial imaging through the chest was performed with 100 mL Omnipaque 350 IV contrast, with sagittal and coronal reformatted images and MIP reconstructions performed, and images stored in the patient's permanent electronic medical record.    FINDINGS:  Comparison to multiple prior exams.  There are no pulmonary arterial filling defects to suggest pulmonary thromboembolism.  The central pulmonary arteries  are normal in caliber.    Scattered calcified and soft plaque involves normal-caliber thoracic aorta, with no aortic dissection or evidence of aortic intramural hematoma.  The heart is normal in size, with no pericardial effusion.  No enlarged mediastinal or hilar lymph nodes.    There are lucencies reflecting upper lobe predominant pulmonary emphysema, with localized bronchial wall thickening and mucous plugging in the right lower lobe, with adjacent reticular and linear parenchymal opacities.  There is bandlike atelectasis in the right middle lobe and lingula, with no consolidation.  No pleural effusion, evidence of pulmonary edema, or pneumothorax.    Images of the upper abdomen show pneumobilia, and are otherwise unremarkable.  There are no acute fractures or destructive osseous lesions.                                       X-Ray Chest AP Portable (Final result)  Result time 05/14/24 10:34:21      Final result by Jesus Herron MD (05/14/24 10:34:21)                   Impression:      Stable chest radiograph.  No acute pulmonary pathology.      Electronically signed by: Jesus Herron  Date:    05/14/2024  Time:    10:34               Narrative:    EXAMINATION:  XR CHEST AP PORTABLE    CLINICAL HISTORY:  Asthma;    COMPARISON:  04/30/2024    FINDINGS:  Cardiac silhouette size is stable compared to prior.  Bibasilar atelectasis or scarring.  No airspace consolidation.  No large pleural effusion.  No pneumothorax.  Cervical ACDF hardware.                                       X-Ray Toe 2 or More Views Left (Final result)  Result time 05/14/24 10:36:02      Final result by Jesus Herron MD (05/14/24 10:36:02)                   Impression:      No acute osseous abnormality.      Electronically signed by: Jesus Herron  Date:    05/14/2024  Time:    10:36               Narrative:    EXAMINATION:  XR TOE 2 OR MORE VIEWS LEFT    CLINICAL HISTORY:  l great toe pain;    COMPARISON:  None    FINDINGS:  No acute  fracture or focal lesion of the great toe.  Joint spaces are maintained.  No soft tissue gas or radiopaque foreign body.                                       Medications   Tdap vaccine injection 0.5 mL (has no administration in time range)   albuterol-ipratropium 2.5 mg-0.5 mg/3 mL nebulizer solution 3 mL (has no administration in time range)   methylPREDNISolone sodium succinate injection 125 mg (has no administration in time range)   albuterol-ipratropium 2.5 mg-0.5 mg/3 mL nebulizer solution 3 mL (3 mLs Nebulization Given 5/14/24 0948)   fentaNYL 50 mcg/mL injection 50 mcg (50 mcg Intravenous Given 5/14/24 0949)   iohexoL (OMNIPAQUE 350) injection 100 mL (100 mLs Intravenous Given 5/14/24 1156)     Medical Decision Making  1248 73-year-old with COPD presents to the ER with several recent falls.  Recent diskectomy at the end of April.  She feels more short of breath than usual.  On exam she has slight wheezing and diffuse rhonchi.  Some mild tachypnea initially.  CTA done for elevated D-dimer does not demonstrate any PE.  Despite nebs she continues to feel short of breath and she states that she would like to be admitted the hospital.  I feel like this is reasonable given her age, several recent falls and shortness of breath above baseline. [EF]      Amount and/or Complexity of Data Reviewed  External Data Reviewed: notes.  Labs: ordered. Decision-making details documented in ED Course.  Radiology: ordered. Decision-making details documented in ED Course.  ECG/medicine tests: ordered and independent interpretation performed. Decision-making details documented in ED Course.  Discussion of management or test interpretation with external provider(s): 1253 Dr garg to admit [EF]      Risk  Prescription drug management.  Decision regarding hospitalization.               ED Course as of 05/14/24 1257   Tue May 14, 2024   0920 BP(!): 142/98 [EF]   0920 Temp: 97.7 °F (36.5 °C) [EF]   0920 Temp Source: Oral [EF]   0920  Pulse: 91 [EF]   0920 Resp(!): 24 [EF]   0920 SpO2: 95 % [EF]   0943 Sinus rhythm 88 beats per minute left axis no ST elevation or depression or T-wave inversion independently interpreted [EF]   0955 WBC: 10.42 [EF]   0955 Hemoglobin: 13.0 [EF]   0955 Platelet Count: 184 [EF]   1031 D-Dimer(!!): 3.94 [EF]   1033 BUN(!): 34 [EF]   1033 Creatinine: 1.1 [EF]   1033 Glucose(!): 298 [EF]   1033 Sodium(!): 132 [EF]   1040 X-Ray Toe 2 or More Views Left [EF]   1040 X-Ray Chest AP Portable [EF]   1103 Resting comfortably [EF]   1222 CTA Chest Non-Coronary (PE Studies) [EF]   1248 73-year-old with COPD presents to the ER with several recent falls.  Recent diskectomy at the end of April.  She feels more short of breath than usual.  On exam she has slight wheezing and diffuse rhonchi.  Some mild tachypnea initially.  CTA done for elevated D-dimer does not demonstrate any PE.  Despite nebs she continues to feel short of breath and she states that she would like to be admitted the hospital.  I feel like this is reasonable given her age, several recent falls and shortness of breath above baseline. [EF]   1253 Dr garg to admit [EF]      ED Course User Index  [EF] Rakesh Land MD                           Clinical Impression:  Final diagnoses:  [R06.02] SOB (shortness of breath)                 Rakesh Land MD  05/14/24 1257

## 2024-05-14 NOTE — H&P
[facility] - Emergency Dept  Hospital Medicine  History & Physical    Patient Name: Maddie Otero  MRN: [id number]  Patient Class: OP- Observation  Admission Date: 5/14/2024  Attending Physician: Merlin Seo MD   Primary Care Provider: Carrie Gorman FNP         Patient information was obtained from patient, past medical records, and ER records.     Subjective:     Principal Problem:COPD exacerbation    Chief Complaint:   Chief Complaint   Patient presents with    Shortness of Breath     Had neck surgery 4/30.  Friday tripped and fell at home.  Hx of COPD, wears 2L NC at baseline.  Increased O2 to 3L at home 2 days ago because of increased work of breathing.  Left sided back pain, since fall on Friday        HPI: 73 year old pt getting admitted with COPD exacerbation and falls  Pt is status post C4-6 anterior cervical diskectomy decompression performed on 04/30/2024   After surgery she did well  Past few days she ahs been suffering from shortness of breath  She felt weak and has to increase her oxygen from 2-4 L  Also pt had episodes of fall and pt came to ER & got admitted   Pt is on lasix and pt reports increased swelling on legs     Past Medical History:   Diagnosis Date    A-fib     Anticoagulant long-term use     Arthritis     COPD (chronic obstructive pulmonary disease)     COPD (chronic obstructive pulmonary disease) 2019    Diabetes mellitus, type 2     Diverticulosis     History of left knee replacement 2017    DR CRENSHAW    HNP (herniated nucleus pulposus)     LUMBAR    Hypertension     Lung disease     Pancreatic insufficiency     s/p whipple, non cancer    Presence of dental bridge     UPPER    Wears glasses        Past Surgical History:   Procedure Laterality Date    ANTERIOR CERVICAL DISCECTOMY W/ FUSION N/A 4/30/2024    Procedure: DISCECTOMY, SPINE, CERVICAL, ANTERIOR APPROACH, WITH FUSION;  Surgeon: Phu Pierson DO;  Location: [facility] OR;  Service: Neurosurgery;  Laterality:  "N/A;  ACDF C4-5, C5-6 with partial C5 Corpectomy    APPENDECTOMY      BACK SURGERY  1994    lower back    BACK SURGERY  2012    lower back    CHOLECYSTECTOMY      HERNIA REPAIR      JOINT REPLACEMENT Left     KNEE    KNEE ARTHROPLASTY Right 5/3/2021    Procedure: ARTHROPLASTY, KNEE;  Surgeon: Manuel Willingham MD;  Location: [facility] OR;  Service: Orthopedics;  Laterality: Right;  guzman    LAPAROSCOPIC REPAIR OF INCISIONAL HERNIA N/A 10/22/2019    Procedure: REPAIR, HERNIA, INCISIONAL, LAPAROSCOPIC;  Surgeon: Pantera Almanza III, MD;  Location: [facility] OR;  Service: General;  Laterality: N/A;    pancrease  2009    stents in pancrease     TONSILLECTOMY      TOTAL KNEE ARTHROPLASTY Left 08/10/2017    WHIPPLE PROCEDURE W/ LAPAROSCOPY  10/09       Review of patient's allergies indicates:   Allergen Reactions    Heparin Itching     Pt states she has no allergy      NOT AN ALLERGY . NOT AWARE OF TAKING IT    Hydrocodone Shortness Of Breath     MED "SHORTENS HER BREATHING"    Penicillins Anaphylaxis, Hives and Itching     Other reaction(s): Unknown  Childhood reaction, swelled      Sulfa (sulfonamide antibiotics) Hives    Doxycycline Nausea And Vomiting     Other reaction(s): Abdominal Pain    Clindamycin      "Felt like I was on fire down through throat and felt like I was having spasms in my throat"    Penicillin v      Childhood reaction, swelled       No current facility-administered medications on file prior to encounter.     Current Outpatient Medications on File Prior to Encounter   Medication Sig    albuterol (PROVENTIL/VENTOLIN HFA) 90 mcg/actuation inhaler INHALE 2 PUFFS INTO THE LUNGS EVERY 6 (SIX) HOURS AS NEEDED FOR WHEEZING. RESCUE    amLODIPine (NORVASC) 5 MG tablet Take 5 mg by mouth once daily.    budesonide-glycopyr-formoterol (BREZTRI AEROSPHERE) 160-9-4.8 mcg/actuation HFAA Inhale 2 puffs into the lungs 2 (two) times a day.    cetirizine (ZYRTEC) 10 MG tablet Take 10 mg by mouth once daily.    famotidine " (PEPCID) 20 MG tablet Take 20 mg by mouth 2 (two) times daily.     furosemide (LASIX) 40 MG tablet Take 40 mg by mouth as needed (edema).     lipase-protease-amylase 12,000-38,000-60,000 units (CREON) CpDR Take 2 capsules by mouth 4 (four) times daily.    lisinopriL (PRINIVIL,ZESTRIL) 5 MG tablet Take 5 mg by mouth once daily.    methocarbamoL (ROBAXIN) 750 MG Tab Take 1 tablet (750 mg total) by mouth 3 (three) times daily. for 21 days    metoprolol succinate (TOPROL-XL) 100 MG 24 hr tablet Take 100 mg by mouth 2 (two) times daily.    metoprolol succinate (TOPROL-XL) 100 MG 24 hr tablet Take 100 mg by mouth once daily.    omeprazole (PRILOSEC) 20 MG capsule Take 1 capsule (20 mg total) by mouth once daily.    oxyCODONE-acetaminophen (PERCOCET)  mg per tablet Take 1 tablet by mouth 3 (three) times daily as needed for Pain.    rOPINIRole (REQUIP) 1 MG tablet Take 1 mg by mouth every evening.    senna-docusate 8.6-50 mg (PERICOLACE) 8.6-50 mg per tablet Take 1 tablet by mouth once daily. for 14 days    sodium chloride (OCEAN NASAL NASL) 1 spray by Nasal route daily as needed (rhinitis).     TRUE METRIX GLUCOSE TEST STRIP Strp     [DISCONTINUED] albuterol-ipratropium (DUO-NEB) 2.5 mg-0.5 mg/3 mL nebulizer solution     [DISCONTINUED] CREON CpDR Take 2 capsules by mouth 4 (four) times daily.     Family History       Problem Relation (Age of Onset)    Cancer Father    Diabetes Mother          Tobacco Use    Smoking status: Former     Current packs/day: 0.00     Average packs/day: 1 pack/day for 30.0 years (30.0 ttl pk-yrs)     Types: Cigarettes     Start date: 1987     Quit date: 2017     Years since quittin.1    Smokeless tobacco: Never   Substance and Sexual Activity    Alcohol use: No    Drug use: No    Sexual activity: Not on file     Review of Systems   Constitutional:  Negative for activity change and appetite change.   HENT:  Negative for congestion and dental problem.    Eyes:  Negative for  discharge and itching.   Respiratory:  Positive for shortness of breath.    Cardiovascular:  Positive for leg swelling. Negative for chest pain.   Gastrointestinal:  Negative for abdominal distention and abdominal pain.   Endocrine: Negative for cold intolerance.   Genitourinary:  Negative for difficulty urinating and dysuria.   Musculoskeletal:  Negative for arthralgias and back pain.   Skin:  Negative for color change.   Neurological:  Negative for dizziness and facial asymmetry.   Hematological:  Negative for adenopathy.   Psychiatric/Behavioral:  Negative for agitation and behavioral problems.      Objective:     Vital Signs (Most Recent):  Temp: 98.2 °F (36.8 °C) (05/14/24 1636)  Pulse: 91 (05/14/24 1502)  Resp: 20 (05/14/24 1552)  BP: (!) 151/82 (05/14/24 1502)  SpO2: 100 % (05/14/24 1316) Vital Signs (24h Range):  Temp:  [97.7 °F (36.5 °C)-98.2 °F (36.8 °C)] 98.2 °F (36.8 °C)  Pulse:  [81-91] 91  Resp:  [13-24] 20  SpO2:  [95 %-100 %] 100 %  BP: (107-151)/(56-98) 151/82     Weight: 99.8 kg (220 lb)  Body mass index is 33.45 kg/m².     Physical Exam  Vitals and nursing note reviewed.   Constitutional:       General: She is not in acute distress.  HENT:      Head: Atraumatic.      Right Ear: External ear normal.      Left Ear: External ear normal.      Nose: Nose normal.      Mouth/Throat:      Mouth: Mucous membranes are moist.   Cardiovascular:      Rate and Rhythm: Normal rate.   Pulmonary:      Effort: Pulmonary effort is normal.      Breath sounds: Rales present.   Abdominal:      Palpations: Abdomen is soft.   Musculoskeletal:         General: Normal range of motion.      Cervical back: Normal range of motion.      Left lower leg: Edema present.   Skin:     General: Skin is warm.   Neurological:      Mental Status: She is alert and oriented to person, place, and time.   Psychiatric:         Behavior: Behavior normal.                Significant Labs: All pertinent labs within the past 24 hours have been  reviewed.  CBC:   Recent Labs   Lab 05/14/24  0944   WBC 10.42   HGB 13.0   HCT 40.5        CMP:   Recent Labs   Lab 05/14/24  0944   *   K 5.1   CL 99   CO2 25   *   BUN 34*   CREATININE 1.1   CALCIUM 8.9   PROT 7.1   ALBUMIN 3.9   BILITOT 0.6   ALKPHOS 129   AST 17   ALT 20   ANIONGAP 8       Significant Imaging: I have reviewed all pertinent imaging results/findings within the past 24 hours.    Assessment/Plan:     * COPD exacerbation  Maintain nebs/Oxygen/iv steroids    Acute pneumonia  Suspected acute pneumonia  CT done today showed right lower lobe bronchitis-bronchiolitis, with scattered lower lung subsegmental atelectasis.   Pt recently was intubated for spine surgery  She is allergic to almost all abx  Start on iv levofloxacin/iv flagyl and de escalate as needed   H/o HCAP in the past per records so will give one dose of iv vancomycin      Recurrent falls  PT/OT        Status post cervical spinal fusion  Status post C4-6 anterior cervical diskectomy decompression performed on 04/30/2024       On home oxygen therapy  Maintain      HTN (hypertension)  Chronic, controlled. Latest blood pressure and vitals reviewed-     Temp:  [97.7 °F (36.5 °C)-98.2 °F (36.8 °C)]   Pulse:  [81-91]   Resp:  [13-24]   BP: (107-151)/(56-98)   SpO2:  [95 %-100 %] .   Home meds for hypertension were reviewed and noted below.   Hypertension Medications               amLODIPine (NORVASC) 5 MG tablet Take 5 mg by mouth once daily.    furosemide (LASIX) 40 MG tablet Take 40 mg by mouth as needed (edema).     lisinopriL (PRINIVIL,ZESTRIL) 5 MG tablet Take 5 mg by mouth once daily.    metoprolol succinate (TOPROL-XL) 100 MG 24 hr tablet Take 100 mg by mouth 2 (two) times daily.    metoprolol succinate (TOPROL-XL) 100 MG 24 hr tablet Take 100 mg by mouth once daily.            While in the hospital, will manage blood pressure as follows; Continue home antihypertensive regimen    Will utilize p.r.n. blood pressure  "medication only if patient's blood pressure greater than 140/90 and she develops symptoms such as worsening chest pain or shortness of breath.    PAF (paroxysmal atrial fibrillation)  History aware  Stable     Diabetes mellitus type II  Patient's FSGs are controlled on current medication regimen.  Last A1c reviewed-   Lab Results   Component Value Date    HGBA1C 8.5 (H) 04/23/2024     Most recent fingerstick glucose reviewed- No results for input(s): "POCTGLUCOSE" in the last 24 hours.  Current correctional scale  Medium  Maintain anti-hyperglycemic dose as follows-   Antihyperglycemics (From admission, onward)      Start     Stop Route Frequency Ordered    05/14/24 1359  insulin aspart U-100 pen 0-10 Units         -- SubQ Before meals & nightly PRN 05/14/24 1259          Hold Oral hypoglycemics while patient is in the hospital.      VTE Risk Mitigation (From admission, onward)           Ordered     Place sequential compression device  Until discontinued         05/14/24 1258     IP VTE HIGH RISK PATIENT  Once         05/14/24 1259     Place sequential compression device  Until discontinued         05/14/24 1259                       On 05/14/2024, patient should be placed in hospital observation services under my care.             Merlin Seo MD  Department of Hospital Medicine  [facility] - Emergency Dept          "

## 2024-05-14 NOTE — TELEPHONE ENCOUNTER
----- Message from Stacey M Lefort sent at 5/14/2024 11:23 AM CDT -----  Jackeline is requesting a callback at   [phone] .

## 2024-05-14 NOTE — CARE UPDATE
05/14/24 1316   Patient Assessment/Suction   Level of Consciousness (AVPU) alert   Respiratory Effort Normal;Unlabored   Expansion/Accessory Muscles/Retractions no use of accessory muscles;no retractions;expansion symmetric   All Lung Fields Breath Sounds diminished   Rhythm/Pattern, Respiratory unlabored;pattern regular;depth regular   Cough Frequency infrequent   Cough Type congested   PRE-TX-O2   Device (Oxygen Therapy) nasal cannula   $ Is the patient on Low Flow Oxygen? Yes   Flow (L/min) (Oxygen Therapy) 2   SpO2 100 %   Pulse Oximetry Type Continuous   Pulse 81   Resp 18   Aerosol Therapy   $ Aerosol Therapy Charges Aerosol Treatment   Daily Review of Necessity (SVN) completed   Respiratory Treatment Status (SVN) given   Treatment Route (SVN) oxygen;mask   Patient Position HOB elevated   Post Treatment Assessment (SVN) breath sounds unchanged   Signs of Intolerance (SVN) none

## 2024-05-15 DIAGNOSIS — Z98.1 S/P CERVICAL SPINAL FUSION: Primary | ICD-10-CM

## 2024-05-15 PROBLEM — E87.5 HYPERKALEMIA: Status: ACTIVE | Noted: 2024-05-15

## 2024-05-15 LAB
ALBUMIN SERPL BCP-MCNC: 3.6 G/DL (ref 3.5–5.2)
ALP SERPL-CCNC: 124 U/L (ref 55–135)
ALT SERPL W/O P-5'-P-CCNC: 20 U/L (ref 10–44)
ANION GAP SERPL CALC-SCNC: 10 MMOL/L (ref 8–16)
ANION GAP SERPL CALC-SCNC: 12 MMOL/L (ref 8–16)
ANION GAP SERPL CALC-SCNC: 7 MMOL/L (ref 8–16)
ANION GAP SERPL CALC-SCNC: 8 MMOL/L (ref 8–16)
AST SERPL-CCNC: 17 U/L (ref 10–40)
BASOPHILS # BLD AUTO: 0.02 K/UL (ref 0–0.2)
BASOPHILS NFR BLD: 0.2 % (ref 0–1.9)
BILIRUB SERPL-MCNC: 0.6 MG/DL (ref 0.1–1)
BUN SERPL-MCNC: 34 MG/DL (ref 8–23)
BUN SERPL-MCNC: 39 MG/DL (ref 8–23)
BUN SERPL-MCNC: 41 MG/DL (ref 8–23)
BUN SERPL-MCNC: 42 MG/DL (ref 8–23)
CALCIUM SERPL-MCNC: 8.8 MG/DL (ref 8.7–10.5)
CALCIUM SERPL-MCNC: 8.9 MG/DL (ref 8.7–10.5)
CALCIUM SERPL-MCNC: 9 MG/DL (ref 8.7–10.5)
CALCIUM SERPL-MCNC: 9 MG/DL (ref 8.7–10.5)
CHLORIDE SERPL-SCNC: 93 MMOL/L (ref 95–110)
CHLORIDE SERPL-SCNC: 95 MMOL/L (ref 95–110)
CHLORIDE SERPL-SCNC: 95 MMOL/L (ref 95–110)
CHLORIDE SERPL-SCNC: 96 MMOL/L (ref 95–110)
CO2 SERPL-SCNC: 25 MMOL/L (ref 23–29)
CO2 SERPL-SCNC: 26 MMOL/L (ref 23–29)
CO2 SERPL-SCNC: 28 MMOL/L (ref 23–29)
CO2 SERPL-SCNC: 29 MMOL/L (ref 23–29)
CREAT SERPL-MCNC: 1.1 MG/DL (ref 0.5–1.4)
CREAT SERPL-MCNC: 1.2 MG/DL (ref 0.5–1.4)
CREAT SERPL-MCNC: 1.2 MG/DL (ref 0.5–1.4)
CREAT SERPL-MCNC: 1.3 MG/DL (ref 0.5–1.4)
DIFFERENTIAL METHOD BLD: ABNORMAL
EOSINOPHIL # BLD AUTO: 0 K/UL (ref 0–0.5)
EOSINOPHIL NFR BLD: 0 % (ref 0–8)
ERYTHROCYTE [DISTWIDTH] IN BLOOD BY AUTOMATED COUNT: 12.8 % (ref 11.5–14.5)
EST. GFR  (NO RACE VARIABLE): 42.9 ML/MIN/1.73 M^2
EST. GFR  (NO RACE VARIABLE): 47.2 ML/MIN/1.73 M^2
EST. GFR  (NO RACE VARIABLE): 47.2 ML/MIN/1.73 M^2
EST. GFR  (NO RACE VARIABLE): 52.4 ML/MIN/1.73 M^2
GLUCOSE SERPL-MCNC: 432 MG/DL (ref 70–110)
GLUCOSE SERPL-MCNC: 441 MG/DL (ref 70–110)
GLUCOSE SERPL-MCNC: 458 MG/DL (ref 70–110)
GLUCOSE SERPL-MCNC: 461 MG/DL (ref 70–110)
GLUCOSE SERPL-MCNC: 480 MG/DL (ref 70–110)
GLUCOSE SERPL-MCNC: 481 MG/DL (ref 70–110)
GLUCOSE SERPL-MCNC: 549 MG/DL (ref 70–110)
GLUCOSE SERPL-MCNC: 678 MG/DL (ref 70–110)
GLUCOSE SERPL-MCNC: 678 MG/DL (ref 70–110)
GLUCOSE SERPL-MCNC: >600 MG/DL (ref 70–110)
GLUCOSE SERPL-MCNC: >600 MG/DL (ref 70–110)
HCT VFR BLD AUTO: 39.3 % (ref 37–48.5)
HGB BLD-MCNC: 12.7 G/DL (ref 12–16)
IMM GRANULOCYTES # BLD AUTO: 0.04 K/UL (ref 0–0.04)
IMM GRANULOCYTES NFR BLD AUTO: 0.5 % (ref 0–0.5)
LYMPHOCYTES # BLD AUTO: 0.7 K/UL (ref 1–4.8)
LYMPHOCYTES NFR BLD: 8.1 % (ref 18–48)
MAGNESIUM SERPL-MCNC: 2.2 MG/DL (ref 1.6–2.6)
MCH RBC QN AUTO: 32 PG (ref 27–31)
MCHC RBC AUTO-ENTMCNC: 32.3 G/DL (ref 32–36)
MCV RBC AUTO: 99 FL (ref 82–98)
MONOCYTES # BLD AUTO: 0.4 K/UL (ref 0.3–1)
MONOCYTES NFR BLD: 4.1 % (ref 4–15)
NEUTROPHILS # BLD AUTO: 7.5 K/UL (ref 1.8–7.7)
NEUTROPHILS NFR BLD: 87.1 % (ref 38–73)
NRBC BLD-RTO: 0 /100 WBC
PLATELET # BLD AUTO: 169 K/UL (ref 150–450)
PMV BLD AUTO: 11.1 FL (ref 9.2–12.9)
POTASSIUM SERPL-SCNC: 4.3 MMOL/L (ref 3.5–5.1)
POTASSIUM SERPL-SCNC: 5 MMOL/L (ref 3.5–5.1)
POTASSIUM SERPL-SCNC: 5.6 MMOL/L (ref 3.5–5.1)
POTASSIUM SERPL-SCNC: 6 MMOL/L (ref 3.5–5.1)
PROT SERPL-MCNC: 6.9 G/DL (ref 6–8.4)
RBC # BLD AUTO: 3.97 M/UL (ref 4–5.4)
SODIUM SERPL-SCNC: 129 MMOL/L (ref 136–145)
SODIUM SERPL-SCNC: 131 MMOL/L (ref 136–145)
SODIUM SERPL-SCNC: 132 MMOL/L (ref 136–145)
SODIUM SERPL-SCNC: 132 MMOL/L (ref 136–145)
VANCOMYCIN TROUGH SERPL-MCNC: 8.1 UG/ML
WBC # BLD AUTO: 8.59 K/UL (ref 3.9–12.7)

## 2024-05-15 PROCEDURE — 94799 UNLISTED PULMONARY SVC/PX: CPT

## 2024-05-15 PROCEDURE — 63600175 PHARM REV CODE 636 W HCPCS: Performed by: INTERNAL MEDICINE

## 2024-05-15 PROCEDURE — 25000003 PHARM REV CODE 250: Performed by: NURSE PRACTITIONER

## 2024-05-15 PROCEDURE — 96372 THER/PROPH/DIAG INJ SC/IM: CPT | Performed by: INTERNAL MEDICINE

## 2024-05-15 PROCEDURE — 36415 COLL VENOUS BLD VENIPUNCTURE: CPT | Performed by: INTERNAL MEDICINE

## 2024-05-15 PROCEDURE — 96376 TX/PRO/DX INJ SAME DRUG ADON: CPT

## 2024-05-15 PROCEDURE — 80053 COMPREHEN METABOLIC PANEL: CPT | Performed by: INTERNAL MEDICINE

## 2024-05-15 PROCEDURE — 97162 PT EVAL MOD COMPLEX 30 MIN: CPT

## 2024-05-15 PROCEDURE — 96375 TX/PRO/DX INJ NEW DRUG ADDON: CPT

## 2024-05-15 PROCEDURE — 96366 THER/PROPH/DIAG IV INF ADDON: CPT

## 2024-05-15 PROCEDURE — 27000221 HC OXYGEN, UP TO 24 HOURS

## 2024-05-15 PROCEDURE — G0378 HOSPITAL OBSERVATION PER HR: HCPCS

## 2024-05-15 PROCEDURE — 96372 THER/PROPH/DIAG INJ SC/IM: CPT | Performed by: NURSE PRACTITIONER

## 2024-05-15 PROCEDURE — 25000003 PHARM REV CODE 250: Performed by: INTERNAL MEDICINE

## 2024-05-15 PROCEDURE — 80202 ASSAY OF VANCOMYCIN: CPT | Performed by: INTERNAL MEDICINE

## 2024-05-15 PROCEDURE — 96367 TX/PROPH/DG ADDL SEQ IV INF: CPT

## 2024-05-15 PROCEDURE — 85025 COMPLETE CBC W/AUTO DIFF WBC: CPT | Performed by: INTERNAL MEDICINE

## 2024-05-15 PROCEDURE — 25000242 PHARM REV CODE 250 ALT 637 W/ HCPCS: Performed by: INTERNAL MEDICINE

## 2024-05-15 PROCEDURE — 99900035 HC TECH TIME PER 15 MIN (STAT)

## 2024-05-15 PROCEDURE — 97530 THERAPEUTIC ACTIVITIES: CPT

## 2024-05-15 PROCEDURE — 96361 HYDRATE IV INFUSION ADD-ON: CPT

## 2024-05-15 PROCEDURE — 94640 AIRWAY INHALATION TREATMENT: CPT | Mod: XB

## 2024-05-15 PROCEDURE — 97110 THERAPEUTIC EXERCISES: CPT

## 2024-05-15 PROCEDURE — 80048 BASIC METABOLIC PNL TOTAL CA: CPT | Performed by: NURSE PRACTITIONER

## 2024-05-15 PROCEDURE — 36415 COLL VENOUS BLD VENIPUNCTURE: CPT | Performed by: NURSE PRACTITIONER

## 2024-05-15 PROCEDURE — 83735 ASSAY OF MAGNESIUM: CPT | Performed by: INTERNAL MEDICINE

## 2024-05-15 PROCEDURE — 99900031 HC PATIENT EDUCATION (STAT)

## 2024-05-15 PROCEDURE — 97165 OT EVAL LOW COMPLEX 30 MIN: CPT

## 2024-05-15 PROCEDURE — 97116 GAIT TRAINING THERAPY: CPT

## 2024-05-15 PROCEDURE — 63600175 PHARM REV CODE 636 W HCPCS: Performed by: NURSE PRACTITIONER

## 2024-05-15 PROCEDURE — 94761 N-INVAS EAR/PLS OXIMETRY MLT: CPT

## 2024-05-15 RX ORDER — INSULIN ASPART 100 [IU]/ML
10 INJECTION, SOLUTION INTRAVENOUS; SUBCUTANEOUS ONCE
Status: COMPLETED | OUTPATIENT
Start: 2024-05-15 | End: 2024-05-15

## 2024-05-15 RX ORDER — CALCIUM GLUCONATE 20 MG/ML
1 INJECTION, SOLUTION INTRAVENOUS ONCE
Qty: 50 ML | Refills: 0 | Status: COMPLETED | OUTPATIENT
Start: 2024-05-15 | End: 2024-05-15

## 2024-05-15 RX ORDER — INSULIN GLARGINE 100 [IU]/ML
20 INJECTION, SOLUTION SUBCUTANEOUS ONCE
Status: COMPLETED | OUTPATIENT
Start: 2024-05-15 | End: 2024-05-15

## 2024-05-15 RX ORDER — INSULIN ASPART 100 [IU]/ML
0-15 INJECTION, SOLUTION INTRAVENOUS; SUBCUTANEOUS
Status: DISCONTINUED | OUTPATIENT
Start: 2024-05-15 | End: 2024-05-16

## 2024-05-15 RX ORDER — PREDNISONE 20 MG/1
40 TABLET ORAL DAILY
Status: DISCONTINUED | OUTPATIENT
Start: 2024-05-15 | End: 2024-05-16

## 2024-05-15 RX ADMIN — AMLODIPINE BESYLATE 5 MG: 5 TABLET ORAL at 08:05

## 2024-05-15 RX ADMIN — FUROSEMIDE 40 MG: 10 INJECTION, SOLUTION INTRAMUSCULAR; INTRAVENOUS at 08:05

## 2024-05-15 RX ADMIN — FAMOTIDINE 20 MG: 20 TABLET ORAL at 08:05

## 2024-05-15 RX ADMIN — IPRATROPIUM BROMIDE 0.5 MG: 0.5 SOLUTION RESPIRATORY (INHALATION) at 03:05

## 2024-05-15 RX ADMIN — OXYCODONE HYDROCHLORIDE AND ACETAMINOPHEN 1 TABLET: 5; 325 TABLET ORAL at 06:05

## 2024-05-15 RX ADMIN — INSULIN ASPART 10 UNITS: 100 INJECTION, SOLUTION INTRAVENOUS; SUBCUTANEOUS at 08:05

## 2024-05-15 RX ADMIN — APIXABAN 5 MG: 5 TABLET, FILM COATED ORAL at 08:05

## 2024-05-15 RX ADMIN — INSULIN GLARGINE 20 UNITS: 100 INJECTION, SOLUTION SUBCUTANEOUS at 03:05

## 2024-05-15 RX ADMIN — LEVALBUTEROL HYDROCHLORIDE 1.25 MG: 1.25 SOLUTION RESPIRATORY (INHALATION) at 07:05

## 2024-05-15 RX ADMIN — ROPINIROLE HYDROCHLORIDE 1 MG: 1 TABLET, FILM COATED ORAL at 05:05

## 2024-05-15 RX ADMIN — PREDNISONE 40 MG: 20 TABLET ORAL at 01:05

## 2024-05-15 RX ADMIN — LEVOFLOXACIN 500 MG: 5 INJECTION, SOLUTION INTRAVENOUS at 05:05

## 2024-05-15 RX ADMIN — LEVALBUTEROL HYDROCHLORIDE 1.25 MG: 1.25 SOLUTION RESPIRATORY (INHALATION) at 03:05

## 2024-05-15 RX ADMIN — INSULIN HUMAN 10 UNITS: 100 INJECTION, SOLUTION PARENTERAL at 02:05

## 2024-05-15 RX ADMIN — METRONIDAZOLE 500 MG: 5 INJECTION, SOLUTION INTRAVENOUS at 04:05

## 2024-05-15 RX ADMIN — OXYCODONE HYDROCHLORIDE AND ACETAMINOPHEN 1 TABLET: 5; 325 TABLET ORAL at 01:05

## 2024-05-15 RX ADMIN — IPRATROPIUM BROMIDE 0.5 MG: 0.5 SOLUTION RESPIRATORY (INHALATION) at 11:05

## 2024-05-15 RX ADMIN — SODIUM ZIRCONIUM CYCLOSILICATE 10 G: 10 POWDER, FOR SUSPENSION ORAL at 01:05

## 2024-05-15 RX ADMIN — METRONIDAZOLE 500 MG: 5 INJECTION, SOLUTION INTRAVENOUS at 07:05

## 2024-05-15 RX ADMIN — CALCIUM GLUCONATE 1 G: 20 INJECTION, SOLUTION INTRAVENOUS at 01:05

## 2024-05-15 RX ADMIN — IPRATROPIUM BROMIDE 0.5 MG: 0.5 SOLUTION RESPIRATORY (INHALATION) at 07:05

## 2024-05-15 RX ADMIN — METHYLPREDNISOLONE SODIUM SUCCINATE 40 MG: 40 INJECTION, POWDER, FOR SOLUTION INTRAMUSCULAR; INTRAVENOUS at 06:05

## 2024-05-15 RX ADMIN — LEVALBUTEROL HYDROCHLORIDE 1.25 MG: 1.25 SOLUTION RESPIRATORY (INHALATION) at 11:05

## 2024-05-15 RX ADMIN — METOPROLOL SUCCINATE 100 MG: 50 TABLET, FILM COATED, EXTENDED RELEASE ORAL at 08:05

## 2024-05-15 RX ADMIN — METRONIDAZOLE 500 MG: 5 INJECTION, SOLUTION INTRAVENOUS at 01:05

## 2024-05-15 RX ADMIN — CETIRIZINE HYDROCHLORIDE 10 MG: 10 TABLET, FILM COATED ORAL at 08:05

## 2024-05-15 RX ADMIN — INSULIN HUMAN 10 UNITS: 100 INJECTION, SOLUTION PARENTERAL at 09:05

## 2024-05-15 RX ADMIN — SODIUM CHLORIDE 500 ML: 9 INJECTION, SOLUTION INTRAVENOUS at 03:05

## 2024-05-15 NOTE — SUBJECTIVE & OBJECTIVE
Interval History: Add IS. Getting several doses of IV insulin. Will start basal as well. Glucose slowly improving.    Review of Systems   Constitutional:  Positive for fatigue.   Respiratory:  Positive for cough, shortness of breath and wheezing.    Neurological:  Positive for weakness.     Objective:     Vital Signs (Most Recent):  Temp: 97.9 °F (36.6 °C) (05/15/24 1132)  Pulse: 85 (05/15/24 1517)  Resp: 17 (05/15/24 1517)  BP: (!) 130/90 (05/15/24 1132)  SpO2: 95 % (05/15/24 1517) Vital Signs (24h Range):  Temp:  [97.4 °F (36.3 °C)-98.2 °F (36.8 °C)] 97.9 °F (36.6 °C)  Pulse:  [67-87] 85  Resp:  [16-21] 17  SpO2:  [95 %-99 %] 95 %  BP: (120-155)/() 130/90     Weight: 104 kg (229 lb 4.5 oz)  Body mass index is 34.86 kg/m².    Intake/Output Summary (Last 24 hours) at 5/15/2024 1635  Last data filed at 5/15/2024 0938  Gross per 24 hour   Intake 1030.05 ml   Output 2475 ml   Net -1444.95 ml         Physical Exam  Vitals and nursing note reviewed.   Constitutional:       Appearance: Normal appearance.   HENT:      Head: Normocephalic and atraumatic.      Nose: Nose normal.      Mouth/Throat:      Mouth: Mucous membranes are moist.      Pharynx: Oropharynx is clear.   Eyes:      Extraocular Movements: Extraocular movements intact.      Pupils: Pupils are equal, round, and reactive to light.   Neck:      Comments: [place] J collar in place  Cardiovascular:      Rate and Rhythm: Normal rate and regular rhythm.      Pulses: Normal pulses.      Heart sounds: Normal heart sounds.   Pulmonary:      Effort: Pulmonary effort is normal.      Breath sounds: Wheezing and rhonchi present.   Abdominal:      General: Bowel sounds are normal.      Palpations: Abdomen is soft.   Musculoskeletal:         General: Normal range of motion.      Cervical back: Normal range of motion and neck supple. No rigidity.   Skin:     General: Skin is warm and dry.      Capillary Refill: Capillary refill takes less than 2 seconds.      Comments:  Surgical incision to right anterior neck clean dry intact, about 1cm of thickened tissue above incision, no fluctuance, no erythema, likely scar tissue   Neurological:      General: No focal deficit present.      Mental Status: She is alert and oriented to person, place, and time.   Psychiatric:         Mood and Affect: Mood normal.         Behavior: Behavior normal.             Significant Labs: All pertinent labs within the past 24 hours have been reviewed.  CBC:   Recent Labs   Lab 05/14/24  0944 05/15/24  0503   WBC 10.42 8.59   HGB 13.0 12.7   HCT 40.5 39.3    169     CMP:   Recent Labs   Lab 05/14/24  0944 05/15/24  0503 05/15/24  1330   * 132* 129*   K 5.1 6.0* 5.6*   CL 99 96 93*   CO2 25 29 26   * 458* 678*  678*   BUN 34* 34* 39*   CREATININE 1.1 1.1 1.2   CALCIUM 8.9 8.8 8.9   PROT 7.1 6.9  --    ALBUMIN 3.9 3.6  --    BILITOT 0.6 0.6  --    ALKPHOS 129 124  --    AST 17 17  --    ALT 20 20  --    ANIONGAP 8 7* 10       Significant Imaging: I have reviewed all pertinent imaging results/findings within the past 24 hours.    US Lower Extremity Veins Bilateral    Result Date: 5/14/2024  EXAMINATION: US LOWER EXTREMITY VEINS BILATERAL CLINICAL HISTORY: dvt; TECHNIQUE: Duplex and color flow Doppler and dynamic compression was performed of the bilateral lower extremity veins was performed. COMPARISON: None FINDINGS: Right thigh veins: The common femoral, femoral, popliteal, upper greater saphenous, and deep femoral veins are patent and free of thrombus. The veins are normally compressible and have normal phasic flow and augmentation response. Right calf veins: The visualized calf veins are patent. Left thigh veins: The common femoral, femoral, popliteal, upper greater saphenous, and deep femoral veins are patent and free of thrombus. The veins are normally compressible and have normal phasic flow and augmentation response. Left calf veins: The visualized calf veins are patent.  Miscellaneous: None     No evidence of deep venous thrombosis in either lower extremity. Electronically signed by: Ketan Laguna Date:    05/14/2024 Time:    18:19    CT Head Without Contrast    Result Date: 5/14/2024  EXAMINATION: CT HEAD WITHOUT CONTRAST CLINICAL HISTORY: Fall, head trauma. FINDINGS: Comparison the prior exams including 04/23/2024.  Motion artifact limits the exam.  There is no acute intracranial hemorrhage, with no intra-axial mass, mass effect, or abnormal extra-axial fluid. Gray-white differentiation is maintained, with scattered areas of nonspecific white matter hypoattenuation. The cortical sulci and ventricles are stable in size, with the unenhanced cerebellum and brainstem unremarkable. There is paranasal sinus mucosal thickening, with the mastoid air cells normally aerated.  There is no acute calvarial fracture or scalp hematoma.     Limited exam due to motion, with no acute intracranial hemorrhage or acute calvarial fracture. Electronically signed by: Bashir Hahn Date:    05/14/2024 Time:    14:11    CTA Chest Non-Coronary (PE Studies)    Result Date: 5/14/2024  EXAMINATION: CTA CHEST NON CORONARY (PE STUDIES) CLINICAL HISTORY: Pulmonary embolism (PE) suspected, unknown D-dimer; TECHNIQUE: Thin axial imaging through the chest was performed with 100 mL Omnipaque 350 IV contrast, with sagittal and coronal reformatted images and MIP reconstructions performed, and images stored in the patient's permanent electronic medical record. FINDINGS: Comparison to multiple prior exams.  There are no pulmonary arterial filling defects to suggest pulmonary thromboembolism.  The central pulmonary arteries are normal in caliber. Scattered calcified and soft plaque involves normal-caliber thoracic aorta, with no aortic dissection or evidence of aortic intramural hematoma.  The heart is normal in size, with no pericardial effusion.  No enlarged mediastinal or hilar lymph nodes. There are lucencies reflecting  upper lobe predominant pulmonary emphysema, with localized bronchial wall thickening and mucous plugging in the right lower lobe, with adjacent reticular and linear parenchymal opacities.  There is bandlike atelectasis in the right middle lobe and lingula, with no consolidation.  No pleural effusion, evidence of pulmonary edema, or pneumothorax. Images of the upper abdomen show pneumobilia, and are otherwise unremarkable.  There are no acute fractures or destructive osseous lesions.     1. Negative for pulmonary thromboembolism. 2. Localized right lower lobe bronchitis-bronchiolitis, with scattered lower lung subsegmental atelectasis. . Electronically signed by: Bashir Hahn Date:    05/14/2024 Time:    12:13    X-Ray Toe 2 or More Views Left    Result Date: 5/14/2024  EXAMINATION: XR TOE 2 OR MORE VIEWS LEFT CLINICAL HISTORY: l great toe pain; COMPARISON: None FINDINGS: No acute fracture or focal lesion of the great toe.  Joint spaces are maintained.  No soft tissue gas or radiopaque foreign body.     No acute osseous abnormality. Electronically signed by: Jesus Herron Date:    05/14/2024 Time:    10:36    X-Ray Chest AP Portable    Result Date: 5/14/2024  EXAMINATION: XR CHEST AP PORTABLE CLINICAL HISTORY: Asthma; COMPARISON: 04/30/2024 FINDINGS: Cardiac silhouette size is stable compared to prior.  Bibasilar atelectasis or scarring.  No airspace consolidation.  No large pleural effusion.  No pneumothorax.  Cervical ACDF hardware.     Stable chest radiograph.  No acute pulmonary pathology. Electronically signed by: Jesus Herron Date:    05/14/2024 Time:    10:34

## 2024-05-15 NOTE — ASSESSMENT & PLAN NOTE
Status post C4-6 anterior cervical diskectomy decompression performed on 04/30/2024   Informed JACINTO Isaac she was in the hospital and would miss her appt that was scheduled for 5/15, will need to schedule another f/u when discharged

## 2024-05-15 NOTE — ASSESSMENT & PLAN NOTE
Chronic, controlled. Latest blood pressure and vitals reviewed-     Temp:  [97.4 °F (36.3 °C)-98.2 °F (36.8 °C)]   Pulse:  [67-87]   Resp:  [16-21]   BP: (120-155)/()   SpO2:  [95 %-99 %] .   Home meds for hypertension were reviewed and noted below.   Hypertension Medications               amLODIPine (NORVASC) 5 MG tablet Take 5 mg by mouth once daily.    furosemide (LASIX) 40 MG tablet Take 40 mg by mouth as needed (edema).     lisinopriL (PRINIVIL,ZESTRIL) 5 MG tablet Take 5 mg by mouth once daily.    metoprolol succinate (TOPROL-XL) 100 MG 24 hr tablet Take 100 mg by mouth 2 (two) times daily.    metoprolol succinate (TOPROL-XL) 100 MG 24 hr tablet Take 100 mg by mouth once daily.            While in the hospital, will manage blood pressure as follows; Continue home antihypertensive regimen    Will utilize p.r.n. blood pressure medication only if patient's blood pressure greater than 140/90 and she develops symptoms such as worsening chest pain or shortness of breath.

## 2024-05-15 NOTE — PLAN OF CARE
[facility]  Initial Discharge Assessment       Primary Care Provider: Carrie Gorman FNP    Admission Diagnosis: COPD exacerbation [J44.1]    Admission Date: 5/14/2024  Expected Discharge Date: 5/20/2024     completed discharge assessment with Pt's daughter via telephone. Pt lives at home with daughter. Demographics, PCP, and insurance verified. Pt has [facility]. No dialysis. Pt completes ADLs without assistance. Pt to discharge home with resumption of home health via family transport. Daughter to provide transport. Pt has no other needs to be addressed at this time.    Transition of Care Barriers: None    Payor: HUMANA MANAGED MEDICARE / Plan: [place] MEDICARE PPO / Product Type: Medicare Advantage /     Extended Emergency Contact Information  Primary Emergency Contact: [place] [address] [facility]  Home Phone: [phone]  Mobile Phone: [phone]  Relation: Daughter    Discharge Plan A: Home Health  Discharge Plan B: [facility]      [facility] [place], [place] - [address]  [address]  [place] [address]  Phone: [phone] Fax: [phone]    [facility] - [facility] [address] [facility]  [address] [facility] [address]  Phone: [phone] Fax: [phone]    [facility]  [address] [place] [address] [address]  [facility] [address]  Phone: [phone] Fax: [phone]      Initial Assessment (most recent)       Adult Discharge Assessment - 05/15/24 1509          Discharge Assessment    Assessment Type Discharge Planning Assessment     Confirmed/corrected address, phone number and insurance Yes     Confirmed Demographics Correct on Facesheet     Source of Information family     If unable to respond/provide information was family/caregiver contacted? Yes     Contact Name/Number Mariana Cotto (Daughter)  [phone]     Communicated ALICE with  patient/caregiver Date not available/Unable to determine     Reason For Admission COPD exacerbation     People in Home child(tami), adult     Facility Arrived From: home     Do you expect to return to your current living situation? Yes     Do you have help at home or someone to help you manage your care at home? Yes     Who are your caregiver(s) and their phone number(s)? Mariana Cotto (Daughter)  [phone] (     Prior to hospitilization cognitive status: Unable to Assess     Current cognitive status: Unable to Assess     Walking or Climbing Stairs Difficulty no     Dressing/Bathing Difficulty no     Home Accessibility wheelchair accessible     Home Layout Able to live on 1st floor     Equipment Currently Used at Home oxygen   2L [employer]    Readmission within 30 days? Yes     Patient currently being followed by outpatient case management? No     Do you currently have service(s) that help you manage your care at home? Yes     Name and Contact number of agency [facility]     Is the pt/caregiver preference to resume services with current agency Yes     Do you take prescription medications? Yes     Do you have prescription coverage? Yes     Coverage Payor: HUMANA MANAGED MEDICARE - HU[employer] MEDICARE PPO -     Do you have any problems affording any of your prescribed medications? No     Is the patient taking medications as prescribed? yes     Who is going to help you get home at discharge? Mariana Cotto (Daughter)  [phone]     How do you get to doctors appointments? car, drives self     Are you on dialysis? No     Do you take coumadin? No     Discharge Plan A Home Health     Discharge Plan B Home Health     DME Needed Upon Discharge  none     Discharge Plan discussed with: Adult children     Transition of Care Barriers None

## 2024-05-15 NOTE — CARE UPDATE
05/15/24 0703   Patient Assessment/Suction   Level of Consciousness (AVPU) alert   Respiratory Effort Normal;Unlabored   Expansion/Accessory Muscles/Retractions no use of accessory muscles;no retractions;expansion symmetric   All Lung Fields Breath Sounds Anterior:   MAHSA Breath Sounds coarse   RUL Breath Sounds wheezes, expiratory   Rhythm/Pattern, Respiratory unlabored;depth regular   Cough Frequency no cough   PRE-TX-O2   Device (Oxygen Therapy) nasal cannula   $ Is the patient on Low Flow Oxygen? Yes   Flow (L/min) (Oxygen Therapy) 2   SpO2 99 %   Pulse Oximetry Type Intermittent   $ Pulse Oximetry - Multiple Charge Pulse Oximetry - Multiple   Pulse 71   Resp 17   Aerosol Therapy   $ Aerosol Therapy Charges Aerosol Treatment   Daily Review of Necessity (SVN) completed   Respiratory Treatment Status (SVN) given   Treatment Route (SVN) mouthpiece utilized;oxygen   Patient Position HOB elevated   Post Treatment Assessment (SVN) patient reports breathing improved   Signs of Intolerance (SVN) none   Breath Sounds Post-Respiratory Treatment   Throughout All Fields Post-Treatment All Fields   Throughout All Fields Post-Treatment aeration increased   Post-treatment Heart Rate (beats/min) 72   Post-treatment Resp Rate (breaths/min) 17   Education   $ Education Bronchodilator;15 min

## 2024-05-15 NOTE — PROGRESS NOTES
[facility]  Progress Note    Patient Name: Maddie Otero  MRN: [id number]  Patient Class: OP- Observation   Admission Date: 5/14/2024  Length of Stay: 0 days  Attending Physician: Yash Chiang MD  Primary Care Provider: Carrie Gorman FNP        Subjective:     Principal Problem:COPD exacerbation        HPI:  73 year old pt getting admitted with COPD exacerbation and falls  Pt is status post C4-6 anterior cervical diskectomy decompression performed on 04/30/2024   After surgery she did well  Past few days she ahs been suffering from shortness of breath  She felt weak and has to increase her oxygen from 2-4 L  Also pt had episodes of fall and pt came to ER & got admitted   Pt is on lasix and pt reports increased swelling on legs     Overview/Hospital Course:  Ms. Otero has been monitored closely during her hospitalization. She was admitted on 5/14/24 with COPD exac witho localized right lower lobe bronchitis-bronchiolitis, with scattered lower lung subsegmental atelectasis on CTA. She was started on IV steroids and transitioned to PO. She is chronically on home O2@2L, but required up to 4L. She is getting scheduled nebs. She had a C4-C6 ACDF on 4/30 with Dr. Pierson and is to have her [place] J collar in place for 6 weeks. She did have a mechanical fall at home prior to arrival and CT head is negative for acute intracranial abnormality. CTA chest was done for elevated D-Dimer and was negative for PE. US bilat LE is negative for DVT. She's had significant hyperglycemia in the setting of high dose steroids for COPD exac/bronchitis. Potassium is also elevated to 6 on 5/15 and is shifted with insulin/calcium/fluid bolus and treated with lokelma. She was empirically started on levaquin and flagyl for concerns of possible aspiration given recent ACDF.     Interval History: Add IS. Getting several doses of IV insulin. Will start basal as well. Glucose slowly improving.    Review of  Systems   Constitutional:  Positive for fatigue.   Respiratory:  Positive for cough, shortness of breath and wheezing.    Neurological:  Positive for weakness.     Objective:     Vital Signs (Most Recent):  Temp: 97.9 °F (36.6 °C) (05/15/24 1132)  Pulse: 85 (05/15/24 1517)  Resp: 17 (05/15/24 1517)  BP: (!) 130/90 (05/15/24 1132)  SpO2: 95 % (05/15/24 1517) Vital Signs (24h Range):  Temp:  [97.4 °F (36.3 °C)-98.2 °F (36.8 °C)] 97.9 °F (36.6 °C)  Pulse:  [67-87] 85  Resp:  [16-21] 17  SpO2:  [95 %-99 %] 95 %  BP: (120-155)/() 130/90     Weight: 104 kg (229 lb 4.5 oz)  Body mass index is 34.86 kg/m².    Intake/Output Summary (Last 24 hours) at 5/15/2024 1635  Last data filed at 5/15/2024 0938  Gross per 24 hour   Intake 1030.05 ml   Output 2475 ml   Net -1444.95 ml         Physical Exam  Vitals and nursing note reviewed.   Constitutional:       Appearance: Normal appearance.   HENT:      Head: Normocephalic and atraumatic.      Nose: Nose normal.      Mouth/Throat:      Mouth: Mucous membranes are moist.      Pharynx: Oropharynx is clear.   Eyes:      Extraocular Movements: Extraocular movements intact.      Pupils: Pupils are equal, round, and reactive to light.   Neck:      Comments: [place] J collar in place  Cardiovascular:      Rate and Rhythm: Normal rate and regular rhythm.      Pulses: Normal pulses.      Heart sounds: Normal heart sounds.   Pulmonary:      Effort: Pulmonary effort is normal.      Breath sounds: Wheezing and rhonchi present.   Abdominal:      General: Bowel sounds are normal.      Palpations: Abdomen is soft.   Musculoskeletal:         General: Normal range of motion.      Cervical back: Normal range of motion and neck supple. No rigidity.   Skin:     General: Skin is warm and dry.      Capillary Refill: Capillary refill takes less than 2 seconds.      Comments: Surgical incision to right anterior neck clean dry intact, about 1cm of thickened tissue above incision, no fluctuance, no  erythema, likely scar tissue   Neurological:      General: No focal deficit present.      Mental Status: She is alert and oriented to person, place, and time.   Psychiatric:         Mood and Affect: Mood normal.         Behavior: Behavior normal.             Significant Labs: All pertinent labs within the past 24 hours have been reviewed.  CBC:   Recent Labs   Lab 05/14/24  0944 05/15/24  0503   WBC 10.42 8.59   HGB 13.0 12.7   HCT 40.5 39.3    169     CMP:   Recent Labs   Lab 05/14/24  0944 05/15/24  0503 05/15/24  1330   * 132* 129*   K 5.1 6.0* 5.6*   CL 99 96 93*   CO2 25 29 26   * 458* 678*  678*   BUN 34* 34* 39*   CREATININE 1.1 1.1 1.2   CALCIUM 8.9 8.8 8.9   PROT 7.1 6.9  --    ALBUMIN 3.9 3.6  --    BILITOT 0.6 0.6  --    ALKPHOS 129 124  --    AST 17 17  --    ALT 20 20  --    ANIONGAP 8 7* 10       Significant Imaging: I have reviewed all pertinent imaging results/findings within the past 24 hours.    US Lower Extremity Veins Bilateral    Result Date: 5/14/2024  EXAMINATION: US LOWER EXTREMITY VEINS BILATERAL CLINICAL HISTORY: dvt; TECHNIQUE: Duplex and color flow Doppler and dynamic compression was performed of the bilateral lower extremity veins was performed. COMPARISON: None FINDINGS: Right thigh veins: The common femoral, femoral, popliteal, upper greater saphenous, and deep femoral veins are patent and free of thrombus. The veins are normally compressible and have normal phasic flow and augmentation response. Right calf veins: The visualized calf veins are patent. Left thigh veins: The common femoral, femoral, popliteal, upper greater saphenous, and deep femoral veins are patent and free of thrombus. The veins are normally compressible and have normal phasic flow and augmentation response. Left calf veins: The visualized calf veins are patent. Miscellaneous: None     No evidence of deep venous thrombosis in either lower extremity. Electronically signed by: Ketan Laguna  Date:    05/14/2024 Time:    18:19    CT Head Without Contrast    Result Date: 5/14/2024  EXAMINATION: CT HEAD WITHOUT CONTRAST CLINICAL HISTORY: Fall, head trauma. FINDINGS: Comparison the prior exams including 04/23/2024.  Motion artifact limits the exam.  There is no acute intracranial hemorrhage, with no intra-axial mass, mass effect, or abnormal extra-axial fluid. Gray-white differentiation is maintained, with scattered areas of nonspecific white matter hypoattenuation. The cortical sulci and ventricles are stable in size, with the unenhanced cerebellum and brainstem unremarkable. There is paranasal sinus mucosal thickening, with the mastoid air cells normally aerated.  There is no acute calvarial fracture or scalp hematoma.     Limited exam due to motion, with no acute intracranial hemorrhage or acute calvarial fracture. Electronically signed by: Bashir Hahn Date:    05/14/2024 Time:    14:11    CTA Chest Non-Coronary (PE Studies)    Result Date: 5/14/2024  EXAMINATION: CTA CHEST NON CORONARY (PE STUDIES) CLINICAL HISTORY: Pulmonary embolism (PE) suspected, unknown D-dimer; TECHNIQUE: Thin axial imaging through the chest was performed with 100 mL Omnipaque 350 IV contrast, with sagittal and coronal reformatted images and MIP reconstructions performed, and images stored in the patient's permanent electronic medical record. FINDINGS: Comparison to multiple prior exams.  There are no pulmonary arterial filling defects to suggest pulmonary thromboembolism.  The central pulmonary arteries are normal in caliber. Scattered calcified and soft plaque involves normal-caliber thoracic aorta, with no aortic dissection or evidence of aortic intramural hematoma.  The heart is normal in size, with no pericardial effusion.  No enlarged mediastinal or hilar lymph nodes. There are lucencies reflecting upper lobe predominant pulmonary emphysema, with localized bronchial wall thickening and mucous plugging in the right lower  lobe, with adjacent reticular and linear parenchymal opacities.  There is bandlike atelectasis in the right middle lobe and lingula, with no consolidation.  No pleural effusion, evidence of pulmonary edema, or pneumothorax. Images of the upper abdomen show pneumobilia, and are otherwise unremarkable.  There are no acute fractures or destructive osseous lesions.     1. Negative for pulmonary thromboembolism. 2. Localized right lower lobe bronchitis-bronchiolitis, with scattered lower lung subsegmental atelectasis. . Electronically signed by: Bashir Hahn Date:    05/14/2024 Time:    12:13    X-Ray Toe 2 or More Views Left    Result Date: 5/14/2024  EXAMINATION: XR TOE 2 OR MORE VIEWS LEFT CLINICAL HISTORY: l great toe pain; COMPARISON: None FINDINGS: No acute fracture or focal lesion of the great toe.  Joint spaces are maintained.  No soft tissue gas or radiopaque foreign body.     No acute osseous abnormality. Electronically signed by: Jesus Herron Date:    05/14/2024 Time:    10:36    X-Ray Chest AP Portable    Result Date: 5/14/2024  EXAMINATION: XR CHEST AP PORTABLE CLINICAL HISTORY: Asthma; COMPARISON: 04/30/2024 FINDINGS: Cardiac silhouette size is stable compared to prior.  Bibasilar atelectasis or scarring.  No airspace consolidation.  No large pleural effusion.  No pneumothorax.  Cervical ACDF hardware.     Stable chest radiograph.  No acute pulmonary pathology. Electronically signed by: Jesus Herron Date:    05/14/2024 Time:    10:34      Assessment/Plan:      * COPD exacerbation  Patient's COPD is with exacerbation noted by continued dyspnea and worsening of baseline hypoxia currently.  Patient is currently off COPD Pathway. Continue scheduled inhalers Steroids, Antibiotics, and Supplemental oxygen and monitor respiratory status closely.     Admitted to med/tele  Transition to PO steroids  Scheduled nebs   Continue levaquin/flagyl  IS        Hyperkalemia  This patient has hyperkalemia which is controlled.  We will monitor for arrhythmias with EKG or continuous telemetry. We will treat the hyperkalemia with Potassium Binders, Calcium gluconate, and IV insulin and dextrose. The likely etiology of the hyperkalemia is Medication induced.  The patients latest potassium has been reviewed and the results are listed below  Recent Labs   Lab 05/15/24  1330   K 5.6*             Acute pneumonia  Suspected acute pneumonia  CT done today showed right lower lobe bronchitis-bronchiolitis, with scattered lower lung subsegmental atelectasis.   Pt recently was intubated for spine surgery  She is allergic to almost all abx  Start on iv levofloxacin/iv flagyl and de escalate as needed   H/o HCAP in the past per records so will give one dose of iv vancomycin      On home oxygen therapy  Maintain      Recurrent falls  PT/OT        Status post cervical spinal fusion  Status post C4-6 anterior cervical diskectomy decompression performed on 04/30/2024   Informed JACINTO Isaac she was in the hospital and would miss her appt that was scheduled for 5/15, will need to schedule another f/u when discharged      HTN (hypertension)  Chronic, controlled. Latest blood pressure and vitals reviewed-     Temp:  [97.4 °F (36.3 °C)-98.2 °F (36.8 °C)]   Pulse:  [67-87]   Resp:  [16-21]   BP: (120-155)/()   SpO2:  [95 %-99 %] .   Home meds for hypertension were reviewed and noted below.   Hypertension Medications               amLODIPine (NORVASC) 5 MG tablet Take 5 mg by mouth once daily.    furosemide (LASIX) 40 MG tablet Take 40 mg by mouth as needed (edema).     lisinopriL (PRINIVIL,ZESTRIL) 5 MG tablet Take 5 mg by mouth once daily.    metoprolol succinate (TOPROL-XL) 100 MG 24 hr tablet Take 100 mg by mouth 2 (two) times daily.    metoprolol succinate (TOPROL-XL) 100 MG 24 hr tablet Take 100 mg by mouth once daily.            While in the hospital, will manage blood pressure as follows; Continue home antihypertensive regimen    Will utilize  "p.r.n. blood pressure medication only if patient's blood pressure greater than 140/90 and she develops symptoms such as worsening chest pain or shortness of breath.    PAF (paroxysmal atrial fibrillation)  Chronic  Continue eliquis     Diabetes mellitus type II  Patient's FSGs are uncontrolled due to hyperglycemia on current medication regimen.  Last A1c reviewed-   Lab Results   Component Value Date    HGBA1C 8.5 (H) 04/23/2024     Most recent fingerstick glucose reviewed- No results for input(s): "POCTGLUCOSE" in the last 24 hours.  Current correctional scale  Medium  Maintain anti-hyperglycemic dose as follows-   Antihyperglycemics (From admission, onward)      Start     Stop Route Frequency Ordered    05/14/24 1359  insulin aspart U-100 pen 0-10 Units         -- SubQ Before meals & nightly PRN 05/14/24 1259          Hold Oral hypoglycemics while patient is in the hospital.    Did not get any glucose checks until this AM since admit - POCT glucoses ordered ACHS  Also did not get any insulin until 1300. IV insulin ordered STAT at 0930 as well as kelma.     Requiring several doses of IV insulin to control  Start basal levemir 20 units   Adjust ISS to high dose       VTE Risk Mitigation (From admission, onward)           Ordered     apixaban tablet 5 mg  2 times daily         05/14/24 1800     Place sequential compression device  Until discontinued         05/14/24 1258     IP VTE HIGH RISK PATIENT  Once         05/14/24 1259     Place sequential compression device  Until discontinued         05/14/24 1259                    Discharge Planning   ALICE: 5/20/2024     Code Status: Full Code   Is the patient medically ready for discharge?:     Reason for patient still in hospital (select all that apply): Patient trending condition and Treatment  Discharge Plan A: Home Health                  Katerine Choi NP  Department of Hospital Medicine   [facility]    "

## 2024-05-15 NOTE — ASSESSMENT & PLAN NOTE
"Patient's FSGs are uncontrolled due to hyperglycemia on current medication regimen.  Last A1c reviewed-   Lab Results   Component Value Date    HGBA1C 8.5 (H) 04/23/2024     Most recent fingerstick glucose reviewed- No results for input(s): "POCTGLUCOSE" in the last 24 hours.  Current correctional scale  Medium  Maintain anti-hyperglycemic dose as follows-   Antihyperglycemics (From admission, onward)      Start     Stop Route Frequency Ordered    05/14/24 1359  insulin aspart U-100 pen 0-10 Units         -- SubQ Before meals & nightly PRN 05/14/24 1259          Hold Oral hypoglycemics while patient is in the hospital.    Did not get any glucose checks until this AM since admit - POCT glucoses ordered ACHS  Also did not get any insulin until 1300. IV insulin ordered STAT at 0930 as well as lokelma.     Requiring several doses of IV insulin to control  Start basal levemir 20 units   Adjust ISS to high dose   "

## 2024-05-15 NOTE — PLAN OF CARE
Problem: Diabetes Comorbidity  Goal: Blood Glucose Level Within Targeted Range  Outcome: Progressing     Problem: Acute Kidney Injury/Impairment  Goal: Fluid and Electrolyte Balance  Outcome: Progressing

## 2024-05-15 NOTE — PLAN OF CARE
Pt's daughter was explained BREWSTER. Pt's daughter verbalized understanding of BREWSTER Brew[place] witnessed. BREWSTER scanned to .       05/15/24 1518   BREWSTER Message   Medicare Outpatient and Observation Notification regarding financial responsibility Explained to patient/caregiver;Other (comments)  (Verbal Ackowledgement)   Date BREWSTER was signed 05/15/24   Time BREWSTER was signed [id number]

## 2024-05-15 NOTE — ASSESSMENT & PLAN NOTE
This patient has hyperkalemia which is controlled. We will monitor for arrhythmias with EKG or continuous telemetry. We will treat the hyperkalemia with Potassium Binders, Calcium gluconate, and IV insulin and dextrose. The likely etiology of the hyperkalemia is Medication induced.  The patients latest potassium has been reviewed and the results are listed below  Recent Labs   Lab 05/15/24  1330   K 5.6*

## 2024-05-15 NOTE — DISCHARGE INSTRUCTIONS
"[facility]  Facility Transfer Orders        Admit to: SNF    Diagnoses:   Active Hospital Problems    Diagnosis  POA    *COPD exacerbation [J44.1]  Yes    Hyperkalemia [E87.5]  Yes    Recurrent falls [R29.6]  Not Applicable    On home oxygen therapy [Z99.81]  Not Applicable    Acute pneumonia [J18.9]  Yes    Status post cervical spinal fusion [Z98.1]  Not Applicable    HTN (hypertension) [I10]  Yes    PAF (paroxysmal atrial fibrillation) [I48.0]  Yes    Diabetes mellitus type II [E11.9]  Yes     Dx updated per 2019 IMO Load        Resolved Hospital Problems   No resolved problems to display.     Allergies:   Review of patient's allergies indicates:   Allergen Reactions    Heparin Itching     Pt states she has no allergy      NOT AN ALLERGY . NOT AWARE OF TAKING IT    Hydrocodone Shortness Of Breath     MED "SHORTENS HER BREATHING"    Penicillins Anaphylaxis, Hives and Itching     Other reaction(s): Unknown  Childhood reaction, swelled      Sulfa (sulfonamide antibiotics) Hives    Doxycycline Nausea And Vomiting     Other reaction(s): Abdominal Pain    Clindamycin      "Felt like I was on fire down through throat and felt like I was having spasms in my throat"    Penicillin v      Childhood reaction, swelled       Code Status: Full    Vitals: Routine       Diet: diabetic diet: 2000 calorie    Activity: Activity as tolerated with [place] J neck brace on    Nursing Precautions: Fall    Bed/Surface: Low Air Loss    Consults: PT to evaluate and treat- 5 times a week, OT to evaluate and treat- 5 times a week, and Wound Care    Oxygen: 3 liters/min via nasal cannula    Dialysis: Patient is not on dialysis.     Labs:               CBL and BMP weekly   Pending Diagnostic Studies:       None          Imaging: n/a    Miscellaneous Care:   Wound Care: yes:  Clean and dry left 1st toe cover with aquacel ag, cover with large bandaid daily.    IV Access: n/a     Medications: Discontinue all previous medication " orders, if any. See new list below.  Current Discharge Medication List        START taking these medications    Details   apixaban (ELIQUIS) 5 mg Tab Take 1 tablet (5 mg total) by mouth 2 (two) times daily.  Qty: 60 tablet, Refills: 2      levoFLOXacin (LEVAQUIN) 500 MG tablet Take 1 tablet (500 mg total) by mouth once daily. for 5 days  Qty: 4 tablet, Refills: 0      LIDOcaine (LIDODERM) 5 % Place 1 patch onto the skin once daily. Remove & Discard patch within 12 hours or as directed by MD for 14 days  Qty: 14 patch, Refills: 0      predniSONE (DELTASONE) 10 MG tablet Take 1 tablet (10 mg total) by mouth once daily. for 3 days  Qty: 3 tablet, Refills: 0           CONTINUE these medications which have NOT CHANGED    Details   albuterol (PROVENTIL/VENTOLIN HFA) 90 mcg/actuation inhaler INHALE 2 PUFFS INTO THE LUNGS EVERY 6 (SIX) HOURS AS NEEDED FOR WHEEZING. RESCUE  Qty: 54 g, Refills: 3      amLODIPine (NORVASC) 5 MG tablet Take 5 mg by mouth once daily.      budesonide-glycopyr-formoterol (BREZTRI AEROSPHERE) 160-9-4.8 mcg/actuation HFAA Inhale 2 puffs into the lungs 2 (two) times a day.  Qty: 3 g, Refills: 6    Comments: 3 month supply      cetirizine (ZYRTEC) 10 MG tablet Take 10 mg by mouth once daily.      famotidine (PEPCID) 20 MG tablet Take 20 mg by mouth 2 (two) times daily.       furosemide (LASIX) 40 MG tablet Take 40 mg by mouth as needed (edema).       lipase-protease-amylase 12,000-38,000-60,000 units (CREON) CpDR Take 2 capsules by mouth 4 (four) times daily.      methocarbamoL (ROBAXIN) 750 MG Tab Take 1 tablet (750 mg total) by mouth 3 (three) times daily. for 21 days  Qty: 63 tablet, Refills: 0      metoprolol succinate (TOPROL-XL) 100 MG 24 hr tablet Take 100 mg by mouth 2 (two) times daily.      omeprazole (PRILOSEC) 20 MG capsule Take 1 capsule (20 mg total) by mouth once daily.  Qty: 90 capsule, Refills: 3      oxyCODONE-acetaminophen (PERCOCET)  mg per tablet Take 1 tablet by mouth 3  (three) times daily as needed for Pain.      rOPINIRole (REQUIP) 1 MG tablet Take 1 mg by mouth every evening.      insulin NPH-insulin regular, 70/30, 100 unit/mL (70-30) injection Inject 22 Units into the skin 3 (three) times daily with meals.      sodium chloride (OCEAN NASAL NASL) 1 spray by Nasal route daily as needed (rhinitis).            STOP taking these medications       lisinopriL (PRINIVIL,ZESTRIL) 5 MG tablet Comments:   Reason for Stopping:         senna-docusate 8.6-50 mg (PERICOLACE) 8.6-50 mg per tablet Comments:   Reason for Stopping:         TRUE METRIX GLUCOSE TEST STRIP Strp Comments:   Reason for Stopping:             Follow up:    Follow-up Information       Center, [facility] Follow up.    Specialties: SNF Agency, Nursing Home Agency, Physical Therapy, Occupational Therapy, SNF Agency  Contact information:  [address]  [place] [address]  [phone]                               Immunizations Administered as of 5/17/2024       Name Date Dose VIS Date Route Exp Date    COVID-19, MRNA, LN-S, PF (Pfizer) (Purple Cap) 3/3/2021  3:19 PM 0.3 mL 12/12/2020 Intramuscular 6/30/2021    Site: Left deltoid     Given By: Shell Piedra MA     : [employer]     Lot: [id number]     Comment: Given By: Amber Almodovar : NICKOLAS     COVID-19, MRNA, LN-S, PF (Pfizer) (Purple Cap) 2/9/2021  7:14 PM 0.3 mL 12/12/2020 Intramuscular 6/30/2021    Site: Left deltoid     Given By: Everardo Baptiste     : Pf[employer]     Lot: [id number]                  Some patients may experience side effects after vaccination.  These may include fever, headache, muscle or joint aches.  Most symptoms resolve with 24-48 hours and do not require urgent medical evaluation unless they persist for more than 72 hours or symptoms are concerning for an unrelated medical condition.

## 2024-05-15 NOTE — PT/OT/SLP EVAL
Occupational Therapy   Evaluation    Name: Maddie Otero  MRN: [id number]  Admitting Diagnosis: COPD exacerbation  Recent Surgery: * No surgery found *      Recommendations:     Discharge Recommendations: Low Intensity Therapy HHOT  Discharge Equipment Recommendations:  walker, rolling (supposedly has one ordered but has not received it yet.)  Barriers to discharge:   (Increased assistance with ADLs and transfers)    Assessment:     Maddie Otero is a 73 y.o. female with a medical diagnosis of COPD exacerbation.  Performance deficits affecting function: weakness, impaired endurance, impaired self care skills, impaired functional mobility, gait instability, decreased lower extremity function, decreased upper extremity function, pain, impaired cardiopulmonary response to activity, edema.      Rehab Prognosis: Good; patient would benefit from acute skilled OT services to address these deficits and reach maximum level of function.       Plan:     Patient to be seen 5 x/week to address the above listed problems via self-care/home management, therapeutic activities, therapeutic exercises  Plan of Care Expires: 06/15/24  Plan of Care Reviewed with: patient    Subjective     Chief Complaint: left flank pain due to bruised ribs.  Patient/Family Comments/goals: I was getting home health before I came into the hospital.     Occupational Profile:  Living Environment: Lives with her daughter in a single story home with a bathroom with tub/shower combo with grab bars.  She stands to shower.  Previous level of function: Independent with self care, driving prior to neck surgery and light housework such as dishes, laundry and made bed.  Roles and Routines: somewhat sedentary lifestyle  Equipment Used at Home: wheelchair (She reports that she was d/c from hospital recently following neck surgery and the ordered her a rollling walker but she has not received it yet.)  Assistance upon Discharge: daughter    Pain/Comfort:  Pain Rating  1: 7/10  Location - Side 1: Bilateral  Location - Orientation 1: generalized  Location 1: rib(s) (with activity)  Pain Addressed 1: Reposition, Distraction, Cessation of Activity    Patients cultural, spiritual, Lutheran conflicts given the current situation: no    Objective:     Communicated with: nurse prior to session.  Patient found supine with PureWick, peripheral IV, bed alarm upon OT entry to room.    General Precautions: Standard, fall  Orthopedic Precautions: spinal precautions  Braces: Cervical collar  Respiratory Status: Nasal cannula, flow 2 L/min    Occupational Performance:    Bed Mobility:    Patient completed Rolling/Turning to Right with stand by assistance  Patient completed Supine to Sit with stand by assistance    Functional Mobility/Transfers:  Patient completed Sit <> Stand Transfer with contact guard assistance  with  rolling walker   Patient completed Bed <> Chair Transfer using Step Transfer technique with contact guard assistance with rolling walker  She took 2 steps and turned to sit into the bed side chair.    Activities of Daily Living:  Bathing: contact guard assistance in standing for periarea, buttocks and thighs.  She stood and held onto the bed rail for support.  Upper Body Dressing: stand by assistance gown change seated in bed side chair.    Cognitive/Visual Perceptual:  Cognitive/Psychosocial Skills:     -       Oriented to: Person, Place, Time, and Situation   -       Follows Commands/attention:Follows two-step commands  -       Communication: clear/fluent  -       Memory: No Deficits noted  -       Safety awareness/insight to disability: intact   -       Mood/Affect/Coping skills/emotional control: Appropriate to situation    Physical Exam:  Balance:    -       seated  good, standing fair+  Edema:  Moderate due to bilateral involvement, soft pitting swelling in Bilateral lower extremities from the ankles to the popliteal fold.  Dominant hand:    -       right  Upper Extremity  Range of Motion:     -       Right Upper Extremity: WNL  -       Left Upper Extremity: WNL  Upper Extremity Strength:    -       Right Upper Extremity: WNL  -       Left Upper Extremity: WNL   Strength:    -       Right Upper Extremity: WNL  -       Left Upper Extremity: WNL  Fine Motor Coordination:    -       Intact  Gross motor coordination:   WFL    AMPAC 6 Click ADL:  AMPAC Total Score: 19    Therapeutic exercise  Anterior/ posterior tilt x 5 seated in chair,   Supine in bed- deep abdominal breaths with belly rubs, heel slides, knee bounces, ankles up and down, toe crunches x 10 2 sets.      Treatment & Education:  She was educated on Role of Occupational Therapy, goals and plan of care.  Discussed importance of OOB activity and functional mobility to negate the negative effects of prolonged bedrest during this hospitalization, safe transfers and d/c planning recommendations. She performed therapeutic exercises to increase lymph uptake and direct lymph flow.  She performed bed mobility and transferred to the bed side chair.    Patient left up in chair with all lines intact, call button in reach, bed alarm off, and chair alarm on    GOALS:   Multidisciplinary Problems       Occupational Therapy Goals          Problem: Occupational Therapy    Goal Priority Disciplines Outcome Interventions   Occupational Therapy Goal     OT, PT/OT     Description: Goals to be met by: 06/05/2024     Patient will increase functional independence with ADLs by performing:    LE Dressing with Supervision.  Grooming while standing at sink with Supervision.  Toileting from bedside commode with Supervision for hygiene and clothing management.   Stand pivot transfers with Supervision.  Toilet transfer to bedside commode with [place].                         History:     Past Medical History:   Diagnosis Date    A-fib     Anticoagulant long-term use     Arthritis     COPD (chronic obstructive pulmonary disease)     COPD  (chronic obstructive pulmonary disease) 2019    Diabetes mellitus, type 2     Diverticulosis     History of left knee replacement 2017    DR CRENSHAW    HNP (herniated nucleus pulposus)     LUMBAR    Hypertension     Lung disease     Pancreatic insufficiency     s/p whipple, non cancer    Presence of dental bridge     UPPER    Wears glasses          Past Surgical History:   Procedure Laterality Date    ANTERIOR CERVICAL DISCECTOMY W/ FUSION N/A 4/30/2024    Procedure: DISCECTOMY, SPINE, CERVICAL, ANTERIOR APPROACH, WITH FUSION;  Surgeon: Phu Pierson DO;  Location: [facility];  Service: Neurosurgery;  Laterality: N/A;  ACDF C4-5, C5-6 with partial C5 Corpectomy    APPENDECTOMY      BACK SURGERY  1994    lower back    BACK SURGERY  2012    lower back    CHOLECYSTECTOMY      HERNIA REPAIR      JOINT REPLACEMENT Left     KNEE    KNEE ARTHROPLASTY Right 5/3/2021    Procedure: ARTHROPLASTY, KNEE;  Surgeon: Manuel Willingham MD;  Location: [facility];  Service: Orthopedics;  Laterality: Right;  guzman    LAPAROSCOPIC REPAIR OF INCISIONAL HERNIA N/A 10/22/2019    Procedure: REPAIR, HERNIA, INCISIONAL, LAPAROSCOPIC;  Surgeon: Pantera Almanza III, MD;  Location: [facility];  Service: General;  Laterality: N/A;    pancrease  2009    stents in pancrease     TONSILLECTOMY      TOTAL KNEE ARTHROPLASTY Left 08/10/2017    WHIPPLE PROCEDURE W/ LAPAROSCOPY  10/09       Time Tracking:     OT Date of Treatment: 05/15/24  OT Start Time: 0858  OT Stop Time: 0946  OT Total Time (min): 48 min    Billable Minutes:Evaluation 15  Therapeutic Activity 15  Therapeutic Exercise 18    5/15/2024

## 2024-05-15 NOTE — CARE UPDATE
05/14/24 8180   Patient Assessment/Suction   Level of Consciousness (AVPU) alert   Respiratory Effort Normal;Unlabored   Expansion/Accessory Muscles/Retractions no retractions;no use of accessory muscles;expansion symmetric   All Lung Fields Breath Sounds rhonchi;crackles   Rhythm/Pattern, Respiratory unlabored;pattern regular;depth regular   Cough Frequency infrequent   Cough Type congested   PRE-TX-O2   Device (Oxygen Therapy) nasal cannula   Flow (L/min) (Oxygen Therapy) 2   SpO2 98 %   Pulse Oximetry Type Continuous   $ Pulse Oximetry - Multiple Charge Pulse Oximetry - Multiple   Pulse 85   Resp 17   Aerosol Therapy   $ Aerosol Therapy Charges Aerosol Treatment   Daily Review of Necessity (SVN) completed   Respiratory Treatment Status (SVN) given   Treatment Route (SVN) mask;oxygen   Patient Position HOB elevated   Post Treatment Assessment (SVN) breath sounds improved   Signs of Intolerance (SVN) none   Breath Sounds Post-Respiratory Treatment   Throughout All Fields Post-Treatment All Fields   Throughout All Fields Post-Treatment aeration increased   Post-treatment Heart Rate (beats/min) 85   Post-treatment Resp Rate (breaths/min) 17   Education   $ Education Bronchodilator;15 min

## 2024-05-15 NOTE — CONSULTS
Right side of neck incision healing, no redness or drainage open to air, appears to be glued.  Wearing neck brace.  Left 1st toe laceration 2.3cm  no drainage leg and foot swollen, bruising periwound.  Cleaned and dried and covered with aquacel ag, non stick large bandaid. Notified Dr. Orourke of above. Purple bruising back arm, healing, dry scabbed skin tear left arm, states tape removed, and elbow.  Keep clean and dry.

## 2024-05-15 NOTE — PT/OT/SLP EVAL
Physical Therapy Evaluation    Patient Name:  Maddie Otero   MRN:  [id number]    Recommendations:     Discharge Recommendations: Moderate Intensity Therapy (mod vs low intensity with 24/7 supervision and assist)   Discharge Equipment Recommendations: rollator   Barriers to discharge:  medical status, fall risk , limited availability of caregiver currently    Assessment:     Maddie Otero is a 73 y.o. female admitted with a medical diagnosis of COPD exacerbation.  She presents with the following impairments/functional limitations: weakness, impaired endurance, impaired self care skills, impaired functional mobility, gait instability, pain, impaired skin, edema, impaired cardiopulmonary response to activity Pt is sitting up in chair upon arrival to room. She is A & Ox4 and agreeable to PT, states she has been up all morning and wishes to stay up until after dinner as lying in bed is painful for her back.  Pt requires Disha for safety and due to pain with transfers and gait.  Pt also requires assist with O2 tank and tubing as well as IV pole. Pt states she is home alone frequently due to daughter unable to stay home 24/7.    Rehab Prognosis: Fair; patient would benefit from acute skilled PT services to address these deficits and reach maximum level of function.    Recent Surgery: * No surgery found *      Plan:     During this hospitalization, patient to be seen 5 x/week to address the identified rehab impairments via gait training, therapeutic activities, therapeutic exercises, neuromuscular re-education and progress toward the following goals:    Plan of Care Expires:  06/15/24    Subjective     Chief Complaint: L sided pain  Patient/Family Comments/goals: get stronger and improve endurance  Pain/Comfort:       Patients cultural, spiritual, Jewish conflicts given the current situation:      Living Environment:  Pt lives with daughter SSH, no steps to enter  Prior to admission, patients level of function was  independent but some progressive weakness and decreased endurance.  Equipment used at home: oxygen, cane, straight, bedside commode.  DME owned (not currently used): none.  Upon discharge, patient will have assistance from daughter.    Objective:     Communicated with RN prior to session.  Patient found up in chair with peripheral IV, PureWick, chair check, telemetry, oxygen  upon PT entry to room.    General Precautions: Standard, fall  Orthopedic Precautions:spinal precautions   Braces: [place] J collar  Respiratory Status: Nasal cannula, flow 2 L/min    Exams:  Cognitive Exam:  Patient is oriented to Person, Place, and Situation  Postural Exam:  Patient presented with the following abnormalities:    -       Rounded shoulders  -       Forward head  -       Kyphosis  Skin Integrity/Edema:      -       Skin integrity: Bruising of L flank  -       Edema: bilat LE L>R  RLE Strength: 4/5  LLE Strength: 4/5    Functional Mobility:  Transfers:     Sit to Stand:  stand by assistance with rolling walker  Gait: x30' w/RW and 2L supplemental O2, limited by endurace and weakness      AM-PAC 6 CLICK MOBILITY  Total Score:        Treatment & Education:  Pt was educated on the following: call light use, importance of OOB activity and functional mobility to negate the negative effects of prolonged bed rest during this hospitalization, safe transfers/ambulation and discharge planning recommendations/options.     Patient left up in chair with all lines intact, call button in reach, chair alarm on, RN notified, and RN present.    GOALS:   Multidisciplinary Problems       Physical Therapy Goals          Problem: Physical Therapy    Goal Priority Disciplines Outcome Goal Variances Interventions   Physical Therapy Goal     PT, PT/OT Progressing     Description: 1. Supine to sit with Stand-by Assistance  2. Sit to stand transfer with Stand-by Assistance  3.. Bed to chair transfer with Supervision using Rolling Walker  4. Gait  x 100 feet  with Stand ByAssistance using Rolling Walker.                         History:     Past Medical History:   Diagnosis Date    A-fib     Anticoagulant long-term use     Arthritis     COPD (chronic obstructive pulmonary disease)     COPD (chronic obstructive pulmonary disease) 2019    Diabetes mellitus, type 2     Diverticulosis     History of left knee replacement 2017    DR CRENSHAW    HNP (herniated nucleus pulposus)     LUMBAR    Hypertension     Lung disease     Pancreatic insufficiency     s/p whipple, non cancer    Presence of dental bridge     UPPER    Wears glasses        Past Surgical History:   Procedure Laterality Date    ANTERIOR CERVICAL DISCECTOMY W/ FUSION N/A 4/30/2024    Procedure: DISCECTOMY, SPINE, CERVICAL, ANTERIOR APPROACH, WITH FUSION;  Surgeon: Phu Pierson DO;  Location: [facility] OR;  Service: Neurosurgery;  Laterality: N/A;  ACDF C4-5, C5-6 with partial C5 Corpectomy    APPENDECTOMY      BACK SURGERY  1994    lower back    BACK SURGERY  2012    lower back    CHOLECYSTECTOMY      HERNIA REPAIR      JOINT REPLACEMENT Left     KNEE    KNEE ARTHROPLASTY Right 5/3/2021    Procedure: ARTHROPLASTY, KNEE;  Surgeon: Manuel Willingham MD;  Location: [facility] OR;  Service: Orthopedics;  Laterality: Right;  guzman    LAPAROSCOPIC REPAIR OF INCISIONAL HERNIA N/A 10/22/2019    Procedure: REPAIR, HERNIA, INCISIONAL, LAPAROSCOPIC;  Surgeon: Pantera Almanza III, MD;  Location: [facility] OR;  Service: General;  Laterality: N/A;    pancrease  2009    stents in pancrease     TONSILLECTOMY      TOTAL KNEE ARTHROPLASTY Left 08/10/2017    WHIPPLE PROCEDURE W/ LAPAROSCOPY  10/09       Time Tracking:     PT Received On: 05/15/24  PT Start Time: 1425     PT Stop Time: 1459  PT Total Time (min): 34 min     Billable Minutes: Evaluation 8, Gait Training 13, and Therapeutic Activity 13      05/16/2024

## 2024-05-15 NOTE — ASSESSMENT & PLAN NOTE
Patient's COPD is with exacerbation noted by continued dyspnea and worsening of baseline hypoxia currently.  Patient is currently off COPD Pathway. Continue scheduled inhalers Steroids, Antibiotics, and Supplemental oxygen and monitor respiratory status closely.     Admitted to med/tele  Transition to PO steroids  Scheduled nebs   Continue levaquin/flagyl  IS

## 2024-05-15 NOTE — HOSPITAL COURSE
Ms. Otero has been monitored closely during her hospitalization. She was admitted on 5/14/24 with COPD exac witho localized right lower lobe bronchitis-bronchiolitis, with scattered lower lung subsegmental atelectasis on CTA. She was started on IV steroids and transitioned to PO. She is chronically on home O2@2L, but required up to 4L. She is getting scheduled nebs. She had a C4-C6 ACDF on 4/30 with Dr. Pierson and is to have her [place] J collar in place for 6 weeks. She did have a mechanical fall at home prior to arrival and CT head is negative for acute intracranial abnormality. CTA chest was done for elevated D-Dimer and was negative for PE. US bilat LE is negative for DVT. She's had significant hyperglycemia in the setting of high dose steroids for COPD exac/bronchitis. Potassium is also elevated to 6 on 5/15 and is shifted with insulin/calcium/fluid bolus and treated with lokelma. She was empirically started on levaquin and flagyl for concerns of possible aspiration given recent ACDF. Potassium and glucose improved with treatment. She reports taking novolog 70/30 22 units TID at home and will resume this in the hospital. Pt's pulmonologist, Dr. Mcneil, apparently stopped by and states she is at baseline and recommended further decreasing steroids. On 5/17/24 potassium has normalized and glucose have improved with resumption of home insulin regimen. She is medically clear for discharge. Pt has been accepted at [facility] and will be discharged. She was seen and examined on the date of discharge.

## 2024-05-15 NOTE — NURSING
Nurses Note -- 4 Eyes      5/14/2024   10:56 PM      Skin assessed during: Admit      [] No Altered Skin Integrity Present    []Prevention Measures Documented      [x] Yes- Altered Skin Integrity Present or Discovered   [] LDA Added if Not in Epic (Describe Wound)   [] New Altered Skin Integrity was Present on Admit and Documented in LDA   [x] Wound Image Taken    Wound Care Consulted? No    Attending Nurse:  Serene Winchester RN/Staff Member:   ALICIA OLMOS

## 2024-05-15 NOTE — PLAN OF CARE
Problem: Physical Therapy  Goal: Physical Therapy Goal  Description: 1. Supine to sit with Stand-by Assistance  2. Sit to stand transfer with Stand-by Assistance  3.. Bed to chair transfer with Supervision using Rolling Walker  4. Gait  x 100 feet with Stand ByAssistance using Rolling Walker.    Outcome: Progressing

## 2024-05-16 LAB
ALBUMIN SERPL BCP-MCNC: 3.4 G/DL (ref 3.5–5.2)
ALP SERPL-CCNC: 110 U/L (ref 55–135)
ALT SERPL W/O P-5'-P-CCNC: 15 U/L (ref 10–44)
ANION GAP SERPL CALC-SCNC: 10 MMOL/L (ref 8–16)
AST SERPL-CCNC: 20 U/L (ref 10–40)
BASOPHILS # BLD AUTO: 0.03 K/UL (ref 0–0.2)
BASOPHILS NFR BLD: 0.2 % (ref 0–1.9)
BILIRUB SERPL-MCNC: 0.3 MG/DL (ref 0.1–1)
BUN SERPL-MCNC: 39 MG/DL (ref 8–23)
CALCIUM SERPL-MCNC: 8.6 MG/DL (ref 8.7–10.5)
CHLORIDE SERPL-SCNC: 101 MMOL/L (ref 95–110)
CO2 SERPL-SCNC: 21 MMOL/L (ref 23–29)
CREAT SERPL-MCNC: 1.1 MG/DL (ref 0.5–1.4)
DIFFERENTIAL METHOD BLD: ABNORMAL
EOSINOPHIL # BLD AUTO: 0 K/UL (ref 0–0.5)
EOSINOPHIL NFR BLD: 0.1 % (ref 0–8)
ERYTHROCYTE [DISTWIDTH] IN BLOOD BY AUTOMATED COUNT: 12.9 % (ref 11.5–14.5)
EST. GFR  (NO RACE VARIABLE): 52.4 ML/MIN/1.73 M^2
GLUCOSE SERPL-MCNC: 158 MG/DL (ref 70–110)
GLUCOSE SERPL-MCNC: 241 MG/DL (ref 70–110)
GLUCOSE SERPL-MCNC: 254 MG/DL (ref 70–110)
GLUCOSE SERPL-MCNC: 362 MG/DL (ref 70–110)
GLUCOSE SERPL-MCNC: 378 MG/DL (ref 70–110)
GLUCOSE SERPL-MCNC: 396 MG/DL (ref 70–110)
GLUCOSE SERPL-MCNC: 402 MG/DL (ref 70–110)
GLUCOSE SERPL-MCNC: 414 MG/DL (ref 70–110)
GLUCOSE SERPL-MCNC: 450 MG/DL (ref 70–110)
HCT VFR BLD AUTO: 38.7 % (ref 37–48.5)
HGB BLD-MCNC: 12.6 G/DL (ref 12–16)
IMM GRANULOCYTES # BLD AUTO: 0.1 K/UL (ref 0–0.04)
IMM GRANULOCYTES NFR BLD AUTO: 0.7 % (ref 0–0.5)
LYMPHOCYTES # BLD AUTO: 1.5 K/UL (ref 1–4.8)
LYMPHOCYTES NFR BLD: 9.8 % (ref 18–48)
MAGNESIUM SERPL-MCNC: 2.2 MG/DL (ref 1.6–2.6)
MCH RBC QN AUTO: 32.3 PG (ref 27–31)
MCHC RBC AUTO-ENTMCNC: 32.6 G/DL (ref 32–36)
MCV RBC AUTO: 99 FL (ref 82–98)
MONOCYTES # BLD AUTO: 1.1 K/UL (ref 0.3–1)
MONOCYTES NFR BLD: 6.9 % (ref 4–15)
NEUTROPHILS # BLD AUTO: 12.6 K/UL (ref 1.8–7.7)
NEUTROPHILS NFR BLD: 82.3 % (ref 38–73)
NRBC BLD-RTO: 0 /100 WBC
PLATELET # BLD AUTO: 210 K/UL (ref 150–450)
PMV BLD AUTO: 11.1 FL (ref 9.2–12.9)
POTASSIUM SERPL-SCNC: 5.2 MMOL/L (ref 3.5–5.1)
PROT SERPL-MCNC: 6.6 G/DL (ref 6–8.4)
RBC # BLD AUTO: 3.9 M/UL (ref 4–5.4)
SODIUM SERPL-SCNC: 132 MMOL/L (ref 136–145)
WBC # BLD AUTO: 15.27 K/UL (ref 3.9–12.7)

## 2024-05-16 PROCEDURE — 25000003 PHARM REV CODE 250: Performed by: INTERNAL MEDICINE

## 2024-05-16 PROCEDURE — 12000002 HC ACUTE/MED SURGE SEMI-PRIVATE ROOM

## 2024-05-16 PROCEDURE — 30200315 PPD INTRADERMAL TEST REV CODE 302: Performed by: NURSE PRACTITIONER

## 2024-05-16 PROCEDURE — 97535 SELF CARE MNGMENT TRAINING: CPT

## 2024-05-16 PROCEDURE — 36415 COLL VENOUS BLD VENIPUNCTURE: CPT | Performed by: INTERNAL MEDICINE

## 2024-05-16 PROCEDURE — 97110 THERAPEUTIC EXERCISES: CPT | Mod: CQ

## 2024-05-16 PROCEDURE — 27000221 HC OXYGEN, UP TO 24 HOURS

## 2024-05-16 PROCEDURE — 63600175 PHARM REV CODE 636 W HCPCS: Performed by: INTERNAL MEDICINE

## 2024-05-16 PROCEDURE — 94640 AIRWAY INHALATION TREATMENT: CPT

## 2024-05-16 PROCEDURE — 99221 1ST HOSP IP/OBS SF/LOW 40: CPT | Mod: ,,, | Performed by: FAMILY MEDICINE

## 2024-05-16 PROCEDURE — 96366 THER/PROPH/DIAG IV INF ADDON: CPT

## 2024-05-16 PROCEDURE — 96376 TX/PRO/DX INJ SAME DRUG ADON: CPT

## 2024-05-16 PROCEDURE — 96361 HYDRATE IV INFUSION ADD-ON: CPT

## 2024-05-16 PROCEDURE — 94761 N-INVAS EAR/PLS OXIMETRY MLT: CPT

## 2024-05-16 PROCEDURE — 96372 THER/PROPH/DIAG INJ SC/IM: CPT | Performed by: INTERNAL MEDICINE

## 2024-05-16 PROCEDURE — 94799 UNLISTED PULMONARY SVC/PX: CPT | Mod: XB

## 2024-05-16 PROCEDURE — 85025 COMPLETE CBC W/AUTO DIFF WBC: CPT | Performed by: INTERNAL MEDICINE

## 2024-05-16 PROCEDURE — 96372 THER/PROPH/DIAG INJ SC/IM: CPT | Performed by: NURSE PRACTITIONER

## 2024-05-16 PROCEDURE — 97116 GAIT TRAINING THERAPY: CPT | Mod: CQ

## 2024-05-16 PROCEDURE — 83735 ASSAY OF MAGNESIUM: CPT | Performed by: INTERNAL MEDICINE

## 2024-05-16 PROCEDURE — 63600175 PHARM REV CODE 636 W HCPCS: Performed by: NURSE PRACTITIONER

## 2024-05-16 PROCEDURE — 25000003 PHARM REV CODE 250: Performed by: NURSE PRACTITIONER

## 2024-05-16 PROCEDURE — 99900031 HC PATIENT EDUCATION (STAT)

## 2024-05-16 PROCEDURE — 80053 COMPREHEN METABOLIC PANEL: CPT | Performed by: INTERNAL MEDICINE

## 2024-05-16 PROCEDURE — 25000242 PHARM REV CODE 250 ALT 637 W/ HCPCS: Performed by: INTERNAL MEDICINE

## 2024-05-16 PROCEDURE — 86580 TB INTRADERMAL TEST: CPT | Performed by: NURSE PRACTITIONER

## 2024-05-16 RX ORDER — INSULIN GLARGINE 100 [IU]/ML
10 INJECTION, SOLUTION SUBCUTANEOUS 2 TIMES DAILY
Status: DISCONTINUED | OUTPATIENT
Start: 2024-05-16 | End: 2024-05-16

## 2024-05-16 RX ORDER — INSULIN ASPART 100 [IU]/ML
20 INJECTION, SOLUTION INTRAVENOUS; SUBCUTANEOUS ONCE
Status: COMPLETED | OUTPATIENT
Start: 2024-05-16 | End: 2024-05-16

## 2024-05-16 RX ORDER — PREDNISONE 20 MG/1
20 TABLET ORAL DAILY
Status: DISCONTINUED | OUTPATIENT
Start: 2024-05-16 | End: 2024-05-17

## 2024-05-16 RX ADMIN — APIXABAN 5 MG: 5 TABLET, FILM COATED ORAL at 08:05

## 2024-05-16 RX ADMIN — HUMAN INSULIN 22 UNITS: 100 INJECTION, SUSPENSION SUBCUTANEOUS at 04:05

## 2024-05-16 RX ADMIN — FAMOTIDINE 20 MG: 20 TABLET ORAL at 08:05

## 2024-05-16 RX ADMIN — INSULIN HUMAN 10 UNITS: 100 INJECTION, SOLUTION PARENTERAL at 04:05

## 2024-05-16 RX ADMIN — OXYCODONE HYDROCHLORIDE AND ACETAMINOPHEN 1 TABLET: 5; 325 TABLET ORAL at 08:05

## 2024-05-16 RX ADMIN — METOPROLOL SUCCINATE 100 MG: 50 TABLET, FILM COATED, EXTENDED RELEASE ORAL at 08:05

## 2024-05-16 RX ADMIN — OXYCODONE HYDROCHLORIDE AND ACETAMINOPHEN 1 TABLET: 5; 325 TABLET ORAL at 10:05

## 2024-05-16 RX ADMIN — METRONIDAZOLE 500 MG: 5 INJECTION, SOLUTION INTRAVENOUS at 10:05

## 2024-05-16 RX ADMIN — OXYCODONE HYDROCHLORIDE AND ACETAMINOPHEN 1 TABLET: 5; 325 TABLET ORAL at 04:05

## 2024-05-16 RX ADMIN — IPRATROPIUM BROMIDE 0.5 MG: 0.5 SOLUTION RESPIRATORY (INHALATION) at 07:05

## 2024-05-16 RX ADMIN — ROPINIROLE HYDROCHLORIDE 1 MG: 1 TABLET, FILM COATED ORAL at 05:05

## 2024-05-16 RX ADMIN — INSULIN GLARGINE 10 UNITS: 100 INJECTION, SOLUTION SUBCUTANEOUS at 08:05

## 2024-05-16 RX ADMIN — METRONIDAZOLE 500 MG: 5 INJECTION, SOLUTION INTRAVENOUS at 02:05

## 2024-05-16 RX ADMIN — PREDNISONE 20 MG: 20 TABLET ORAL at 08:05

## 2024-05-16 RX ADMIN — LEVALBUTEROL HYDROCHLORIDE 1.25 MG: 1.25 SOLUTION RESPIRATORY (INHALATION) at 07:05

## 2024-05-16 RX ADMIN — SODIUM CHLORIDE 500 ML: 9 INJECTION, SOLUTION INTRAVENOUS at 03:05

## 2024-05-16 RX ADMIN — SODIUM ZIRCONIUM CYCLOSILICATE 10 G: 10 POWDER, FOR SUSPENSION ORAL at 08:05

## 2024-05-16 RX ADMIN — METRONIDAZOLE 500 MG: 5 INJECTION, SOLUTION INTRAVENOUS at 06:05

## 2024-05-16 RX ADMIN — LEVALBUTEROL HYDROCHLORIDE 1.25 MG: 1.25 SOLUTION RESPIRATORY (INHALATION) at 03:05

## 2024-05-16 RX ADMIN — AMLODIPINE BESYLATE 5 MG: 5 TABLET ORAL at 08:05

## 2024-05-16 RX ADMIN — IPRATROPIUM BROMIDE 0.5 MG: 0.5 SOLUTION RESPIRATORY (INHALATION) at 11:05

## 2024-05-16 RX ADMIN — LEVOFLOXACIN 500 MG: 5 INJECTION, SOLUTION INTRAVENOUS at 05:05

## 2024-05-16 RX ADMIN — LEVALBUTEROL HYDROCHLORIDE 1.25 MG: 1.25 SOLUTION RESPIRATORY (INHALATION) at 11:05

## 2024-05-16 RX ADMIN — INSULIN ASPART 20 UNITS: 100 INJECTION, SOLUTION INTRAVENOUS; SUBCUTANEOUS at 02:05

## 2024-05-16 RX ADMIN — TUBERCULIN PURIFIED PROTEIN DERIVATIVE 5 UNITS: 5 INJECTION, SOLUTION INTRADERMAL at 01:05

## 2024-05-16 RX ADMIN — IPRATROPIUM BROMIDE 0.5 MG: 0.5 SOLUTION RESPIRATORY (INHALATION) at 03:05

## 2024-05-16 RX ADMIN — CETIRIZINE HYDROCHLORIDE 10 MG: 10 TABLET, FILM COATED ORAL at 08:05

## 2024-05-16 NOTE — PLAN OF CARE
Spoke with Katie at [facility] in [place], patient is medically accepted and [facility] is submitting for auth through [facility] now.

## 2024-05-16 NOTE — ASSESSMENT & PLAN NOTE
Chronic, controlled. Latest blood pressure and vitals reviewed-     Temp:  [97.4 °F (36.3 °C)-98.4 °F (36.9 °C)]   Pulse:  [64-85]   Resp:  [17-19]   BP: (118-170)/(68-87)   SpO2:  [94 %-99 %] .   Home meds for hypertension were reviewed and noted below.   Hypertension Medications               amLODIPine (NORVASC) 5 MG tablet Take 5 mg by mouth once daily.    furosemide (LASIX) 40 MG tablet Take 40 mg by mouth as needed (edema).     lisinopriL (PRINIVIL,ZESTRIL) 5 MG tablet Take 5 mg by mouth once daily.    metoprolol succinate (TOPROL-XL) 100 MG 24 hr tablet Take 100 mg by mouth 2 (two) times daily.    metoprolol succinate (TOPROL-XL) 100 MG 24 hr tablet Take 100 mg by mouth once daily.            While in the hospital, will manage blood pressure as follows; Continue home antihypertensive regimen    Will utilize p.r.n. blood pressure medication only if patient's blood pressure greater than 140/90 and she develops symptoms such as worsening chest pain or shortness of breath.

## 2024-05-16 NOTE — PLAN OF CARE
Problem: COPD (Chronic Obstructive Pulmonary Disease)  Goal: Optimal Chronic Illness Coping  Outcome: Progressing     Problem: Fall Injury Risk  Goal: Absence of Fall and Fall-Related Injury  Outcome: Progressing     Problem: Pneumonia  Goal: Fluid Balance  Outcome: Progressing

## 2024-05-16 NOTE — PROGRESS NOTES
[facility] Medicine  Progress Note    Patient Name: Maddie Otero  MRN: [id number]  Patient Class: IP- Inpatient   Admission Date: 5/14/2024  Length of Stay: 0 days  Attending Physician: Yash Chiang MD  Primary Care Provider: Carrie Gorman FNP        Subjective:     Principal Problem:COPD exacerbation        HPI:  73 year old pt getting admitted with COPD exacerbation and falls  Pt is status post C4-6 anterior cervical diskectomy decompression performed on 04/30/2024   After surgery she did well  Past few days she ahs been suffering from shortness of breath  She felt weak and has to increase her oxygen from 2-4 L  Also pt had episodes of fall and pt came to ER & got admitted   Pt is on lasix and pt reports increased swelling on legs     Overview/Hospital Course:  Ms. Otero has been monitored closely during her hospitalization. She was admitted on 5/14/24 with COPD exac witho localized right lower lobe bronchitis-bronchiolitis, with scattered lower lung subsegmental atelectasis on CTA. She was started on IV steroids and transitioned to PO. She is chronically on home O2@2L, but required up to 4L. She is getting scheduled nebs. She had a C4-C6 ACDF on 4/30 with Dr. Pierson and is to have her [place] J collar in place for 6 weeks. She did have a mechanical fall at home prior to arrival and CT head is negative for acute intracranial abnormality. CTA chest was done for elevated D-Dimer and was negative for PE. US bilat LE is negative for DVT. She's had significant hyperglycemia in the setting of high dose steroids for COPD exac/bronchitis. Potassium is also elevated to 6 on 5/15 and is shifted with insulin/calcium/fluid bolus and treated with lokelma. She was empirically started on levaquin and flagyl for concerns of possible aspiration given recent ACDF. Potassium and glucose improved with treatment. She reports taking novolog 70/30 22 units TID at home and will resume this in the  [facility]. Pt's pulmonologist, Dr. Mcneil, apparently stopped by and states she is at baseline and recommended further decreasing steroids. Prednisone was decreased to 20 mg this AM.     Interval History: She is requesting to go to [place] for SNF care. Has a daughter at home who is limited in her ability to help. PT lewis initially recommended low intensity therapy.     Review of Systems   Constitutional:  Positive for fatigue.   Respiratory:  Positive for cough, shortness of breath and wheezing.    Neurological:  Positive for weakness.     Objective:     Vital Signs (Most Recent):  Temp: 97.8 °F (36.6 °C) (05/16/24 1132)  Pulse: 70 (05/16/24 1132)  Resp: 18 (05/16/24 1132)  BP: (!) 141/69 (05/16/24 1132)  SpO2: 95 % (05/16/24 1132) Vital Signs (24h Range):  Temp:  [97.4 °F (36.3 °C)-98.4 °F (36.9 °C)] 97.8 °F (36.6 °C)  Pulse:  [64-87] 70  Resp:  [17-19] 18  SpO2:  [94 %-99 %] 95 %  BP: (118-170)/(68-87) 141/69     Weight: 104 kg (229 lb 4.5 oz)  Body mass index is 34.86 kg/m².    Intake/Output Summary (Last 24 hours) at 5/16/2024 1237  Last data filed at 5/16/2024 0848  Gross per 24 hour   Intake 1400 ml   Output 1450 ml   Net -50 ml         Physical Exam  Vitals and nursing note reviewed.   Constitutional:       Appearance: Normal appearance.   HENT:      Head: Normocephalic and atraumatic.      Nose: Nose normal.      Mouth/Throat:      Mouth: Mucous membranes are moist.      Pharynx: Oropharynx is clear.   Eyes:      Extraocular Movements: Extraocular movements intact.      Pupils: Pupils are equal, round, and reactive to light.   Neck:      Comments: [place] J collar in place  Cardiovascular:      Rate and Rhythm: Normal rate and regular rhythm.      Pulses: Normal pulses.      Heart sounds: Normal heart sounds.   Pulmonary:      Effort: Pulmonary effort is normal.      Breath sounds: Wheezing and rhonchi present.   Abdominal:      General: Bowel sounds are normal.      Palpations: Abdomen is soft.    Musculoskeletal:         General: Normal range of motion.      Cervical back: Normal range of motion and neck supple. No rigidity.   Skin:     General: Skin is warm and dry.      Capillary Refill: Capillary refill takes less than 2 seconds.      Comments: Surgical incision to right anterior neck clean dry intact, about 1cm of thickened tissue above incision, no fluctuance, no erythema, likely scar tissue   Neurological:      General: No focal deficit present.      Mental Status: She is alert and oriented to person, place, and time.   Psychiatric:         Mood and Affect: Mood normal.         Behavior: Behavior normal.             Significant Labs: All pertinent labs within the past 24 hours have been reviewed.  CBC:   Recent Labs   Lab 05/15/24  0503 05/16/24  0823   WBC 8.59 15.27*   HGB 12.7 12.6   HCT 39.3 38.7    210     CMP:   Recent Labs   Lab 05/15/24  0503 05/15/24  1330 05/15/24  1617 05/15/24  2057 05/16/24  0544   *   < > 132* 131* 132*   K 6.0*   < > 4.3 5.0 5.2*   CL 96   < > 95 95 101   CO2 29   < > 25 28 21*   *   < > 461* 432* 241*   BUN 34*   < > 41* 42* 39*   CREATININE 1.1   < > 1.2 1.3 1.1   CALCIUM 8.8   < > 9.0 9.0 8.6*   PROT 6.9  --   --   --  6.6   ALBUMIN 3.6  --   --   --  3.4*   BILITOT 0.6  --   --   --  0.3   ALKPHOS 124  --   --   --  110   AST 17  --   --   --  20   ALT 20  --   --   --  15   ANIONGAP 7*   < > 12 8 10    < > = values in this interval not displayed.       Significant Imaging: I have reviewed all pertinent imaging results/findings within the past 24 hours.    US Lower Extremity Veins Bilateral    Result Date: 5/14/2024  EXAMINATION: US LOWER EXTREMITY VEINS BILATERAL CLINICAL HISTORY: dvt; TECHNIQUE: Duplex and color flow Doppler and dynamic compression was performed of the bilateral lower extremity veins was performed. COMPARISON: None FINDINGS: Right thigh veins: The common femoral, femoral, popliteal, upper greater saphenous, and deep femoral  veins are patent and free of thrombus. The veins are normally compressible and have normal phasic flow and augmentation response. Right calf veins: The visualized calf veins are patent. Left thigh veins: The common femoral, femoral, popliteal, upper greater saphenous, and deep femoral veins are patent and free of thrombus. The veins are normally compressible and have normal phasic flow and augmentation response. Left calf veins: The visualized calf veins are patent. Miscellaneous: None     No evidence of deep venous thrombosis in either lower extremity. Electronically signed by: Ketan Laguna Date:    05/14/2024 Time:    18:19    CT Head Without Contrast    Result Date: 5/14/2024  EXAMINATION: CT HEAD WITHOUT CONTRAST CLINICAL HISTORY: Fall, head trauma. FINDINGS: Comparison the prior exams including 04/23/2024.  Motion artifact limits the exam.  There is no acute intracranial hemorrhage, with no intra-axial mass, mass effect, or abnormal extra-axial fluid. Gray-white differentiation is maintained, with scattered areas of nonspecific white matter hypoattenuation. The cortical sulci and ventricles are stable in size, with the unenhanced cerebellum and brainstem unremarkable. There is paranasal sinus mucosal thickening, with the mastoid air cells normally aerated.  There is no acute calvarial fracture or scalp hematoma.     Limited exam due to motion, with no acute intracranial hemorrhage or acute calvarial fracture. Electronically signed by: Bashir Hahn Date:    05/14/2024 Time:    14:11    CTA Chest Non-Coronary (PE Studies)    Result Date: 5/14/2024  EXAMINATION: CTA CHEST NON CORONARY (PE STUDIES) CLINICAL HISTORY: Pulmonary embolism (PE) suspected, unknown D-dimer; TECHNIQUE: Thin axial imaging through the chest was performed with 100 mL Omnipaque 350 IV contrast, with sagittal and coronal reformatted images and MIP reconstructions performed, and images stored in the patient's permanent electronic medical record.  FINDINGS: Comparison to multiple prior exams.  There are no pulmonary arterial filling defects to suggest pulmonary thromboembolism.  The central pulmonary arteries are normal in caliber. Scattered calcified and soft plaque involves normal-caliber thoracic aorta, with no aortic dissection or evidence of aortic intramural hematoma.  The heart is normal in size, with no pericardial effusion.  No enlarged mediastinal or hilar lymph nodes. There are lucencies reflecting upper lobe predominant pulmonary emphysema, with localized bronchial wall thickening and mucous plugging in the right lower lobe, with adjacent reticular and linear parenchymal opacities.  There is bandlike atelectasis in the right middle lobe and lingula, with no consolidation.  No pleural effusion, evidence of pulmonary edema, or pneumothorax. Images of the upper abdomen show pneumobilia, and are otherwise unremarkable.  There are no acute fractures or destructive osseous lesions.     1. Negative for pulmonary thromboembolism. 2. Localized right lower lobe bronchitis-bronchiolitis, with scattered lower lung subsegmental atelectasis. . Electronically signed by: Bashir Hahn Date:    05/14/2024 Time:    12:13    X-Ray Toe 2 or More Views Left    Result Date: 5/14/2024  EXAMINATION: XR TOE 2 OR MORE VIEWS LEFT CLINICAL HISTORY: l great toe pain; COMPARISON: None FINDINGS: No acute fracture or focal lesion of the great toe.  Joint spaces are maintained.  No soft tissue gas or radiopaque foreign body.     No acute osseous abnormality. Electronically signed by: Jesus Herron Date:    05/14/2024 Time:    10:36    X-Ray Chest AP Portable    Result Date: 5/14/2024  EXAMINATION: XR CHEST AP PORTABLE CLINICAL HISTORY: Asthma; COMPARISON: 04/30/2024 FINDINGS: Cardiac silhouette size is stable compared to prior.  Bibasilar atelectasis or scarring.  No airspace consolidation.  No large pleural effusion.  No pneumothorax.  Cervical ACDF hardware.     Stable chest  radiograph.  No acute pulmonary pathology. Electronically signed by: Jesus Herron Date:    05/14/2024 Time:    10:34      Assessment/Plan:      * COPD exacerbation  Patient's COPD is with exacerbation noted by continued dyspnea and worsening of baseline hypoxia currently.  Patient is currently off COPD Pathway. Continue scheduled inhalers Steroids, Antibiotics, and Supplemental oxygen and monitor respiratory status closely.     Admitted to med/tele  Wean PO steroids  Scheduled nebs   Continue levaquin/flagyl  IS        Hyperkalemia  This patient has hyperkalemia which is controlled. We will monitor for arrhythmias with EKG or continuous telemetry. We will treat the hyperkalemia with Potassium Binders, Calcium gluconate, and IV insulin and dextrose. The likely etiology of the hyperkalemia is Medication induced.  The patients latest potassium has been reviewed and the results are listed below  Recent Labs   Lab 05/16/24  0544   K 5.2*               Acute pneumonia  Suspected acute pneumonia  CT done today showed right lower lobe bronchitis-bronchiolitis, with scattered lower lung subsegmental atelectasis.   Pt recently was intubated for spine surgery  She is allergic to almost all abx  Start on iv levofloxacin/iv flagyl and de escalate as needed   H/o HCAP in the past per records so will give one dose of iv vancomycin      On home oxygen therapy  At baseline      Recurrent falls  PT/OT - Low intensity therapy - requesting [place] for [facility] -  working on this        Status post cervical spinal fusion  Status post C4-6 anterior cervical diskectomy decompression performed on 04/30/2024   Informed JACINTO Isaac she was in the hospital and would miss her appt that was scheduled for 5/15, will need to schedule another f/u when discharged  Xray C spine with no acute abnormalities      HTN (hypertension)  Chronic, controlled. Latest blood pressure and vitals reviewed-     Temp:  [97.4 °F (36.3 °C)-98.4 °F (36.9 °C)]  "  Pulse:  [64-85]   Resp:  [17-19]   BP: (118-170)/(68-87)   SpO2:  [94 %-99 %] .   Home meds for hypertension were reviewed and noted below.   Hypertension Medications               amLODIPine (NORVASC) 5 MG tablet Take 5 mg by mouth once daily.    furosemide (LASIX) 40 MG tablet Take 40 mg by mouth as needed (edema).     lisinopriL (PRINIVIL,ZESTRIL) 5 MG tablet Take 5 mg by mouth once daily.    metoprolol succinate (TOPROL-XL) 100 MG 24 hr tablet Take 100 mg by mouth 2 (two) times daily.    metoprolol succinate (TOPROL-XL) 100 MG 24 hr tablet Take 100 mg by mouth once daily.            While in the hospital, will manage blood pressure as follows; Continue home antihypertensive regimen    Will utilize p.r.n. blood pressure medication only if patient's blood pressure greater than 140/90 and she develops symptoms such as worsening chest pain or shortness of breath.    PAF (paroxysmal atrial fibrillation)  Chronic  Continue eliquis     Diabetes mellitus type II  Patient's FSGs are uncontrolled due to hyperglycemia on current medication regimen.  Last A1c reviewed-   Lab Results   Component Value Date    HGBA1C 8.5 (H) 04/23/2024     Most recent fingerstick glucose reviewed- No results for input(s): "POCTGLUCOSE" in the last 24 hours.  Current correctional scale  Medium  Maintain anti-hyperglycemic dose as follows-   Antihyperglycemics (From admission, onward)      Start     Stop Route Frequency Ordered    05/16/24 1200  insulin NPH/Reg human 100 unit/mL (70-30) pen 22 Units         -- SubQ 3 times daily with meals 05/16/24 1020          Hold Oral hypoglycemics while patient is in the hospital.    Glucoses are much better. Pt CONFIRMS she takes novolog 70/30 22 units THREE TIMES A DAY. I will cautiously continue this to get her back on her home regimen. Monitor closely      VTE Risk Mitigation (From admission, onward)           Ordered     apixaban tablet 5 mg  2 times daily         05/14/24 1800     Place sequential " compression device  Until discontinued         05/14/24 1258     IP VTE HIGH RISK PATIENT  Once         05/14/24 1259     Place sequential compression device  Until discontinued         05/14/24 1259                    Discharge Planning   ALICE: 5/20/2024     Code Status: Full Code   Is the patient medically ready for discharge?:     Reason for patient still in hospital (select all that apply): Patient trending condition and Treatment  Discharge Plan A: Home Health                  Katerine Choi NP  Department of Hospital Medicine   [facility]

## 2024-05-16 NOTE — SUBJECTIVE & OBJECTIVE
Interval History: She is requesting to go to [place] for SNF care. Has a daughter at home who is limited in her ability to help. PT anushkaal initially recommended low intensity therapy.     Review of Systems   Constitutional:  Positive for fatigue.   Respiratory:  Positive for cough, shortness of breath and wheezing.    Neurological:  Positive for weakness.     Objective:     Vital Signs (Most Recent):  Temp: 97.8 °F (36.6 °C) (05/16/24 1132)  Pulse: 70 (05/16/24 1132)  Resp: 18 (05/16/24 1132)  BP: (!) 141/69 (05/16/24 1132)  SpO2: 95 % (05/16/24 1132) Vital Signs (24h Range):  Temp:  [97.4 °F (36.3 °C)-98.4 °F (36.9 °C)] 97.8 °F (36.6 °C)  Pulse:  [64-87] 70  Resp:  [17-19] 18  SpO2:  [94 %-99 %] 95 %  BP: (118-170)/(68-87) 141/69     Weight: 104 kg (229 lb 4.5 oz)  Body mass index is 34.86 kg/m².    Intake/Output Summary (Last 24 hours) at 5/16/2024 1237  Last data filed at 5/16/2024 0848  Gross per 24 hour   Intake 1400 ml   Output 1450 ml   Net -50 ml         Physical Exam  Vitals and nursing note reviewed.   Constitutional:       Appearance: Normal appearance.   HENT:      Head: Normocephalic and atraumatic.      Nose: Nose normal.      Mouth/Throat:      Mouth: Mucous membranes are moist.      Pharynx: Oropharynx is clear.   Eyes:      Extraocular Movements: Extraocular movements intact.      Pupils: Pupils are equal, round, and reactive to light.   Neck:      Comments: [place] J collar in place  Cardiovascular:      Rate and Rhythm: Normal rate and regular rhythm.      Pulses: Normal pulses.      Heart sounds: Normal heart sounds.   Pulmonary:      Effort: Pulmonary effort is normal.      Breath sounds: Wheezing and rhonchi present.   Abdominal:      General: Bowel sounds are normal.      Palpations: Abdomen is soft.   Musculoskeletal:         General: Normal range of motion.      Cervical back: Normal range of motion and neck supple. No rigidity.   Skin:     General: Skin is warm and dry.      Capillary Refill:  Capillary refill takes less than 2 seconds.      Comments: Surgical incision to right anterior neck clean dry intact, about 1cm of thickened tissue above incision, no fluctuance, no erythema, likely scar tissue   Neurological:      General: No focal deficit present.      Mental Status: She is alert and oriented to person, place, and time.   Psychiatric:         Mood and Affect: Mood normal.         Behavior: Behavior normal.             Significant Labs: All pertinent labs within the past 24 hours have been reviewed.  CBC:   Recent Labs   Lab 05/15/24  0503 05/16/24  0823   WBC 8.59 15.27*   HGB 12.7 12.6   HCT 39.3 38.7    210     CMP:   Recent Labs   Lab 05/15/24  0503 05/15/24  1330 05/15/24  1617 05/15/24  2057 05/16/24  0544   *   < > 132* 131* 132*   K 6.0*   < > 4.3 5.0 5.2*   CL 96   < > 95 95 101   CO2 29   < > 25 28 21*   *   < > 461* 432* 241*   BUN 34*   < > 41* 42* 39*   CREATININE 1.1   < > 1.2 1.3 1.1   CALCIUM 8.8   < > 9.0 9.0 8.6*   PROT 6.9  --   --   --  6.6   ALBUMIN 3.6  --   --   --  3.4*   BILITOT 0.6  --   --   --  0.3   ALKPHOS 124  --   --   --  110   AST 17  --   --   --  20   ALT 20  --   --   --  15   ANIONGAP 7*   < > 12 8 10    < > = values in this interval not displayed.       Significant Imaging: I have reviewed all pertinent imaging results/findings within the past 24 hours.    US Lower Extremity Veins Bilateral    Result Date: 5/14/2024  EXAMINATION: US LOWER EXTREMITY VEINS BILATERAL CLINICAL HISTORY: dvt; TECHNIQUE: Duplex and color flow Doppler and dynamic compression was performed of the bilateral lower extremity veins was performed. COMPARISON: None FINDINGS: Right thigh veins: The common femoral, femoral, popliteal, upper greater saphenous, and deep femoral veins are patent and free of thrombus. The veins are normally compressible and have normal phasic flow and augmentation response. Right calf veins: The visualized calf veins are patent. Left thigh  veins: The common femoral, femoral, popliteal, upper greater saphenous, and deep femoral veins are patent and free of thrombus. The veins are normally compressible and have normal phasic flow and augmentation response. Left calf veins: The visualized calf veins are patent. Miscellaneous: None     No evidence of deep venous thrombosis in either lower extremity. Electronically signed by: Ketan Laguna Date:    05/14/2024 Time:    18:19    CT Head Without Contrast    Result Date: 5/14/2024  EXAMINATION: CT HEAD WITHOUT CONTRAST CLINICAL HISTORY: Fall, head trauma. FINDINGS: Comparison the prior exams including 04/23/2024.  Motion artifact limits the exam.  There is no acute intracranial hemorrhage, with no intra-axial mass, mass effect, or abnormal extra-axial fluid. Gray-white differentiation is maintained, with scattered areas of nonspecific white matter hypoattenuation. The cortical sulci and ventricles are stable in size, with the unenhanced cerebellum and brainstem unremarkable. There is paranasal sinus mucosal thickening, with the mastoid air cells normally aerated.  There is no acute calvarial fracture or scalp hematoma.     Limited exam due to motion, with no acute intracranial hemorrhage or acute calvarial fracture. Electronically signed by: Bashir Hahn Date:    05/14/2024 Time:    14:11    CTA Chest Non-Coronary (PE Studies)    Result Date: 5/14/2024  EXAMINATION: CTA CHEST NON CORONARY (PE STUDIES) CLINICAL HISTORY: Pulmonary embolism (PE) suspected, unknown D-dimer; TECHNIQUE: Thin axial imaging through the chest was performed with 100 mL Omnipaque 350 IV contrast, with sagittal and coronal reformatted images and MIP reconstructions performed, and images stored in the patient's permanent electronic medical record. FINDINGS: Comparison to multiple prior exams.  There are no pulmonary arterial filling defects to suggest pulmonary thromboembolism.  The central pulmonary arteries are normal in caliber.  Scattered calcified and soft plaque involves normal-caliber thoracic aorta, with no aortic dissection or evidence of aortic intramural hematoma.  The heart is normal in size, with no pericardial effusion.  No enlarged mediastinal or hilar lymph nodes. There are lucencies reflecting upper lobe predominant pulmonary emphysema, with localized bronchial wall thickening and mucous plugging in the right lower lobe, with adjacent reticular and linear parenchymal opacities.  There is bandlike atelectasis in the right middle lobe and lingula, with no consolidation.  No pleural effusion, evidence of pulmonary edema, or pneumothorax. Images of the upper abdomen show pneumobilia, and are otherwise unremarkable.  There are no acute fractures or destructive osseous lesions.     1. Negative for pulmonary thromboembolism. 2. Localized right lower lobe bronchitis-bronchiolitis, with scattered lower lung subsegmental atelectasis. . Electronically signed by: Bashir Hahn Date:    05/14/2024 Time:    12:13    X-Ray Toe 2 or More Views Left    Result Date: 5/14/2024  EXAMINATION: XR TOE 2 OR MORE VIEWS LEFT CLINICAL HISTORY: l great toe pain; COMPARISON: None FINDINGS: No acute fracture or focal lesion of the great toe.  Joint spaces are maintained.  No soft tissue gas or radiopaque foreign body.     No acute osseous abnormality. Electronically signed by: Jesus Herron Date:    05/14/2024 Time:    10:36    X-Ray Chest AP Portable    Result Date: 5/14/2024  EXAMINATION: XR CHEST AP PORTABLE CLINICAL HISTORY: Asthma; COMPARISON: 04/30/2024 FINDINGS: Cardiac silhouette size is stable compared to prior.  Bibasilar atelectasis or scarring.  No airspace consolidation.  No large pleural effusion.  No pneumothorax.  Cervical ACDF hardware.     Stable chest radiograph.  No acute pulmonary pathology. Electronically signed by: Jesus Herron Date:    05/14/2024 Time:    10:34

## 2024-05-16 NOTE — ASSESSMENT & PLAN NOTE
PT/OT - Low intensity therapy - requesting [place] for [facility] -  working on this

## 2024-05-16 NOTE — ASSESSMENT & PLAN NOTE
Status post C4-6 anterior cervical diskectomy decompression performed on 04/30/2024   Informed JACINTO Isaac she was in the hospital and would miss her appt that was scheduled for 5/15, will need to schedule another f/u when discharged  Xray C spine with no acute abnormalities

## 2024-05-16 NOTE — PLAN OF CARE
Problem: Wound  Goal: Improved Oral Intake  Intervention: Promote and Optimize Oral Intake  Flowsheets (Taken 5/16/2024 1407)  Nutrition Interventions:   other (see comments): double non starchy vegetables and meats.   supplemental drinks provided: Glucerantolin BID

## 2024-05-16 NOTE — ASSESSMENT & PLAN NOTE
This patient has hyperkalemia which is controlled. We will monitor for arrhythmias with EKG or continuous telemetry. We will treat the hyperkalemia with Potassium Binders, Calcium gluconate, and IV insulin and dextrose. The likely etiology of the hyperkalemia is Medication induced.  The patients latest potassium has been reviewed and the results are listed below  Recent Labs   Lab 05/16/24  0544   K 5.2*

## 2024-05-16 NOTE — ASSESSMENT & PLAN NOTE
"Patient's FSGs are uncontrolled due to hyperglycemia on current medication regimen.  Last A1c reviewed-   Lab Results   Component Value Date    HGBA1C 8.5 (H) 04/23/2024     Most recent fingerstick glucose reviewed- No results for input(s): "POCTGLUCOSE" in the last 24 hours.  Current correctional scale  Medium  Maintain anti-hyperglycemic dose as follows-   Antihyperglycemics (From admission, onward)      Start     Stop Route Frequency Ordered    05/16/24 1200  insulin NPH/Reg human 100 unit/mL (70-30) pen 22 Units         -- SubQ 3 times daily with meals 05/16/24 1020          Hold Oral hypoglycemics while patient is in the hospital.    Glucoses are much better. Pt CONFIRMS she takes novolog 70/30 22 units THREE TIMES A DAY. I will cautiously continue this to get her back on her home regimen. Monitor closely  "

## 2024-05-16 NOTE — ASSESSMENT & PLAN NOTE
Patient's COPD is with exacerbation noted by continued dyspnea and worsening of baseline hypoxia currently.  Patient is currently off COPD Pathway. Continue scheduled inhalers Steroids, Antibiotics, and Supplemental oxygen and monitor respiratory status closely.     Admitted to med/tele  Wean PO steroids  Scheduled nebs   Continue levaquin/flagyl  IS

## 2024-05-16 NOTE — PLAN OF CARE
New order received for Sn[facility] placement, spoke with pt and she requests [facility] in [place], referral sent to [facility] via [facility].       05/16/24 0355   Discharge Assessment   Assessment Type Discharge Planning Reassessment   Discharge Plan A Skilled Nursing Facility   Discharge Plan B Skilled Nursing Facility   DME Needed Upon Discharge  none   Discharge Plan discussed with: Patient   Transition of Care Barriers None

## 2024-05-16 NOTE — PROGRESS NOTES
"[facility]  Adult Nutrition   Progress Note (Initial Assessment)     SUMMARY     Recommendations  Recommendation/Intervention: 1. Continue diabetic diet as tolerated. 2. Recommend Glucerna BID for added protein and kcal to assist with healing. 3. Discussed importance of adherring to diabetic diet at home due to steroids.  Goals: 1. Intake continues to be >/= 50% EEN / EPN. 2. Lab values trend to target range.  Nutrition Goal Status: new    Nutrition Diagnosis PES Statement: Increased nutrition needs related to wound healing as evidenced by left great toe wound in patient with COPD exacerbation, on steroids and history of DM 2  elevated glucose labs and Hb A1c > 8%    Dietitian Rounds Brief  RD screen for risk due to diabetic foot wound. Patient admit for COPD exacerbation, noted to be on steroids. Pt reports she knows steroids raise her glucose. Discussed diet with patient and improtance to follow while on steroids. Patient voiced understanding. Will provide double vegetables and meats due to patient's increased hunger. RD to follow for labs, intake, and status change PRN.    Nutrition Related Social Determinants of Health: SDOH: Adequate food in home environment    Malnutrition Assessment   No visible signs; patient reports good appetite with no recent weight loss.      Diet order:   Current Diet Order: Diabetic Cardiac (Low Na/Chol); 2000 Calorie; Fluid - 1800mL                 Evaluation of Received Nutrient/Fluid Intake  Energy Calories Required: meeting needs  Protein Required: not meeting needs  Fluid Required: meeting needs  Tolerance: tolerating     % Intake of Estimated Energy Needs: 75 - 100 %  % Meal Intake: 75 - 100 %      Intake/Output Summary (Last 24 hours) at 5/16/2024 1359  Last data filed at 5/16/2024 1317  Gross per 24 hour   Intake 1505 ml   Output 1450 ml   Net 55 ml        Anthropometrics  Temp: 97.8 °F (36.6 °C)  Height Method: Stated  Height: 5' 8" (172.7 cm)  Height (inches): " 68 in  Weight Method: Bed Scale  Weight: 104 kg (229 lb 4.5 oz)  Weight (lb): 229.28 lb  Ideal Body Weight (IBW), Female: 140 lb  % Ideal Body Weight, Female (lb): 163.77 %  BMI (Calculated): 34.9  BMI Grade: 30 - 34.9- obesity - grade I       Estimated/Assessed Needs  Weight Used For Calorie Calculations: 104 kg (229 lb 4.5 oz)  Energy Calorie Requirements (kcal): 1979 kcal / day (19 kcal/kg)  Energy Need Method: [employer]-St Jeor  Protein Requirements:  gm/day (1.5-2.0 gm/kg IBW)  Weight Used For Protein Calculations: 64 kg (141 lb 1.5 oz) (IBW)     Estimated Fluid Requirement Method: RDA Method  RDA Method (mL): 1979  CHO Requirement: 247 gm/day ; 16 servings/day    Reason for Assessment  Reason For Assessment: identified at risk by screening criteria  Diagnosis: diabetes diagnosis/complications, pulmonary disease  Relevant Medical History: DM 2, COPD, Afib, panctreatic insufficiency  Interdisciplinary Rounds: did not attend  Nutrition Discharge Planning: Pending    Nutrition/Diet History  Spiritual, Cultural Beliefs, Christianity Practices, Values that Affect Care: no  Food Allergies: NKFA  Factors Affecting Nutritional Intake: None identified at this time    Nutrition Risk Screen  Nutrition Risk Screen: no indicators present       Wound 05/14/24 2319 Incision Left anterior Toe, first horizontal-Wound Image: Images linked  MST Score: 0  Have you recently lost weight without trying?: No  Weight loss score: 0  Have you been eating poorly because of a decreased appetite?: No  Appetite score: 0       Weight History:  Wt Readings from Last 10 Encounters:   05/14/24 104 kg (229 lb 4.5 oz)   04/30/24 99.8 kg (220 lb)   04/29/24 99.8 kg (220 lb)   04/23/24 99.8 kg (220 lb)   04/03/24 100.6 kg (221 lb 12.8 oz)   12/07/23 98.4 kg (216 lb 14.9 oz)   10/11/23 98.4 kg (217 lb)   04/12/23 99.8 kg (220 lb)   12/20/22 101.1 kg (222 lb 14.2 oz)   12/13/22 98.9 kg (218 lb 0.6 oz)        Lab/Procedures/Meds: Pertinent Labs/Meds  Reviewed    Medications:Pertinent Medications Reviewed  Scheduled Meds:   amLODIPine  5 mg Oral Daily    apixaban  5 mg Oral BID    cetirizine  10 mg Oral Daily    famotidine  20 mg Oral BID    insulin NPH/Reg human  22 Units Subcutaneous TID WM    ipratropium  0.5 mg Nebulization Q8H    levalbuterol  1.25 mg Nebulization Q8H    levoFLOXacin  500 mg Intravenous Q24H    metoprolol succinate  100 mg Oral Daily    metronidazole  500 mg Intravenous Q8H    predniSONE  20 mg Oral Daily    rOPINIRole  1 mg Oral QHS     Continuous Infusions:  PRN Meds:.  Current Facility-Administered Medications:     acetaminophen, 650 mg, Oral, Q8H PRN    acetaminophen, 650 mg, Oral, Q4H PRN    aluminum-magnesium hydroxide-simethicone, 30 mL, Oral, QID PRN    dextrose 50%, 12.5 g, Intravenous, PRN    dextrose 50%, 25 g, Intravenous, PRN    glucagon (human recombinant), 1 mg, Intramuscular, PRN    glucose, 16 g, Oral, PRN    glucose, 24 g, Oral, PRN    magnesium oxide, 800 mg, Oral, PRN    magnesium oxide, 800 mg, Oral, PRN    melatonin, 6 mg, Oral, Nightly PRN    ondansetron, 4 mg, Intravenous, Q6H PRN    oxyCODONE-acetaminophen, 1 tablet, Oral, Q4H PRN    potassium bicarbonate, 35 mEq, Oral, PRN    potassium bicarbonate, 50 mEq, Oral, PRN    potassium bicarbonate, 60 mEq, Oral, PRN    potassium, sodium phosphates, 2 packet, Oral, PRN    potassium, sodium phosphates, 2 packet, Oral, PRN    potassium, sodium phosphates, 2 packet, Oral, PRN    sodium chloride 0.9%, 3 mL, Intravenous, PRN    DIPH,PERTUSS(ACELL),TET VACCINE (ADULT)(BOOSTRIX,ADACEL), 0.5 mL, Intramuscular, vaccine x 1 dose    Labs: Pertinent Labs Reviewed  Clinical Chemistry:  Recent Labs   Lab 05/14/24  0944 05/14/24  0944 05/15/24  0503 05/15/24  1330 05/16/24  0544   *  --  132*   < > 132*   K 5.1  --  6.0*   < > 5.2*   CL 99  --  96   < > 101   CO2 25  --  29   < > 21*   *  --  458*   < > 241*   BUN 34*  --  34*   < > 39*   CREATININE 1.1  --  1.1   < > 1.1  "  CALCIUM 8.9  --  8.8   < > 8.6*   PROT 7.1  --  6.9  --  6.6   ALBUMIN 3.9  --  3.6  --  3.4*   BILITOT 0.6  --  0.6  --  0.3   ALKPHOS 129  --  124  --  110   AST 17  --  17  --  20   ALT 20  --  20  --  15   ANIONGAP 8  --  7*   < > 10   MG  --    < > 2.2  --  2.2    < > = values in this interval not displayed.     CBC:   Recent Labs   Lab 05/16/24  0823   WBC 15.27*   RBC 3.90*   HGB 12.6   HCT 38.7      MCV 99*   MCH 32.3*   MCHC 32.6     Lipid Panel:  No results for input(s): "CHOL", "HDL", "LDLCALC", "TRIG", "CHOLHDL" in the last 168 hours.  Cardiac Profile:  Recent Labs   Lab 05/14/24  0944   BNP 47     Inflammatory Labs:  No results for input(s): "CRP" in the last 168 hours.  Diabetes:  No results for input(s): "HGBA1C", "POCTGLUCOSE" in the last 168 hours.  Thyroid & Parathyroid:  No results for input(s): "TSH", "FREET4", "O1RUJGV", "E5FPBJP", "THYROIDAB" in the last 168 hours.    Monitor and Evaluation  Food and Nutrient Intake: energy intake, food and beverage intake  Food and Nutrient Adminstration: diet order  Knowledge/Beliefs/Attitudes: food and nutrition knowledge/skill  Physical Activity and Function: nutrition-related ADLs and IADLs  Anthropometric Measurements: weight, weight change, body mass index  Biochemical Data, Medical Tests and Procedures: electrolyte and renal panel, gastrointestinal profile, glucose/endocrine profile, lipid profile  Nutrition-Focused Physical Findings: overall appearance     Nutrition Risk  Level of Risk/Frequency of Follow-up:  (1 x / week)     Nutrition Follow-Up  RD Follow-up?: Yes      Rut Bob RD 05/16/2024 1:59 PM       "

## 2024-05-16 NOTE — PT/OT/SLP PROGRESS
Physical Therapy Treatment    Patient Name:  Maddie Otero   MRN:  [id number]    Recommendations:     Discharge Recommendations: Moderate Intensity Therapy (mod vs low intensity with 24/7 supervision and assist)  Discharge Equipment Recommendations: rollator  Barriers to discharge: Decreased caregiver support    Assessment:     Maddie Otero is a 73 y.o. female admitted with a medical diagnosis of COPD exacerbation.  She presents with the following impairments/functional limitations: weakness, impaired endurance, impaired self care skills, impaired functional mobility, gait instability, decreased lower extremity function, pain, decreased ROM, orthopedic precautions . Pt up in chair upon entry to room. LE exercises performed prior to gait trials with pt requiring cues for technique and to complete with slow controlled movements. Following ex, pt performed a single gait trial ambulating 100' x 2 CGA  with rw and 2L O2. Pt required a standing rest break and cues for pursed lip breathing due to dyspnea with activity. Pt left up in bedside chair with RN present to administer Rx.     Rehab Prognosis: Good; patient would benefit from acute skilled PT services to address these deficits and reach maximum level of function.    Recent Surgery: * No surgery found *      Plan:     During this hospitalization, patient to be seen 5 x/week to address the identified rehab impairments via gait training, therapeutic activities, therapeutic exercises, neuromuscular re-education and progress toward the following goals:    Plan of Care Expires:  06/15/24    Subjective     Chief Complaint: L rib pain with movement  Patient/Family Comments/goals: Pt would like to t/f to [facility] for more therapy, due to caregiver burden and decreased functional mobility.   Pain/Comfort:  Pain Rating 1: 0/10  Location - Side 1: Left  Location - Orientation 1: generalized  Location 1: rib(s)  Pain Addressed 1: Cessation of Activity, Nurse notified  Pain  Rating Post-Intervention 1: 8/10      Objective:     Communicated with RN prior to session.  Patient found up in chair with cervical collar, oxygen, peripheral IV, telemetry upon PT entry to room.     General Precautions: Standard, fall  Orthopedic Precautions: spinal precautions  Braces: Cervical collar  Respiratory Status: Nasal cannula, flow 2 L/min     Functional Mobility:  Transfers:     Sit to Stand:  contact guard assistance with rolling walker  Gait: 100 x 2 CGA RW      AM-PAC 6 CLICK MOBILITY  Turning over in bed (including adjusting bedclothes, sheets and blankets)?: 3  Sitting down on and standing up from a chair with arms (e.g., wheelchair, bedside commode, etc.): 3  Moving from lying on back to sitting on the side of the bed?: 3  Moving to and from a bed to a chair (including a wheelchair)?: 3  Need to walk in hospital room?: 3  Climbing 3-5 steps with a railing?: 2  Basic Mobility Total Score: 17       Treatment & Education:  Pt performed B LE there ex sitting x 30 reps with vc for proper execution and joint protection: ap, laq, marching, hip abd/add, gs/qs.     Patient left HOB elevated with all lines intact and call button in reach..    GOALS:   Multidisciplinary Problems       Physical Therapy Goals          Problem: Physical Therapy    Goal Priority Disciplines Outcome Goal Variances Interventions   Physical Therapy Goal     PT, PT/OT Progressing     Description: 1. Supine to sit with Stand-by Assistance  2. Sit to stand transfer with Stand-by Assistance  3.. Bed to chair transfer with Supervision using Rolling Walker  4. Gait  x 100 feet with Stand ByAssistance using Rolling Walker.                         Time Tracking:     PT Received On: 05/16/24  PT Start Time: 0927     PT Stop Time: 0955  PT Total Time (min): 28 min     Billable Minutes: Gait Training 18 and Therapeutic Exercise 10    Treatment Type: Treatment  PT/PTA: PTA     Number of PTA visits since last PT visit: 1 05/16/2024

## 2024-05-16 NOTE — PT/OT/SLP PROGRESS
Occupational Therapy   Treatment    Name: Maddie Otero  MRN: [id number]  Admitting Diagnosis:  COPD exacerbation       Recommendations:     Discharge Recommendations: Moderate Intensity Therapy  Discharge Equipment Recommendations:  walker, rolling (supposedly has one ordered but has not received it yet.)  Barriers to discharge:  Decreased caregiver support    Assessment:     Maddie Otero is a 73 y.o. female with a medical diagnosis of COPD exacerbation.  Performance deficits affecting function are weakness, impaired endurance, impaired self care skills, impaired functional mobility, gait instability, impaired balance, pain.     Pt was found up in the chair; she was pleasant/agreeable to OT session; her ability to complete ADLs is limited due to pain in her LUE, LLE, and L flank from her recent fall.    Rehab Prognosis:  Fair; patient would benefit from acute skilled OT services to address these deficits and reach maximum level of function.       Plan:     Patient to be seen 5 x/week to address the above listed problems via self-care/home management, therapeutic activities, therapeutic exercises  Plan of Care Expires: 06/15/24  Plan of Care Reviewed with: patient    Subjective     Pain/Comfort:  Pain Rating 1:  (not rated)  Location - Side 1: Left  Location 1:  (leg, ribs, arm)  Pain Addressed 1: Distraction, Reposition  Pain Rating Post-Intervention 1:  (not rated)    Objective:     Communicated with: nurse prior to session.  Patient found up in chair with telemetry, cervical collar upon OT entry to room.    General Precautions: Standard, fall    Orthopedic Precautions:spinal precautions  Braces: Cervical collar  Respiratory Status: Nasal cannula, flow 2 L/min     Occupational Performance:     Activities of Daily Living:  Grooming: minimum assistance for hair combing at top/back of head; pt able to brush teeth unassisted while seated in bedside chair  Upper Body Dressing: moderate assistance to don robe; zuly on  dressing LUE first  Lower Body Dressing: moderate assistance for donning sock and threading LLE into undergarments; pt could benefit from sock aid/reacher     Treatment & Education:  Education provided on how to safely doff/disassemble c-collar for changing of pads/liners; pt demonstrated understanding    Education provided on adaptive dressing techniques to accommodate for pt's decreased mobility on L side; will need to follow up with education on use of hip kit for assistance with LB dressing    Patient left up in chair with all lines intact and call button in reach    GOALS:   Multidisciplinary Problems       Occupational Therapy Goals          Problem: Occupational Therapy    Goal Priority Disciplines Outcome Interventions   Occupational Therapy Goal     OT, PT/OT     Description: Goals to be met by: 06/05/2024     Patient will increase functional independence with ADLs by performing:    LE Dressing with Supervision.  Grooming while standing at sink with Supervision.  Toileting from bedside commode with Supervision for hygiene and clothing management.   Stand pivot transfers with Supervision.  Toilet transfer to bedside commode with Modified [place].                         Time Tracking:     OT Date of Treatment: 05/16/24  OT Start Time: 1028  OT Stop Time: 1051  OT Total Time (min): 23 min    Billable Minutes:Self Care/Home Management 23    OT/NYASIA: OT          5/16/2024

## 2024-05-17 VITALS
TEMPERATURE: 98 F | SYSTOLIC BLOOD PRESSURE: 122 MMHG | WEIGHT: 229.25 LBS | HEIGHT: 68 IN | OXYGEN SATURATION: 93 % | BODY MASS INDEX: 34.75 KG/M2 | HEART RATE: 81 BPM | DIASTOLIC BLOOD PRESSURE: 78 MMHG | RESPIRATION RATE: 18 BRPM

## 2024-05-17 LAB
ALBUMIN SERPL BCP-MCNC: 3.4 G/DL (ref 3.5–5.2)
ALP SERPL-CCNC: 98 U/L (ref 55–135)
ALT SERPL W/O P-5'-P-CCNC: 17 U/L (ref 10–44)
ANION GAP SERPL CALC-SCNC: 7 MMOL/L (ref 8–16)
AST SERPL-CCNC: 19 U/L (ref 10–40)
BASOPHILS # BLD AUTO: 0.04 K/UL (ref 0–0.2)
BASOPHILS NFR BLD: 0.3 % (ref 0–1.9)
BILIRUB SERPL-MCNC: 0.5 MG/DL (ref 0.1–1)
BUN SERPL-MCNC: 24 MG/DL (ref 8–23)
CALCIUM SERPL-MCNC: 8.1 MG/DL (ref 8.7–10.5)
CHLORIDE SERPL-SCNC: 101 MMOL/L (ref 95–110)
CO2 SERPL-SCNC: 28 MMOL/L (ref 23–29)
CREAT SERPL-MCNC: 0.9 MG/DL (ref 0.5–1.4)
DIFFERENTIAL METHOD BLD: ABNORMAL
EOSINOPHIL # BLD AUTO: 0.1 K/UL (ref 0–0.5)
EOSINOPHIL NFR BLD: 0.5 % (ref 0–8)
ERYTHROCYTE [DISTWIDTH] IN BLOOD BY AUTOMATED COUNT: 13.1 % (ref 11.5–14.5)
EST. GFR  (NO RACE VARIABLE): >60 ML/MIN/1.73 M^2
GLUCOSE SERPL-MCNC: 109 MG/DL (ref 70–110)
GLUCOSE SERPL-MCNC: 228 MG/DL (ref 70–110)
GLUCOSE SERPL-MCNC: 253 MG/DL (ref 70–110)
GLUCOSE SERPL-MCNC: 254 MG/DL (ref 70–110)
GLUCOSE SERPL-MCNC: 296 MG/DL (ref 70–110)
HCT VFR BLD AUTO: 37.9 % (ref 37–48.5)
HGB BLD-MCNC: 12 G/DL (ref 12–16)
IMM GRANULOCYTES # BLD AUTO: 0.13 K/UL (ref 0–0.04)
IMM GRANULOCYTES NFR BLD AUTO: 0.9 % (ref 0–0.5)
LYMPHOCYTES # BLD AUTO: 1.1 K/UL (ref 1–4.8)
LYMPHOCYTES NFR BLD: 7 % (ref 18–48)
MAGNESIUM SERPL-MCNC: 1.9 MG/DL (ref 1.6–2.6)
MCH RBC QN AUTO: 31.8 PG (ref 27–31)
MCHC RBC AUTO-ENTMCNC: 31.7 G/DL (ref 32–36)
MCV RBC AUTO: 101 FL (ref 82–98)
MONOCYTES # BLD AUTO: 1.1 K/UL (ref 0.3–1)
MONOCYTES NFR BLD: 7.6 % (ref 4–15)
NEUTROPHILS # BLD AUTO: 12.6 K/UL (ref 1.8–7.7)
NEUTROPHILS NFR BLD: 83.7 % (ref 38–73)
NRBC BLD-RTO: 0 /100 WBC
PLATELET # BLD AUTO: 192 K/UL (ref 150–450)
PMV BLD AUTO: 10.9 FL (ref 9.2–12.9)
POTASSIUM SERPL-SCNC: 4.1 MMOL/L (ref 3.5–5.1)
PROT SERPL-MCNC: 6.1 G/DL (ref 6–8.4)
RBC # BLD AUTO: 3.77 M/UL (ref 4–5.4)
SODIUM SERPL-SCNC: 136 MMOL/L (ref 136–145)
WBC # BLD AUTO: 15.06 K/UL (ref 3.9–12.7)

## 2024-05-17 PROCEDURE — 85025 COMPLETE CBC W/AUTO DIFF WBC: CPT | Performed by: INTERNAL MEDICINE

## 2024-05-17 PROCEDURE — 97530 THERAPEUTIC ACTIVITIES: CPT

## 2024-05-17 PROCEDURE — 83735 ASSAY OF MAGNESIUM: CPT | Performed by: INTERNAL MEDICINE

## 2024-05-17 PROCEDURE — 25000242 PHARM REV CODE 250 ALT 637 W/ HCPCS: Performed by: INTERNAL MEDICINE

## 2024-05-17 PROCEDURE — 63600175 PHARM REV CODE 636 W HCPCS: Mod: JZ,JG | Performed by: INTERNAL MEDICINE

## 2024-05-17 PROCEDURE — 94761 N-INVAS EAR/PLS OXIMETRY MLT: CPT

## 2024-05-17 PROCEDURE — 97535 SELF CARE MNGMENT TRAINING: CPT

## 2024-05-17 PROCEDURE — 99900031 HC PATIENT EDUCATION (STAT)

## 2024-05-17 PROCEDURE — 99900035 HC TECH TIME PER 15 MIN (STAT)

## 2024-05-17 PROCEDURE — 36415 COLL VENOUS BLD VENIPUNCTURE: CPT | Performed by: INTERNAL MEDICINE

## 2024-05-17 PROCEDURE — 25000003 PHARM REV CODE 250: Performed by: NURSE PRACTITIONER

## 2024-05-17 PROCEDURE — 63600175 PHARM REV CODE 636 W HCPCS: Performed by: NURSE PRACTITIONER

## 2024-05-17 PROCEDURE — 25000003 PHARM REV CODE 250: Performed by: INTERNAL MEDICINE

## 2024-05-17 PROCEDURE — 94640 AIRWAY INHALATION TREATMENT: CPT

## 2024-05-17 PROCEDURE — 80053 COMPREHEN METABOLIC PANEL: CPT | Performed by: INTERNAL MEDICINE

## 2024-05-17 RX ORDER — LIDOCAINE 50 MG/G
1 PATCH TOPICAL DAILY
Qty: 14 PATCH | Refills: 0 | Status: SHIPPED | OUTPATIENT
Start: 2024-05-17 | End: 2024-05-31

## 2024-05-17 RX ORDER — LEVOFLOXACIN 250 MG/1
500 TABLET ORAL DAILY
Status: DISCONTINUED | OUTPATIENT
Start: 2024-05-17 | End: 2024-05-17 | Stop reason: HOSPADM

## 2024-05-17 RX ORDER — LIDOCAINE 50 MG/G
1 PATCH TOPICAL
Status: DISCONTINUED | OUTPATIENT
Start: 2024-05-17 | End: 2024-05-17 | Stop reason: HOSPADM

## 2024-05-17 RX ORDER — LEVOFLOXACIN 500 MG/1
500 TABLET, FILM COATED ORAL DAILY
Qty: 4 TABLET | Refills: 0 | Status: SHIPPED | OUTPATIENT
Start: 2024-05-18 | End: 2024-05-23

## 2024-05-17 RX ORDER — PREDNISONE 5 MG/1
10 TABLET ORAL DAILY
Status: DISCONTINUED | OUTPATIENT
Start: 2024-05-18 | End: 2024-05-17 | Stop reason: HOSPADM

## 2024-05-17 RX ORDER — PREDNISONE 10 MG/1
10 TABLET ORAL DAILY
Qty: 3 TABLET | Refills: 0 | Status: SHIPPED | OUTPATIENT
Start: 2024-05-18 | End: 2024-05-21

## 2024-05-17 RX ADMIN — HUMAN INSULIN 22 UNITS: 100 INJECTION, SUSPENSION SUBCUTANEOUS at 04:05

## 2024-05-17 RX ADMIN — PREDNISONE 20 MG: 20 TABLET ORAL at 08:05

## 2024-05-17 RX ADMIN — LEVALBUTEROL HYDROCHLORIDE 1.25 MG: 1.25 SOLUTION RESPIRATORY (INHALATION) at 07:05

## 2024-05-17 RX ADMIN — IPRATROPIUM BROMIDE 0.5 MG: 0.5 SOLUTION RESPIRATORY (INHALATION) at 07:05

## 2024-05-17 RX ADMIN — LEVALBUTEROL HYDROCHLORIDE 1.25 MG: 1.25 SOLUTION RESPIRATORY (INHALATION) at 02:05

## 2024-05-17 RX ADMIN — OXYCODONE HYDROCHLORIDE AND ACETAMINOPHEN 1 TABLET: 5; 325 TABLET ORAL at 06:05

## 2024-05-17 RX ADMIN — METOPROLOL SUCCINATE 100 MG: 50 TABLET, FILM COATED, EXTENDED RELEASE ORAL at 08:05

## 2024-05-17 RX ADMIN — ROPINIROLE HYDROCHLORIDE 1 MG: 1 TABLET, FILM COATED ORAL at 06:05

## 2024-05-17 RX ADMIN — OXYCODONE HYDROCHLORIDE AND ACETAMINOPHEN 1 TABLET: 5; 325 TABLET ORAL at 02:05

## 2024-05-17 RX ADMIN — IPRATROPIUM BROMIDE 0.5 MG: 0.5 SOLUTION RESPIRATORY (INHALATION) at 02:05

## 2024-05-17 RX ADMIN — LIDOCAINE 5% 1 PATCH: 700 PATCH TOPICAL at 09:05

## 2024-05-17 RX ADMIN — APIXABAN 5 MG: 5 TABLET, FILM COATED ORAL at 08:05

## 2024-05-17 RX ADMIN — OXYCODONE HYDROCHLORIDE AND ACETAMINOPHEN 1 TABLET: 5; 325 TABLET ORAL at 03:05

## 2024-05-17 RX ADMIN — CETIRIZINE HYDROCHLORIDE 10 MG: 10 TABLET, FILM COATED ORAL at 08:05

## 2024-05-17 RX ADMIN — METRONIDAZOLE 500 MG: 5 INJECTION, SOLUTION INTRAVENOUS at 09:05

## 2024-05-17 RX ADMIN — HUMAN INSULIN 22 UNITS: 100 INJECTION, SUSPENSION SUBCUTANEOUS at 08:05

## 2024-05-17 RX ADMIN — HUMAN INSULIN 22 UNITS: 100 INJECTION, SUSPENSION SUBCUTANEOUS at 12:05

## 2024-05-17 RX ADMIN — LEVOFLOXACIN 500 MG: 250 TABLET, FILM COATED ORAL at 01:05

## 2024-05-17 RX ADMIN — METRONIDAZOLE 500 MG: 5 INJECTION, SOLUTION INTRAVENOUS at 03:05

## 2024-05-17 RX ADMIN — FAMOTIDINE 20 MG: 20 TABLET ORAL at 08:05

## 2024-05-17 RX ADMIN — AMLODIPINE BESYLATE 5 MG: 5 TABLET ORAL at 08:05

## 2024-05-17 RX ADMIN — OXYCODONE HYDROCHLORIDE AND ACETAMINOPHEN 1 TABLET: 5; 325 TABLET ORAL at 08:05

## 2024-05-17 NOTE — ASSESSMENT & PLAN NOTE
This patient has hyperkalemia which is controlled. We will monitor for arrhythmias with EKG or continuous telemetry. We will treat the hyperkalemia with Potassium Binders, Calcium gluconate, and IV insulin and dextrose. The likely etiology of the hyperkalemia is Medication induced.  The patients latest potassium has been reviewed and the results are listed below  Recent Labs   Lab 05/17/24  1050   K 4.1     Resolved

## 2024-05-17 NOTE — CARE UPDATE
05/16/24 3882   Patient Assessment/Suction   Level of Consciousness (AVPU) alert   Respiratory Effort Unlabored   Expansion/Accessory Muscles/Retractions no use of accessory muscles   Rhythm/Pattern, Respiratory no shortness of breath reported   Cough Frequency no cough   PRE-TX-O2   Device (Oxygen Therapy) nasal cannula   $ Is the patient on Low Flow Oxygen? Yes   Flow (L/min) (Oxygen Therapy) 2   SpO2 96 %   Pulse Oximetry Type Intermittent   $ Pulse Oximetry - Multiple Charge Pulse Oximetry - Multiple   Pulse 66   Resp 18   Positioning   Head of Bed (HOB) Positioning HOB elevated;HOB at 30-45 degrees   Aerosol Therapy   $ Aerosol Therapy Charges Aerosol Treatment   Daily Review of Necessity (SVN) completed   Respiratory Treatment Status (SVN) given   Treatment Route (SVN) mask   Patient Position HOB elevated;Willis's   Post Treatment Assessment (SVN) breath sounds improved   Signs of Intolerance (SVN) none   Breath Sounds Post-Respiratory Treatment   Throughout All Fields Post-Treatment All Fields   Throughout All Fields Post-Treatment aeration increased   Post-treatment Heart Rate (beats/min) 65   Post-treatment Resp Rate (breaths/min) 17

## 2024-05-17 NOTE — PLAN OF CARE
1:49pm-  left message for Lynda [phone] with [facility] to check on status of auth    3:00pm- dc sent for review    05/17/24 9896   Post-Acute Status   Post-Acute Authorization Placement   Post-Acute Placement Status Pending payor review/awaiting authorization (if required)   Coverage Payor: [employer] MANAGED MEDICARE - [employer] MEDICARE PPO -   Discharge Delays None known at this time   Discharge Plan   Discharge Plan A Skilled Nursing Facility   Discharge Plan B Skilled Nursing Facility

## 2024-05-17 NOTE — ASSESSMENT & PLAN NOTE
Suspected acute pneumonia  CT done today showed right lower lobe bronchitis-bronchiolitis, with scattered lower lung subsegmental atelectasis.   Pt recently was intubated for spine surgery  She is allergic to almost all abx  Started on iv levofloxacin/iv flagyl and de-escalated to PO levaquin 5/17  H/o HCAP in the past per records so will give one dose of iv vancomycin

## 2024-05-17 NOTE — ASSESSMENT & PLAN NOTE
Chronic, controlled. Latest blood pressure and vitals reviewed-     Temp:  [97.4 °F (36.3 °C)-98.4 °F (36.9 °C)]   Pulse:  [62-83]   Resp:  [17-18]   BP: (126-151)/(61-84)   SpO2:  [96 %-100 %] .   Home meds for hypertension were reviewed and noted below.   Hypertension Medications               amLODIPine (NORVASC) 5 MG tablet Take 5 mg by mouth once daily.    furosemide (LASIX) 40 MG tablet Take 40 mg by mouth as needed (edema).     lisinopriL (PRINIVIL,ZESTRIL) 5 MG tablet Take 5 mg by mouth once daily.    metoprolol succinate (TOPROL-XL) 100 MG 24 hr tablet Take 100 mg by mouth 2 (two) times daily.    metoprolol succinate (TOPROL-XL) 100 MG 24 hr tablet Take 100 mg by mouth once daily.            While in the hospital, will manage blood pressure as follows; Continue home antihypertensive regimen    Will utilize p.r.n. blood pressure medication only if patient's blood pressure greater than 140/90 and she develops symptoms such as worsening chest pain or shortness of breath.

## 2024-05-17 NOTE — SUBJECTIVE & OBJECTIVE
Interval History: awaiting insurance approval for [facility]    Review of Systems   Constitutional:  Positive for fatigue.   Respiratory:  Positive for cough and wheezing. Negative for shortness of breath.    Neurological:  Positive for weakness.     Objective:     Vital Signs (Most Recent):  Temp: 97.5 °F (36.4 °C) (05/17/24 1100)  Pulse: 82 (05/17/24 1100)  Resp: 17 (05/17/24 1100)  BP: (!) 145/78 (05/17/24 1100)  SpO2: 97 % (05/17/24 1100) Vital Signs (24h Range):  Temp:  [97.4 °F (36.3 °C)-98.4 °F (36.9 °C)] 97.5 °F (36.4 °C)  Pulse:  [62-83] 82  Resp:  [17-18] 17  SpO2:  [96 %-100 %] 97 %  BP: (126-151)/(61-84) 145/78     Weight: 104 kg (229 lb 4.5 oz)  Body mass index is 34.86 kg/m².    Intake/Output Summary (Last 24 hours) at 5/17/2024 1241  Last data filed at 5/17/2024 0618  Gross per 24 hour   Intake 1405 ml   Output 1200 ml   Net 205 ml         Physical Exam  Vitals and nursing note reviewed.   Constitutional:       Appearance: Normal appearance.   HENT:      Head: Normocephalic and atraumatic.      Nose: Nose normal.      Mouth/Throat:      Mouth: Mucous membranes are moist.      Pharynx: Oropharynx is clear.   Eyes:      Extraocular Movements: Extraocular movements intact.      Pupils: Pupils are equal, round, and reactive to light.   Neck:      Comments: [place] J collar in place  Cardiovascular:      Rate and Rhythm: Normal rate and regular rhythm.      Pulses: Normal pulses.      Heart sounds: Normal heart sounds.   Pulmonary:      Effort: Pulmonary effort is normal.      Breath sounds: Rhonchi present. No wheezing.   Abdominal:      General: Bowel sounds are normal.      Palpations: Abdomen is soft.   Musculoskeletal:         General: Normal range of motion.      Cervical back: Normal range of motion and neck supple. No rigidity.   Skin:     General: Skin is warm and dry.      Capillary Refill: Capillary refill takes less than 2 seconds.      Comments: Surgical incision to right anterior neck clean dry  intact, about 1cm of thickened tissue above incision, no fluctuance, no erythema, likely scar tissue   Neurological:      General: No focal deficit present.      Mental Status: She is alert and oriented to person, place, and time.   Psychiatric:         Mood and Affect: Mood normal.         Behavior: Behavior normal.             Significant Labs: All pertinent labs within the past 24 hours have been reviewed.  CBC:   Recent Labs   Lab 05/16/24  0823 05/17/24  1050   WBC 15.27* 15.06*   HGB 12.6 12.0   HCT 38.7 37.9    192     CMP:   Recent Labs   Lab 05/15/24  2057 05/16/24  0544 05/17/24  1050   * 132* 136   K 5.0 5.2* 4.1   CL 95 101 101   CO2 28 21* 28   * 241* 253*   BUN 42* 39* 24*   CREATININE 1.3 1.1 0.9   CALCIUM 9.0 8.6* 8.1*   PROT  --  6.6 6.1   ALBUMIN  --  3.4* 3.4*   BILITOT  --  0.3 0.5   ALKPHOS  --  110 98   AST  --  20 19   ALT  --  15 17   ANIONGAP 8 10 7*       Significant Imaging: I have reviewed all pertinent imaging results/findings within the past 24 hours.    US Lower Extremity Veins Bilateral    Result Date: 5/14/2024  EXAMINATION: US LOWER EXTREMITY VEINS BILATERAL CLINICAL HISTORY: dvt; TECHNIQUE: Duplex and color flow Doppler and dynamic compression was performed of the bilateral lower extremity veins was performed. COMPARISON: None FINDINGS: Right thigh veins: The common femoral, femoral, popliteal, upper greater saphenous, and deep femoral veins are patent and free of thrombus. The veins are normally compressible and have normal phasic flow and augmentation response. Right calf veins: The visualized calf veins are patent. Left thigh veins: The common femoral, femoral, popliteal, upper greater saphenous, and deep femoral veins are patent and free of thrombus. The veins are normally compressible and have normal phasic flow and augmentation response. Left calf veins: The visualized calf veins are patent. Miscellaneous: None     No evidence of deep venous thrombosis in  either lower extremity. Electronically signed by: Ketan Lgauna Date:    05/14/2024 Time:    18:19    CT Head Without Contrast    Result Date: 5/14/2024  EXAMINATION: CT HEAD WITHOUT CONTRAST CLINICAL HISTORY: Fall, head trauma. FINDINGS: Comparison the prior exams including 04/23/2024.  Motion artifact limits the exam.  There is no acute intracranial hemorrhage, with no intra-axial mass, mass effect, or abnormal extra-axial fluid. Gray-white differentiation is maintained, with scattered areas of nonspecific white matter hypoattenuation. The cortical sulci and ventricles are stable in size, with the unenhanced cerebellum and brainstem unremarkable. There is paranasal sinus mucosal thickening, with the mastoid air cells normally aerated.  There is no acute calvarial fracture or scalp hematoma.     Limited exam due to motion, with no acute intracranial hemorrhage or acute calvarial fracture. Electronically signed by: Bashir Hahn Date:    05/14/2024 Time:    14:11    CTA Chest Non-Coronary (PE Studies)    Result Date: 5/14/2024  EXAMINATION: CTA CHEST NON CORONARY (PE STUDIES) CLINICAL HISTORY: Pulmonary embolism (PE) suspected, unknown D-dimer; TECHNIQUE: Thin axial imaging through the chest was performed with 100 mL Omnipaque 350 IV contrast, with sagittal and coronal reformatted images and MIP reconstructions performed, and images stored in the patient's permanent electronic medical record. FINDINGS: Comparison to multiple prior exams.  There are no pulmonary arterial filling defects to suggest pulmonary thromboembolism.  The central pulmonary arteries are normal in caliber. Scattered calcified and soft plaque involves normal-caliber thoracic aorta, with no aortic dissection or evidence of aortic intramural hematoma.  The heart is normal in size, with no pericardial effusion.  No enlarged mediastinal or hilar lymph nodes. There are lucencies reflecting upper lobe predominant pulmonary emphysema, with localized  bronchial wall thickening and mucous plugging in the right lower lobe, with adjacent reticular and linear parenchymal opacities.  There is bandlike atelectasis in the right middle lobe and lingula, with no consolidation.  No pleural effusion, evidence of pulmonary edema, or pneumothorax. Images of the upper abdomen show pneumobilia, and are otherwise unremarkable.  There are no acute fractures or destructive osseous lesions.     1. Negative for pulmonary thromboembolism. 2. Localized right lower lobe bronchitis-bronchiolitis, with scattered lower lung subsegmental atelectasis. . Electronically signed by: Bashir Hahn Date:    05/14/2024 Time:    12:13    X-Ray Toe 2 or More Views Left    Result Date: 5/14/2024  EXAMINATION: XR TOE 2 OR MORE VIEWS LEFT CLINICAL HISTORY: l great toe pain; COMPARISON: None FINDINGS: No acute fracture or focal lesion of the great toe.  Joint spaces are maintained.  No soft tissue gas or radiopaque foreign body.     No acute osseous abnormality. Electronically signed by: Jesus Herron Date:    05/14/2024 Time:    10:36    X-Ray Chest AP Portable    Result Date: 5/14/2024  EXAMINATION: XR CHEST AP PORTABLE CLINICAL HISTORY: Asthma; COMPARISON: 04/30/2024 FINDINGS: Cardiac silhouette size is stable compared to prior.  Bibasilar atelectasis or scarring.  No airspace consolidation.  No large pleural effusion.  No pneumothorax.  Cervical ACDF hardware.     Stable chest radiograph.  No acute pulmonary pathology. Electronically signed by: Jesus Herron Date:    05/14/2024 Time:    10:34

## 2024-05-17 NOTE — PROGRESS NOTES
Critical access hospital Medicine  Progress Note    Patient Name: Maddie tOero  MRN: [id number]  Patient Class: IP- Inpatient   Admission Date: 5/14/2024  Length of Stay: 1 days  Attending Physician: Yash Chiang MD  Primary Care Provider: Carrie Gorman FNP        Subjective:     Principal Problem:COPD exacerbation        HPI:  73 year old pt getting admitted with COPD exacerbation and falls  Pt is status post C4-6 anterior cervical diskectomy decompression performed on 04/30/2024   After surgery she did well  Past few days she ahs been suffering from shortness of breath  She felt weak and has to increase her oxygen from 2-4 L  Also pt had episodes of fall and pt came to ER & got admitted   Pt is on lasix and pt reports increased swelling on legs     Overview/Hospital Course:  Ms. Otero has been monitored closely during her hospitalization. She was admitted on 5/14/24 with COPD exac witho localized right lower lobe bronchitis-bronchiolitis, with scattered lower lung subsegmental atelectasis on CTA. She was started on IV steroids and transitioned to PO. She is chronically on home O2@2L, but required up to 4L. She is getting scheduled nebs. She had a C4-C6 ACDF on 4/30 with Dr. Pierson and is to have her [place] J collar in place for 6 weeks. She did have a mechanical fall at home prior to arrival and CT head is negative for acute intracranial abnormality. CTA chest was done for elevated D-Dimer and was negative for PE. US bilat LE is negative for DVT. She's had significant hyperglycemia in the setting of high dose steroids for COPD exac/bronchitis. Potassium is also elevated to 6 on 5/15 and is shifted with insulin/calcium/fluid bolus and treated with lokelma. She was empirically started on levaquin and flagyl for concerns of possible aspiration given recent ACDF. Potassium and glucose improved with treatment. She reports taking novolog 70/30 22 units TID at home and will resume this in the  hospital. Pt's pulmonologist, Dr. Mcneil, apparently stopped by and states she is at baseline and recommended further decreasing steroids. On 5/17/24 potassium has normalized and glucose have improved with resumption of home insulin regimen. She is medically clear for discharge. Pt has been accepted at [facility] and is awaiting insurance approval.     Interval History: awaiting insurance approval for [facility]    Review of Systems   Constitutional:  Positive for fatigue.   Respiratory:  Positive for cough and wheezing. Negative for shortness of breath.    Neurological:  Positive for weakness.     Objective:     Vital Signs (Most Recent):  Temp: 97.5 °F (36.4 °C) (05/17/24 1100)  Pulse: 82 (05/17/24 1100)  Resp: 17 (05/17/24 1100)  BP: (!) 145/78 (05/17/24 1100)  SpO2: 97 % (05/17/24 1100) Vital Signs (24h Range):  Temp:  [97.4 °F (36.3 °C)-98.4 °F (36.9 °C)] 97.5 °F (36.4 °C)  Pulse:  [62-83] 82  Resp:  [17-18] 17  SpO2:  [96 %-100 %] 97 %  BP: (126-151)/(61-84) 145/78     Weight: 104 kg (229 lb 4.5 oz)  Body mass index is 34.86 kg/m².    Intake/Output Summary (Last 24 hours) at 5/17/2024 1241  Last data filed at 5/17/2024 0618  Gross per 24 hour   Intake 1405 ml   Output 1200 ml   Net 205 ml         Physical Exam  Vitals and nursing note reviewed.   Constitutional:       Appearance: Normal appearance.   HENT:      Head: Normocephalic and atraumatic.      Nose: Nose normal.      Mouth/Throat:      Mouth: Mucous membranes are moist.      Pharynx: Oropharynx is clear.   Eyes:      Extraocular Movements: Extraocular movements intact.      Pupils: Pupils are equal, round, and reactive to light.   Neck:      Comments: [place] J collar in place  Cardiovascular:      Rate and Rhythm: Normal rate and regular rhythm.      Pulses: Normal pulses.      Heart sounds: Normal heart sounds.   Pulmonary:      Effort: Pulmonary effort is normal.      Breath sounds: Rhonchi present. No wheezing.   Abdominal:      General: Bowel sounds are  normal.      Palpations: Abdomen is soft.   Musculoskeletal:         General: Normal range of motion.      Cervical back: Normal range of motion and neck supple. No rigidity.   Skin:     General: Skin is warm and dry.      Capillary Refill: Capillary refill takes less than 2 seconds.      Comments: Surgical incision to right anterior neck clean dry intact, about 1cm of thickened tissue above incision, no fluctuance, no erythema, likely scar tissue   Neurological:      General: No focal deficit present.      Mental Status: She is alert and oriented to person, place, and time.   Psychiatric:         Mood and Affect: Mood normal.         Behavior: Behavior normal.             Significant Labs: All pertinent labs within the past 24 hours have been reviewed.  CBC:   Recent Labs   Lab 05/16/24  0823 05/17/24  1050   WBC 15.27* 15.06*   HGB 12.6 12.0   HCT 38.7 37.9    192     CMP:   Recent Labs   Lab 05/15/24  2057 05/16/24  0544 05/17/24  1050   * 132* 136   K 5.0 5.2* 4.1   CL 95 101 101   CO2 28 21* 28   * 241* 253*   BUN 42* 39* 24*   CREATININE 1.3 1.1 0.9   CALCIUM 9.0 8.6* 8.1*   PROT  --  6.6 6.1   ALBUMIN  --  3.4* 3.4*   BILITOT  --  0.3 0.5   ALKPHOS  --  110 98   AST  --  20 19   ALT  --  15 17   ANIONGAP 8 10 7*       Significant Imaging: I have reviewed all pertinent imaging results/findings within the past 24 hours.    US Lower Extremity Veins Bilateral    Result Date: 5/14/2024  EXAMINATION: US LOWER EXTREMITY VEINS BILATERAL CLINICAL HISTORY: dvt; TECHNIQUE: Duplex and color flow Doppler and dynamic compression was performed of the bilateral lower extremity veins was performed. COMPARISON: None FINDINGS: Right thigh veins: The common femoral, femoral, popliteal, upper greater saphenous, and deep femoral veins are patent and free of thrombus. The veins are normally compressible and have normal phasic flow and augmentation response. Right calf veins: The visualized calf veins are patent.  Left thigh veins: The common femoral, femoral, popliteal, upper greater saphenous, and deep femoral veins are patent and free of thrombus. The veins are normally compressible and have normal phasic flow and augmentation response. Left calf veins: The visualized calf veins are patent. Miscellaneous: None     No evidence of deep venous thrombosis in either lower extremity. Electronically signed by: Ketan Laguna Date:    05/14/2024 Time:    18:19    CT Head Without Contrast    Result Date: 5/14/2024  EXAMINATION: CT HEAD WITHOUT CONTRAST CLINICAL HISTORY: Fall, head trauma. FINDINGS: Comparison the prior exams including 04/23/2024.  Motion artifact limits the exam.  There is no acute intracranial hemorrhage, with no intra-axial mass, mass effect, or abnormal extra-axial fluid. Gray-white differentiation is maintained, with scattered areas of nonspecific white matter hypoattenuation. The cortical sulci and ventricles are stable in size, with the unenhanced cerebellum and brainstem unremarkable. There is paranasal sinus mucosal thickening, with the mastoid air cells normally aerated.  There is no acute calvarial fracture or scalp hematoma.     Limited exam due to motion, with no acute intracranial hemorrhage or acute calvarial fracture. Electronically signed by: Bashir Hahn Date:    05/14/2024 Time:    14:11    CTA Chest Non-Coronary (PE Studies)    Result Date: 5/14/2024  EXAMINATION: CTA CHEST NON CORONARY (PE STUDIES) CLINICAL HISTORY: Pulmonary embolism (PE) suspected, unknown D-dimer; TECHNIQUE: Thin axial imaging through the chest was performed with 100 mL Omnipaque 350 IV contrast, with sagittal and coronal reformatted images and MIP reconstructions performed, and images stored in the patient's permanent electronic medical record. FINDINGS: Comparison to multiple prior exams.  There are no pulmonary arterial filling defects to suggest pulmonary thromboembolism.  The central pulmonary arteries are normal in  caliber. Scattered calcified and soft plaque involves normal-caliber thoracic aorta, with no aortic dissection or evidence of aortic intramural hematoma.  The heart is normal in size, with no pericardial effusion.  No enlarged mediastinal or hilar lymph nodes. There are lucencies reflecting upper lobe predominant pulmonary emphysema, with localized bronchial wall thickening and mucous plugging in the right lower lobe, with adjacent reticular and linear parenchymal opacities.  There is bandlike atelectasis in the right middle lobe and lingula, with no consolidation.  No pleural effusion, evidence of pulmonary edema, or pneumothorax. Images of the upper abdomen show pneumobilia, and are otherwise unremarkable.  There are no acute fractures or destructive osseous lesions.     1. Negative for pulmonary thromboembolism. 2. Localized right lower lobe bronchitis-bronchiolitis, with scattered lower lung subsegmental atelectasis. . Electronically signed by: Bashir Hahn Date:    05/14/2024 Time:    12:13    X-Ray Toe 2 or More Views Left    Result Date: 5/14/2024  EXAMINATION: XR TOE 2 OR MORE VIEWS LEFT CLINICAL HISTORY: l great toe pain; COMPARISON: None FINDINGS: No acute fracture or focal lesion of the great toe.  Joint spaces are maintained.  No soft tissue gas or radiopaque foreign body.     No acute osseous abnormality. Electronically signed by: Jesus Herron Date:    05/14/2024 Time:    10:36    X-Ray Chest AP Portable    Result Date: 5/14/2024  EXAMINATION: XR CHEST AP PORTABLE CLINICAL HISTORY: Asthma; COMPARISON: 04/30/2024 FINDINGS: Cardiac silhouette size is stable compared to prior.  Bibasilar atelectasis or scarring.  No airspace consolidation.  No large pleural effusion.  No pneumothorax.  Cervical ACDF hardware.     Stable chest radiograph.  No acute pulmonary pathology. Electronically signed by: Jesus Herron Date:    05/14/2024 Time:    10:34      Assessment/Plan:      * COPD exacerbation  Patient's COPD is  with exacerbation noted by continued dyspnea and worsening of baseline hypoxia currently.  Patient is currently off COPD Pathway. Continue scheduled inhalers Steroids, Antibiotics, and Supplemental oxygen and monitor respiratory status closely.     Admitted to med/tele  Wean PO steroids  Scheduled nebs   Will change IV abx to PO levaquin  IS        Hyperkalemia  This patient has hyperkalemia which is controlled. We will monitor for arrhythmias with EKG or continuous telemetry. We will treat the hyperkalemia with Potassium Binders, Calcium gluconate, and IV insulin and dextrose. The likely etiology of the hyperkalemia is Medication induced.  The patients latest potassium has been reviewed and the results are listed below  Recent Labs   Lab 05/17/24  1050   K 4.1     Resolved          Acute pneumonia  Suspected acute pneumonia  CT done today showed right lower lobe bronchitis-bronchiolitis, with scattered lower lung subsegmental atelectasis.   Pt recently was intubated for spine surgery  She is allergic to almost all abx  Started on iv levofloxacin/iv flagyl and de-escalated to PO levaquin 5/17  H/o HCAP in the past per records so will give one dose of iv vancomycin      On home oxygen therapy  At baseline      Recurrent falls  PT/OT - Low intensity therapy - requesting [facility] for [facility] -  working on this        Status post cervical spinal fusion  Status post C4-6 anterior cervical diskectomy decompression performed on 04/30/2024   Informed JACINTO Isaac she was in the hospital and would miss her appt that was scheduled for 5/15, will need to schedule another f/u when discharged  Xray C spine with no acute abnormalities      HTN (hypertension)  Chronic, controlled. Latest blood pressure and vitals reviewed-     Temp:  [97.4 °F (36.3 °C)-98.4 °F (36.9 °C)]   Pulse:  [62-83]   Resp:  [17-18]   BP: (126-151)/(61-84)   SpO2:  [96 %-100 %] .   Home meds for hypertension were reviewed and noted below.   Hypertension  "Medications               amLODIPine (NORVASC) 5 MG tablet Take 5 mg by mouth once daily.    furosemide (LASIX) 40 MG tablet Take 40 mg by mouth as needed (edema).     lisinopriL (PRINIVIL,ZESTRIL) 5 MG tablet Take 5 mg by mouth once daily.    metoprolol succinate (TOPROL-XL) 100 MG 24 hr tablet Take 100 mg by mouth 2 (two) times daily.    metoprolol succinate (TOPROL-XL) 100 MG 24 hr tablet Take 100 mg by mouth once daily.            While in the hospital, will manage blood pressure as follows; Continue home antihypertensive regimen    Will utilize p.r.n. blood pressure medication only if patient's blood pressure greater than 140/90 and she develops symptoms such as worsening chest pain or shortness of breath.    PAF (paroxysmal atrial fibrillation)  Chronic  Continue eliquis     Diabetes mellitus type II  Patient's FSGs are uncontrolled due to hyperglycemia on current medication regimen.  Last A1c reviewed-   Lab Results   Component Value Date    HGBA1C 8.5 (H) 04/23/2024     Most recent fingerstick glucose reviewed- No results for input(s): "POCTGLUCOSE" in the last 24 hours.  Current correctional scale  Medium  Maintain anti-hyperglycemic dose as follows-   Antihyperglycemics (From admission, onward)      Start     Stop Route Frequency Ordered    05/16/24 1200  insulin NPH/Reg human 100 unit/mL (70-30) pen 22 Units         -- SubQ 3 times daily with meals 05/16/24 1020          Hold Oral hypoglycemics while patient is in the hospital.    Glucoses are much better. Pt CONFIRMS she takes novolog 70/30 22 units THREE TIMES A DAY. I will cautiously continue this to get her back on her home regimen. Monitor closely      VTE Risk Mitigation (From admission, onward)           Ordered     apixaban tablet 5 mg  2 times daily         05/14/24 1800     Place sequential compression device  Until discontinued         05/14/24 1258     IP VTE HIGH RISK PATIENT  Once         05/14/24 1259     Place sequential compression device "  Until discontinued         05/14/24 1259                    Discharge Planning   ALICE: 5/18/2024     Code Status: Full Code   Is the patient medically ready for discharge?:     Reason for patient still in hospital (select all that apply): Pending disposition and Other (specify) awaiting insurance approval for SNF  Discharge Plan A: Skilled Nursing Facility                  Katerine Choi NP  Department of Hospital Medicine   Critical access hospital

## 2024-05-17 NOTE — PLAN OF CARE
Problem: COPD (Chronic Obstructive Pulmonary Disease)  Goal: Optimal Chronic Illness Coping  Outcome: Progressing     Problem: Acute Kidney Injury/Impairment  Goal: Fluid and Electrolyte Balance  Outcome: Progressing

## 2024-05-17 NOTE — NURSING
Pt had a bg of [phone], notified Jose Juan BENITEZ about no prn sliding scale. He informed me per day team's note, they will be getting her back on her home regimen, and she will not have a sliding scale. No new orders at this time.

## 2024-05-17 NOTE — PT/OT/SLP PROGRESS
Occupational Therapy   Treatment    Name: Maddie Otero  MRN: [id number]  Admitting Diagnosis:  COPD exacerbation       Recommendations:     Discharge Recommendations: Moderate Intensity Therapy  Discharge Equipment Recommendations:  walker, rolling (supposedly has one ordered but has not received it yet.)  Barriers to discharge:  Decreased caregiver support    Assessment:     Maddie Otero is a 73 y.o. female with a medical diagnosis of COPD exacerbation. Performance deficits affecting function are weakness, impaired endurance, impaired self care skills, impaired functional mobility, gait instability, decreased lower extremity function, pain, decreased ROM, orthopedic precautions.     Rehab Prognosis:  Fair; patient would benefit from acute skilled OT services to address these deficits and reach maximum level of function.       Plan:     Patient to be seen 5 x/week to address the above listed problems via self-care/home management, therapeutic activities, therapeutic exercises  Plan of Care Expires: 06/15/24  Plan of Care Reviewed with: patient    Subjective     Chief Complaint: L rib pain  Patient/Family Comments/goals: none  Pain/Comfort:  Pain Rating 1:  (not rated)  Location - Side 1: Left  Location - Orientation 1: generalized  Location 1: rib(s)  Pain Addressed 1: Nurse notified, Distraction  Pain Rating Post-Intervention 1:  (not rated)    Objective:     Communicated with: nurse Butts prior to session.  Patient found up in chair with cervical collar, oxygen, peripheral IV, telemetry upon OT entry to room.    General Precautions: Standard, fall    Orthopedic Precautions:spinal precautions  Braces: Cervical collar  Respiratory Status: Nasal cannula, flow 2 L/min     Occupational Performance:     Activities of Daily Living:  Grooming: stand by assistance with oral and facial hygiene seated in chair using RUE      AMPA 6 Click ADL:      Treatment & Education:  OT assisted patient with re-donning C-collar  after completing grooming tasks in chair      Patient left up in chair with all lines intact and call button in reach    GOALS:   Multidisciplinary Problems       Occupational Therapy Goals          Problem: Occupational Therapy    Goal Priority Disciplines Outcome Interventions   Occupational Therapy Goal     OT, PT/OT     Description: Goals to be met by: 06/05/2024     Patient will increase functional independence with ADLs by performing:    LE Dressing with Supervision.  Grooming while standing at sink with Supervision.  Toileting from bedside commode with Supervision for hygiene and clothing management.   Stand pivot transfers with Supervision.  Toilet transfer to bedside commode with Modified [place].                         Time Tracking:     OT Date of Treatment: 05/17/24  OT Start Time: 0923  OT Stop Time: 0941  OT Total Time (min): 18 min    Billable Minutes:Self Care/Home Management 18    OT/NYASIA: OT          5/17/2024

## 2024-05-17 NOTE — PT/OT/SLP PROGRESS
Physical Therapy Treatment    Patient Name:  Maddie Otero   MRN:  [id number]    Recommendations:     Discharge Recommendations: Moderate Intensity Therapy  Discharge Equipment Recommendations: to be determined by next level of care  Barriers to discharge:  increase assist with mobility, high risk for falls,  decrease activity tolerance    Assessment:     Maddie Otero is a 73 y.o. female admitted with a medical diagnosis of COPD exacerbation.  She presents with the following impairments/functional limitations: weakness, impaired endurance, impaired self care skills, impaired functional mobility, gait instability, decreased lower extremity function, pain, decreased ROM, orthopedic precautions.    Pt found up in chair. Pt agreeable to visit. Reports not feeling as good as yesterday. Pt requests assist to bathroom. Pt request to ambulate with HHA reporting increase left rib pain with RW WB. Pt ambulated 20 ft x 2 with R HHA and min A due to impaired balance/stability. Pt dependent for hygiene care to due rib pain.     Rehab Prognosis: Fair; patient would benefit from acute skilled PT services to address these deficits and reach maximum level of function.    Recent Surgery: * No surgery found *      Plan:     During this hospitalization, patient to be seen 6 x/week to address the identified rehab impairments via gait training, therapeutic activities, therapeutic exercises, neuromuscular re-education and progress toward the following goals:    Plan of Care Expires:  06/17/24    Subjective     Chief Complaint: rib pain  Patient/Family Comments/goals: go to SNF  Pain/Comfort:  Pain Rating 1: 4/10  Location - Side 1: Left  Location 1: rib(s)  Pain Addressed 1: Reposition, Distraction, Cessation of Activity      Objective:     Communicated with RN prior to session.  Patient found up in chair with peripheral IV, telemetry, oxygen, cervical collar upon PT entry to room.     General Precautions: Standard, fall  Orthopedic  Precautions: spinal precautions  Braces: Cervical collar  Respiratory Status: Nasal cannula, flow 2 L/min     Functional Mobility:  Transfers:     Sit to Stand:  contact guard assistance with R HHA  Gait: 20 ft x 2 with R HHA and min A      AM-PAC 6 CLICK MOBILITY          Treatment & Education:  Pt educated on POC, discharge recommendation, importance of time OOB, proper form with mobility, need for assist with mobility, use of call bell to seek assistance as needed and fall prevention      Patient left up in chair with all lines intact, call button in reach, and chair alarm on..    GOALS:   Multidisciplinary Problems       Physical Therapy Goals          Problem: Physical Therapy    Goal Priority Disciplines Outcome Goal Variances Interventions   Physical Therapy Goal     PT, PT/OT Progressing     Description: 1. Supine to sit with Stand-by Assistance  2. Sit to stand transfer with Stand-by Assistance  3.. Bed to chair transfer with Supervision using Rolling Walker  4. Gait  x 100 feet with Stand ByAssistance using Rolling Walker.                         Time Tracking:     PT Received On: 05/17/24  PT Start Time: 1055     PT Stop Time: 1113  PT Total Time (min): 18 min     Billable Minutes: Therapeutic Activity 18    Treatment Type: Treatment  PT/PTA: PT     Number of PTA visits since last PT visit: 0     05/17/2024

## 2024-05-17 NOTE — PLAN OF CARE
Charts and orders reviewed. Pt to discharge to SNF at [facility]  informed EBEN Butts that  report can be called to [place] at [phone].  setting up transportation.  Pt has no other needs to be addressed by case management. Pt cleared to discharge from case management standpoint.     05/17/24 1545   Final Note   Assessment Type Final Discharge Note   Anticipated Discharge Disposition SNF   What phone number can be called within the next 1-3 days to see how you are doing after discharge? [phone]   Hospital Resources/Appts/Education Provided Provided patient/caregiver with written discharge plan information   Post-Acute Status   Post-Acute Placement Status Set-up Complete/Auth obtained   Coverage [employer]   Discharge Delays None known at this time

## 2024-05-17 NOTE — CONSULTS
Chief complaint:  Shortness of Breath (Had neck surgery .  Friday tripped and fell at home.  Hx of COPD, wears 2L NC at baseline.  Increased O2 to 3L at home 2 days ago because of increased work of breathing.  Left sided back pain, since fall on Friday)      HPI:  Maddie Otero is a 73 y.o. female presenting with a laceration of the left great toe. Pt tripped over a carpet at home and got the laceration on the fall. Pt also has a laceration of the neck. Pt is with daughter and neither has any other concerns today    PMH:  As per HPI and below:  Past Medical History:   Diagnosis Date    A-fib     Anticoagulant long-term use     Arthritis     COPD (chronic obstructive pulmonary disease)     COPD (chronic obstructive pulmonary disease)     Diabetes mellitus, type 2     Diverticulosis     History of left knee replacement     DR CRENSHAW    HNP (herniated nucleus pulposus)     LUMBAR    Hypertension     Lung disease     Pancreatic insufficiency     s/p whipple, non cancer    Presence of dental bridge     UPPER    Wears glasses        Social History     Socioeconomic History    Marital status:    Tobacco Use    Smoking status: Former     Current packs/day: 0.00     Average packs/day: 1 pack/day for 30.0 years (30.0 ttl pk-yrs)     Types: Cigarettes     Start date: 1987     Quit date: 2017     Years since quittin.1    Smokeless tobacco: Never   Substance and Sexual Activity    Alcohol use: No    Drug use: No       Past Surgical History:   Procedure Laterality Date    ANTERIOR CERVICAL DISCECTOMY W/ FUSION N/A 2024    Procedure: DISCECTOMY, SPINE, CERVICAL, ANTERIOR APPROACH, WITH FUSION;  Surgeon: Phu Pierson DO;  Location: [facility] OR;  Service: Neurosurgery;  Laterality: N/A;  ACDF C4-5, C5-6 with partial C5 Corpectomy    APPENDECTOMY      BACK SURGERY      lower back    BACK SURGERY      lower back    CHOLECYSTECTOMY      HERNIA REPAIR      JOINT REPLACEMENT Left     KNEE     "KNEE ARTHROPLASTY Right 5/3/2021    Procedure: ARTHROPLASTY, KNEE;  Surgeon: Manuel Willingham MD;  Location: [facility] OR;  Service: Orthopedics;  Laterality: Right;  guzman    LAPAROSCOPIC REPAIR OF INCISIONAL HERNIA N/A 10/22/2019    Procedure: REPAIR, HERNIA, INCISIONAL, LAPAROSCOPIC;  Surgeon: Pantera Almanza III, MD;  Location: [facility] OR;  Service: General;  Laterality: N/A;    pancrease  2009    stents in pancrease     TONSILLECTOMY      TOTAL KNEE ARTHROPLASTY Left 08/10/2017    WHIPPLE PROCEDURE W/ LAPAROSCOPY  10/09       Family History   Problem Relation Name Age of Onset    Diabetes Mother      Cancer Father         Review of patient's allergies indicates:   Allergen Reactions    Heparin Itching     Pt states she has no allergy      NOT AN ALLERGY . NOT AWARE OF TAKING IT    Hydrocodone Shortness Of Breath     MED "SHORTENS HER BREATHING"    Penicillins Anaphylaxis, Hives and Itching     Other reaction(s): Unknown  Childhood reaction, swelled      Sulfa (sulfonamide antibiotics) Hives    Doxycycline Nausea And Vomiting     Other reaction(s): Abdominal Pain    Clindamycin      "Felt like I was on fire down through throat and felt like I was having spasms in my throat"    Penicillin v      Childhood reaction, swelled       No current facility-administered medications on file prior to encounter.     Current Outpatient Medications on File Prior to Encounter   Medication Sig Dispense Refill    albuterol (PROVENTIL/VENTOLIN HFA) 90 mcg/actuation inhaler INHALE 2 PUFFS INTO THE LUNGS EVERY 6 (SIX) HOURS AS NEEDED FOR WHEEZING. RESCUE 54 g 3    amLODIPine (NORVASC) 5 MG tablet Take 5 mg by mouth once daily.      budesonide-glycopyr-formoterol (BREZTRI AEROSPHERE) 160-9-4.8 mcg/actuation HFAA Inhale 2 puffs into the lungs 2 (two) times a day. 3 g 6    cetirizine (ZYRTEC) 10 MG tablet Take 10 mg by mouth once daily.      famotidine (PEPCID) 20 MG tablet Take 20 mg by mouth 2 (two) times daily.       furosemide (LASIX) 40 MG " "tablet Take 40 mg by mouth as needed (edema).       lipase-protease-amylase 12,000-38,000-60,000 units (CREON) CpDR Take 2 capsules by mouth 4 (four) times daily.      lisinopriL (PRINIVIL,ZESTRIL) 5 MG tablet Take 5 mg by mouth once daily.      methocarbamoL (ROBAXIN) 750 MG Tab Take 1 tablet (750 mg total) by mouth 3 (three) times daily. for 21 days 63 tablet 0    metoprolol succinate (TOPROL-XL) 100 MG 24 hr tablet Take 100 mg by mouth 2 (two) times daily.      metoprolol succinate (TOPROL-XL) 100 MG 24 hr tablet Take 100 mg by mouth once daily.      omeprazole (PRILOSEC) 20 MG capsule Take 1 capsule (20 mg total) by mouth once daily. 90 capsule 3    oxyCODONE-acetaminophen (PERCOCET)  mg per tablet Take 1 tablet by mouth 3 (three) times daily as needed for Pain.      rOPINIRole (REQUIP) 1 MG tablet Take 1 mg by mouth every evening.      [] senna-docusate 8.6-50 mg (PERICOLACE) 8.6-50 mg per tablet Take 1 tablet by mouth once daily. for 14 days 14 tablet 0    insulin NPH-insulin regular, 70/30, 100 unit/mL (70-30) injection Inject 22 Units into the skin 3 (three) times daily with meals.      sodium chloride (OCEAN NASAL NASL) 1 spray by Nasal route daily as needed (rhinitis).       TRUE METRIX GLUCOSE TEST STRIP Strp          ROS: As per HPI and below:  Pertinent items are noted in HPI.      Physical Exam:     Vitals:    24 1556 24 1632 24 1901 249   BP:  (!) 146/79 (!) 145/79    Pulse: 66 71 73    Resp: 18 18 18 18   Temp:  97.6 °F (36.4 °C) 98.4 °F (36.9 °C)    TempSrc:  Oral Oral    SpO2: 100% 96% 96%    Weight:       Height:           BP  Min: 107/56  Max: 170/68  Temp  Av.9 °F (36.6 °C)  Min: 97.4 °F (36.3 °C)  Max: 98.4 °F (36.9 °C)  Pulse  Av.1  Min: 64  Max: 91  Resp  Av  Min: 13  Max: 24  SpO2  Av %  Min: 94 %  Max: 100 %  Height  Av' 8" (172.7 cm)  Min: 5' 8" (172.7 cm)  Max: 5' 8" (172.7 cm)  Weight  Av.9 kg (224 lb 10.2 oz)  Min: " 99.8 kg (220 lb)  Max: 104 kg (229 lb 4.5 oz)    Body mass index is 34.86 kg/m².          General:             Well developed, well nourished, no apparent distress  HEENT:              NCAT, no JVD, mucous membranes moist, EOM intact  Cardiovascular:  Regular rate and rhythm, normal S1, normal S2, No murmurs, rubs, or gallops  Respiratory:        Normal breath sounds, no wheezes, no rales, no rhonchi  Abdomen:           Bowel sounds present, non tender, non distended, no masses, no hepatojugular reflux  Extremities:        No clubbing, no cyanosis, no edema  Vascular:            2+ b/l radial.  Peripheral pulses intact.  No carotid bruits.  Neurological:      No focal deficits  Skin:                   No obvious rashes or erythema, right neck incision, intact, left great toe laceration                        Lab Results   Component Value Date    WBC 15.27 (H) 05/16/2024    HGB 12.6 05/16/2024    HCT 38.7 05/16/2024    MCV 99 (H) 05/16/2024     05/16/2024     Lab Results   Component Value Date    CHOL 156 01/10/2014    CHOL 129 02/06/2012     Lab Results   Component Value Date    HDL 40 01/10/2014    HDL 32 (L) 02/06/2012     Lab Results   Component Value Date    LDLCALC 92.4 01/10/2014    LDLCALC 67.0 02/06/2012     Lab Results   Component Value Date    TRIG 118 01/10/2014    TRIG 152 (H) 02/06/2012     Lab Results   Component Value Date    CHOLHDL 25.6 01/10/2014    CHOLHDL 24.8 02/06/2012     CMP  Recent Labs   Lab 05/16/24  0544   *   CALCIUM 8.6*   ALBUMIN 3.4*   PROT 6.6   *   K 5.2*   CO2 21*      BUN 39*   CREATININE 1.1   ALKPHOS 110   ALT 15   AST 20   BILITOT 0.3      Lab Results   Component Value Date    TSH 1.373 01/10/2014           Assessment and Recommendations       Diagnoses:    1. Surgical incision of the Right neck  2. Left great toe laceration    Plan:  1. AqAg to the left great toe daily  2. Neck incision open to air  3. Pt going to SNF on DC, gave card and information  for wound clinic    Complexity:    moderate

## 2024-05-17 NOTE — ASSESSMENT & PLAN NOTE
Patient's COPD is with exacerbation noted by continued dyspnea and worsening of baseline hypoxia currently.  Patient is currently off COPD Pathway. Continue scheduled inhalers Steroids, Antibiotics, and Supplemental oxygen and monitor respiratory status closely.     Admitted to med/tele  Wean PO steroids  Scheduled nebs   Will change IV abx to PO levaquin  IS

## 2024-05-17 NOTE — CARE UPDATE
05/17/24 0707   Patient Assessment/Suction   Level of Consciousness (AVPU) alert   Respiratory Effort Normal;Unlabored   Expansion/Accessory Muscles/Retractions no use of accessory muscles;no retractions;expansion symmetric   All Lung Fields Breath Sounds diminished   Rhythm/Pattern, Respiratory unlabored;pattern regular;chest wiggle adequate;depth regular;no shortness of breath reported   Cough Frequency no cough   PRE-TX-O2   Device (Oxygen Therapy) nasal cannula   Flow (L/min) (Oxygen Therapy) 2   SpO2 96 %   Pulse Oximetry Type Intermittent   $ Pulse Oximetry - Multiple Charge Pulse Oximetry - Multiple   Pulse 75   Resp 18   Aerosol Therapy   $ Aerosol Therapy Charges Aerosol Treatment   Daily Review of Necessity (SVN) completed   Respiratory Treatment Status (SVN) given   Treatment Route (SVN) oxygen;mask   Patient Position HOB elevated   Post Treatment Assessment (SVN) increased aeration   Signs of Intolerance (SVN) none   Breath Sounds Post-Respiratory Treatment   Throughout All Fields Post-Treatment All Fields   Throughout All Fields Post-Treatment aeration increased   Post-treatment Heart Rate (beats/min) 76   Post-treatment Resp Rate (breaths/min) 19   Education   $ Education Bronchodilator;15 min

## 2024-05-17 NOTE — ASSESSMENT & PLAN NOTE
4:38 PM    Reason for Call: Phone Call    Description: pt called very upset her meds weren't refilled last Friday when she was in/stated she pharmacy received refills for her smoking meds but nothing else    Was an appointment offered for this call? Yes  If yes : Appointment type              Date    Preferred method for responding to this message: Telephone Call  What is your phone number ?[phone]     If we cannot reach you directly, may we leave a detailed response at the number you provided? Yes    Can this message wait until your PCP/provider returns, if available today? Not applicable    Brunilda Puentes    Status post C4-6 anterior cervical diskectomy decompression performed on 04/30/2024   Informed JACINTO Isaac she was in the hospital and would miss her appt that was scheduled for 5/15, will need to schedule another f/u when discharged  Xray C spine with no acute abnormalities

## 2024-05-18 NOTE — NURSING
Pt. discharged to  [facility]  via transport van. Pt in stable condition. AVS reviewed and pt verbalized understanding. Discharge instructions and personal belongings sent with patient. All needs met at time of discharge.

## 2024-05-19 NOTE — DISCHARGE SUMMARY
Pending sale to [facility]  Discharge Summary      Patient Name: Maddie Otero  MRN: [id number]  ANDREW: [id number]  Patient Class: IP- Inpatient  Admission Date: 5/14/2024  Hospital Length of Stay: 1 days  Discharge Date and Time: 5/17/2024  8:19 PM  Attending Physician: No att. providers found   Discharging Provider: Katerine Choi NP  Primary Care Provider: Carrie Gorman FNP    Primary Care Team: Networked reference to record PCT     HPI:   73 year old pt getting admitted with COPD exacerbation and falls  Pt is status post C4-6 anterior cervical diskectomy decompression performed on 04/30/2024   After surgery she did well  Past few days she ahs been suffering from shortness of breath  She felt weak and has to increase her oxygen from 2-4 L  Also pt had episodes of fall and pt came to ER & got admitted   Pt is on lasix and pt reports increased swelling on legs     * No surgery found *      Hospital Course:   Ms. Otero has been monitored closely during her hospitalization. She was admitted on 5/14/24 with COPD exac witho localized right lower lobe bronchitis-bronchiolitis, with scattered lower lung subsegmental atelectasis on CTA. She was started on IV steroids and transitioned to PO. She is chronically on home O2@2L, but required up to 4L. She is getting scheduled nebs. She had a C4-C6 ACDF on 4/30 with Dr. Pierson and is to have her Glasscock J collar in place for 6 weeks. She did have a mechanical fall at home prior to arrival and CT head is negative for acute intracranial abnormality. CTA chest was done for elevated D-Dimer and was negative for PE. US bilat LE is negative for DVT. She's had significant hyperglycemia in the setting of high dose steroids for COPD exac/bronchitis. Potassium is also elevated to 6 on 5/15 and is shifted with insulin/calcium/fluid bolus and treated with lokelma. She was empirically started on levaquin and flagyl for concerns of possible aspiration given recent ACDF.  Potassium and glucose improved with treatment. She reports taking novolog 70/30 22 units TID at home and will resume this in the hospital. Pt's pulmonologist, Dr. Mcneil, apparently stopped by and states she is at baseline and recommended further decreasing steroids. On 5/17/24 potassium has normalized and glucose have improved with resumption of home insulin regimen. She is medically clear for discharge. Pt has been accepted at [facility] and will be discharged. She was seen and examined on the date of discharge.     Goals of Care Treatment Preferences:  Code Status: Full Code      Consults:     No new Assessment & Plan notes have been filed under this hospital service since the last note was generated.  Service: Hospital Medicine    Final Active Diagnoses:    Diagnosis Date Noted POA    PRINCIPAL PROBLEM:  COPD exacerbation [J44.1] 05/14/2024 Yes    Hyperkalemia [E87.5] 05/15/2024 Yes    Recurrent falls [R29.6] 05/14/2024 Not Applicable    On home oxygen therapy [Z99.81] 05/14/2024 Not Applicable    Acute pneumonia [J18.9] 05/14/2024 Yes    Status post cervical spinal fusion [Z98.1] 05/01/2024 Not Applicable    HTN (hypertension) [I10] 04/23/2024 Yes    PAF (paroxysmal atrial fibrillation) [I48.0] 05/11/2021 Yes    Diabetes mellitus type II [E11.9] 10/15/2012 Yes      Problems Resolved During this Admission:       Discharged Condition: stable    Disposition: Skilled Nursing Facility    Follow Up:   Follow-up Information       Center, [facility] Follow up.    Specialties: SNF Agency, Nursing Home Agency, Physical Therapy, Occupational Therapy, SNF Agency  Contact information:  [address] [place] [id number] [phone]                           Patient Instructions:      Diet diabetic     Notify your health care provider if you experience any of the following:  temperature >100.4     Notify your health care provider if you experience any of the following:  persistent nausea and vomiting or diarrhea      Notify your health care provider if you experience any of the following:  severe uncontrolled pain     Notify your health care provider if you experience any of the following:  persistent dizziness, light-headedness, or visual disturbances     Activity as tolerated       Significant Diagnostic Studies: Labs:   Lab Results   Component Value Date    WBC 15.06 (H) 05/17/2024    HGB 12.0 05/17/2024    HCT 37.9 05/17/2024     (H) 05/17/2024     05/17/2024       CMP  Sodium   Date Value Ref Range Status   05/17/2024 136 136 - 145 mmol/L Final   01/03/2019 137 134 - 144 mmol/L      Potassium   Date Value Ref Range Status   05/17/2024 4.1 3.5 - 5.1 mmol/L Final     Chloride   Date Value Ref Range Status   05/17/2024 101 95 - 110 mmol/L Final   01/03/2019 104 98 - 110 mmol/L      CO2   Date Value Ref Range Status   05/17/2024 28 23 - 29 mmol/L Final     Glucose   Date Value Ref Range Status   05/17/2024 253 (H) 70 - 110 mg/dL Final   01/03/2019 187 (H) 70 - 99 mg/dL      BUN   Date Value Ref Range Status   05/17/2024 24 (H) 8 - 23 mg/dL Final     Creatinine   Date Value Ref Range Status   05/17/2024 0.9 0.5 - 1.4 mg/dL Final   01/03/2019 0.79 0.60 - 1.40 mg/dL    11/27/2012 0.8 0.5 - 1.4 mg/dL Final     Calcium   Date Value Ref Range Status   05/17/2024 8.1 (L) 8.7 - 10.5 mg/dL Final   11/27/2012 9.1 8.7 - 10.5 mg/dL Final     Total Protein   Date Value Ref Range Status   05/17/2024 6.1 6.0 - 8.4 g/dL Final     Albumin   Date Value Ref Range Status   05/17/2024 3.4 (L) 3.5 - 5.2 g/dL Final   01/03/2019 3.6 3.1 - 4.7 g/dL      Total Bilirubin   Date Value Ref Range Status   05/17/2024 0.5 0.1 - 1.0 mg/dL Final     Comment:     For infants and newborns, interpretation of results should be based  on gestational age, weight and in agreement with clinical  observations.    Premature Infant recommended reference ranges:  Up to 24 hours.............<8.0 mg/dL  Up to 48 hours............<12.0 mg/dL  3-5  days..................<15.0 mg/dL  6-29 days.................<15.0 mg/dL       Alkaline Phosphatase   Date Value Ref Range Status   05/17/2024 98 55 - 135 U/L Final     AST   Date Value Ref Range Status   05/17/2024 19 10 - 40 U/L Final     ALT   Date Value Ref Range Status   05/17/2024 17 10 - 44 U/L Final     Anion Gap   Date Value Ref Range Status   05/17/2024 7 (L) 8 - 16 mmol/L Final   11/27/2012 11 5 - 15 meq/L Final     eGFR   Date Value Ref Range Status   05/17/2024 >60.0 >60 mL/min/1.73 m^2 Final     X-Ray Cervical Spine AP And Lateral    Result Date: 5/15/2024  EXAMINATION: XR CERVICAL SPINE AP LATERAL CLINICAL HISTORY: fall s/p C4-6 ACDF 4/30; TECHNIQUE: AP, lateral and open mouth views of the cervical spine were performed. COMPARISON: 04/30/2024 FINDINGS: The C7 vertebral body is not well visualized on lateral view due to superimposition of tissues.  Postoperative changes of anterior cervical discectomy and fusion at C4-C6.  No evidence of hardware fracture or loosening.  Vertebral body heights appear maintained.  There is no radiographically evident fracture or subluxation.  The disc spaces are preserved.  Multilevel facet arthropathy.   The prevertebral soft tissues are normal.  The airway is patent.     No radiographically evident acute cervical spine abnormality.  Stable postoperative changes of ACDF at C4-C6.  No evidence of acute hardware complication Electronically signed by: Emmanuel Moss Date:    05/15/2024 Time:    18:13    US Lower Extremity Veins Bilateral    Result Date: 5/14/2024  EXAMINATION: US LOWER EXTREMITY VEINS BILATERAL CLINICAL HISTORY: dvt; TECHNIQUE: Duplex and color flow Doppler and dynamic compression was performed of the bilateral lower extremity veins was performed. COMPARISON: None FINDINGS: Right thigh veins: The common femoral, femoral, popliteal, upper greater saphenous, and deep femoral veins are patent and free of thrombus. The veins are normally compressible and have  normal phasic flow and augmentation response. Right calf veins: The visualized calf veins are patent. Left thigh veins: The common femoral, femoral, popliteal, upper greater saphenous, and deep femoral veins are patent and free of thrombus. The veins are normally compressible and have normal phasic flow and augmentation response. Left calf veins: The visualized calf veins are patent. Miscellaneous: None     No evidence of deep venous thrombosis in either lower extremity. Electronically signed by: Ketan Laguna Date:    05/14/2024 Time:    18:19    CT Head Without Contrast    Result Date: 5/14/2024  EXAMINATION: CT HEAD WITHOUT CONTRAST CLINICAL HISTORY: Fall, head trauma. FINDINGS: Comparison the prior exams including 04/23/2024.  Motion artifact limits the exam.  There is no acute intracranial hemorrhage, with no intra-axial mass, mass effect, or abnormal extra-axial fluid. Gray-white differentiation is maintained, with scattered areas of nonspecific white matter hypoattenuation. The cortical sulci and ventricles are stable in size, with the unenhanced cerebellum and brainstem unremarkable. There is paranasal sinus mucosal thickening, with the mastoid air cells normally aerated.  There is no acute calvarial fracture or scalp hematoma.     Limited exam due to motion, with no acute intracranial hemorrhage or acute calvarial fracture. Electronically signed by: Bashir Hahn Date:    05/14/2024 Time:    14:11    CTA Chest Non-Coronary (PE Studies)    Result Date: 5/14/2024  EXAMINATION: CTA CHEST NON CORONARY (PE STUDIES) CLINICAL HISTORY: Pulmonary embolism (PE) suspected, unknown D-dimer; TECHNIQUE: Thin axial imaging through the chest was performed with 100 mL Omnipaque 350 IV contrast, with sagittal and coronal reformatted images and MIP reconstructions performed, and images stored in the patient's permanent electronic medical record. FINDINGS: Comparison to multiple prior exams.  There are no pulmonary arterial  filling defects to suggest pulmonary thromboembolism.  The central pulmonary arteries are normal in caliber. Scattered calcified and soft plaque involves normal-caliber thoracic aorta, with no aortic dissection or evidence of aortic intramural hematoma.  The heart is normal in size, with no pericardial effusion.  No enlarged mediastinal or hilar lymph nodes. There are lucencies reflecting upper lobe predominant pulmonary emphysema, with localized bronchial wall thickening and mucous plugging in the right lower lobe, with adjacent reticular and linear parenchymal opacities.  There is bandlike atelectasis in the right middle lobe and lingula, with no consolidation.  No pleural effusion, evidence of pulmonary edema, or pneumothorax. Images of the upper abdomen show pneumobilia, and are otherwise unremarkable.  There are no acute fractures or destructive osseous lesions.     1. Negative for pulmonary thromboembolism. 2. Localized right lower lobe bronchitis-bronchiolitis, with scattered lower lung subsegmental atelectasis. . Electronically signed by: Bashir Hahn Date:    05/14/2024 Time:    12:13    X-Ray Toe 2 or More Views Left    Result Date: 5/14/2024  EXAMINATION: XR TOE 2 OR MORE VIEWS LEFT CLINICAL HISTORY: l great toe pain; COMPARISON: None FINDINGS: No acute fracture or focal lesion of the great toe.  Joint spaces are maintained.  No soft tissue gas or radiopaque foreign body.     No acute osseous abnormality. Electronically signed by: Jesus Herron Date:    05/14/2024 Time:    10:36    X-Ray Chest AP Portable    Result Date: 5/14/2024  EXAMINATION: XR CHEST AP PORTABLE CLINICAL HISTORY: Asthma; COMPARISON: 04/30/2024 FINDINGS: Cardiac silhouette size is stable compared to prior.  Bibasilar atelectasis or scarring.  No airspace consolidation.  No large pleural effusion.  No pneumothorax.  Cervical ACDF hardware.     Stable chest radiograph.  No acute pulmonary pathology. Electronically signed by: Jesus Herron  Date:    05/14/2024 Time:    10:34    X-Ray Chest 1 View    Result Date: 4/30/2024  EXAMINATION: XR CHEST 1 VIEW CLINICAL HISTORY: COPD; Chronic obstructive pulmonary disease with (acute) exacerbation TECHNIQUE: Single frontal view of the chest was performed. COMPARISON: 04/23/2024, multiple prior exams dating back to 2009 FINDINGS: Cardiac monitor wires overlie the chest.  Cardiomediastinal silhouette appears stable.  Scattered thin bandlike interstitial opacities in bilateral mid and lower lungs suggestive of chronic scarring or subsegmental atelectasis, similar to prior exam.  No definite new focal consolidation, significant pleural effusion or pneumothorax.  No acute osseous abnormality.  Anterior fusion hardware in the lower cervical spine.     No definite acute radiographic abnormality. Electronically signed by: Emmanuel Moss Date:    04/30/2024 Time:    20:07    X-Ray Cervical Spine AP And Lateral    Result Date: 4/30/2024  EXAMINATION: XR CERVICAL SPINE AP LATERAL CLINICAL HISTORY: Post op cervical spinal fusion; TECHNIQUE: AP, lateral and open mouth views of the cervical spine were performed. COMPARISON: MRI cervical spine 04/23/2024 FINDINGS: Recent postoperative changes of anterior cervical discectomy and fusion at C4-C6.  Hardware appears well aligned and intact without evidence of fracture or loosening.  Surgical drain within the prevertebral soft tissues.  There is no radiographically evident acute fracture or subluxation.  The C7 vertebral body is incompletely visualized on lateral view due to superimposition of tissues.  Multilevel facet arthropathy.  The odontoid process appears intact.    The airway is patent.     Recent postoperative changes of anterior cervical discectomy and fusion at C4-C6.  No evidence of acute hardware complication. Electronically signed by: Emmanuel Moss Date:    04/30/2024 Time:    20:05    FL Flouro Usage    Result Date: 4/30/2024  See Notes This procedure was  auto-finalized.    MRI Cervical Spine Without Contrast    Result Date: 4/23/2024  EXAMINATION: MRI CERVICAL SPINE WITHOUT CONTRAST CLINICAL HISTORY: Neck pain, spinal stenosis, abnormal CT COMPARISON: Same date CT cervical spine FINDINGS: Multiplanar noncontrast imaging was performed.  Exam is significantly limited by repetitive motion artifact, which persists upon multiple repeat attempts. There is no evidence of cervical fracture or osseous destructive lesion.  C5 retrolisthesis of 3 mm is noted, with normal alignment at all other levels.  While poorly visualized because of motion artifact, there is probable signal increase within the cervical cord at the C5-6 level suggesting myelomalacia. Axial images are very limited secondary to motion artifact.  The following findings are noted: C2-3: Right-sided facet hypertrophy with probable mild foraminal stenosis. C3-4: Better demonstrated on CT, there is bilateral facet hypertrophy with severe left and moderate right foraminal stenosis. C4-5: Better demonstrated on CT, there is severe right-sided degenerative facet hypertrophy with uncinate spurring and severe right-sided foraminal stenosis.  There is mild foraminal narrowing on the left. C5-6: Broad-based disc/osteophyte complex combines with bilateral degenerative facet hypertrophy to result in probable severe narrowing of the spinal canal.  Uncinate process and facet hypertrophy cause severe bilateral foraminal stenosis, better demonstrated on CT. C6-7: Mild broad-based disc bulge without significant spinal stenosis.  Foramina are poorly visualized, but appeared within normal limits on prior CT. C7-T1: No significant abnormalities are identified.     1. Exam is significantly compromised by repetitive motion artifact. 2. Changes of multilevel cervical degenerative disc and facet disease.  Findings appear most pronounced at C5-6, where there is probable severe spinal stenosis and severe bilateral foraminal narrowing.   Sagittal T2 weighted images demonstrate mild signal increase within the substance of the cervical cord at this level most compatible with myelomalacia.  Please see additional details as noted at each individual level. 3. Mild C5 retrolisthesis, felt to be degenerative in nature. 4. Additional details as above. Electronically signed by: Emir Chen Date:    04/23/2024 Time:    15:15    CTA Chest Aorta Non Coronary    Result Date: 4/23/2024  EXAMINATION: CTA CHEST AORTA NON CORONARY CLINICAL HISTORY: Aortic dissection suspected;Interstitial lung disease; acute chest pain. TECHNIQUE: Thin axial imaging through the chest was performed with 100 mL Omnipaque 350 IV contrast, with sagittal and coronal reformatted images and MIP reconstructions performed, and images stored in the patient's permanent electronic medical record. FINDINGS: Comparison to multiple prior exams.  There are no pulmonary arterial filling defects to suggest pulmonary thromboembolism.  The central pulmonary arteries are enlarged. Diffuse calcified and soft plaque involves normal-caliber thoracic aorta, with no aortic dissection or evidence of aortic intramural hematoma.  The heart is mildly enlarged, with no pericardial effusion.  No enlarged mediastinal or hilar lymph nodes. Upper lobe predominant lucencies in both lungs reflecting emphysema, with no consolidation or evidence of interstitial pulmonary edema.  There is scattered bronchial wall thickening, with no bronchiectasis or central mucous plugging.  Linear and reticular densities in the lower lungs reflect subsegmental atelectasis, with bandlike opacity suggesting atelectasis in the lingula.  No pleural effusion or pneumothorax. Images of the upper abdomen show pneumobilia, and are otherwise unremarkable.  There are no acute fractures or destructive osseous lesions.     1. Negative for pulmonary embolism or aortic dissection. 2. Mild cardiomegaly, with enlarged central pulmonary arteries,  nonspecific but suggestive of pulmonary arterial hypertension. 3. Pulmonary emphysema. 4. Scattered bronchial wall thickening suggesting nonspecific bronchitis-bronchiolitis. Electronically signed by: Bashir Hahn Date:    04/23/2024 Time:    12:49    CT Cervical Spine Without Contrast    Result Date: 4/23/2024  EXAMINATION: CT CERVICAL SPINE WITHOUT CONTRAST CLINICAL HISTORY: Neck pain, chronic, degenerative changes on xray; TECHNIQUE: Axial noncontrast imaging was performed, with sagittal and coronal reformatted images reviewed. COMPARISON: None. FINDINGS: There is straightening of the normal cervical spinal curvature, with normal vertebral body heights and alignment, and no acute fractures or destructive osseous lesions.  There is moderate to advanced multilevel intervertebral disc space narrowing with vertebral osteophytes, and moderate to severe multilevel cervical facet arthropathy. The craniocervical junction and prevertebral soft tissues are normal.  Axial images show varying degrees of spinal canal stenosis and neural foraminal narrowing.  Of note, there is broad-based disc bulging with osteophytic ridging at C5-C6, producing severe spinal canal stenosis and potential cervical cord compression, with severe bilateral neural foraminal narrowing. There is no evidence of spinal epidural hematoma, with no acute cervical soft tissue abnormalities.  Hypodensity along the lower margin of the left thyroid lobe is nonspecific.  The lung apices are clear.  Sagittal and coronal reformatted images show no acute fracture or malalignment.     1. Broad-based disc protrusion with osteophytic ridging at C5-C6, producing severe spinal canal stenosis and potential cervical cord compression.  Consider further evaluation with cervical spine MRI. 2. Multilevel cervical degenerative disc disease and facet osteoarthritis, with no acute fractures or destructive osseous lesions. 3. Straightening of the normal cervical spinal  curvature, which could be positional, or reflective of paraspinal muscular spasm. . Electronically signed by: Bashir Hahn Date:    04/23/2024 Time:    12:38    CT Head Without Contrast    Result Date: 4/23/2024  EXAMINATION: CT HEAD WITHOUT CONTRAST CLINICAL HISTORY: Dizziness, persistent/recurrent, cardiac or vascular cause suspected; FINDINGS: No prior studies for comparison. There is no acute intracranial hemorrhage, with no mass effect or abnormal extra-axial fluid. Scattered areas of nonspecific hypoattenuation involve the white matter, with gray-white differentiation maintained. There is mild generalized prominence of the cortical sulci and ventricles. The cerebellum and brainstem are unremarkable.  There are carotid siphon vascular calcifications. There is paranasal sinus mucosal thickening, with no sinus air-fluid levels.  The mastoid air cells are clear.  There are no acute fractures or destructive osseous lesions.     1. Scattered areas of nonspecific white matter hypoattenuation, presumably reflecting gliosis from chronic small vessel ischemic disease. 2. No acute intracranial hemorrhage, mass effect, or loss of gray differentiation. Electronically signed by: Bashir Hahn Date:    04/23/2024 Time:    12:34    X-Ray Chest AP Portable    Result Date: 4/23/2024  EXAMINATION: XR CHEST AP PORTABLE CLINICAL HISTORY: Chest Pain; FINDINGS: Portable chest radiograph at 09:22 hours compared to prior exams including 10/11/2023 shows the cardiomediastinal silhouette is within normal limits.  The central pulmonary arteries are prominent, unchanged. The lungs are normally expanded, with no consolidation, pleural effusion, or evidence of pulmonary edema.  Lucencies reflecting emphysema are unchanged, with no pneumothorax or acute fracture.     No evidence of acute cardiopulmonary disease. Electronically signed by: Bashir Hahn Date:    04/23/2024 Time:    09:37       Pending Diagnostic Studies:       None            Medications:  Reconciled Home Medications:      Medication List        START taking these medications      apixaban 5 mg Tab  Commonly known as: ELIQUIS  Take 1 tablet (5 mg total) by mouth 2 (two) times daily.     levoFLOXacin 500 MG tablet  Commonly known as: LEVAQUIN  Take 1 tablet (500 mg total) by mouth once daily. for 5 days     LIDOcaine 5 %  Commonly known as: LIDODERM  Place 1 patch onto the skin once daily. Remove & Discard patch within 12 hours or as directed by MD for 14 days     predniSONE 10 MG tablet  Commonly known as: DELTASONE  Take 1 tablet (10 mg total) by mouth once daily. for 3 days            CHANGE how you take these medications      metoprolol succinate 100 MG 24 hr tablet  Commonly known as: TOPROL-XL  Take 100 mg by mouth 2 (two) times daily.  What changed: Another medication with the same name was removed. Continue taking this medication, and follow the directions you see here.            CONTINUE taking these medications      albuterol 90 mcg/actuation inhaler  Commonly known as: PROVENTIL/VENTOLIN HFA  INHALE 2 PUFFS INTO THE LUNGS EVERY 6 (SIX) HOURS AS NEEDED FOR WHEEZING. RESCUE     amLODIPine 5 MG tablet  Commonly known as: NORVASC  Take 5 mg by mouth once daily.     BREZTRI AEROSPHERE 160-9-4.8 mcg/actuation Hfaa  Generic drug: budesonide-glycopyr-formoterol  Inhale 2 puffs into the lungs 2 (two) times a day.     cetirizine 10 MG tablet  Commonly known as: ZYRTEC  Take 10 mg by mouth once daily.     CREON Cpdr  Generic drug: lipase-protease-amylase 12,000-38,000-60,000 units  Take 2 capsules by mouth 4 (four) times daily.     famotidine 20 MG tablet  Commonly known as: PEPCID  Take 20 mg by mouth 2 (two) times daily.     furosemide 40 MG tablet  Commonly known as: LASIX  Take 40 mg by mouth as needed (edema).     insulin NPH-insulin regular (70/30) 100 unit/mL (70-30) injection  Inject 22 Units into the skin 3 (three) times daily with meals.     methocarbamoL 750 MG Tab  Commonly  known as: ROBAXIN  Take 1 tablet (750 mg total) by mouth 3 (three) times daily. for 21 days     OCEAN NASAL NASL  1 spray by Nasal route daily as needed (rhinitis).     omeprazole 20 MG capsule  Commonly known as: PRILOSEC  Take 1 capsule (20 mg total) by mouth once daily.     oxyCODONE-acetaminophen  mg per tablet  Commonly known as: PERCOCET  Take 1 tablet by mouth 3 (three) times daily as needed for Pain.     rOPINIRole 1 MG tablet  Commonly known as: REQUIP  Take 1 mg by mouth every evening.            STOP taking these medications      lisinopriL 5 MG tablet  Commonly known as: PRINIVIL,ZESTRIL     STOOL SOFTENER-LAXATIVE 8.6-50 mg per tablet  Generic drug: senna-docusate 8.6-50 mg     TRUE METRIX GLUCOSE TEST STRIP Strp  Generic drug: blood sugar diagnostic              Indwelling Lines/Drains at time of discharge:   Lines/Drains/Airways       None                   Time spent on the discharge of patient: 43 minutes         Katerine Choi NP  Department of Hospital Medicine  [facility]

## 2024-05-21 ENCOUNTER — EXTERNAL HOME HEALTH (OUTPATIENT)
Dept: HOME HEALTH SERVICES | Facility: HOSPITAL | Age: 74
End: 2024-05-21
Payer: MEDICARE

## 2024-06-08 LAB
OHS QRS DURATION: 102 MS
OHS QTC CALCULATION: 464 MS

## 2024-06-10 ENCOUNTER — TELEPHONE (OUTPATIENT)
Dept: CARDIOLOGY | Facility: CLINIC | Age: 74
End: 2024-06-10
Payer: MEDICARE

## 2024-06-10 NOTE — TELEPHONE ENCOUNTER
----- Message from Ragini Kimbrough sent at 6/10/2024 10:02 AM CDT -----  Type:  Appointment Request for Hospital F/U    Caller is requesting a Hospital F/U appointment.      Name of Caller:   Pt    When is the first available appointment?   08/06/2024    Would the patient rather a call back or a response via [facility]?   Call back    Best Call Back Number:   [phone]    Additional Information:  Pt has an appt with Dr. Edmond that she canceled due being in hospital. Needing to est care with cardiology and have hospital f/u.   Please call back to advise. Thanks!   [facility] - [facility]HANNA L KRAK[facility] [place] [address] [place] [phone]       Vikram Escoto  [id number]  1959    ORTHOPAEDIC SURGERY OUTPATIENT NOTE  8/30/2022      HISTORY:  57 y o  female who presents to the office today for a follow-up evaluation for her left proximal humerus fracture, DOI 8/13/2022  She states that her pain is well controlled  She she states that she will use Tylenol as needed to help alleviate her pain  She has been compliant with the pendulum exercises and elbow range of motion  She has been compliant with the use of sling      Past Medical History:   Diagnosis Date    Anemia     Chemotherapy follow-up examination     Depression     Endometrial cancer ([facility] [address] ) 12/2017    History of chemotherapy     started 12/2021endometrial cancer    Hyperglycemia     vx type 2 dm -- last assessed 4/1/14; resolved 11/7/17    Hyperlipidemia     Hypertension     Obesity     last assessed 10/14/17; resolved 11/7/17       Past Surgical History:   Procedure Laterality Date    ABDOMINAL SURGERY      GASTRIC BYPASS    BREAST BIOPSY Right 06/28/2019    core biopsy; benign    CHOLECYSTECTOMY      at the time of gastric bypass    COLONOSCOPY      CT NEEDLE BIOPSY LYMPH NODE  07/08/2019    FL GUIDED CENTRAL VENOUS ACCESS DEVICE INSERTION  11/12/2019    GASTRIC BYPASS      HYSTERECTOMY Bilateral 2017    total abdominal with salpingo-oophorectomy    IR NEPHROSTOMY TO NEPHROURETERAL STENT  06/09/2022    IR NEPHROSTOMY TUBE CHECK/CHANGE/REPOSITION/REINSERTION/UPSIZE  05/27/2022    IR NEPHROSTOMY TUBE PLACEMENT  07/26/2019    IR NEPHROURETERAL STENT CHECK/CHANGE/REPOSITION  04/06/2021    IR NEPHROURETERAL STENT CHECK/CHANGE/REPOSITION  06/04/2021    IR NEPHROURETERAL STENT CHECK/CHANGE/REPOSITION  09/03/2021    IR NEPHROURETERAL STENT CHECK/CHANGE/REPOSITION  12/07/2021    IR NEPHROURETERAL STENT CHECK/CHANGE/REPOSITION  03/08/2022    IR NEPHROURETERAL STENT CHECK/CHANGE/REPOSITION  05/10/2022    IR PICC LINE  09/27/2019    IR PORT PLACEMENT  07/26/2019    IR PORT REMOVAL  09/20/2019    OOPHORECTOMY Bilateral 2017    WV INSJ TUNNELED CTR VAD W/SUBQ PORT AGE 5 YR/> Left 11/12/2019    Procedure: INSERTION VENOUS PORT ( PORT-A-CATH) IR;  Surgeon: Cyndi Dove DO;  [facility];  Service: Interventional Radiology    W[place] LAP, RADICAL HYST W/ TUBE&OV, NODE BX N/A 12/19/2017    Procedure: HYSTERECTOMY LAPAROSCOPIC TOTAL ([address]) W/ ROBOTICS; BILATERAL SALPINGOOPHERECTOMY; LYMPH NODE DISSECTION; lysis of adhesions;  Surgeon: Alyssa Winters MD;  [facility];  Service: Gynecology Oncology    W[place] LAP,DIAGNOSTIC ABDOMEN N/A 12/19/2017    Procedure: LAPAROSCOPY DIAGNOSTIC;  Surgeon: Alyssa Winters MD;  [facility];  Service: Gynecology Oncology    TONSILLECTOMY      US GUIDED BREAST BIOPSY RIGHT COMPLETE Right 06/28/2019       Social History     Socioeconomic History    Marital status: Single     Spouse name: Not on file    Number of children: Not on file    Years of education: Not on file    Highest education level: Not on file   Occupational History    Not on file   Tobacco Use    Smoking status: Never Smoker    Smokeless tobacco: Never Used   Vaping Use    Vaping Use: Never used   Substance and Sexual Activity    Alcohol use: Not Currently    Drug use: No    Sexual activity: Not Currently   Other Topics Concern    Not on file   Social History Narrative    Not on file     Social Determinants of Health     Financial Resource Strain: Not on file   Food Insecurity: Not on file   Transportation Needs: Not on file   Physical Activity: Not on file   Stress: Not on file   Social Connections: Not on file   Intimate Partner Violence: Not on file   Housing Stability: Not on file       Family History   Problem Relation Age of Onset    Other Mother         dyslipidemia    Ovarian cancer Mother 48    Lymphoma Father  Breast cancer Sister 61    No Known Problems Maternal Grandmother     Bone cancer Maternal Grandfather     Uterine cancer Paternal Grandmother     No Known Problems Paternal Grandfather     No Known Problems Brother     No Known Problems Brother     No Known Problems Maternal Aunt     No Known Problems Maternal Aunt     No Known Problems Maternal Aunt     No Known Problems Maternal Aunt     No Known Problems Paternal Aunt     No Known Problems Paternal Aunt     No Known Problems Paternal Aunt     No Known Problems Paternal Aunt         Patient's Medications   New Prescriptions    No medications on file   Previous Medications    ARIPIPRAZOLE (ABILIFY) 20 MG TABLET    Take 20 mg by mouth in the morning  ASPIRIN (ECOTRIN LOW STRENGTH) 81 MG EC TABLET    Take 81 mg by mouth daily    CALCIUM-MAGNESIUM-VITAMIN D (CALCIUM 500 PO)    Take by mouth daily     CHOLECALCIFEROL (VITAMIN D3) 1,000 UNITS TABLET    Take 1,000 Units by mouth daily    CLOTRIMAZOLE-BETAMETHASONE (LOTRISONE) 1-0 05 % CREAM    Apply topically 2 (two) times a day    CRANBERRY-VITAMIN C 250-60 MG CAPS    Take 1 tablet by mouth in the morning    CYANOCOBALAMIN (VITAMIN B12 PO)    Take by mouth daily     DRONABINOL (MARINOL) 2 5 MG CAPSULE    Take 1 capsule (2 5 mg total) by mouth 2 (two) times a day before meals    ENOXAPARIN (LOVENOX) 80 MG/0 8 ML    Inject 0 8 mL (80 mg total) under the skin every 24 hours    ESTRADIOL (ESTRACE VAGINAL) 0 1 MG/G VAGINAL CREAM    Insert 1g nightly for 6 weeks, then 1-2x weekly      FOLIC ACID (FOLVITE) 1 MG TABLET    Take 1 tablet (1 mg total) by mouth daily    GEMFIBROZIL (LOPID) 600 MG TABLET    TAKE 1 TABLET BY MOUTH EVERY DAY    LORAZEPAM (ATIVAN) 0 5 MG TABLET    Take 1 tablet (0 5 mg total) by mouth every 6 (six) hours as needed for anxiety (or nausea)    MULTIPLE VITAMIN (MULTIVITAMIN) TABLET    Take 1 tablet by mouth daily    NALOXONE (NARCAN) 4 MG/0 1 ML NASAL SPRAY    Administer 1 spray into a nostril  If no response after 2-3 minutes, give another dose in the other nostril using a new spray  OMEPRAZOLE (PRILOSEC) 20 MG DELAYED RELEASE CAPSULE    Take 20 mg by mouth daily    ONDANSETRON (ZOFRAN) 8 MG TABLET    Take 1 tablet (8 mg total) by mouth every 8 (eight) hours as needed for nausea or vomiting    OXYBUTYNIN (DITROPAN XL) 15 MG 24 HR TABLET    TAKE 1 TABLET BY MOUTH DAILY AT BEDTIME    RUCAPARIB (RUBRACA) 300 MG TABLET    Take 600 mg by mouth every 12 (twelve) hours Take with or without food  Do not repeat a vomited dose  SACCHAROMYCES BOULARDII (FLORASTOR) 250 MG CAPSULE    Take 1 capsule (250 mg total) by mouth 2 (two) times a day    SENNA (SENOKOT) 8 6 MG TABLET    Take 1 tablet (8 6 mg total) by mouth 2 (two) times a day as needed for constipation    VENLAFAXINE (EFFEXOR) 75 MG TABLET    Take 75 mg by mouth 3 (three) times a day     Modified Medications    No medications on file   Discontinued Medications    No medications on file       Allergies   Allergen Reactions    Cephalosporins Rash     Which Cephalosporin reaction was to not specified; however, has tolerated Amoxicillin, Cefazolin, and Cefepime        /76 (BP Location: Right arm, Patient Position: Sitting)   Pulse 79   Resp 17   Ht 4' 11" (1 499 m)   Wt 58 1 kg (128 lb)   LMP  (LMP Unknown)   BMI 25 85 kg/m²      REVIEW OF SYSTEMS:  Constitutional: Negative  HEENT: Negative  Respiratory: Negative  Skin: Negative  Neurological: Negative  Psychiatric/Behavioral: Negative  Musculoskeletal: Negative except for that mentioned in the HPI  /76 (BP Location: Right arm, Patient Position: Sitting)   Pulse 79   Resp 17   Ht 4' 11" (1 499 m)   Wt 58 1 kg (128 lb)   LMP  (LMP Unknown)   BMI 25 85 kg/m²   Gen: Alert and oriented to person, place, time  HEENT: EOMI, eyes clear, moist mucus membranes, hearing intact  Respiratory: Bilateral chest rise   No audible wheezing found  Cardiovascular: Regular Rate and Rhythm  Abdomen: soft nontender/nondistended     PHYSICAL EXAM:  Left Shoulder  Range of motion deferred due to fracture  Full range of motion of elbow, wrist and digits  Neurovascularly intact    IMAGING:  X-rays performed in the office today of her left shoulder demonstrates a nondisplaced left surgical neck proximal humerus fracture  ASSESSMENT AND PLAN:  57 y o  female left proximal humerus fracture, DOI 8/13/2022    X-ray's were reviewed in the office today with the patient  A prescription for formal physical therapy was provided to the patient at today's visit to work on gentle range of motion in 2 weeks  She may begin strengthening at 6 weeks  She should continue with the pendulum exercises  She will be going on vacation next week  She was instructed to remain nonweightbearing with her left upper extremity  She was instructed to continue with the sling  I will follow-up with her in 4 weeks for a clinical re-evaluation and repeat left shoulder x-rays  She understood and had no further question is      Scribe Attestation    I,:  Chelo Stoner am acting as a scribe while in the presence of the attending physician :       I,:  An Dos Santos personally performed the services described in this documentation    as scribed in my presence :

## 2024-06-18 ENCOUNTER — TELEPHONE (OUTPATIENT)
Dept: NEUROSURGERY | Facility: CLINIC | Age: 74
End: 2024-06-18
Payer: MEDICARE

## 2024-06-18 NOTE — TELEPHONE ENCOUNTER
----- Message from Stacey M Lefort sent at 6/18/2024 12:55 PM CDT -----  Pt is requesting a callback at [phone].

## 2024-06-19 ENCOUNTER — HOSPITAL ENCOUNTER (OUTPATIENT)
Dept: RADIOLOGY | Facility: HOSPITAL | Age: 74
Discharge: HOME | End: 2024-06-19
Attending: HEALTH CARE PROVIDER
Payer: MEDICARE

## 2024-06-19 ENCOUNTER — OFFICE VISIT (OUTPATIENT)
Dept: NEUROSURGERY | Facility: CLINIC | Age: 74
End: 2024-06-19
Payer: MEDICARE

## 2024-06-19 VITALS
HEART RATE: 74 BPM | BODY MASS INDEX: 33.21 KG/M2 | SYSTOLIC BLOOD PRESSURE: 138 MMHG | HEIGHT: 68 IN | WEIGHT: 219.13 LBS | DIASTOLIC BLOOD PRESSURE: 83 MMHG

## 2024-06-19 DIAGNOSIS — Z98.1 S/P CERVICAL SPINAL FUSION: ICD-10-CM

## 2024-06-19 DIAGNOSIS — M81.0 AGE-RELATED OSTEOPOROSIS WITHOUT CURRENT PATHOLOGICAL FRACTURE: ICD-10-CM

## 2024-06-19 DIAGNOSIS — Z98.1 S/P CERVICAL SPINAL FUSION: Primary | ICD-10-CM

## 2024-06-19 PROCEDURE — 1125F AMNT PAIN NOTED PAIN PRSNT: CPT | Mod: CPTII,S$GLB,, | Performed by: HEALTH CARE PROVIDER

## 2024-06-19 PROCEDURE — 72040 X-RAY EXAM NECK SPINE 2-3 VW: CPT | Mod: 26,,, | Performed by: RADIOLOGY

## 2024-06-19 PROCEDURE — 3075F SYST BP GE 130 - 139MM HG: CPT | Mod: CPTII,S$GLB,, | Performed by: HEALTH CARE PROVIDER

## 2024-06-19 PROCEDURE — 3079F DIAST BP 80-89 MM HG: CPT | Mod: CPTII,S$GLB,, | Performed by: HEALTH CARE PROVIDER

## 2024-06-19 PROCEDURE — 72040 X-RAY EXAM NECK SPINE 2-3 VW: CPT | Mod: TC

## 2024-06-19 PROCEDURE — 99024 POSTOP FOLLOW-UP VISIT: CPT | Mod: S$GLB,,, | Performed by: HEALTH CARE PROVIDER

## 2024-06-19 PROCEDURE — 1101F PT FALLS ASSESS-DOCD LE1/YR: CPT | Mod: CPTII,S$GLB,, | Performed by: HEALTH CARE PROVIDER

## 2024-06-19 PROCEDURE — 3288F FALL RISK ASSESSMENT DOCD: CPT | Mod: CPTII,S$GLB,, | Performed by: HEALTH CARE PROVIDER

## 2024-06-19 PROCEDURE — 3052F HG A1C>EQUAL 8.0%<EQUAL 9.0%: CPT | Mod: CPTII,S$GLB,, | Performed by: HEALTH CARE PROVIDER

## 2024-06-19 PROCEDURE — 1159F MED LIST DOCD IN RCRD: CPT | Mod: CPTII,S$GLB,, | Performed by: HEALTH CARE PROVIDER

## 2024-06-19 NOTE — PROGRESS NOTES
"  Neurosurgery  Post-Operative Follow up    SUBJECTIVE:     History of Present Illness:  Maddie Otero is a 73 y.o. female  presents status post C4-6 Anterior cervical discectomy and fusion on 04/30/2024 for cervical spinal stenosis, degenerative disc disease, radiculopathy and spondylolisthesis.  Patient presented with her daughter for 6 week postop appointment.  Today, patient reports resolution of posterior cervical pain that radiated to her right arm.  She recently was released from skilled nursing facility and reports she is currently at her baseline.  Patient is also undergoing outpatient physical therapy.  She also reports intermittent dysphagia which was present prior to surgical intervention.  She denies incisional pain/issues, dysphonia, new upper extremity weakness, numbness or tingling.  Patient reports she is very pleased with her postoperative results.    Patient also reports she is currently not taking any medication or supplements for osteoporosis nor is she currently under the care of an endocrinologist.      OBJECTIVE:     Vital Signs  Pulse: 74  BP: 138/83  Pain Score:   3  Height: 5' 8" (172.7 cm)  Weight: 99.4 kg (219 lb 2.2 oz)  Body mass index is 33.32 kg/m².    Neurosurgery Physical Exam  General:  Very pleasant.  No acute distress.  Neck: No tracheal deviation.  Collar in place.  Neurologic: Alert and oriented. Thought content appropriate.  GCS: E4 V5 M6; Total: 15  Pulmonary: normal respirations, no signs of respiratory distress  Skin: Skin is warm, dry and intact.    Sensory: intact to light touch throughout  Motor Strength: Moves all extremities spontaneously with good tone.  No abnormal movements seen.  Full strength upper and lower extremities.        Anterior incision:  Well healed. No surrounding erythema or edema. No drainage from incision. No palpable hematoma or underlying fluid collection.    Diagnostic Imaging:  I have personally reviewed cervical x-rays images obtained " 06/19/2024 with patient which revealed stable alignment without hardware malfunction or complication.    ASSESSMENT/PLAN:     1. S/P cervical spinal fusion    2. Age-related osteoporosis without current pathological fracture        In regards to postoperative status, patient is doing very well.  No focal deficits seen on exam.  Patient can gradually decreased cervical collar usage.  Recommend patient continue physical therapy for maximum medical benefit.    Patient has a history of untreated presumed osteoporosis based on bone quality during surgical intervention.  Recommend starting calcium and vitamin-D supplements.  Referral for endocrinology placed.  Updated DEXA scan ordered.    Patient will follow-up in 3 months with cervical x-rays.        S/P cervical spinal fusion  -     X-Ray Cervical Spine 2 or 3 Views; Future; Expected date: 09/19/2024    Age-related osteoporosis without current pathological fracture  -     Ambulatory referral/consult to Endocrinology; Future; Expected date: 06/26/2024  -     DXA Bone Density Axial Skeleton 1 or more sites; Future; Expected date: 06/19/2024        I spent a total of 44 minutes non-face and face to face time preparing to see the patient (eg, review of tests), obtaining and/or reviewing separately obtained history, documenting clinical information in the electronic or other health record, interpreting results and communicating results to the patient/family/caregiver, or care coordinator.    Lucia Dewey PA-C  Neurosurgery  Formerly [facility]

## 2024-06-21 ENCOUNTER — DOCUMENT SCAN (OUTPATIENT)
Dept: HOME HEALTH SERVICES | Facility: HOSPITAL | Age: 74
End: 2024-06-21
Payer: MEDICARE

## 2024-06-26 ENCOUNTER — DOCUMENT SCAN (OUTPATIENT)
Dept: HOME HEALTH SERVICES | Facility: HOSPITAL | Age: 74
End: 2024-06-26
Payer: MEDICARE

## 2024-06-28 ENCOUNTER — DOCUMENT SCAN (OUTPATIENT)
Dept: HOME HEALTH SERVICES | Facility: HOSPITAL | Age: 74
End: 2024-06-28
Payer: MEDICARE

## 2024-06-28 PROCEDURE — 99499 UNLISTED E&M SERVICE: CPT | Mod: ,,, | Performed by: NEUROLOGICAL SURGERY

## 2024-07-03 ENCOUNTER — PATIENT MESSAGE (OUTPATIENT)
Dept: ENDOCRINOLOGY | Facility: CLINIC | Age: 74
End: 2024-07-03
Payer: MEDICARE

## 2024-07-03 ENCOUNTER — HOSPITAL ENCOUNTER (OUTPATIENT)
Dept: RADIOLOGY | Facility: HOSPITAL | Age: 74
Discharge: HOME | End: 2024-07-03
Attending: HEALTH CARE PROVIDER
Payer: MEDICARE

## 2024-07-03 ENCOUNTER — OFFICE VISIT (OUTPATIENT)
Dept: CARDIOLOGY | Facility: CLINIC | Age: 74
End: 2024-07-03
Payer: MEDICARE

## 2024-07-03 VITALS
SYSTOLIC BLOOD PRESSURE: 127 MMHG | DIASTOLIC BLOOD PRESSURE: 68 MMHG | HEART RATE: 69 BPM | HEIGHT: 68 IN | WEIGHT: 216.06 LBS | BODY MASS INDEX: 32.74 KG/M2

## 2024-07-03 DIAGNOSIS — M81.0 AGE-RELATED OSTEOPOROSIS WITHOUT CURRENT PATHOLOGICAL FRACTURE: ICD-10-CM

## 2024-07-03 DIAGNOSIS — I10 HYPERTENSION, UNSPECIFIED TYPE: ICD-10-CM

## 2024-07-03 DIAGNOSIS — I48.91 ATRIAL FIBRILLATION, UNSPECIFIED TYPE: ICD-10-CM

## 2024-07-03 DIAGNOSIS — R60.9 EDEMA, UNSPECIFIED TYPE: Primary | ICD-10-CM

## 2024-07-03 PROCEDURE — 99204 OFFICE O/P NEW MOD 45 MIN: CPT | Mod: S$GLB,,, | Performed by: INTERNAL MEDICINE

## 2024-07-03 PROCEDURE — 3074F SYST BP LT 130 MM HG: CPT | Mod: CPTII,S$GLB,, | Performed by: INTERNAL MEDICINE

## 2024-07-03 PROCEDURE — 1160F RVW MEDS BY RX/DR IN RCRD: CPT | Mod: CPTII,S$GLB,, | Performed by: INTERNAL MEDICINE

## 2024-07-03 PROCEDURE — 1126F AMNT PAIN NOTED NONE PRSNT: CPT | Mod: CPTII,S$GLB,, | Performed by: INTERNAL MEDICINE

## 2024-07-03 PROCEDURE — 3288F FALL RISK ASSESSMENT DOCD: CPT | Mod: CPTII,S$GLB,, | Performed by: INTERNAL MEDICINE

## 2024-07-03 PROCEDURE — 77080 DXA BONE DENSITY AXIAL: CPT | Mod: TC,PO

## 2024-07-03 PROCEDURE — 1159F MED LIST DOCD IN RCRD: CPT | Mod: CPTII,S$GLB,, | Performed by: INTERNAL MEDICINE

## 2024-07-03 PROCEDURE — 1101F PT FALLS ASSESS-DOCD LE1/YR: CPT | Mod: CPTII,S$GLB,, | Performed by: INTERNAL MEDICINE

## 2024-07-03 PROCEDURE — 3078F DIAST BP <80 MM HG: CPT | Mod: CPTII,S$GLB,, | Performed by: INTERNAL MEDICINE

## 2024-07-03 PROCEDURE — 99999 PR PBB SHADOW E&M-EST. PATIENT-LVL IV: CPT | Mod: PBBFAC,,, | Performed by: INTERNAL MEDICINE

## 2024-07-03 PROCEDURE — 3052F HG A1C>EQUAL 8.0%<EQUAL 9.0%: CPT | Mod: CPTII,S$GLB,, | Performed by: INTERNAL MEDICINE

## 2024-07-03 PROCEDURE — 77080 DXA BONE DENSITY AXIAL: CPT | Mod: 26,,, | Performed by: RADIOLOGY

## 2024-07-03 NOTE — PROGRESS NOTES
"  Subjective:    Patient ID:  Maddie Otero is a 73 y.o. female patient here for evaluation Atrial Fibrillation, Hypertension, Hyperlipidemia, and Edema      History of Present Illness:     73-year-old female came here for new patient evaluation and establishment of care.  She was following up with the Cardiology in [place] in the past.  She has past medical history of atrial fibrillation on anticoagulation, COPD uses home oxygen, hypertension, diabetes.  She recently had cervical spine surgery and COPD exacerbation requiring steroid therapy.  She complains of lower extremity edema recently which is bilateral and improving.  Venous Doppler was negative for DVT.  She takes Lasix as needed for lower extremity edema.  Has chronic dyspnea secondary to COPD. no chest pain        Notes from prior cardiology visit in [place] May, 2023  History of Present Illness  Maddie Otero is a pleasant 72 y.o. 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  She has been doing well from cardiac standpoint, AF well controlled and in NSR, on flecainide, Toprol and Eliquis. No recurrence of arrhythmia or palpitations.   Still maintaining reasonable functional status overall.No chest pain, no PND, orthopnea, no edema, no palpitations, no syncope or presyncope. Intermittent shortness of breath related to her COPD, she is on oxygen as needed and she follows with pulmonary in [place]. Her BP is well controlled at home.   Previous intolerance to Cardizem due to severe lower extremity edema  Most recent SPECT stress test normal 2/2021     Review of patient's allergies indicates:   Allergen Reactions    Heparin Itching     Pt states she has no allergy      NOT AN ALLERGY . NOT AWARE OF TAKING IT    Hydrocodone Shortness Of Breath     MED "SHORTENS HER BREATHING"    Penicillins Anaphylaxis, Hives and Itching     Other reaction(s): " "Unknown  Childhood reaction, swelled      Sulfa (sulfonamide antibiotics) Hives    Doxycycline Nausea And Vomiting     Other reaction(s): Abdominal Pain    Clindamycin      "Felt like I was on fire down through throat and felt like I was having spasms in my throat"    Penicillin v      Childhood reaction, swelled       Past Medical History:   Diagnosis Date    A-fib     Anticoagulant long-term use     Arthritis     COPD (chronic obstructive pulmonary disease)     COPD (chronic obstructive pulmonary disease) 2019    Diabetes mellitus, type 2     Diverticulosis     History of left knee replacement 2017    DR CRENSHAW    HNP (herniated nucleus pulposus)     LUMBAR    Hypertension     Lung disease     Pancreatic insufficiency     s/p whipple, non cancer    Presence of dental bridge     UPPER    Wears glasses      Past Surgical History:   Procedure Laterality Date    ANTERIOR CERVICAL DISCECTOMY W/ FUSION N/A 4/30/2024    Procedure: DISCECTOMY, SPINE, CERVICAL, ANTERIOR APPROACH, WITH FUSION;  Surgeon: Phu Pierson DO;  Location: [facility] OR;  Service: Neurosurgery;  Laterality: N/A;  ACDF C4-5, C5-6 with partial C5 Corpectomy    APPENDECTOMY      BACK SURGERY  1994    lower back    BACK SURGERY  2012    lower back    CHOLECYSTECTOMY      HERNIA REPAIR      JOINT REPLACEMENT Left     KNEE    KNEE ARTHROPLASTY Right 5/3/2021    Procedure: ARTHROPLASTY, KNEE;  Surgeon: Manuel Willingham MD;  Location: [facility] OR;  Service: Orthopedics;  Laterality: Right;  guzman    LAPAROSCOPIC REPAIR OF INCISIONAL HERNIA N/A 10/22/2019    Procedure: REPAIR, HERNIA, INCISIONAL, LAPAROSCOPIC;  Surgeon: Pantera Almanza III, MD;  Location: [facility] OR;  Service: General;  Laterality: N/A;    pancrease  2009    stents in pancrease     TONSILLECTOMY      TOTAL KNEE ARTHROPLASTY Left 08/10/2017    WHIPPLE PROCEDURE W/ LAPAROSCOPY  10/09     Social History     Tobacco Use    Smoking status: Former     Current packs/day: 0.00     Average packs/day: 1 " pack/day for 30.0 years (30.0 ttl pk-yrs)     Types: Cigarettes     Start date: 1987     Quit date: 2017     Years since quittin.2    Smokeless tobacco: Never   Substance Use Topics    Alcohol use: No    Drug use: No        Review of Systems   Negative except as mentioned in HPI         Objective        Vitals:    24 1119   BP: 127/68   Pulse: 69       LIPIDS - LAST 2   Lab Results   Component Value Date    CHOL 156 01/10/2014    CHOL 129 2012    HDL 40 01/10/2014    HDL 32 (L) 2012    LDLCALC 92.4 01/10/2014    LDLCALC 67.0 2012    TRIG 118 01/10/2014    TRIG 152 (H) 2012    CHOLHDL 25.6 01/10/2014    CHOLHDL 24.8 2012       CBC - LAST 2  Lab Results   Component Value Date    WBC 15.06 (H) 2024    WBC 15.27 (H) 2024    RBC 3.77 (L) 2024    RBC 3.90 (L) 2024    HGB 12.0 2024    HGB 12.6 2024    HCT 37.9 2024    HCT 38.7 2024     (H) 2024    MCV 99 (H) 2024    MCH 31.8 (H) 2024    MCH 32.3 (H) 2024    MCHC 31.7 (L) 2024    MCHC 32.6 2024    RDW 13.1 2024    RDW 12.9 2024     2024     2024    MPV 10.9 2024    MPV 11.1 2024    GRAN 12.6 (H) 2024    GRAN 83.7 (H) 2024    LYMPH 1.1 2024    LYMPH 7.0 (L) 2024    MONO 1.1 (H) 2024    MONO 7.6 2024    BASO 0.04 2024    BASO 0.03 2024    NRBC 0 2024    NRBC 0 2024       CHEMISTRY & LIVER FUNCTION - LAST 2  Lab Results   Component Value Date     2024     (L) 2024    K 3.9 2024    K 4.1 2024     2024     2024    CO2 28 2024    CO2 21 (L) 2024    ANIONGAP 7 (L) 2024    ANIONGAP 10 2024    BUN 24 (H) 2024    BUN 39 (H) 2024    CREATININE 0.9 2024    CREATININE 1.1 2024     (H) 2024     (H) 2024    CALCIUM  8.1 (L) 05/17/2024    CALCIUM 8.6 (L) 05/16/2024    PH 7.399 05/08/2021    PH 7.412 05/06/2021    MG 1.9 05/17/2024    MG 2.2 05/16/2024    ALBUMIN 3.4 (L) 05/17/2024    ALBUMIN 3.4 (L) 05/16/2024    PROT 6.1 05/17/2024    PROT 6.6 05/16/2024    ALKPHOS 98 05/17/2024    ALKPHOS 110 05/16/2024    ALT 17 05/17/2024    ALT 15 05/16/2024    AST 19 05/17/2024    AST 20 05/16/2024    BILITOT 0.5 05/17/2024    BILITOT 0.3 05/16/2024        CARDIAC PROFILE - LAST 2  Lab Results   Component Value Date    BNP 47 05/14/2024     (H) 04/23/2024    TROPONINI <0.006 05/04/2021    TROPONINI <0.006 05/04/2021    TROPONINIHS 5.4 05/14/2024    TROPONINIHS 5.5 04/23/2024        COAGULATION - LAST 2  Lab Results   Component Value Date    INR 1.0 05/03/2021    INR 0.96 11/27/2012    APTT 27.3 05/03/2021    APTT 26.9 11/27/2012       ENDOCRINE & PSA - LAST 2  Lab Results   Component Value Date    HGBA1C 8.5 (H) 04/23/2024    HGBA1C 8.1 (H) 05/03/2021    TSH 1.373 01/10/2014    TSH 2.458 02/06/2012    PROCAL 0.32 (H) 05/08/2021    PROCAL 1.62 (H) 05/05/2021        ECHOCARDIOGRAM RESULTS  Results for orders placed during the hospital encounter of 12/07/23    Echo    Interpretation Summary    Left Ventricle: The left ventricle is normal in size. Increased wall thickness. There is mild concentric hypertrophy. Normal wall motion. There is normal systolic function with a visually estimated ejection fraction of 60 - 65%. There is normal diastolic function.    Right Ventricle: Normal right ventricular cavity size. Wall thickness is normal. Right ventricle wall motion  is normal. Systolic function is normal.    Tricuspid Valve: There is mild regurgitation with a centrally directed jet.    Pulmonary Artery: No pulmonary hypertension.    IVC/SVC: Normal venous pressure at 3 mmHg.      CURRENT/PREVIOUS VISIT EKG  Results for orders placed or performed during the hospital encounter of 05/14/24   EKG 12-lead    Collection Time: 05/14/24  9:37 AM    Result Value Ref Range    QRS Duration 102 ms    OHS QTC Calculation 464 ms    Narrative    Test Reason : R06.02,    Vent. Rate : 088 BPM     Atrial Rate : 088 BPM     P-R Int : 184 ms          QRS Dur : 102 ms      QT Int : 384 ms       P-R-T Axes : 045 -34 059 degrees     QTc Int : 464 ms    Normal sinus rhythm  Left axis deviation  Abnormal ECG  When compared with ECG of 23-APR-2024 08:49,  No significant change was found  Confirmed by Don Clifford MD ([id number]) on 6/8/2024 1:43:36 PM    Referred By: CHEPE   SELF           Confirmed By:Don Clifford MD     No valid procedures specified.   No results found for this or any previous visit.    No valid procedures specified.          PREVIOUS STRESS TEST              PREVIOUS ANGIOGRAM        PHYSICAL EXAM    CONSTITUTIONAL: Well built, well nourished in no apparent distress  HEENT: No pallor  NECK: no JVD  LUNGS: CTA b/l  HEART: regular rate and rhythm, S1, S2 normal, no murmur   ABDOMEN:  Nondistended  EXTREMITIES:  Mild bilateral lower extremity edema  NEURO: AAO X 3   Psych:  Normal affect    I HAVE REVIEWED :    The vital signs, nurses notes, and all the pertinent radiology and labs.        Current Outpatient Medications   Medication Instructions    albuterol (PROVENTIL/VENTOLIN HFA) 90 mcg/actuation inhaler 2 puffs, Inhalation, Every 6 hours PRN, Rescue    amLODIPine (NORVASC) 5 mg, Oral, Daily    apixaban (ELIQUIS) 5 mg, Oral, 2 times daily    budesonide-glycopyr-formoterol (BREZTRI AEROSPHERE) 160-9-4.8 mcg/actuation HFAA 2 puffs, Inhalation, 2 times daily    cetirizine (ZYRTEC) 10 mg, Oral, Daily    famotidine (PEPCID) 20 mg, Oral, 2 times daily    furosemide (LASIX) 40 mg, Oral, As needed (PRN)    insulin NPH-insulin regular, 70/30, 100 unit/mL (70-30) injection 22 Units, Subcutaneous, 3 times daily with meals    lipase-protease-amylase 12,000-38,000-60,000 units (CREON) CpDR 2 capsules, Oral, 4 times daily    metoprolol succinate (TOPROL-XL) 100 mg,  Oral, 2 times daily    omeprazole (PRILOSEC) 20 mg, Oral, Daily    oxyCODONE-acetaminophen (PERCOCET)  mg per tablet 1 tablet, Oral, 3 times daily PRN    rOPINIRole (REQUIP) 1 mg, Oral, Nightly    sodium chloride (OCEAN NASAL NASL) 1 spray, Nasal, Daily PRN        ECG reviewed by me: Sinus rhythm left axis deviation  Assessment & Plan       73-year-old female came here for new patient evaluation and establishment of care.  She was following up with the Cardiology in [place] in the past.  She has past medical history of atrial fibrillation on anticoagulation, COPD uses home oxygen, hypertension, diabetes.  She recently had cervical spine surgery and COPD exacerbation requiring steroid therapy.  She complains of lower extremity edema recently which is bilateral.  Venous Doppler was negative for DVT.  She takes Lasix as needed for lower extremity edema.  Has chronic dyspnea secondary to COPD. no chest pain  Lower extremity edema possibly secondary to recent steroid use.  Continue Lasix as needed  Repeat echocardiogram  She remains in sinus rhythm.  Continue anticoagulation.  Continue metoprolol and flecainide  Blood pressure controlled.  Continue amlodipine    Follow up in about 2 months (around 9/3/2024).

## 2024-07-05 LAB
OHS QRS DURATION: 100 MS
OHS QTC CALCULATION: 471 MS

## 2024-07-24 ENCOUNTER — DOCUMENT SCAN (OUTPATIENT)
Dept: HOME HEALTH SERVICES | Facility: HOSPITAL | Age: 74
End: 2024-07-24
Payer: MEDICARE

## 2024-07-24 ENCOUNTER — EXTERNAL HOME HEALTH (OUTPATIENT)
Dept: HOME HEALTH SERVICES | Facility: HOSPITAL | Age: 74
End: 2024-07-24
Payer: MEDICARE

## 2024-07-26 ENCOUNTER — DOCUMENT SCAN (OUTPATIENT)
Dept: HOME HEALTH SERVICES | Facility: HOSPITAL | Age: 74
End: 2024-07-26
Payer: MEDICARE

## 2024-08-09 ENCOUNTER — TELEPHONE (OUTPATIENT)
Dept: ENDOCRINOLOGY | Facility: CLINIC | Age: 74
End: 2024-08-09
Payer: MEDICARE

## 2024-08-19 PROBLEM — J18.9 ACUTE PNEUMONIA: Status: RESOLVED | Noted: 2024-05-14 | Resolved: 2024-08-19

## 2024-08-26 NOTE — TELEPHONE ENCOUNTER
----- Message from Jo Ann Barfield sent at 7/12/2017  9:51 AM CDT -----  Pt stopped at the window on her way out and said she discussed it with her daughter and she would like to do supartz injections please thanks  kbb   
Call Natalia @ [phone] & spoke w/ Chandan.  She advised that Supartz is an excluded benefit and considered not medically necessary w/ or w/o a prior authorization.  
Never smoker

## 2024-09-25 ENCOUNTER — OFFICE VISIT (OUTPATIENT)
Dept: NEUROSURGERY | Facility: CLINIC | Age: 74
End: 2024-09-25
Payer: MEDICARE

## 2024-09-25 ENCOUNTER — HOSPITAL ENCOUNTER (OUTPATIENT)
Dept: RADIOLOGY | Facility: HOSPITAL | Age: 74
Discharge: HOME | End: 2024-09-25
Attending: HEALTH CARE PROVIDER
Payer: MEDICARE

## 2024-09-25 VITALS — HEIGHT: 68 IN | BODY MASS INDEX: 32.85 KG/M2

## 2024-09-25 DIAGNOSIS — Z98.1 S/P CERVICAL SPINAL FUSION: Primary | ICD-10-CM

## 2024-09-25 DIAGNOSIS — M85.80 OSTEOPENIA, UNSPECIFIED LOCATION: ICD-10-CM

## 2024-09-25 DIAGNOSIS — Z98.1 S/P CERVICAL SPINAL FUSION: ICD-10-CM

## 2024-09-25 PROCEDURE — 72040 X-RAY EXAM NECK SPINE 2-3 VW: CPT | Mod: 26,,, | Performed by: RADIOLOGY

## 2024-09-25 PROCEDURE — 1159F MED LIST DOCD IN RCRD: CPT | Mod: CPTII,S$GLB,, | Performed by: HEALTH CARE PROVIDER

## 2024-09-25 PROCEDURE — 1101F PT FALLS ASSESS-DOCD LE1/YR: CPT | Mod: CPTII,S$GLB,, | Performed by: HEALTH CARE PROVIDER

## 2024-09-25 PROCEDURE — 72040 X-RAY EXAM NECK SPINE 2-3 VW: CPT | Mod: TC

## 2024-09-25 PROCEDURE — 3052F HG A1C>EQUAL 8.0%<EQUAL 9.0%: CPT | Mod: CPTII,S$GLB,, | Performed by: HEALTH CARE PROVIDER

## 2024-09-25 PROCEDURE — 3288F FALL RISK ASSESSMENT DOCD: CPT | Mod: CPTII,S$GLB,, | Performed by: HEALTH CARE PROVIDER

## 2024-09-25 PROCEDURE — 99215 OFFICE O/P EST HI 40 MIN: CPT | Mod: S$GLB,,, | Performed by: HEALTH CARE PROVIDER

## 2024-09-25 PROCEDURE — 1126F AMNT PAIN NOTED NONE PRSNT: CPT | Mod: CPTII,S$GLB,, | Performed by: HEALTH CARE PROVIDER

## 2024-09-25 NOTE — PROGRESS NOTES
"  Neurosurgery  Post-Operative Follow up    SUBJECTIVE:     History of Present Illness:  Maddie Otero is a 73 y.o. female  presents status post post C4-6 Anterior cervical discectomy and fusion on 04/30/2024 for cervical spinal stenosis, degenerative disc disease, radiculopathy and spondylolisthesis.  Patient presented with her daughter for 3-month postop appointment.  Patient continues to report resolution of preoperative symptoms.  Today, she reports intermittent mild neck pain that comes on after extended activity.  Patient takes over-the-counter Tylenol which does relieve her pain.  She has completed physical therapy and is doing very well postoperatively.  She can perform ADLs without difficulty.  She denies dysphagia, dysphonia, anterior cervical incision issues, new upper extremity radicular pain/weakness or paresthesias.      OBJECTIVE:     Vital Signs  Pain Score: 0-No pain (Pain can increase through the day)  Height: 5' 8" (172.7 cm)  Body mass index is 32.85 kg/m².    Neurosurgery Physical Exam  General:  Very pleasant.  No acute distress.  Neurologic: Alert and oriented. Thought content appropriate.  GCS: E4 V5 M6; Total: 15  Pulmonary: normal respirations, no signs of respiratory distress  Skin: Skin is warm, dry and intact.    Sensory: intact to light touch throughout  Motor Strength: Moves all extremities spontaneously with good tone. No abnormal movements seen.   Bilateral upper extremity strength deltoid/biceps/triceps/hand  5/5     Anterior cervical Incision:  Well healed.    Diagnostic Imaging:  I have personally reviewed cervical x-ray images obtained 09/25/2024 with patient which revealed stable alignment without hardware malfunction or complication.    ASSESSMENT/PLAN:     1. S/P cervical spinal fusion    2. Osteopenia, unspecified location      Patient is doing very well postoperatively and is neurologically intact.  Cervical imaging revealed stable instrumented fusion without acute " findings.  I anticipate as patient continues to heal, intermittent cervical pain after extended activity will resolve.    Furthermore, DEXA scan demonstrate osteopenia.  Patient reported she is taking calcium and vitamin-D as previously discussed.    Patient can follow-up as needed.          S/P cervical spinal fusion    Osteopenia, unspecified location        I spent a total of 41 minutes non-face and face to face time preparing to see the patient (eg, review of tests), obtaining and/or reviewing separately obtained history, documenting clinical information in the electronic or other health record, interpreting results and communicating results to the patient/family/caregiver, or care coordinator.    Lucia Dewey PA-C  Neurosurgery  [facility]

## 2024-09-28 RX ORDER — OMEPRAZOLE 20 MG/1
40 CAPSULE, DELAYED RELEASE ORAL
Qty: 180 CAPSULE | Refills: 3 | Status: SHIPPED | OUTPATIENT
Start: 2024-09-28

## 2024-12-26 RX ORDER — ALBUTEROL SULFATE 90 UG/1
2 INHALANT RESPIRATORY (INHALATION) EVERY 6 HOURS PRN
Qty: 54 G | Refills: 3 | Status: SHIPPED | OUTPATIENT
Start: 2024-12-26

## 2025-01-27 RX ORDER — BUDESONIDE, GLYCOPYRROLATE, AND FORMOTEROL FUMARATE 160; 9; 4.8 UG/1; UG/1; UG/1
2 AEROSOL, METERED RESPIRATORY (INHALATION) 2 TIMES DAILY
Qty: 32.1 G | Refills: 3 | Status: SHIPPED | OUTPATIENT
Start: 2025-01-27

## 2025-08-06 ENCOUNTER — TELEPHONE (OUTPATIENT)
Dept: DERMATOLOGY | Facility: CLINIC | Age: 75
End: 2025-08-06
Payer: MEDICARE

## 2025-08-06 NOTE — TELEPHONE ENCOUNTER
Copied from CRM #[id number]. Topic: Appointments - Appointment Access  >> Aug 6, 2025 10:39 AM Julee wrote:  Type:  Sooner Apoointment Request    Caller is requesting a sooner appointment.  Caller declined first available appointment listed below.  Caller will not accept being placed on the waitlist and is requesting a message be sent to doctor.  Name of Caller:Pt  When is the first available appointment?N/A   Symptoms: Yearly check up  Would the patient rather a call back or a response via [facility]? Call  Best Call Back Number:[phone]  Additional Information: Please call to advise.

## (undated) DEVICE — DRAPE IOBAN 23X17 6650EZ

## (undated) DEVICE — UNDERGLOVE BIOGEL PI MICRO BLUE SZ 8

## (undated) DEVICE — SUT ETHILON 2-0 PSLX 30IN

## (undated) DEVICE — GLOVE BIOGEL SKINSENSE PI 8.0

## (undated) DEVICE — TACKER ABSORBABLE SORBAFIX 0113116

## (undated) DEVICE — UNDERGLOVE BIOGEL PI MICRO BLUE SZ 6.5

## (undated) DEVICE — SUTURE MONOCRYL 4-0 PS-2 27 MCP426H

## (undated) DEVICE — GOWN SMART LRG 044673

## (undated) DEVICE — GOWN SMART IMP BREATHABLE XXLG

## (undated) DEVICE — PAD BOVIE ADULT

## (undated) DEVICE — NDL ECLIPSE SAF REG 18GX1.5IN

## (undated) DEVICE — ALCOHOL 70% ANTISEPTIC ISO 4OZ

## (undated) DEVICE — BLADE CLIPPER NEURO / MDL 4411

## (undated) DEVICE — SUT MCRYL PLUS 4-0 PS2 27IN

## (undated) DEVICE — SUT VICRYL 3-0 27 SH

## (undated) DEVICE — ADHESIVE MASTISOL VIAL 48/BX

## (undated) DEVICE — SCISSORS 5MM APPLIED MEDICAL   CB030

## (undated) DEVICE — TROCAR OPTICAL ZTHREAD 12MMX100MM CTF73

## (undated) DEVICE — CORD BIPOLAR 12 FOOT

## (undated) DEVICE — SUT BONE WAX SYNTHETIC

## (undated) DEVICE — COVER LIGHT HANDLE LB53

## (undated) DEVICE — DRAPE INCISE IOBAN 2 23X17IN

## (undated) DEVICE — BLADE ELECTRO EDGE INSULATED

## (undated) DEVICE — NDL ECLIPSE SAFETY 23G 1.5IN

## (undated) DEVICE — ECOSTAIN SET

## (undated) DEVICE — ELECTRODE REM PLYHSV RETURN 9

## (undated) DEVICE — SOLUTION IRRI H2O BOTTLE 1000ML

## (undated) DEVICE — UNDERGLOVE BIOGEL PI MICRO BLUE SZ 7

## (undated) DEVICE — SLEEVE TROCAR 5MMX100MM  CTS02

## (undated) DEVICE — COVER XRAY 24.5X24IN

## (undated) DEVICE — TAPE MEDIPORE H CLOTH 2INX2YD

## (undated) DEVICE — COLLECTOR BONE VAC DUST AUTOLG

## (undated) DEVICE — COUNT NDL FOAM MAGNET 40COUNT

## (undated) DEVICE — DURAPREP SURG SCRUB 26ML

## (undated) DEVICE — SCISSORS CURVED WITH MONOPOLAR 5DCS

## (undated) DEVICE — SWABSTICK BENZOIN S42450

## (undated) DEVICE — GOWN X-LARGE 044674

## (undated) DEVICE — CONTAINER SPECIMEN OR STER 4OZ

## (undated) DEVICE — DRAPE UTILITY 89731

## (undated) DEVICE — SPONGE BULKEE II ABSRB 6X6.75

## (undated) DEVICE — GLOVE BIOGEL PI  GOLD SZ 7

## (undated) DEVICE — STRIP MEDI WND CLSR 1X5IN

## (undated) DEVICE — STAPLER SKIN PROXIMATE WIDE

## (undated) DEVICE — TROCAR OPTICAL ZTHREAD 5MMX100MM CTF03

## (undated) DEVICE — APPLIER CLIP  5MM

## (undated) DEVICE — TOOL MR8 MATCH HEAD 14CM 3MM

## (undated) DEVICE — SUTURE VICRYL #0 27 UR-6 VCP603H

## (undated) DEVICE — BLADE 4IN EDGE INSULATED

## (undated) DEVICE — FORCEP SPTZLR-MALIS 1.5MM 8IN

## (undated) DEVICE — DRAPE OPMI STERILE

## (undated) DEVICE — TRAY CATH 1-LYR URIMTR 16FR

## (undated) DEVICE — GLOVE BIOGEL PI ULTRA TOUCH GRAY SZ7.5

## (undated) DEVICE — Device

## (undated) DEVICE — MATRIX HEMOSTATIC FLOSEAL 5ML

## (undated) DEVICE — SPONGE SURGIFOAM 100 8.5X12X10

## (undated) DEVICE — BABCOCK WITH RATCHET HANDLE 5MM 5BB

## (undated) DEVICE — SOLUTION IRRI NS BOTTLE 1000ML R5200-01

## (undated) DEVICE — DRAPE THREE-QUARTER 53X77IN

## (undated) DEVICE — TRAY ACF SMH

## (undated) DEVICE — DRAPE PED LAP II 100X72X124IN

## (undated) DEVICE — GOWN ECLIPSE REINF LVL4 TWL LG

## (undated) DEVICE — DRESSING MEPORE 2.5 X 3   670800

## (undated) DEVICE — UNDERGLOVE BIOGEL PI MICRO BLUE SZ 7.5

## (undated) DEVICE — TUBING SUC UNIV W/CONN 12FT

## (undated) DEVICE — GLOVE BIOGEL PI ULTRA TOUCH G GRAY SZ6.5

## (undated) DEVICE — SYRINGE 10ML 302995

## (undated) DEVICE — YANKAUER FLEX NO VENT REG CAP

## (undated) DEVICE — PROTECTOR ULNAR NERVE FOAM

## (undated) DEVICE — NEEDLE SAFETY ECLIPSE 25G 1-1/2IN 305767

## (undated) DEVICE — GLOVE BIOGEL MICRO SURGEON PINK SZ 6.5

## (undated) DEVICE — DRAPE STERI INSTRUMENT 1018

## (undated) DEVICE — TUBING INSUFFLATION 10FT

## (undated) DEVICE — STERISTRIP 1/4 SKIN CLOSURE

## (undated) DEVICE — GLOVE BIOGEL PIMICRO INDIC 8.5

## (undated) DEVICE — SUTURE GORE-TEX CV-0 THX-25 0N13A

## (undated) DEVICE — DISSECTOR KITTNER 13300

## (undated) DEVICE — TAPE SILK 3IN

## (undated) DEVICE — NEEDLE INSUFFLATION 120MM 172015

## (undated) DEVICE — GLOVE BIOGEL MICRO SURGEON PINK SZ 7

## (undated) DEVICE — ELECTRODE BLADE INSULATED 1 IN

## (undated) DEVICE — GLOVE BIOGEL PI ORTHO PRO BROWN SZ 7

## (undated) DEVICE — TRAY GENERAL LAPAROSCOPY

## (undated) DEVICE — DRAPE C ARM 42 X 120 10/BX

## (undated) DEVICE — NDL SPINAL 18GX3.5 SPINOCAN

## (undated) DEVICE — CATH JELCO TEFLON 16G 2IN